# Patient Record
Sex: MALE | Race: ASIAN | NOT HISPANIC OR LATINO | URBAN - METROPOLITAN AREA
[De-identification: names, ages, dates, MRNs, and addresses within clinical notes are randomized per-mention and may not be internally consistent; named-entity substitution may affect disease eponyms.]

---
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
## 2020-01-02 ENCOUNTER — APPOINTMENT (OUTPATIENT)
Dept: HEMATOLOGY ONCOLOGY | Facility: CLINIC | Age: 20
End: 2020-01-02

## 2020-01-14 ENCOUNTER — OUTPATIENT (OUTPATIENT)
Dept: OUTPATIENT SERVICES | Facility: HOSPITAL | Age: 20
LOS: 1 days | Discharge: HOME | End: 2020-01-14

## 2020-01-14 DIAGNOSIS — C81.98 HODGKIN LYMPHOMA, UNSPECIFIED, LYMPH NODES OF MULTIPLE SITES: ICD-10-CM

## 2020-01-16 ENCOUNTER — RESULT REVIEW (OUTPATIENT)
Age: 20
End: 2020-01-16

## 2020-01-16 ENCOUNTER — APPOINTMENT (OUTPATIENT)
Dept: HEMATOLOGY ONCOLOGY | Facility: CLINIC | Age: 20
End: 2020-01-16

## 2020-01-16 LAB
BASOPHILS # BLD AUTO: 0 K/UL — SIGNIFICANT CHANGE UP (ref 0–0.2)
BASOPHILS NFR BLD AUTO: 0.3 % — SIGNIFICANT CHANGE UP (ref 0–2)
EOSINOPHIL # BLD AUTO: 0.3 K/UL — SIGNIFICANT CHANGE UP (ref 0–0.5)
EOSINOPHIL NFR BLD AUTO: 2.1 % — SIGNIFICANT CHANGE UP (ref 0–6)
HCT VFR BLD CALC: 41.1 % — SIGNIFICANT CHANGE UP (ref 39–50)
HGB BLD-MCNC: 13.3 G/DL — SIGNIFICANT CHANGE UP (ref 13–17)
LYMPHOCYTES # BLD AUTO: 13.1 % — SIGNIFICANT CHANGE UP (ref 13–44)
LYMPHOCYTES # BLD AUTO: 2 K/UL — SIGNIFICANT CHANGE UP (ref 1–3.3)
MCHC RBC-ENTMCNC: 25.9 PG — LOW (ref 27–34)
MCHC RBC-ENTMCNC: 32.5 G/DL — SIGNIFICANT CHANGE UP (ref 32–36)
MCV RBC AUTO: 79.9 FL — LOW (ref 80–100)
MONOCYTES # BLD AUTO: 0.6 K/UL — SIGNIFICANT CHANGE UP (ref 0–0.9)
MONOCYTES NFR BLD AUTO: 4.1 % — SIGNIFICANT CHANGE UP (ref 2–14)
NEUTROPHILS # BLD AUTO: 12 K/UL — HIGH (ref 1.8–7.4)
NEUTROPHILS NFR BLD AUTO: 80.4 % — HIGH (ref 43–77)
PLATELET # BLD AUTO: 308 K/UL — SIGNIFICANT CHANGE UP (ref 150–400)
RBC # BLD: 5.15 M/UL — SIGNIFICANT CHANGE UP (ref 4.2–5.8)
RBC # FLD: 13.3 % — SIGNIFICANT CHANGE UP (ref 10.3–14.5)
WBC # BLD: 15 K/UL — HIGH (ref 3.8–10.5)
WBC # FLD AUTO: 15 K/UL — HIGH (ref 3.8–10.5)

## 2020-01-16 NOTE — ASSESSMENT
[FreeTextEntry1] : The patient is a 18 y/o M with relapsed classical  Hodgkins lymphoma. He was being seen by Dr. Emery for treatment and was started on ICE- had day 1 of etoposide, experienced nausea/vomiting, he was admitted to have day 2 of treatment but then left AMA. He cancelled further appointments and did not want any further treatment despite numerous attempts to discuss with patient, mother, grandmother. He received ABVE at [facility]. Compliance was an issue then. Since he has had treatment with gemzar and brentuximab...with POD\par \par Biopsy on 9/23/19 showed evidence of dz. He was referred here for a consultation for a autoPSCT evaluation.\par Patient has a reputation of not being compliant with office visit as well as therapy. Patient is against receiving chemotherapy at this time. Therefore consideration for a evaluation of local radiation will be made followed by course of Brentuximab.\par \par Plan to schedule PET/CT in mid November 2019. \par Plan to refer for a radiation evaluation for localized therapy if cervical area is only site of disease..\par After XRT , the patient will receive Brentuximab every 3 weeks. Could consider Opdivo\par Upon receiving the medication, the patient will be monitored with f/u scans every 4 to 6  months.\par If his condition does not improve or worsens, chemotherapy will be reconsidered.\par To d/w Dr. Emery about further plan. \par Pt met with SW today.\par see me in 6 to 8 weeks\par Gravity of situation discussed

## 2020-01-16 NOTE — PHYSICAL EXAM
[Fully active, able to carry on all pre-disease performance without restriction] : Status 0 - Fully active, able to carry on all pre-disease performance without restriction [Normal] : affect appropriate [de-identified] : 1 cm cervical LN

## 2020-01-16 NOTE — ADDENDUM
[FreeTextEntry1] : Documented by Gissel Velázquez acting as a scribe for Dr. Keven Oneal on 01/16/2020.\par \par All medical record entries made by the Scribe were at my, Dr. Keven Oneal, direction and personally dictated by me on 01/16/2020. I have reviewed the chart and agree that  the record accurately reflects my personal performance of the history, physical exam, assessment and plan. I have also personally directed, reviewed, and agree with the discharge instructions.\par

## 2020-01-16 NOTE — HISTORY OF PRESENT ILLNESS
[de-identified] : Mr. Lai is a 19 year old male who is referred by Dr. Kalie Emery for a consultation. Recent PET/CT and bx of the left neck mass shows evidence of  relapsed classical Hodgkins lymphoma.\par Patient initially presented to the [facility] ED on 9/4/18 from [facility] with a mediastinal mass. Upon work up he was found to have Stage IVB disease. Ultrasound guided right supraclavicular lymph node Classical Hodgkins Lymphoma- large atypical cells to be CD30, CD15, PAX5+, MUM1+, LCA(-), CD20(-), CD79a(-), CD3(-), ALK(-).\par Bilateral bone marrow biopsy on 9/6/18- Cellular marrow with maturing trilineage hematopoiesis and no morphologic evidence of lymphoma\par PET/CT completed on 9/10/18- Hypermetabolic lymphadenopathy in neck and thorax, multiple hypermetabolic foci in the bilateral lower cervical and supraclavicular regions, measuring 0.9 x 0.7 cm demonstrates SUV 4.3 anterior mediastinal mass measuring 4.2 x 3 cm demonstrating peripheral FDG avidity and central photopenia, SUV: 6.9. Marked mediastinal and bilateral hilar lymphadenopathy demonstrating FDG avidity, right hilar lymph node measures 3.8 x 2.4 cm, SUV: 10.3. 5 mm nodule is again noted in the right middle lobe. Several hypermetabolic foci identified predominantly throughout the axial skeleton with foci noted scattered throughout the upper thoracic spine and a singular focus in the right anterior eighth rib FDG avid focus in the right side of the T7 vertebral body with corresponding lucency on CT measuring 0.9 x 0.9 cm, SUV 14.7. Two hypermetabolic foci in the appendicular skeleton with corresponding underlying lucency on CT. There is an FDG avid focus in the left inferior glenoid and a corresponding lucency on CT measuring 0.9 x 0.7 cm, SUV 12. There is also a 1.1 x 0.7 cm lucency in the right femoral head with corresponding FDG avidity, SUV 7.3. He was placed on/following a protocol- [facility] 1331, treatment with ABVE-PC (adriamycin, bleomycin, vincristine, etoposide, cytoxan, prednisone, dexrazoxane). He was withdrawn from the study due to his social situation, difficulty with compliance. 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 He was treated with chemotherapy from Sept 2018- Dec 2018. Interim PET/CT completed on 11/3/18- Partially hypermetabolic anterior mediastinal mass is decreased in \par size and metabolism as compared to prior study dated 9/10/2018, compatible with a partial response to interval therapy (Deauville 4). Resolution of additional previously seen hypermetabolic mediastinal, hilar, and cardiophrenic lymph nodes.Marked interval decrease in hypermetabolic foci in the axial and appendicular skeleton with residual hypermetabolic focus in left glenoid which may represent a small focus of residual disease (Deauville 4). He completed 5 cycles of chemotherapy, but missed about 10 appointments, and refused further chemotherapy after cycle 5 due to side effects. He underwent MRI Neck, Abdomen, and Pelvis from 2/2019 showed new lymph node enlargement to the L neck and redemonstrated osseous infiltration involving the bilateral pelvic bones and proximal femurs. CT Chest from 3/2019 showed mediastinal enlarged lymph nodes which were either unchanged or demonstrate interval progression since 11/2018, progressive R hilar lymphadenopathy, and a new cluster of small nodular opacities within the right lower lobe. PET Scan done 3/2019 revealed a new hypermetabolic lymphadenopathy in the left neck measuring approximately 2.4 x 2.1cm in the left level III/V cervical regions. There is also new hypermetabolic 1.2 x 0.8 cm left level VB cervical lymph node and chest new hypermetabolic approximately 1.1 x 0.9 cm right upper paratracheal node. Reference approximately 3.4 x 2.0 cm subcarinal node. Approximately 3.8 x 3.1cm right perihilar mass. as compared to PET/CT from 11/3/2018 suspicious for recurrent lymphoma. A nonspecific new diffuse splenic hypermetabolism. A new hypermetabolic opacity/consolidation in the right lower lung, approximately 2 x 1.3cm opacity/consolidation in the superior segment of the right lower lobe. Ultrasound guided core biopsy of left cervical lymph node done on 4/11/19- confirmed refractory disease. Bone marrow biopsy was negative. \par \par  [de-identified] : The patient is here today for a follow-up consultation for Auto-PSCT. Patient was being treated by Dr. Emery and was started on ICE- had day 1 of etoposide, experienced nausea/vomiting, he was admitted to have day 2 of treatment but then left AMA. He cancelled further appointments and did not want any further treatment despite numerous attempts to discuss with patient, mother, grandmother.........A recent PET/CT and bx of a left neck lymph node shows evidence of relapsed HL. He is doing well overall and offers no acute sxs. Denies mouth sores, eye dryness, blurred vision, nausea, vomiting, diarrhea. No CP, SOB or LE edema. He c/o a lump on the left side of his neck. He stresses that he is not fond of further treatment with chemotherapy.\par \par \par \par \par \par

## 2020-01-28 ENCOUNTER — APPOINTMENT (OUTPATIENT)
Dept: HEMATOLOGY ONCOLOGY | Facility: CLINIC | Age: 20
End: 2020-01-28
Payer: MEDICAID

## 2020-01-28 ENCOUNTER — RESULT REVIEW (OUTPATIENT)
Age: 20
End: 2020-01-28

## 2020-01-28 VITALS
DIASTOLIC BLOOD PRESSURE: 65 MMHG | OXYGEN SATURATION: 100 % | HEART RATE: 77 BPM | SYSTOLIC BLOOD PRESSURE: 99 MMHG | WEIGHT: 97.86 LBS | TEMPERATURE: 98 F | RESPIRATION RATE: 16 BRPM

## 2020-01-28 PROCEDURE — 99215 OFFICE O/P EST HI 40 MIN: CPT

## 2020-01-29 LAB
ALBUMIN SERPL ELPH-MCNC: 4.5 G/DL
ALP BLD-CCNC: 96 U/L
ALT SERPL-CCNC: 16 U/L
ANION GAP SERPL CALC-SCNC: 15 MMOL/L
AST SERPL-CCNC: 12 U/L
BILIRUB SERPL-MCNC: 0.5 MG/DL
BUN SERPL-MCNC: 15 MG/DL
CALCIUM SERPL-MCNC: 9.9 MG/DL
CHLORIDE SERPL-SCNC: 100 MMOL/L
CO2 SERPL-SCNC: 26 MMOL/L
CREAT SERPL-MCNC: 0.76 MG/DL
GLUCOSE SERPL-MCNC: 80 MG/DL
LDH SERPL-CCNC: 141 U/L
POTASSIUM SERPL-SCNC: 4.2 MMOL/L
PROT SERPL-MCNC: 8.2 G/DL
SODIUM SERPL-SCNC: 141 MMOL/L

## 2020-01-31 ENCOUNTER — TRANSCRIPTION ENCOUNTER (OUTPATIENT)
Age: 20
End: 2020-01-31

## 2020-01-31 ENCOUNTER — INPATIENT (INPATIENT)
Facility: HOSPITAL | Age: 20
LOS: 0 days | Discharge: LEFT BEFORE DISCHARGE | End: 2020-01-31
Attending: INTERNAL MEDICINE | Admitting: INTERNAL MEDICINE
Payer: MEDICAID

## 2020-01-31 VITALS
WEIGHT: 95.9 LBS | HEIGHT: 67.72 IN | TEMPERATURE: 98 F | SYSTOLIC BLOOD PRESSURE: 107 MMHG | OXYGEN SATURATION: 99 % | DIASTOLIC BLOOD PRESSURE: 63 MMHG | HEART RATE: 79 BPM | RESPIRATION RATE: 17 BRPM

## 2020-01-31 DIAGNOSIS — C81.70 OTHER HODGKIN LYMPHOMA, UNSPECIFIED SITE: ICD-10-CM

## 2020-01-31 DIAGNOSIS — C81.90 HODGKIN LYMPHOMA, UNSPECIFIED, UNSPECIFIED SITE: ICD-10-CM

## 2020-01-31 PROCEDURE — 99221 1ST HOSP IP/OBS SF/LOW 40: CPT

## 2020-01-31 RX ORDER — SODIUM CHLORIDE 9 MG/ML
1000 INJECTION INTRAMUSCULAR; INTRAVENOUS; SUBCUTANEOUS
Refills: 0 | Status: DISCONTINUED | OUTPATIENT
Start: 2020-01-31 | End: 2020-01-31

## 2020-01-31 RX ORDER — ACETAMINOPHEN 500 MG
650 TABLET ORAL EVERY 6 HOURS
Refills: 0 | Status: DISCONTINUED | OUTPATIENT
Start: 2020-01-31 | End: 2020-01-31

## 2020-01-31 RX ORDER — LIDOCAINE AND PRILOCAINE CREAM 25; 25 MG/G; MG/G
1 CREAM TOPICAL ONCE
Refills: 0 | Status: DISCONTINUED | OUTPATIENT
Start: 2020-01-31 | End: 2020-01-31

## 2020-01-31 NOTE — DISCHARGE NOTE PROVIDER - CARE PROVIDER_API CALL
Keven Oneal)  Medical Oncology  [address] [place] [address]  Phone: [phone]  Fax: [phone]  Follow Up Time:

## 2020-01-31 NOTE — DISCHARGE NOTE NURSING/CASE MANAGEMENT/SOCIAL WORK - PATIENT PORTAL LINK FT
You can access the FollowMyHealth Patient Portal offered by [facility] by registering at the following website: [email or web address]. By joining [facility] FollowMyHealth portal, you will also be able to view your health information using other applications (apps) compatible with our system.

## 2020-01-31 NOTE — H&P ADULT - HISTORY OF PRESENT ILLNESS
20 y/o M with relapsed classical Hodgkins lymphoma  admitted for cycle 1 ICE with plan for auto SCT     Patient was initially diagnosed with stage IV CHL in 9/2018 and treated with ABVE-PC completed 12/2018   after 5 cycles due to noncompliance and patient refusal. Relapsed in 2/2019 and started on salvage treatment in   4/2019 with gemzar/Brentuximab which was also stopped secondary to patients refusal and noncompliance.    It was recommended that the patient receive 2 cycles of ICE followed by an auto SCT. Patient started cycle 1 with ICE  , received day 1 etoposide, experienced nausea/vomiting. Admitted for remainder of chemotherapy treatment but left AMA    PET on  12/19/19 revealed POD. Patient is now admitted for cycle 1 ICE with plan for Auto SCT. 20 y/o M with relapsed classical Hodgkins lymphoma  admitted for cycle 1 ICE with plan for auto SCT.   Patient vomited on admission.     Patient was initially diagnosed with stage IV CHL in 9/2018 and treated with ABVE-PC completed 12/2018   after 5 cycles due to noncompliance and patient refusal. Relapsed in 2/2019 and started on salvage treatment in   4/2019 with gemzar/Brentuximab which was also stopped secondary to patients refusal and noncompliance.    It was recommended that the patient receive 2 cycles of ICE followed by an auto SCT. Patient started cycle 1 with ICE  , received day 1 etoposide, experienced nausea/vomiting. Admitted for remainder of chemotherapy treatment but left AMA    PET on  12/19/19 revealed POD. Patient is now admitted for cycle 1 ICE with plan for Auto SCT.

## 2020-01-31 NOTE — H&P ADULT - PROBLEM SELECTOR PLAN 1
Patient admitted to [address] [place] [address].  After a brief interview with the patient, he decided he did not want to stay in the hospital for his chemotherapy. Attempted to encourage patient to stay and discussed the consequences of not being treated.   Social work intervention provided.  Dr Oneal notified of patients intent to leave.  AMA papers provided to patient for signature.

## 2020-01-31 NOTE — DISCHARGE NOTE PROVIDER - HOSPITAL COURSE
20 y/o M with relapsed classical Hodgkins lymphoma    admitted for cycle 1 ICE with plan for auto SCT. PAtient vomited upon entry to his hospital room. Offered antiemetics, but refused. Brief interview with patient when he got to the floor. After a few minutes of being in the hospital, the patient decided he did not want to stay to receive his chemotherapy. Attempt made to encourage patient to stay. Social work intervention provided. Patient decided to sign out AMA. Paper work signed. Dr Oneal notificed of patients intent.

## 2020-01-31 NOTE — DISCHARGE NOTE PROVIDER - NSDCCPCAREPLAN_GEN_ALL_CORE_FT
PRINCIPAL DISCHARGE DIAGNOSIS  Diagnosis: Hodgkins lymphoma in relapse  Assessment and Plan of Treatment: Hodgkins lymphoma in relapse  Please make an appointment to follow up with your doctor.

## 2020-01-31 NOTE — H&P ADULT - NSHPLABSRESULTS_GEN_ALL_CORE
CBC for Oncology (01.28.20 @ 14:07)    WBC Count: 17.7 K/uL    RBC Count: 5.19 M/uL    Hemoglobin: 13.7 g/dL    Hematocrit: 41.4 %    Mean Cell Volume: 79.8 fl    Mean Cell Hemoglobin: 26.5 pg    Mean Cell Hemoglobin Conc: 33.2 g/dL    Red Cell Distrib Width: 14.0 %    Platelet Count - Automated: 287 K/uL

## 2020-02-03 NOTE — RESULTS/DATA
[FreeTextEntry1] : PET/CT Skull to Thigh (12/19/19): 1. Extensive physiologic brown adipose tissue in neck, thorax, and upper abdomen, limits evaluation. There is difficult to delineate hypermetabolic lymphadenopathy in neck and thorax, new as compared to prior study from 7/13/2019, compatible with progression of lymphoma. Contrast enhanced CT is recommended to further delineate lymphadenopathy. FDG PET/CT may be repeated following pretreatment with a benzodiazepine and warming, if clinically indicated. 2. Multiple new small nodular pulmonary opacities throughout right lung are nonspecific. The largest opacities are FDG-avid. Differential diagnosis includes infection, inflammation, and lymphoma. Please correlate clinically and follow-up with dedicated CT. 3. Nonspecific symmetric tonsillar hypermetabolism, decreased as compared to prior study.

## 2020-02-03 NOTE — ASSESSMENT
[FreeTextEntry1] : The patient is a 20 y/o M with relapsed classical  Hodgkins lymphoma. He was being seen by Dr. Emery for treatment and was started on ICE- had day 1 of etoposide, experienced nausea/vomiting, he was admitted to have day 2 of treatment but then left AMA. He cancelled further appointments and did not want any further treatment despite numerous attempts to discuss with patient, mother, grandmother. He received ABVE at [facility]. Compliance was an issue then. Since he has had treatment with gemzar and brentuximab...with POD\par Biopsy on 9/23/19 showed evidence of dz. He was referred here for a consultation for a autoPSCT evaluation.\par Patient has a reputation of not being compliant with office visit as well as therapy. He has had further POD on scan. Gravity of situation discussed. Pt left from appt last week w/o being seen. Compliance is a major issue. He is not following up as he needs to. \par \par I have had a long discussion with the patient regarding the risks, benefits, alternatives and logistics to autologous stem cell transplantation. I have reviewed with the patient the success rates with various treatments including chemotherapy only as well as chemotherapy and autologous stem cell transplant. \par \par I have also reviewed the pre-transplant testing for vital organ testing including, but not limited to echocardiogram, MUGA, PFTs, urine collection, bone marrow biopsy, sinus x-rays, dental evaluation and liver function.\par \par I have reviewed with the patient the role of Neupogen/Mozobil for 5 days prior to collecting stem cells. I have also discussed the process of apheresis as a way to collect the peripheral stem cells. Anticoagulation with Citrate during apheresis was discussed, along with side effects including but not limited to hypocalcemia mild dysesthesias (most common) to tetany, seizures and cardiac arrhythmias. Treatment with oral or intravenous calcium supplementation in the setting of hypocalcemia was also discussed. \par \par I have discussed with the patient the pre-administration of Kepivance IV x 3 days, as a GI prophylaxis with the aim to lessen swelling, irritation, sores, and ulcers in the GI tract caused by high dose chemotherapy. Side effects including but not limited to flushing, itching from Kepivance were also discussed. \par \par I reviewed with the patient the process of autologous stem cell transplant. I have reviewed the four week admission in the hospital including initial chemotherapy, lasting for 6 days, followed by a 1-2 day rest period, followed by the transplant via a triple lumen catheter. I reviewed post-transplant expectations and possible need for supportive care in the post transplant state as well as post-transplant chemotherapy. I instructed the patient, he may require various transfusions including, but not limited to, PRBCs, platelets, fluids, electrolytes, etc.\par \par I have also discussed the need for antifungal, antiviral, antibiotics, and other therapies to keep the patient comfortable in the 6-9 months post transplant and discharge. Possible risks were discussed including infection, bleeding, inflammation in bowel, inflammation in the kidneys and livers. \par \par Post-discharge instructions were reviewed including diet control, crowd controls for ideally 6 weeks post transplant. I also discussed common complaints in the post-discharge state including fatigue, weakness, and supportive care. The patient has a supportive environment at home. I have also reviewed treatment options if the transplant is not effective. \par \par All questions were answered, patient has verbalized understanding. Emotional support provided. Literature and consents were provided for the patient for their review. Patient to consider options.\par \par Plan for ICE treatment prior to Auto-PSCT which would consist of three days in the hospital every 2-3 weeks, aiming for three sessions of chemotherapy. After second session of ICE, would repeat scans to  response to treatment. After third session of ICE, would plan to collect stem cells. Plan for admission for first session on 1/30 or 1/31. Plan for pretesting.\par Patient will receive Neulasta shots and will have blood work monitored twice every week off from chemotherapy.\par Reviewed PET/CT Skull to Thigh (12/19/19) and discussed with patient - worsening disease.\par Gravity of situation discussed\par Will continue discussing plan of care with Dr. Emery.\par Patient requests that his mother be called at [phone] when the hospital is ready to admit him for ICE sessions.\par Ordered Emla cream for port-access on 1/28/2020. Ordered Oxycodone 5mg q 6 hours for 5 days for pain on 1/28/2020. Recommend taking Advil or Tylenol for pain as well.\par Labs sent for CMP today.\par RTC for f/u with Delores or ANIVAL Molina on 2/6. Neulasta shots to be given on 2/3 and 2/6.\par Compliance stressed...

## 2020-02-03 NOTE — REVIEW OF SYSTEMS
[Recent Change In Weight] : ~T recent weight change [Negative] : Integumentary [FreeTextEntry9] : pain of shoulder blades [FreeTextEntry2] : weight loss [de-identified] : enlarged cervical LNs

## 2020-02-03 NOTE — HISTORY OF PRESENT ILLNESS
[de-identified] : Mr. Lai is a 19 year old male who is referred by Dr. Kalie Emery for a consultation. Recent PET/CT and bx of the left neck mass shows evidence of  relapsed classical Hodgkins lymphoma.\par Patient initially presented to the [facility] ED on 9/4/18 from [facility] with a mediastinal mass. Upon work up he was found to have Stage IVB disease. Ultrasound guided right supraclavicular lymph node Classical Hodgkins Lymphoma- large atypical cells to be CD30, CD15, PAX5+, MUM1+, LCA(-), CD20(-), CD79a(-), CD3(-), ALK(-).\par Bilateral bone marrow biopsy on 9/6/18- Cellular marrow with maturing trilineage hematopoiesis and no morphologic evidence of lymphoma\par PET/CT completed on 9/10/18- Hypermetabolic lymphadenopathy in neck and thorax, multiple hypermetabolic foci in the bilateral lower cervical and supraclavicular regions, measuring 0.9 x 0.7 cm demonstrates SUV 4.3 anterior mediastinal mass measuring 4.2 x 3 cm demonstrating peripheral FDG avidity and central photopenia, SUV: 6.9. Marked mediastinal and bilateral hilar lymphadenopathy demonstrating FDG avidity, right hilar lymph node measures 3.8 x 2.4 cm, SUV: 10.3. 5 mm nodule is again noted in the right middle lobe. Several hypermetabolic foci identified predominantly throughout the axial skeleton with foci noted scattered throughout the upper thoracic spine and a singular focus in the right anterior eighth rib FDG avid focus in the right side of the T7 vertebral body with corresponding lucency on CT measuring 0.9 x 0.9 cm, SUV 14.7. Two hypermetabolic foci in the appendicular skeleton with corresponding underlying lucency on CT. There is an FDG avid focus in the left inferior glenoid and a corresponding lucency on CT measuring 0.9 x 0.7 cm, SUV 12. There is also a 1.1 x 0.7 cm lucency in the right femoral head with corresponding FDG avidity, SUV 7.3. He was placed on/following a protocol- [facility], treatment with ABVE-PC (adriamycin, bleomycin, vincristine, etoposide, cytoxan, prednisone, dexrazoxane). He was withdrawn from the study due to his social situation, difficulty with compliance. 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 He was treated with chemotherapy from Sept 2018- Dec 2018. Interim PET/CT completed on 11/3/18- Partially hypermetabolic anterior mediastinal mass is decreased in \par size and metabolism as compared to prior study dated 9/10/2018, compatible with a partial response to interval therapy (Deauville 4). Resolution of additional previously seen hypermetabolic mediastinal, hilar, and cardiophrenic lymph nodes.Marked interval decrease in hypermetabolic foci in the axial and appendicular skeleton with residual hypermetabolic focus in left glenoid which may represent a small focus of residual disease (Deauville 4). He completed 5 cycles of chemotherapy, but missed about 10 appointments, and refused further chemotherapy after cycle 5 due to side effects. He underwent MRI Neck, Abdomen, and Pelvis from 2/2019 showed new lymph node enlargement to the L neck and redemonstrated osseous infiltration involving the bilateral pelvic bones and proximal femurs. CT Chest from 3/2019 showed mediastinal enlarged lymph nodes which were either unchanged or demonstrate interval progression since 11/2018, progressive R hilar lymphadenopathy, and a new cluster of small nodular opacities within the right lower lobe. PET Scan done 3/2019 revealed a new hypermetabolic lymphadenopathy in the left neck measuring approximately 2.4 x 2.1cm in the left level III/V cervical regions. There is also new hypermetabolic 1.2 x 0.8 cm left level VB cervical lymph node and chest new hypermetabolic approximately 1.1 x 0.9 cm right upper paratracheal node. Reference approximately 3.4 x 2.0 cm subcarinal node. Approximately 3.8 x 3.1cm right perihilar mass. as compared to PET/CT from 11/3/2018 suspicious for recurrent lymphoma. A nonspecific new diffuse splenic hypermetabolism. A new hypermetabolic opacity/consolidation in the right lower lung, approximately 2 x 1.3cm opacity/consolidation in the superior segment of the right lower lobe. Ultrasound guided core biopsy of left cervical lymph node done on 4/11/19- confirmed refractory disease. Bone marrow biopsy was negative. \par \par  [de-identified] : The patient is here today for a follow-up consultation for Auto-PSCT. Patient is accompanied by his mother. He has lost a few lbs. He c/o enlarged cervical lymph nodes and pain of his shoulder blades. Denies fever/chills, night sweats, mouth sores, eye dryness, blurred vision, nausea, vomiting, diarrhea. No CP, SOB or LE edema. He requests the use of Emla numbing cream when his port is being accessed. He expresses interest in beginning chemotherapy ASAP. Progression of disease dicsussed on recent scan

## 2020-02-03 NOTE — ADDENDUM
[FreeTextEntry1] : Documented by Gissel Velázquez acting as a scribe for Dr. Keven Oneal on 01/28/2020.\par \par All medical record entries made by the Scribe were at my, Dr. Keven Oneal, direction and personally dictated by me on 01/28/2020. I have reviewed the chart and agree that  the record accurately reflects my personal performance of the history, physical exam, assessment and plan. I have also personally directed, reviewed, and agree with the discharge instructions.

## 2020-02-03 NOTE — PHYSICAL EXAM
[Fully active, able to carry on all pre-disease performance without restriction] : Status 0 - Fully active, able to carry on all pre-disease performance without restriction [Normal] : affect appropriate [de-identified] : 1 cm cervical LN

## 2020-02-04 ENCOUNTER — APPOINTMENT (OUTPATIENT)
Dept: INFUSION THERAPY | Facility: HOSPITAL | Age: 20
End: 2020-02-04

## 2020-02-06 ENCOUNTER — APPOINTMENT (OUTPATIENT)
Dept: HEMATOLOGY ONCOLOGY | Facility: CLINIC | Age: 20
End: 2020-02-06

## 2020-02-11 ENCOUNTER — APPOINTMENT (OUTPATIENT)
Dept: HEMATOLOGY ONCOLOGY | Facility: CLINIC | Age: 20
End: 2020-02-11

## 2020-02-19 ENCOUNTER — EMERGENCY (EMERGENCY)
Age: 20
LOS: 1 days | Discharge: LEFT BEFORE DISCHARGE | End: 2020-02-19
Attending: EMERGENCY MEDICINE | Admitting: EMERGENCY MEDICINE
Payer: MEDICAID

## 2020-02-19 VITALS
HEART RATE: 82 BPM | TEMPERATURE: 99 F | OXYGEN SATURATION: 100 % | SYSTOLIC BLOOD PRESSURE: 90 MMHG | WEIGHT: 97.78 LBS | DIASTOLIC BLOOD PRESSURE: 60 MMHG | RESPIRATION RATE: 18 BRPM

## 2020-02-19 VITALS
SYSTOLIC BLOOD PRESSURE: 106 MMHG | RESPIRATION RATE: 14 BRPM | OXYGEN SATURATION: 100 % | DIASTOLIC BLOOD PRESSURE: 55 MMHG | TEMPERATURE: 99 F | HEART RATE: 80 BPM

## 2020-02-19 LAB
ALBUMIN SERPL ELPH-MCNC: 3.8 G/DL — SIGNIFICANT CHANGE UP (ref 3.3–5)
ALP SERPL-CCNC: 90 U/L — SIGNIFICANT CHANGE UP (ref 60–270)
ALT FLD-CCNC: 14 U/L — SIGNIFICANT CHANGE UP (ref 4–41)
ANION GAP SERPL CALC-SCNC: 12 MMO/L — SIGNIFICANT CHANGE UP (ref 7–14)
APTT BLD: 38.8 SEC — HIGH (ref 27.5–36.3)
AST SERPL-CCNC: 14 U/L — SIGNIFICANT CHANGE UP (ref 4–40)
BASE EXCESS BLDV CALC-SCNC: 3.9 MMOL/L — SIGNIFICANT CHANGE UP
BASOPHILS # BLD AUTO: 0.05 K/UL — SIGNIFICANT CHANGE UP (ref 0–0.2)
BASOPHILS NFR BLD AUTO: 0.3 % — SIGNIFICANT CHANGE UP (ref 0–2)
BILIRUB SERPL-MCNC: 0.2 MG/DL — SIGNIFICANT CHANGE UP (ref 0.2–1.2)
BLOOD GAS VENOUS - CREATININE: SIGNIFICANT CHANGE UP MG/DL (ref 0.5–1.3)
BUN SERPL-MCNC: 14 MG/DL — SIGNIFICANT CHANGE UP (ref 7–23)
CALCIUM SERPL-MCNC: 9.3 MG/DL — SIGNIFICANT CHANGE UP (ref 8.4–10.5)
CHLORIDE BLDV-SCNC: 104 MMOL/L — SIGNIFICANT CHANGE UP (ref 96–108)
CHLORIDE SERPL-SCNC: 101 MMOL/L — SIGNIFICANT CHANGE UP (ref 98–107)
CO2 SERPL-SCNC: 26 MMOL/L — SIGNIFICANT CHANGE UP (ref 22–31)
CREAT SERPL-MCNC: 0.63 MG/DL — SIGNIFICANT CHANGE UP (ref 0.5–1.3)
EOSINOPHIL # BLD AUTO: 0.42 K/UL — SIGNIFICANT CHANGE UP (ref 0–0.5)
EOSINOPHIL NFR BLD AUTO: 2.2 % — SIGNIFICANT CHANGE UP (ref 0–6)
GAS PNL BLDV: 140 MMOL/L — SIGNIFICANT CHANGE UP (ref 136–146)
GLUCOSE BLDV-MCNC: 93 MG/DL — SIGNIFICANT CHANGE UP (ref 70–99)
GLUCOSE SERPL-MCNC: 104 MG/DL — HIGH (ref 70–99)
HCO3 BLDV-SCNC: 26 MMOL/L — SIGNIFICANT CHANGE UP (ref 20–27)
HCT VFR BLD CALC: 41.1 % — SIGNIFICANT CHANGE UP (ref 39–50)
HCT VFR BLDV CALC: 35.5 % — LOW (ref 39–51)
HGB BLD-MCNC: 12.3 G/DL — LOW (ref 13–17)
HGB BLDV-MCNC: 11.5 G/DL — LOW (ref 13–17)
IMM GRANULOCYTES NFR BLD AUTO: 0.5 % — SIGNIFICANT CHANGE UP (ref 0–1.5)
INR BLD: 1.39 — HIGH (ref 0.88–1.17)
LACTATE BLDV-MCNC: 0.8 MMOL/L — SIGNIFICANT CHANGE UP (ref 0.5–2)
LDH SERPL L TO P-CCNC: 1474 U/L — HIGH (ref 135–225)
LYMPHOCYTES # BLD AUTO: 11.1 % — LOW (ref 13–44)
LYMPHOCYTES # BLD AUTO: 2.14 K/UL — SIGNIFICANT CHANGE UP (ref 1–3.3)
MCHC RBC-ENTMCNC: 24.2 PG — LOW (ref 27–34)
MCHC RBC-ENTMCNC: 29.9 % — LOW (ref 32–36)
MCV RBC AUTO: 80.9 FL — SIGNIFICANT CHANGE UP (ref 80–100)
MONOCYTES # BLD AUTO: 0.72 K/UL — SIGNIFICANT CHANGE UP (ref 0–0.9)
MONOCYTES NFR BLD AUTO: 3.7 % — SIGNIFICANT CHANGE UP (ref 2–14)
NEUTROPHILS # BLD AUTO: 15.82 K/UL — HIGH (ref 1.8–7.4)
NEUTROPHILS NFR BLD AUTO: 82.2 % — HIGH (ref 43–77)
NRBC # FLD: 0 K/UL — SIGNIFICANT CHANGE UP (ref 0–0)
PCO2 BLDV: 58 MMHG — HIGH (ref 41–51)
PH BLDV: 7.33 PH — SIGNIFICANT CHANGE UP (ref 7.32–7.43)
PHOSPHATE SERPL-MCNC: 4.2 MG/DL — SIGNIFICANT CHANGE UP (ref 2.5–4.5)
PLATELET # BLD AUTO: 361 K/UL — SIGNIFICANT CHANGE UP (ref 150–400)
PMV BLD: 9.1 FL — SIGNIFICANT CHANGE UP (ref 7–13)
PO2 BLDV: < 24 MMHG — LOW (ref 35–40)
POTASSIUM BLDV-SCNC: 4 MMOL/L — SIGNIFICANT CHANGE UP (ref 3.4–4.5)
POTASSIUM SERPL-MCNC: 4.2 MMOL/L — SIGNIFICANT CHANGE UP (ref 3.5–5.3)
POTASSIUM SERPL-SCNC: 4.2 MMOL/L — SIGNIFICANT CHANGE UP (ref 3.5–5.3)
PROT SERPL-MCNC: 8.4 G/DL — HIGH (ref 6–8.3)
PROTHROM AB SERPL-ACNC: 15.6 SEC — HIGH (ref 9.8–13.1)
RBC # BLD: 5.08 M/UL — SIGNIFICANT CHANGE UP (ref 4.2–5.8)
RBC # FLD: 15.2 % — HIGH (ref 10.3–14.5)
SAO2 % BLDV: 36.6 % — LOW (ref 60–85)
SODIUM SERPL-SCNC: 139 MMOL/L — SIGNIFICANT CHANGE UP (ref 135–145)
URATE SERPL-MCNC: 3.9 MG/DL — SIGNIFICANT CHANGE UP (ref 3.4–8.8)
WBC # BLD: 19.24 K/UL — HIGH (ref 3.8–10.5)
WBC # FLD AUTO: 19.24 K/UL — HIGH (ref 3.8–10.5)

## 2020-02-19 PROCEDURE — 99285 EMERGENCY DEPT VISIT HI MDM: CPT

## 2020-02-19 RX ORDER — SODIUM CHLORIDE 9 MG/ML
1350 INJECTION, SOLUTION INTRAVENOUS ONCE
Refills: 0 | Status: COMPLETED | OUTPATIENT
Start: 2020-02-19 | End: 2020-02-19

## 2020-02-19 RX ADMIN — SODIUM CHLORIDE 1350 MILLILITER(S): 9 INJECTION, SOLUTION INTRAVENOUS at 20:57

## 2020-02-19 NOTE — ED PROVIDER NOTE - PROGRESS NOTE DETAILS
Elizabeth Goldberger PGY-3: discussed case w hem-onc, will see pt in morning. Recommend checking TLS labs and admit to medicine for chemo Elizabeth Goldberger PGY-3: pt verbally abusive to staff.  Attempted to deescalate situation but pt demanding to leave. Upset that his IV pump beeping and that his door was closed (as pt on droplet precautions since RVP sent). Had nursing fix pump and explained reason for door being closed. Discussed importance of admission as pt has acutely life-threatening condition (arrived hypotensive w/ some improvement after fluid started) and can be treated but pt has been refusing. Emphasized that leaving hospital could lead to death at home and he understands and refuses to stay in hospital in spite of numerous attempts and explanations as to why necessary. No signs intoxication. Will AMA Elizabeth Goldberger PGY-3: pt verbally abusive to staff.  Attempted to deescalate situation but pt demanding to leave. Upset that his IV pump beeping and that his door was closed (as pt on droplet precautions since RVP sent). Had nursing fix pump and explained reason for door being closed. Discussed importance of admission as pt has acutely life-threatening condition (arrived hypotensive w/ some improvement after fluid started) and can be treated but pt has been refusing. Emphasized that leaving hospital could lead to death at home and he understands and refuses to stay in hospital in spite of numerous attempts and explanations as to why necessary. States will call his doctors and try to arrange chemo himself (though was assured that would likely be told to return to hospital for it). No signs intoxication. Will AMA

## 2020-02-19 NOTE — ED ADULT NURSE NOTE - OBJECTIVE STATEMENT
Pt presenting to room 8 AxOx4 ambulatory at baseline with c.o left sided neck mass, N/V. PMH of lymphoma. On arrival to ED pt's breathing is even and unlabored. Palor/diaphoresis not noted. Pt appears comfortable, no active vomiting noted. Pt denies CP, SOB, difficulty breathing/swallowing, fevers, chills. IV established with 20g in RAC, labs drawn and sent, MD at bedside, will continue to monitor.

## 2020-02-19 NOTE — ED PROVIDER NOTE - OBJECTIVE STATEMENT
18 y/o M with relapsed classical Hodgkins lymphoma here today c/o nausea, low-grade temp (Tm 100), and dry cough. Also w/ left neck swelling though has been present for months. Pt has not had chemo for 8 mos because refused to receive. Denies vomiting in contrast to triage note "except when somewhere dirty" but not recently. No diarrhea. No travel or sick contacts. No urinary sx. Presented to [facility] 1/31 and was admitted w plan for cycle 1 ICE + auto SCT but pt left AMA immediately after admission.

## 2020-02-19 NOTE — ED PROVIDER NOTE - ATTENDING CONTRIBUTION TO CARE
Agree with above, pt very well appearing despite labs and BP, neck mass enlarging but no airway compromise. Plan was for admission for treatment however patient decided to AMA as he has done on prior admissions to [facility] and other hospitals. Pt states he finds hospitals dirty and was also complaining about machine beeping. Discussion had at length with patient regarding the fact that his lymphoma is potentially treatable and that if he continues to refuse care he will die. Pt is aware and states he will seek care at another hospital. He was able to repeat back risk of leaving and has decisional capacity at this time. Pt AMAed.

## 2020-02-19 NOTE — ED STATDOCS - OBJECTIVE STATEMENT
I performed a medical screening examination and determined this patient to be medically stable and will transfer to the [facility] adult ED for further care. heart and lung exam done and both did not reveal concerns for immediate intercvention.    stage iv hodgkins under adult care for past 8 months.  here with left sided neck pain, left sided back pain, malaise. I performed a medical screening examination and determined this patient to be medically stable and will transfer to the [facility] adult ED for further care. heart and lung exam done and both did not reveal concerns for immediate intervention.    stage iv hodgkins under adult oncology (non-compliant and refusing chemo) past 8 months.  here with left sided neck pain, left sided back pain, malaise.  non toxic, stable, well perfused.  signed out to adult EM Tammi.

## 2020-02-19 NOTE — ED PROVIDER NOTE - NSFOLLOWUPINSTRUCTIONS_ED_ALL_ED_FT
You were seen in the emergency department for low blood pressure. Please follow up with your hematologist-oncologist tomorrow. Return to the emergency department immediately if you experience fever, difficulty breathing, lightheadedness or any other concerning symptoms.

## 2020-02-19 NOTE — ED PEDIATRIC TRIAGE NOTE - CHIEF COMPLAINT QUOTE
Pt presents BIBA for general malaise, chills, night sweats and lymph mass on left side of the neck, pt reports lymph mass is tender, pt reports nausea and vomiting at home, hx of non hodgkin's lymphoma stage 4, vascular access port in left chest wall

## 2020-02-19 NOTE — ED PROVIDER NOTE - CLINICAL SUMMARY MEDICAL DECISION MAKING FREE TEXT BOX
18 y/o M with relapsed classical Hodgkins lymphoma here today c/o nausea, low-grade temp (Tm 100), and dry cough. Pt appears NAD, bp 80s/40s w otherwise normal VS. C/f occult sepsis c/b malignancy or URI w poor po intake. Plan for labs, IVF, CXR, d/w hemonc likely admit

## 2020-02-19 NOTE — ED PROVIDER NOTE - PATIENT PORTAL LINK FT
You can access the FollowMyHealth Patient Portal offered by [facility] by registering at the following website: [email or web address]. By joining [employer]’s FollowMyHealth portal, you will also be able to view your health information using other applications (apps) compatible with our system.

## 2020-02-20 ENCOUNTER — INPATIENT (INPATIENT)
Facility: HOSPITAL | Age: 20
LOS: 3 days | Discharge: HOME | DRG: 846 | End: 2020-02-24
Attending: STUDENT IN AN ORGANIZED HEALTH CARE EDUCATION/TRAINING PROGRAM | Admitting: STUDENT IN AN ORGANIZED HEALTH CARE EDUCATION/TRAINING PROGRAM
Payer: MEDICAID

## 2020-02-20 ENCOUNTER — TRANSCRIPTION ENCOUNTER (OUTPATIENT)
Age: 20
End: 2020-02-20

## 2020-02-20 VITALS
HEART RATE: 99 BPM | DIASTOLIC BLOOD PRESSURE: 72 MMHG | HEIGHT: 68 IN | TEMPERATURE: 99 F | OXYGEN SATURATION: 99 % | SYSTOLIC BLOOD PRESSURE: 105 MMHG | WEIGHT: 95.02 LBS | RESPIRATION RATE: 16 BRPM

## 2020-02-20 DIAGNOSIS — Z02.9 ENCOUNTER FOR ADMINISTRATIVE EXAMINATIONS, UNSPECIFIED: ICD-10-CM

## 2020-02-20 DIAGNOSIS — J11.1 INFLUENZA DUE TO UNIDENTIFIED INFLUENZA VIRUS WITH OTHER RESPIRATORY MANIFESTATIONS: ICD-10-CM

## 2020-02-20 DIAGNOSIS — C81.90 HODGKIN LYMPHOMA, UNSPECIFIED, UNSPECIFIED SITE: ICD-10-CM

## 2020-02-20 DIAGNOSIS — Z29.9 ENCOUNTER FOR PROPHYLACTIC MEASURES, UNSPECIFIED: ICD-10-CM

## 2020-02-20 LAB
ALBUMIN SERPL ELPH-MCNC: 3.6 G/DL — SIGNIFICANT CHANGE UP (ref 3.3–5)
ALP SERPL-CCNC: 90 U/L — SIGNIFICANT CHANGE UP (ref 40–120)
ALT FLD-CCNC: 15 U/L — SIGNIFICANT CHANGE UP (ref 10–45)
ANION GAP SERPL CALC-SCNC: 12 MMOL/L — SIGNIFICANT CHANGE UP (ref 5–17)
APTT BLD: 39.6 SEC — HIGH (ref 27.5–36.3)
AST SERPL-CCNC: 24 U/L — SIGNIFICANT CHANGE UP (ref 10–40)
B PERT DNA SPEC QL NAA+PROBE: NOT DETECTED — SIGNIFICANT CHANGE UP
BASOPHILS # BLD AUTO: 0.08 K/UL — SIGNIFICANT CHANGE UP (ref 0–0.2)
BASOPHILS NFR BLD AUTO: 0.4 % — SIGNIFICANT CHANGE UP (ref 0–2)
BILIRUB SERPL-MCNC: 0.3 MG/DL — SIGNIFICANT CHANGE UP (ref 0.2–1.2)
BUN SERPL-MCNC: 14 MG/DL — SIGNIFICANT CHANGE UP (ref 7–23)
C PNEUM DNA SPEC QL NAA+PROBE: NOT DETECTED — SIGNIFICANT CHANGE UP
CALCIUM SERPL-MCNC: 9.5 MG/DL — SIGNIFICANT CHANGE UP (ref 8.4–10.5)
CHLORIDE SERPL-SCNC: 98 MMOL/L — SIGNIFICANT CHANGE UP (ref 96–108)
CO2 SERPL-SCNC: 26 MMOL/L — SIGNIFICANT CHANGE UP (ref 22–31)
CREAT SERPL-MCNC: 0.72 MG/DL — SIGNIFICANT CHANGE UP (ref 0.5–1.3)
EOSINOPHIL # BLD AUTO: 0.35 K/UL — SIGNIFICANT CHANGE UP (ref 0–0.5)
EOSINOPHIL NFR BLD AUTO: 1.8 % — SIGNIFICANT CHANGE UP (ref 0–6)
FLUAV H1 2009 PAND RNA SPEC QL NAA+PROBE: DETECTED — HIGH
FLUAV H1 RNA SPEC QL NAA+PROBE: NOT DETECTED — SIGNIFICANT CHANGE UP
FLUAV H3 RNA SPEC QL NAA+PROBE: NOT DETECTED — SIGNIFICANT CHANGE UP
FLUBV RNA SPEC QL NAA+PROBE: NOT DETECTED — SIGNIFICANT CHANGE UP
GLUCOSE SERPL-MCNC: 89 MG/DL — SIGNIFICANT CHANGE UP (ref 70–99)
HADV DNA SPEC QL NAA+PROBE: NOT DETECTED — SIGNIFICANT CHANGE UP
HCOV PNL SPEC NAA+PROBE: SIGNIFICANT CHANGE UP
HCT VFR BLD CALC: 39.6 % — SIGNIFICANT CHANGE UP (ref 39–50)
HGB BLD-MCNC: 12.2 G/DL — LOW (ref 13–17)
HIV COMBO RESULT: SIGNIFICANT CHANGE UP
HIV1+2 AB SPEC QL: SIGNIFICANT CHANGE UP
HMPV RNA SPEC QL NAA+PROBE: NOT DETECTED — SIGNIFICANT CHANGE UP
HPIV1 RNA SPEC QL NAA+PROBE: NOT DETECTED — SIGNIFICANT CHANGE UP
HPIV2 RNA SPEC QL NAA+PROBE: NOT DETECTED — SIGNIFICANT CHANGE UP
HPIV3 RNA SPEC QL NAA+PROBE: NOT DETECTED — SIGNIFICANT CHANGE UP
HPIV4 RNA SPEC QL NAA+PROBE: NOT DETECTED — SIGNIFICANT CHANGE UP
IMM GRANULOCYTES NFR BLD AUTO: 0.6 % — SIGNIFICANT CHANGE UP (ref 0–1.5)
INR BLD: 1.4 RATIO — HIGH (ref 0.88–1.16)
LYMPHOCYTES # BLD AUTO: 10.4 % — LOW (ref 13–44)
LYMPHOCYTES # BLD AUTO: 2 K/UL — SIGNIFICANT CHANGE UP (ref 1–3.3)
MAGNESIUM SERPL-MCNC: 2.1 MG/DL — SIGNIFICANT CHANGE UP (ref 1.6–2.6)
MCHC RBC-ENTMCNC: 24.2 PG — LOW (ref 27–34)
MCHC RBC-ENTMCNC: 30.8 GM/DL — LOW (ref 32–36)
MCV RBC AUTO: 78.4 FL — LOW (ref 80–100)
MONOCYTES # BLD AUTO: 0.58 K/UL — SIGNIFICANT CHANGE UP (ref 0–0.9)
MONOCYTES NFR BLD AUTO: 3 % — SIGNIFICANT CHANGE UP (ref 2–14)
NEUTROPHILS # BLD AUTO: 16.13 K/UL — HIGH (ref 1.8–7.4)
NEUTROPHILS NFR BLD AUTO: 83.8 % — HIGH (ref 43–77)
NRBC # BLD: 0 /100 WBCS — SIGNIFICANT CHANGE UP (ref 0–0)
PLATELET # BLD AUTO: 371 K/UL — SIGNIFICANT CHANGE UP (ref 150–400)
POTASSIUM SERPL-MCNC: 4.7 MMOL/L — SIGNIFICANT CHANGE UP (ref 3.5–5.3)
POTASSIUM SERPL-SCNC: 4.7 MMOL/L — SIGNIFICANT CHANGE UP (ref 3.5–5.3)
PROT SERPL-MCNC: 8.6 G/DL — HIGH (ref 6–8.3)
PROTHROM AB SERPL-ACNC: 16.3 SEC — HIGH (ref 10–12.9)
RAPID RVP RESULT: SIGNIFICANT CHANGE UP
RBC # BLD: 5.05 M/UL — SIGNIFICANT CHANGE UP (ref 4.2–5.8)
RBC # FLD: 15.2 % — HIGH (ref 10.3–14.5)
RSV RNA SPEC QL NAA+PROBE: NOT DETECTED — SIGNIFICANT CHANGE UP
RV+EV RNA SPEC QL NAA+PROBE: NOT DETECTED — SIGNIFICANT CHANGE UP
SODIUM SERPL-SCNC: 136 MMOL/L — SIGNIFICANT CHANGE UP (ref 135–145)
SPECIMEN SOURCE: SIGNIFICANT CHANGE UP
SPECIMEN SOURCE: SIGNIFICANT CHANGE UP
WBC # BLD: 19.25 K/UL — HIGH (ref 3.8–10.5)
WBC # FLD AUTO: 19.25 K/UL — HIGH (ref 3.8–10.5)

## 2020-02-20 PROCEDURE — 70491 CT SOFT TISSUE NECK W/DYE: CPT | Mod: 26

## 2020-02-20 PROCEDURE — 99223 1ST HOSP IP/OBS HIGH 75: CPT | Mod: GC

## 2020-02-20 PROCEDURE — 74177 CT ABD & PELVIS W/CONTRAST: CPT | Mod: 26

## 2020-02-20 PROCEDURE — 99285 EMERGENCY DEPT VISIT HI MDM: CPT

## 2020-02-20 PROCEDURE — 71260 CT THORAX DX C+: CPT | Mod: 26

## 2020-02-20 PROCEDURE — 99223 1ST HOSP IP/OBS HIGH 75: CPT

## 2020-02-20 RX ORDER — SODIUM CHLORIDE 9 MG/ML
1000 INJECTION INTRAMUSCULAR; INTRAVENOUS; SUBCUTANEOUS
Refills: 0 | Status: DISCONTINUED | OUTPATIENT
Start: 2020-02-20 | End: 2020-02-24

## 2020-02-20 RX ORDER — ENOXAPARIN SODIUM 100 MG/ML
40 INJECTION SUBCUTANEOUS DAILY
Refills: 0 | Status: DISCONTINUED | OUTPATIENT
Start: 2020-02-20 | End: 2020-02-24

## 2020-02-20 RX ADMIN — ENOXAPARIN SODIUM 40 MILLIGRAM(S): 100 INJECTION SUBCUTANEOUS at 22:42

## 2020-02-20 NOTE — ED ADULT NURSE REASSESSMENT NOTE - NS ED NURSE REASSESS COMMENT FT1
MAR at bedside, plan of care and wait time explained to patient. MAR unable to convince patient to stay. Inpatient NP paged, awaiting re-evaluation.

## 2020-02-20 NOTE — DISCHARGE NOTE PROVIDER - NSDCACTIVITY_GEN_ALL_CORE
No heavy lifting/straining Walking - Indoors allowed/Walking - Outdoors allowed/No heavy lifting/straining

## 2020-02-20 NOTE — H&P ADULT - RS GEN PE MLT RESP DETAILS PC
breath sounds equal/good air movement/respirations non-labored/no wheezes/clear to auscultation bilaterally/normal

## 2020-02-20 NOTE — DISCHARGE NOTE PROVIDER - CARE PROVIDER_API CALL
Kalie Emery (DO)  Hematology; Medical Oncology  [address] [place] [address]  Phone: [phone]  Fax: [phone]  Follow Up Time: 1-3 days

## 2020-02-20 NOTE — ED ADULT NURSE REASSESSMENT NOTE - NS ED NURSE REASSESS COMMENT FT1
Inpatient NP Kandi explained plan of care and wait time for inpatient bed to patient, Pt refusing to stay. Pt leaving AMA, awaiting AMA paperwork from ANIVAL Chau.

## 2020-02-20 NOTE — ED ADULT NURSE REASSESSMENT NOTE - NS ED NURSE REASSESS COMMENT FT1
Pt states, "I want to leave, can you get me discharged. Ill leave AMA, I was in [facility] this morning and I've been here for 7 hours and 2 minutes and I want to go home now and go to sleep. Ill come back when Im sicker." Admitting physician paged, awaiting re-eval.

## 2020-02-20 NOTE — ED PROVIDER NOTE - PROGRESS NOTE DETAILS
Joseph Frankel PGY1: Dr. Rupert taveras. Nurse at her office says she will see patient after doing rounds. Joseph Frankel PGY1: Dr. Emery at patient's  bedside. Patient agreed to be admitted to hospital for chemo. Will get labs and imaging per Dr. Emery for chemo planning. Will admit.

## 2020-02-20 NOTE — DISCHARGE NOTE PROVIDER - HOSPITAL COURSE
20 yo M w/ pmhx stage IV CHL treated with ABVE-PC now with relapsed disease who presents with worsening left neck swelling. Needs salvage treatment ASAP.  Admit  Dx   Hodgkin's  lymphoma      Pt  requesting  to  leave  against  Medical advice.  MAR   discussed  with   Pt  in  detailed    the  risk  of  leaving  hospital.   I    I also  discussed  with  patient  for   20  minutes  the  risk  of  leaving  the  hospital including  death. 18 yo M w/ pmhx stage IV CHL treated with ABVE-PC now with relapsed disease who presents with worsening left neck swelling. Needs salvage treatment ASAP.  Admit  Dx   Hodgkin's  lymphoma      Pt  requesting  to  leave  against  Medical advice.  MAR   discussed  with   Pt  in  detailed    the  risk  of  leaving  hospital.   I    I also  discussed  with  patient  for   20  minutes  the  risk  of  leaving  the  hospital including  death.         18 yo M w/ pmhx stage IV CHL treated with ABVE-PC with relapsed disease non adherent with treatment initially presented to [facility] yesterday evening with worsening left neck swelling, diagnosed with Flu but left AMA, returns now at urging of hematologist to initiate chemo.          Problem/Plan - 1:    ·  Problem: Hodgkins lymphoma in relapse.  Plan: - Treatment with ICE    - Restaging with CT neck/chest/abdomen/pelvis completed-extensive metastatic   disease noted with necrotic lymph nodes    - TLS labs - No evidence on review , new labs ordered today    - Psych c/s for poor insight and non-compliance completed 2/21: Adjustment disorder with mixed disturbance of emotions and conduct, rec [facility] AOPD at discharge    - Transfer to [facility] when bed available    - Patient refused chemo in the middle of administration, he is refusing zofran for nausea, Heme/onc made aware.          Problem/Plan - 2:    ·  Problem: Flu.  Plan: - No role for Tamiflu    - Out of window and asymptomatic. 1    18 yo M w/ pmhx stage IV CHL treated with ABVE-PC with relapsed disease non adherent with treatment initially presented to [facility] yesterday evening with worsening left neck swelling, diagnosed with Flu but left AMA, returns now at urging of hematologist to initiate chemo. Treatment with ICE. Restaging with CT neck/chest/abdomen/pelvis completed-extensive metastatic   disease noted with necrotic lymph nodes    Psych c/s for poor insight and non-compliance completed 2/21: Adjustment disorder with mixed disturbance of emotions and conduct, rec [facility] AOPD at discharge    He received 1 and half days of CI chemotherapy but noted some shortness of breath overnight and refused to restart. He received Day 3 etoposide on 2/24    will need neulasta, was arranged to be given tomorrow 24 hours after chemo treatment, however pt did not want to stay in the hospital. Discussed with Heme and Attending Dr. Rodriges. pt to follow up at [facility] For Neulastar injection tomorrow.  Discussed the importance of compliance with this regimen to ensure that he attains a remission .         For Flu, no role for tamiflu as out of window and asymptomatic.    pt to follow up with Dr. Emery tomorrow and PCP in one week 1    20 yo M w/ pmhx stage IV CHL treated with ABVE-PC with relapsed disease non adherent with treatment initially presented to [facility] yesterday evening with worsening left neck swelling, diagnosed with Flu but left AMA, returns now at urging of hematologist to initiate chemo. Treatment with ICE. Restaging with CT neck/chest/abdomen/pelvis completed-extensive metastatic   disease noted with necrotic lymph nodes    Psych c/s for poor insight and non-compliance completed 2/21: Adjustment disorder with mixed disturbance of emotions and conduct, rec [facility] AOPD at discharge    He received 1 and half days of CI chemotherapy but noted some shortness of breath overnight and refused to restart. He received Day 3 etoposide on 2/24    will need neulasta, was arranged to be given tomorrow 24 hours after chemo treatment, however pt did not want to stay in the hospital. Discussed with Heme and Attending Dr. Rodriges. pt to follow up at [facility] For Neulastar injection tomorrow.  Discussed the importance of compliance with this regimen. For Flu, no role for tamiflu as out of window and asymptomatic. pt to follow up with Dr. Emery tomorrow and PCP in one week. He is medically stable for d/c home.

## 2020-02-20 NOTE — ED ADULT NURSE NOTE - ED STAT RN HANDOFF DETAILS
Bedside report given to on coming nurse Pedro Luis. Understands pmh, medications given and plan of care for patient. Patient in stable condition, vital signs updated, has no complaints at this time and has been updated on care plan. Explained to patient that it is change of shift and new nurse is taking over, pt verbalized understanding.

## 2020-02-20 NOTE — ED PROVIDER NOTE - ATTENDING CONTRIBUTION TO CARE
------------ATTENDING NOTE------------  pt c/o worsening labs and L anterior neck mass/lymph nodes from HL, complicated as noncompliant w/ care and declining appropriate treatments/follow up, calm/cooperative in ED, no recent fevers, no current additional complaints, evaluated by Onc and admitted for continued evaluation, pre testing for planned chemo / interventions.  - Hosea Khan MD   ------------------------------------------------

## 2020-02-20 NOTE — H&P ADULT - ASSESSMENT
18 yo M w/ pmhx stage IV CHL treated with ABVE-PC with relapsed disease non adherent with treatment initially presented to [facility] yesterday evening with worsening left neck swelling, diagnosed with Flu but left AMA, returns now at urging of hematologist to initiate chemo.

## 2020-02-20 NOTE — ED ADULT NURSE NOTE - OBJECTIVE STATEMENT
20 y/o male patient presents ambulatory to ED 20 y/o male patient presents ambulatory to ED at request of oncologist Dr. Kalie Emery for evaluation to restart chemo therapy. Patient has hx of lymphoma, has not received chemo in the last 8 months. Patient states he has had a lump on his left neck for the last few months but has not wanted to receive treatment. Patient seen at [facility] yesterday, signed out AMA, tested +flu A - patient without any symptoms right now. Patient has a port to the left chest wall.

## 2020-02-20 NOTE — CONSULT NOTE ADULT - ATTENDING COMMENTS
This is a 19 year old male with refractory Hodgkin's lymphoma due to poor compliance.  Attempted to administer ICE x 2 previously with the patient leaving the hospital AMA.  He now has worsening cervical lymphadenopathy, night sweats.   Extensive discussion had with the patient.  I have explained that without treatment his disease will progress and he will die which he appears to understand.  He states he want treatment, understands it will require commitment and compliance.    We have agreed upon having a psychiatry evaluation to understand why he has a short temper and is not able to have compliance- he is agreeable to this.  He denies depression but has a brief second of tearfulness.    CT neck/chest/abd/pelvis to assess his disease.   Plan to begin ICE tomorrow.    IV hydration.

## 2020-02-20 NOTE — ED PROVIDER NOTE - CLINICAL SUMMARY MEDICAL DECISION MAKING FREE TEXT BOX
Joseph Frankel PGY1: 18 yo with recurrent hodgkins lymphoma who presents on the request of Dr. Emery. VSS. Patient looks well and is non toxic appearing. PE as above. Patient has port in place on left side. No signs of infection. Patient with Joseph Frankel PGY1: 18 yo with recurrent hodgkins lymphoma who presents on the request of Dr. Emery. VSS. Patient looks well and is non toxic appearing. PE as above. Patient has port in place on left side. No signs of infection. No acute intervention for ED at this time. Will manage per Dr. Emery and oncolog team

## 2020-02-20 NOTE — H&P ADULT - PROBLEM SELECTOR PLAN 4
Transitions of Care Status:  1.  Name of PCP:Kalie Emery  2.  PCP Contacted on Admission: [x ] Y    [ ] N  seeing patient in hospital  3.  PCP contacted at Discharge: [ ] Y    [ ] N    [ ] N/A  4.  Post-Discharge Appointment Date and Location:  5.  Summary of Handoff given to PCP:

## 2020-02-20 NOTE — CONSULT NOTE ADULT - SUBJECTIVE AND OBJECTIVE BOX
HPI:  Pt is a 20 yo M w/ pmhx stage IV CHL treated with ABVE-PC with relapsed disease who presents with worsening left neck swelling. He was called to come in at the advise of primary hematologist. He presented yesterday evening to [facility] with complaints of weakness and n/v. He left AMA. He now presents to resume care. He reports weight loss, night sweats, and decreased appetite. In terms of Hodgkin's Lymphoma, pt was was initially diagnosed with stage IV CHL in 9/2018 and treated with ABVE-PC completed 12/2018 after 5 cycles. Relapsed in 2/2019 and started on salvage treatment in  4/2019 with gemzar/Brentuximab which was also stopped secondary to patients refusal and noncompliance. He was recommended treatment with ICE however has been noncompliant with treatments and appointments. He was most recently admitted to [facility] end of January but left AMA.     14 point ROS otherwise negative    PAST MEDICAL & SURGICAL HISTORY:  Hodgkins lymphoma in relapse  No significant past surgical history      Allergies    No Known Allergies    Intolerances        MEDICATIONS  (STANDING):    MEDICATIONS  (PRN):      FAMILY HISTORY:  No pertinent family history in first degree relatives      SOCIAL HISTORY: No EtOH, no tobacco    Height (cm): 172.72 (02-20 @ 13:05)  Weight (kg): 43.1 (02-20 @ 13:05), 44.35 (02-19 @ 17:26)  BMI (kg/m2): 14.4 (02-20 @ 13:05)  BSA (m2): 1.49 (02-20 @ 13:05)    VITALS:   T(F): 98.9 (02-20-20 @ 13:05), Max: 99.1 (02-19-20 @ 20:53)  HR: 91 (02-20-20 @ 14:20)  BP: 101/75 (02-20-20 @ 14:20)  RR: 16 (02-20-20 @ 14:20)  SpO2: 100% (02-20-20 @ 14:20)  Wt(kg): --    PHYSICAL EXAM    GENERAL: NAD, well-developed  HEAD:  Atraumatic, Normocephalic  EYES: EOMI, PERRLA, conjunctiva and sclera clear  NECK: prominent left neck swelling with tenderness to palpation   CHEST/LUNG: Clear to auscultation bilaterally; No wheeze  HEART: Regular rate and rhythm; No murmurs, rubs, or gallops  ABDOMEN: Soft, Nontender, Nondistended; Bowel sounds present  EXTREMITIES: No clubbing, cyanosis, or edema  NEUROLOGY: non-focal  SKIN: No rashes or lesions    LABS:                         12.2   19.25 )-----------( 371      ( 20 Feb 2020 14:36 )             39.6     02-20    136  |  98  |  14  ----------------------------<  89  4.7   |  26  |  0.72    Ca    9.5      20 Feb 2020 14:36  Phos  4.0     02-20  Mg     2.1     02-20    TPro  8.6<H>  /  Alb  3.6  /  TBili  0.3  /  DBili  x   /  AST  24  /  ALT  15  /  AlkPhos  90  02-20    Magnesium, Serum: 2.1 mg/dL (02-20 @ 14:36)  Phosphorus Level, Serum: 4.0 mg/dL (02-20 @ 14:36)  Uric Acid, Serum: 4.3 mg/dL (02-20 @ 14:36)  Lactate Dehydrogenase, Serum: 351 U/L (02-20 @ 14:36)  Phosphorus Level, Serum: 4.2 mg/dL (02-19 @ 19:42)  Uric Acid, Serum: 3.9 mg/dL (02-19 @ 19:42)  Lactate Dehydrogenase, Serum: 1474 U/L (02-19 @ 19:42)    PT/INR - ( 20 Feb 2020 14:36 )   PT: 16.3 sec;   INR: 1.40 ratio         PTT - ( 20 Feb 2020 14:36 )  PTT:39.6 sec      IMAGING:

## 2020-02-20 NOTE — DISCHARGE NOTE PROVIDER - NSDCCPCAREPLAN_GEN_ALL_CORE_FT
PRINCIPAL DISCHARGE DIAGNOSIS  Diagnosis: Hodgkins lymphoma in relapse  Assessment and Plan of Treatment: Re-emphiaze    the  importance  of  initiating  treatment.    Pt  requires  inpatient  treatment  for  ICE  tommorrow.   Eval  for  auto  transplant   Please  follow  up  with  your  hematology .   Please  return  to  ED    if  fever  or  decline in  your  condition. PRINCIPAL DISCHARGE DIAGNOSIS  Diagnosis: Hodgkins lymphoma in relapse  Assessment and Plan of Treatment: You were evaluated by hematology  Received chemotherapy   You will need neulasta tomorrow.   Please follow up wih Dr. Emery at [facility] tomorrow  It is very important to comply with treatment regimen and to keep Hematology appointment   Please follow up with your primray care physician         SECONDARY DISCHARGE DIAGNOSES  Diagnosis: Flu  Assessment and Plan of Treatment: No symptoms  No role for Tamiflu, Out of window and asymptomatic.

## 2020-02-20 NOTE — CHART NOTE - NSCHARTNOTEFT_GEN_A_CORE
I met the patient in the emergency department at approximately 8pm on 2/20/2020. We spoke extensively about his current situation. I asked Candido if he knew why we were admitting him to the hospital. He replied that we were admitting him in order to "start chemo for my lymphoma." He expressed frustration that he had been waiting for 7 hours in the ED while waiting for a bed to become available upstairs. He requested to leave the hospital immediately. He mentioned that he would be willing to the hospital as a direct admission in order to start chemo. He said he would come back whenever they have things all set up so that I can show up directly to a bed upstairs and start chemo right away after arriving. I explained to him that leaving the hospital with his advanced disease was dangerous. I explained to him in plain language that he could die tonight if he leaves the hospital. I urged him to stay in order to be monitored overnight and that we would start chemo as soon as possible. He was able to repeat back to me that he understood the risks of leaving the hospital against medical advice. He said directly to me: "if I leave the hospital now I could die tonight." I tried once more to convince him to stay however I explained to him that he has capacity and is an adult and can leave as long as he understands the risk. After leaving the room I discussed the situation with the NP covering the patient, the ED attending, the hospitalist and the hem-onc fellow on call. All are in agreement that the patient should stay, and I expressed this to Candido, however he still wishes to leave. Discussed with the NP to come to the room and assist Candido with filling out the AMA paperwork. I explained to Candido that he could come back to the hospital at any time to receive treatment or if he developed any symptoms. I discussed with the hem-onc fellow that Dr. Mallory thomas should contact the patient tomorrow morning in order to coordinate further care and he agreed this would happen.

## 2020-02-20 NOTE — ED PROVIDER NOTE - PHYSICAL EXAMINATION
Gen: Alert and oriented. Lying comfortably in bed. Answering questions appropriately  HEENT: port on left side. no erythema or pus from site. extra occular movements intact, no nasal discharge, mucous membranes moist  CV: Regular rate and rhythm, +S1/S2, no murmurs/rubs/gallops,   Resp: Clear to ausculation bilaterally, no wheezes/rhonchi/rales  GI: Abdomen soft non-distended, non tender to palpation, no masses  MSK: No open wounds, no bruising, no LE edema  Neuro: A&Ox4, following commands, moving all four extremities spontaneously  Psych: appropriate mood

## 2020-02-20 NOTE — ED PROVIDER NOTE - NS ED ROS FT
Gen: Denies fever, weight loss  CV: Denies chest pain, palpitations  HEENT: Denies neck pain, sore throat  Skin: Denies rash, erythema, color changes  Resp: Denies SOB, cough  Endo: Denies sensitivity to heat, cold, increased urination  GI: Denies constipation, nausea, vomiting  Msk: Denies LE swelling, extremity pain  : Denies dysuria, increased frequency  Neuro: Denies LOC, weakness, seizures  Psych: Denies hx of psych, hallucinations, SI

## 2020-02-20 NOTE — H&P ADULT - NSHPLABSRESULTS_GEN_ALL_CORE
personally reviewed by me:    < from: CT Abdomen and Pelvis w/ IV Cont (02.20.20 @ 15:55) >    IMPRESSION:    Mediastinal and hilar lymphadenopathy in keeping with history of Hodgkin's lymphoma, essentially unchanged.    Interval increase in number and size of right lung pulmonary nodules since PET/CT 12/19/2019, a couple of which are now centrally cavitating. Differential includes infection, inflammation and lymphoma.    < end of copied text >    < from: CT Neck Soft Tissue w/ IV Cont (02.20.20 @ 15:55) >    IMPRESSION:    Significantly increased, prominent left-sided deep chain andspinal accessory chain lymphadenopathy. Many of the lymph nodes are necrotic.    Asymmetric enlargement of the left palatine tonsil is similar. Increased size of the lingual tonsils which now fill the valleculae.      < end of copied text >

## 2020-02-20 NOTE — ED ADULT TRIAGE NOTE - CHIEF COMPLAINT QUOTE
L sided neck swelling, recalled by Dr. Reyes. L sided neck swelling, recalled by Dr. Reyes. Pt seen in [facility] yesterday and AMA'ed.

## 2020-02-20 NOTE — H&P ADULT - COMMENTS
Gen: weight loss, night sweats, fatigue  HEENT: no dysphagia/odynophagia  Neuro: +lightheadedness, no syncope  CV: no cp/palpitations  Resp: no wheezing/sob/cough  Abd: reports nausea after iv contrast today, no diarrhea  : no dysuria  Heme: no epistaxis/melena/brbpr  Psych: denies anxiety/depression  MSK: no muscle aches

## 2020-02-20 NOTE — H&P ADULT - NSHPSOCIALHISTORY_GEN_ALL_CORE
daily marijuana use  no tobacco/etoh/ivdu/cocaine  lives with mom, sister, brother  [facility]  dropped out of school after 11th grade  does not work

## 2020-02-20 NOTE — CONSULT NOTE ADULT - ASSESSMENT
18 yo M w/ pmhx stage IV CHL treated with ABVE-PC now with relapsed disease who presents with worsening left neck swelling. Needs salvage treatment ASAP.    #Hodgkin's Lymphoma  - initially treated with ABVE-PC as pediatric, then treated with brentuximab/gemzaar  - most recently was going to be treated with ICE  - pt has long standing history of noncompliance and missed treatments/appointments  - most recently left AMA for C1 ICE on 7 Monti 1/2020  - would restage with CT neck/chest/abdomen/pelvis   - monitor tumor lysis labs daily   - again reemphasized importance of starting treatment and goal of cure  - He needs salvage treatment and evaluation for auto transplant   - will await above workup and plan to treat as inpatient with ICE as pt seems agreeable   - would recommend psychiatric evaluation given repeated missed appointments and difficult to control behavior at times  - transfer to [facility] when bed available     Bridger Tellez, PGY-5  Hematology-Oncology Fellow  [phone] ([place]) [phone] ([place]ANISLAW)

## 2020-02-20 NOTE — H&P ADULT - HISTORY OF PRESENT ILLNESS
20 yo M w/ pmhx stage IV CHL treated with ABVE-PC with relapsed disease initially presented to [facility] yesterday evening with worsening left neck swelling, diagnosed with Flu but left AMA. His hematologist called him and advised him to return to the hospital to initiate treatment for his recurrent lymphoma asap.  He reports a few days of runny nose and cough, denies fevers but has night sweats and weight loss.  In terms of Hodgkin's Lymphoma, pt was initially diagnosed with stage IV CHL in 9/2018 and treated with ABVE-PC completed 12/2018 after 5 cycles. Relapsed in 2/2019 and started on salvage treatment in  4/2019 with gemzar/Brentuximab which was also stopped secondary to patients refusal and noncompliance. He was recommended treatment with ICE however has been noncompliant with treatments and appointments. He was most recently admitted to [facility] end of January but left AMA. Per the patient, he was frustrated that doctors 'lied to me' because they had said he wouldnt need chemo after the first treatment.  When it recurred and he was told he needed to resume chemo, he was very mad and wanted to grow his hair back for his birthday.  With his neck lymph nodes, they have been growing and now he feels they are getting close to his brain so he's ready to start treatment.

## 2020-02-20 NOTE — H&P ADULT - PROBLEM SELECTOR PLAN 1
appreciate heme/onc eval:  initially treated with ABVE-PC as pediatric, then treated with brentuximab/gemzaar  - most recently was going to be treated with ICE  - pt has long standing history of noncompliance and missed treatments/appointments  - most recently left AMA for C1 ICE on 7 Monti 1/2020  - restaging with CT neck/chest/abdomen/pelvis completed-extensive metastatic disease noted with necrotic lymph nodes  - monitor tumor lysis labs daily   - again reemphasized importance of starting treatment and goal of cure  - He needs salvage treatment and evaluation for auto transplant   - plan to treat as inpatient with ICE tomorrow per heme  - psych c/s in AM regarding short temper and poor adherence in past  - transfer to [facility] when bed available   -IVF

## 2020-02-20 NOTE — DISCHARGE NOTE PROVIDER - NSDCCAREPROVSEEN_GEN_ALL_CORE_FT
Rhea Munoz  [facility] Hematology, Team [facility] Hematology, Team  Luly Rodriges

## 2020-02-20 NOTE — ED PROVIDER NOTE - OBJECTIVE STATEMENT
18 yo M with history of recurrent active lymphoma who has not had chemo in 8 months who presents on the request of Dr. Kalie Emery for evaluation for restarting chemo. Patient states that he had a lump on his neck for sometime now but did want to restart chemo. Was seen in [facility] yesterday and tested positive for influenza A however patient is asymptomatic. Patient has no acute complaints.

## 2020-02-20 NOTE — H&P ADULT - PROBLEM SELECTOR PLAN 2
RVP+ on 2/19 at [facility], symptomatic for several days, now improving and RVP today (2/20) negative  ivf hydration  no role for tamiflu as >48 hours from symptom onset

## 2020-02-21 LAB
ALBUMIN SERPL ELPH-MCNC: 3.3 G/DL — SIGNIFICANT CHANGE UP (ref 3.3–5)
ALP SERPL-CCNC: 86 U/L — SIGNIFICANT CHANGE UP (ref 40–120)
ALT FLD-CCNC: 14 U/L — SIGNIFICANT CHANGE UP (ref 10–45)
ANION GAP SERPL CALC-SCNC: 13 MMOL/L — SIGNIFICANT CHANGE UP (ref 5–17)
AST SERPL-CCNC: 7 U/L — LOW (ref 10–40)
BASOPHILS # BLD AUTO: 0.06 K/UL — SIGNIFICANT CHANGE UP (ref 0–0.2)
BASOPHILS NFR BLD AUTO: 0.3 % — SIGNIFICANT CHANGE UP (ref 0–2)
BILIRUB SERPL-MCNC: 0.2 MG/DL — SIGNIFICANT CHANGE UP (ref 0.2–1.2)
BUN SERPL-MCNC: 14 MG/DL — SIGNIFICANT CHANGE UP (ref 7–23)
CALCIUM SERPL-MCNC: 9.2 MG/DL — SIGNIFICANT CHANGE UP (ref 8.4–10.5)
CHLORIDE SERPL-SCNC: 102 MMOL/L — SIGNIFICANT CHANGE UP (ref 96–108)
CO2 SERPL-SCNC: 25 MMOL/L — SIGNIFICANT CHANGE UP (ref 22–31)
CREAT SERPL-MCNC: 0.67 MG/DL — SIGNIFICANT CHANGE UP (ref 0.5–1.3)
EOSINOPHIL # BLD AUTO: 0.33 K/UL — SIGNIFICANT CHANGE UP (ref 0–0.5)
EOSINOPHIL NFR BLD AUTO: 1.6 % — SIGNIFICANT CHANGE UP (ref 0–6)
GLUCOSE SERPL-MCNC: 96 MG/DL — SIGNIFICANT CHANGE UP (ref 70–99)
HCT VFR BLD CALC: 38.2 % — LOW (ref 39–50)
HGB BLD-MCNC: 12.1 G/DL — LOW (ref 13–17)
IMM GRANULOCYTES NFR BLD AUTO: 0.5 % — SIGNIFICANT CHANGE UP (ref 0–1.5)
LDH SERPL L TO P-CCNC: 126 U/L — SIGNIFICANT CHANGE UP (ref 50–242)
LYMPHOCYTES # BLD AUTO: 1.84 K/UL — SIGNIFICANT CHANGE UP (ref 1–3.3)
LYMPHOCYTES # BLD AUTO: 9.1 % — LOW (ref 13–44)
MAGNESIUM SERPL-MCNC: 2.1 MG/DL — SIGNIFICANT CHANGE UP (ref 1.6–2.6)
MCHC RBC-ENTMCNC: 24.6 PG — LOW (ref 27–34)
MCHC RBC-ENTMCNC: 31.7 GM/DL — LOW (ref 32–36)
MCV RBC AUTO: 77.6 FL — LOW (ref 80–100)
MONOCYTES # BLD AUTO: 1.01 K/UL — HIGH (ref 0–0.9)
MONOCYTES NFR BLD AUTO: 5 % — SIGNIFICANT CHANGE UP (ref 2–14)
NEUTROPHILS # BLD AUTO: 16.79 K/UL — HIGH (ref 1.8–7.4)
NEUTROPHILS NFR BLD AUTO: 83.5 % — HIGH (ref 43–77)
NRBC # BLD: 0 /100 WBCS — SIGNIFICANT CHANGE UP (ref 0–0)
PHOSPHATE SERPL-MCNC: 4.1 MG/DL — SIGNIFICANT CHANGE UP (ref 2.5–4.5)
PLATELET # BLD AUTO: 362 K/UL — SIGNIFICANT CHANGE UP (ref 150–400)
POTASSIUM SERPL-MCNC: 4.4 MMOL/L — SIGNIFICANT CHANGE UP (ref 3.5–5.3)
POTASSIUM SERPL-SCNC: 4.4 MMOL/L — SIGNIFICANT CHANGE UP (ref 3.5–5.3)
PROT SERPL-MCNC: 7.3 G/DL — SIGNIFICANT CHANGE UP (ref 6–8.3)
RBC # BLD: 4.92 M/UL — SIGNIFICANT CHANGE UP (ref 4.2–5.8)
RBC # FLD: 15.1 % — HIGH (ref 10.3–14.5)
SODIUM SERPL-SCNC: 140 MMOL/L — SIGNIFICANT CHANGE UP (ref 135–145)
TSH SERPL-MCNC: 1.59 UIU/ML — SIGNIFICANT CHANGE UP (ref 0.27–4.2)
URATE SERPL-MCNC: 4.3 MG/DL — SIGNIFICANT CHANGE UP (ref 3.4–8.8)
WBC # BLD: 20.13 K/UL — HIGH (ref 3.8–10.5)
WBC # FLD AUTO: 20.13 K/UL — HIGH (ref 3.8–10.5)

## 2020-02-21 PROCEDURE — 99222 1ST HOSP IP/OBS MODERATE 55: CPT

## 2020-02-21 PROCEDURE — 99232 SBSQ HOSP IP/OBS MODERATE 35: CPT

## 2020-02-21 PROCEDURE — 99233 SBSQ HOSP IP/OBS HIGH 50: CPT | Mod: GC

## 2020-02-21 RX ORDER — SODIUM CHLORIDE 9 MG/ML
10 INJECTION INTRAMUSCULAR; INTRAVENOUS; SUBCUTANEOUS
Refills: 0 | Status: DISCONTINUED | OUTPATIENT
Start: 2020-02-21 | End: 2020-02-24

## 2020-02-21 RX ORDER — CHLORHEXIDINE GLUCONATE 213 G/1000ML
1 SOLUTION TOPICAL
Refills: 0 | Status: DISCONTINUED | OUTPATIENT
Start: 2020-02-21 | End: 2020-02-24

## 2020-02-21 RX ORDER — LIDOCAINE 4 G/100G
1 CREAM TOPICAL ONCE
Refills: 0 | Status: COMPLETED | OUTPATIENT
Start: 2020-02-21 | End: 2020-02-21

## 2020-02-21 RX ADMIN — SODIUM CHLORIDE 75 MILLILITER(S): 9 INJECTION INTRAMUSCULAR; INTRAVENOUS; SUBCUTANEOUS at 19:54

## 2020-02-21 RX ADMIN — LIDOCAINE 1 APPLICATION(S): 4 CREAM TOPICAL at 18:06

## 2020-02-21 RX ADMIN — ENOXAPARIN SODIUM 40 MILLIGRAM(S): 100 INJECTION SUBCUTANEOUS at 14:43

## 2020-02-21 NOTE — DIETITIAN INITIAL EVALUATION ADULT. - ADD RECOMMEND
1) Continue current diet order as tolerated and encourage po intake and obtain/honor food preferences as able. 2) Monitor PO intake, diet tolerance, weight trends, labs, and skin integrity. 3) Malnutrition sticker placed - provider notified.

## 2020-02-21 NOTE — DIETITIAN INITIAL EVALUATION ADULT. - OTHER INFO
Visited pt at bedside. Pt reports having a good appetite both PTA and in-house. Pt states that he does note like food in house and that his mom brings him food from home at each meal. Pt reports normally having traditional home foods such as chicken perez, rice, potatoes, and vegetables.      Pt denies any known food allergies or intolerances, but pt does report not consuming beef or pork due to [employer] preferences. Pt denies any chewing/swallowing difficulty, self-feeding difficulty, nausea/vomiting, constipation, or diarrhea. Last BM was this morning (2/21) per pt report. Pt denies any micronutrient supplementation at home.     Pt reports a UBW of 115lbs ~8 months ago. Pt endorses gradual weight loss to dosing weight of 95lbs. Pt reports that clothes have been fitting more loosely and that his face seems "skinnier". Nutrition focused physical exam conducted with verbal consent from patient. Pt observed with severe muscle wasting (clavicles and shoulders) and moderate fat loss (orbitals, triceps, quadriceps).     Education: encouraged pt to have adequate protein and energy intake; sources discussed. Pt declined nutrition supplements at this time. Pt does report that milk based supplements such as Ensure cause GI distress such as nausea/vomiting.

## 2020-02-21 NOTE — BEHAVIORAL HEALTH ASSESSMENT NOTE - NSBHREFERDETAILS_PSY_A_CORE_FT
Psychiatry is consulted due to patient's h/o bizarre behaviour, manifested by showing lack of interest in treatment, h/o signing out AMA, refusing chemotherapy in past .

## 2020-02-21 NOTE — SBIRT NOTE ADULT - NSSBIRTDRGBRIEFINTDET_GEN_A_CORE
Patient reports that he will smoke marijuana with his friends sometimes. Patient reports no concerns, brief intervention provided. Resources declined.

## 2020-02-21 NOTE — DIETITIAN INITIAL EVALUATION ADULT. - PERTINENT MEDS FT
MEDICATIONS  (STANDING):  chlorhexidine 4% Liquid 1 Application(s) Topical <User Schedule>  enoxaparin Injectable 40 milliGRAM(s) SubCutaneous daily  sodium chloride 0.9%. 1000 milliLiter(s) (75 mL/Hr) IV Continuous <Continuous>    MEDICATIONS  (PRN):  sodium chloride 0.9% lock flush 10 milliLiter(s) IV Push every 1 hour PRN Pre/post blood products, medications, blood draw, and to maintain line patency

## 2020-02-21 NOTE — PROGRESS NOTE ADULT - PROBLEM SELECTOR PLAN 1
Treatment with ICE  Restaging with CT neck/chest/abdomen/pelvis completed-extensive metastatic disease noted with necrotic lymph nodes  TLS labs daily   Psych c/s for poor insight and non-compliance  Transfer to 7M when bed available

## 2020-02-21 NOTE — DIETITIAN INITIAL EVALUATION ADULT. - REASON INDICATOR FOR ASSESSMENT
Pt seen for nutrition consult.   Source: EMR and pt  Pertinent chart information: 20 yo M w/ pmhx stage IV CHL treated with ABVE-PC with relapsed disease initially presented to [facility] yesterday evening with worsening left neck swelling, diagnosed with Flu but left AMA. Pt now on droplet precautions for influenza A.

## 2020-02-21 NOTE — CHART NOTE - NSCHARTNOTEFT_GEN_A_CORE
Upon Nutritional Assessment by the Registered Dietitian your patient was determined to meet criteria / has evidence of the following diagnosis/diagnoses:          [ ]  Mild Protein Calorie Malnutrition        [ ]  Moderate Protein Calorie Malnutrition        [x] Severe Protein Calorie Malnutrition        [ ] Unspecified Protein Calorie Malnutrition        [x] Underweight / BMI <19        [ ] Morbid Obesity / BMI > 40      Findings as based on:  [x] Comprehensive nutrition assessment   [x] Nutrition Focused Physical Exam  [x] Other: 17% weight loss over 8 months; moderate/severe fat/muscle loss, BMI 14.5kg/m2.       Nutrition Plan/Recommendations:    1) Continue current diet order as tolerated and encourage po intake and obtain/honor food preferences as able.   2) Monitor PO intake, diet tolerance, weight trends, labs, and skin integrity.      PROVIDER Section:     By signing this assessment you are acknowledging and agree with the diagnosis/diagnoses assigned by the Registered Dietitian    Comments:

## 2020-02-21 NOTE — BEHAVIORAL HEALTH ASSESSMENT NOTE - NSBHCHARTREVIEWLAB_PSY_A_CORE FT
12.1   20.13 )-----------( 362      ( 21 Feb 2020 06:28 )             38.2     02-21    140  |  102  |  14  ----------------------------<  96  4.4   |  25  |  0.67    Ca    9.2      21 Feb 2020 06:26  Phos  4.1     02-21  Mg     2.1     02-21    TPro  7.3  /  Alb  3.3  /  TBili  0.2  /  DBili  x   /  AST  7<L>  /  ALT  14  /  AlkPhos  86  02-21

## 2020-02-21 NOTE — PROGRESS NOTE ADULT - SUBJECTIVE AND OBJECTIVE BOX
INTERVAL HPI/OVERNIGHT EVENTS:  Overnight almost left AMA but decided to stay. Has no complaints today, wants to start chemo.     MEDICATIONS  (STANDING):  chlorhexidine 4% Liquid 1 Application(s) Topical <User Schedule>  enoxaparin Injectable 40 milliGRAM(s) SubCutaneous daily  lidocaine 2% Gel 1 Application(s) Topical once  sodium chloride 0.9%. 1000 milliLiter(s) (75 mL/Hr) IV Continuous <Continuous>    MEDICATIONS  (PRN):  sodium chloride 0.9% lock flush 10 milliLiter(s) IV Push every 1 hour PRN Pre/post blood products, medications, blood draw, and to maintain line patency    Allergies    No Known Allergies    Intolerances          VITAL SIGNS:  T(F): 98.2 (02-21-20 @ 13:19)  HR: 80 (02-21-20 @ 13:19)  BP: 113/69 (02-21-20 @ 13:19)  RR: 17 (02-21-20 @ 13:19)  SpO2: 98% (02-21-20 @ 13:19)  Wt(kg): --    PHYSICAL EXAM:    Constitutional: NAD, lying comfortably in bed  Eyes: EOMI, PERRLA  Neck: large left sided palpable LAD  Respiratory: CTAB; no r/r/w  Cardiovascular: RRR, no M/R/G  Gastrointestinal: +BS, soft, NTND, no hepatosplenomegaly  Extremities: no c/c/e  Neurological: AAOx3, nonfocal    LABS:                        12.1   20.13 )-----------( 362      ( 21 Feb 2020 06:28 )             38.2     02-21    140  |  102  |  14  ----------------------------<  96  4.4   |  25  |  0.67    Ca    9.2      21 Feb 2020 06:26  Phos  4.1     02-21  Mg     2.1     02-21    TPro  7.3  /  Alb  3.3  /  TBili  0.2  /  DBili  x   /  AST  7<L>  /  ALT  14  /  AlkPhos  86  02-21    PT/INR - ( 20 Feb 2020 14:36 )   PT: 16.3 sec;   INR: 1.40 ratio         PTT - ( 20 Feb 2020 14:36 )  PTT:39.6 sec      RADIOLOGY & ADDITIONAL TESTS:  Studies reviewed.

## 2020-02-21 NOTE — PROGRESS NOTE ADULT - ASSESSMENT
18 yo M w/ pmhx stage IV CHL treated with ABVE-PC now with relapsed disease who presents with worsening left neck swelling. Needs salvage treatment ASAP.    #Hodgkin's Lymphoma  - initially treated with ABVE-PC as pediatric, then treated with brentuximab/gemzaar  - pt has long standing history of noncompliance and missed treatments/appointments  - most recently left AMA for C1 ICE on 7 Elijah 1/2020  - CT neck/chest/abdomen/pelvis imaging reviewed with increase in neck adenopathy and necrotic nodes; hilar/mediastinal LAD mostly unchanged  - pt threatened to leave AMA once again but has decided to stay  - again reemphasized importance of starting treatment and goal of cure  - will plan to begin ICE regimen (ifosfamide, carboplatin, and etoposide) tomorrow 2/22   - chemo orders signed and consent obtained   - monitor TLS labs daily   - He needs salvage treatment and evaluation for auto transplant   - psychiatric evaluation pending given repeated missed appointments and difficult to control behavior at times    Bridger Tellez, PGY-5  Hematology-Oncology Fellow  [phone] ([place]) [phone] ([facility])

## 2020-02-21 NOTE — DIETITIAN INITIAL EVALUATION ADULT. - PHYSICAL APPEARANCE
other (specify)/emaciated Ht: 68in, Wt: 95lbs, BMI: 14.5kg/m2, IBW: 154lbs +/- 10%, %IBW: 62%  Edema: none noted   Skin per nursing flowsheets: no pressure injury documented

## 2020-02-21 NOTE — PROGRESS NOTE ADULT - SUBJECTIVE AND OBJECTIVE BOX
Michael Ribeiro MD  Pager [phone]  Spectra [phone]  Cell Phone [phone]    Patient is a 19y old  Male who presents with a chief complaint of neck swelling (20 Feb 2020 21:02)        SUBJECTIVE / OVERNIGHT EVENTS: Patient c/o burning at IV site.       MEDICATIONS  (STANDING):  enoxaparin Injectable 40 milliGRAM(s) SubCutaneous daily  sodium chloride 0.9%. 1000 milliLiter(s) (75 mL/Hr) IV Continuous <Continuous>    MEDICATIONS  (PRN):      Vital Signs Last 24 Hrs  T(C): 36.8 (21 Feb 2020 13:19), Max: 37.2 (20 Feb 2020 18:29)  T(F): 98.2 (21 Feb 2020 13:19), Max: 99 (20 Feb 2020 18:29)  HR: 80 (21 Feb 2020 13:19) (80 - 91)  BP: 113/69 (21 Feb 2020 13:19) (101/51 - 113/69)  BP(mean): 83 (20 Feb 2020 18:29) (83 - 83)  RR: 17 (21 Feb 2020 13:19) (15 - 18)  SpO2: 98% (21 Feb 2020 13:19) (96% - 100%)  CAPILLARY BLOOD GLUCOSE        I&O's Summary        PHYSICAL EXAM  GENERAL: NAD, well-developed  HEAD:  Atraumatic, Normocephalic  EYES: EOMI, PERRLA, conjunctiva and sclera clear  NECK: + L sided LAURA  CHEST/LUNG: Clear to auscultation bilaterally; No wheeze  HEART: Regular rate and rhythm; No murmurs, rubs, or gallops  ABDOMEN: Soft, Nontender, Nondistended; Bowel sounds present  EXTREMITIES:  2+ Peripheral Pulses, No clubbing, cyanosis, or edema; b/l inguinal LAURA  PSYCH: AAOx3      LABS:                        12.1   20.13 )-----------( 362      ( 21 Feb 2020 06:28 )             38.2     02-21    140  |  102  |  14  ----------------------------<  96  4.4   |  25  |  0.67    Ca    9.2      21 Feb 2020 06:26  Phos  4.1     02-21  Mg     2.1     02-21    TPro  7.3  /  Alb  3.3  /  TBili  0.2  /  DBili  x   /  AST  7<L>  /  ALT  14  /  AlkPhos  86  02-21    PT/INR - ( 20 Feb 2020 14:36 )   PT: 16.3 sec;   INR: 1.40 ratio         PTT - ( 20 Feb 2020 14:36 )  PTT:39.6 sec          Culture - Blood (collected 19 Feb 2020 22:07)  Source: Peripheral Site 2  Preliminary Report (20 Feb 2020 22:07):    NO ORGANISMS ISOLATED    NO ORGANISMS ISOLATED AT 24 HOURS    Culture - Blood (collected 19 Feb 2020 22:07)  Source: Peripheral Site 1  Preliminary Report (20 Feb 2020 22:07):    NO ORGANISMS ISOLATED    NO ORGANISMS ISOLATED AT 24 HOURS        RADIOLOGY & ADDITIONAL TESTS:    Imaging Personally Reviewed:  Consultant(s) Notes Reviewed:    Care Discussed with Consultants/Other Providers:

## 2020-02-21 NOTE — BEHAVIORAL HEALTH ASSESSMENT NOTE - RISK ASSESSMENT
Low Acute Suicide Risk Chronic risk : H/o Hodgkin lymphoma, h/o refusing treatment  Protective factors: Denies current/past SI, self preserving interest in care, domiciled

## 2020-02-21 NOTE — BEHAVIORAL HEALTH ASSESSMENT NOTE - OTHER PAST PSYCHIATRIC HISTORY (INCLUDE DETAILS REGARDING ONSET, COURSE OF ILLNESS, INPATIENT/OUTPATIENT TREATMENT)
anxiety 1 year ago, was treated with Xanax at [facility], stopped treatment 2 months after prescribed. See HPI

## 2020-02-21 NOTE — SBIRT NOTE ADULT - NSSBIRTBRIEFINTDET_GEN_A_CORE
Patient reports social drinking with family and friends at special occasions, no concerns for drinking habits. Brief intervention provided as patient is 19 yrs old. Resources declined.

## 2020-02-21 NOTE — BEHAVIORAL HEALTH ASSESSMENT NOTE - NSBHCHARTREVIEWVS_PSY_A_CORE FT
Vital Signs Last 24 Hrs  T(C): 36.8 (21 Feb 2020 13:19), Max: 37.2 (20 Feb 2020 18:29)  T(F): 98.2 (21 Feb 2020 13:19), Max: 99 (20 Feb 2020 18:29)  HR: 80 (21 Feb 2020 13:19) (80 - 91)  BP: 113/69 (21 Feb 2020 13:19) (101/51 - 113/69)  BP(mean): 83 (20 Feb 2020 18:29) (83 - 83)  RR: 17 (21 Feb 2020 13:19) (15 - 18)  SpO2: 98% (21 Feb 2020 13:19) (96% - 100%)

## 2020-02-21 NOTE — BEHAVIORAL HEALTH ASSESSMENT NOTE - NSBHCHARTREVIEWINVESTIGATE_PSY_A_CORE FT
Ventricular Rate 97 BPM    Atrial Rate 97 BPM    P-R Interval 112 ms    QRS Duration 110 ms    Q-T Interval 340 ms    QTC Calculation(Bezet) 431 ms    P Axis 64 degrees    R Axis 71 degrees    T Axis 54 degrees    Diagnosis Line Normal sinus rhythm  Possible Left atrial enlargement  Incomplete right bundle branch block  Nonspecific T wave abnormality  Abnormal ECG    Confirmed by KYE HINDS ([id number]) on 4/15/2019 3:22:16 PM

## 2020-02-21 NOTE — PROGRESS NOTE ADULT - ASSESSMENT
18 yo M w/ pmhx stage IV CHL treated with ABVE-PC with relapsed disease non adherent with treatment initially presented to [facility] yesterday evening with worsening left neck swelling, diagnosed with Flu but left AMA, returns now at urging of hematologist to initiate chemo.

## 2020-02-21 NOTE — BEHAVIORAL HEALTH ASSESSMENT NOTE - HPI (INCLUDE ILLNESS QUALITY, SEVERITY, DURATION, TIMING, CONTEXT, MODIFYING FACTORS, ASSOCIATED SIGNS AND SYMPTOMS)
Patient is a 18 y/o [place] male patient, single, with no kids, domiciled with mother and grandma and sister, with PPHx of adjustment disorder, h/o social drinking and social use of cannabis, no psychiatric admissions,  h/o arrests for robbery, racing, violating parole, h/o dropping out from school due to fights, PMH of relapsing Hodgkin Lymphoma, admitted to the hospital for neck swelling and increase in size of lymph nodes. Hematology plans to start chemotherapy but is concerned due to patient's h/o bizarre behaviour, manifested by showing lack of interest in treatment, h/o signing out AMA, refusing chemotherapy tx in past. Per team, patient's parents are  and they don't want to be involved in decisions regarding his treatment.   Upon evaluation, patient is fairly cooperative, alert and oriented. He reports having intentions of signing out from hospital yesterday because, "I was locked up yesterday and had to deal with court" and due to being here for hours. Per patient, he was locked up for driving with suspended license. He reports feeling so tired yesterday and wanted to just go home and have his coffee. Patient states that he wants to get treated for Hodgkin lymphoma but he does not like it when people handle him roughly. He complains about having redness and swelling at IV canula site for which he requested to see his nurse but he does not like waiting because, "what if it gets infected". Patient states that he used to be a patient person but now he does he gets frustrated when people don't listen to him.   Patient reports "night sweats" but denies any depressive symptoms including changes in appetite or sleep. He denies SI/HI, as well as denies any manic or psychotic complains. Patient denies taking Remeron which was previously recommended by CL team in July 2019 for depressive symptoms in context of  adjustment disorder. He  states that he does not need any psychiatric treatment at this time.

## 2020-02-21 NOTE — BEHAVIORAL HEALTH ASSESSMENT NOTE - SUMMARY
Patient is a 20 y/o [place] male patient, single, with no kids, domiciled with mother and grandma and sister, with PPHx of adjustment disorder, h/o social drinking and social use of cannabis, no psychiatric admissions,  h/o arrests for robbery, racing, violating parole, h/o dropping out from school due to fights, PMH of relapsing Hodgkin Lymphoma, admitted to the hospital for neck swelling and increase in size of lymph nodes. Hematology plans to start chemotherapy but is concerned due to patient's h/o bizarre behaviour, manifested by showing lack of interest in treatment, h/o signing out AMA, refusing chemotherapy tx in past. Per team, patient's parents are  and they don't want to be involved in decisions regarding his treatment.   Upon evaluation, patient denies any acute psychiatric complains including depressive or psychotic symptoms. He appears to have poor frustration tolerance in context of adjustment disorder or underlying personality traits. Presently, he is not interested in treatment with psychotropic medication. No indication for psychiatric admission at this time as patient is low acute risk of self harm or harm to others. Patient appears to have capacity for general medical decisions (primary team is in agreement) , but he was not assessed for any specific decisions.

## 2020-02-21 NOTE — BEHAVIORAL HEALTH ASSESSMENT NOTE - CASE SUMMARY
This is a 19-y.o. CAM pt, single, with no kids, domiciled with mother and grandma and sister, with PPHx of adjustment disorder, h/o social drinking and social use of cannabis, no psychiatric admissions,  h/o arrests for robbery, racing, violating parole, h/o dropping out from school due to fights, PMH of relapsing Hodgkin Lymphoma, admitted to the hospital for neck swelling and increase in size of lymph nodes. Hematology plans to start chemotherapy but is concerned due to patient's h/o bizarre behaviour, manifested by showing lack of interest in treatment, h/o signing out AMA, refusing chemotherapy tx in past. Per team, patient's parents are  and they don't want to be involved in decisions regarding his treatment.    I have seen and evaluated this patient myself. Chart, labs, meds reviewed. I agree with fellow's assessment and plan.

## 2020-02-22 LAB
ALBUMIN SERPL ELPH-MCNC: 3.5 G/DL — SIGNIFICANT CHANGE UP (ref 3.3–5)
ALP SERPL-CCNC: 82 U/L — SIGNIFICANT CHANGE UP (ref 40–120)
ALT FLD-CCNC: 17 U/L — SIGNIFICANT CHANGE UP (ref 10–45)
ANION GAP SERPL CALC-SCNC: 11 MMOL/L — SIGNIFICANT CHANGE UP (ref 5–17)
AST SERPL-CCNC: 9 U/L — LOW (ref 10–40)
BILIRUB SERPL-MCNC: 0.4 MG/DL — SIGNIFICANT CHANGE UP (ref 0.2–1.2)
BUN SERPL-MCNC: 10 MG/DL — SIGNIFICANT CHANGE UP (ref 7–23)
CALCIUM SERPL-MCNC: 9.1 MG/DL — SIGNIFICANT CHANGE UP (ref 8.4–10.5)
CHLORIDE SERPL-SCNC: 101 MMOL/L — SIGNIFICANT CHANGE UP (ref 96–108)
CO2 SERPL-SCNC: 24 MMOL/L — SIGNIFICANT CHANGE UP (ref 22–31)
CREAT SERPL-MCNC: 0.62 MG/DL — SIGNIFICANT CHANGE UP (ref 0.5–1.3)
GLUCOSE SERPL-MCNC: 87 MG/DL — SIGNIFICANT CHANGE UP (ref 70–99)
HCT VFR BLD CALC: 36.4 % — LOW (ref 39–50)
HGB BLD-MCNC: 11.7 G/DL — LOW (ref 13–17)
LDH SERPL L TO P-CCNC: 124 U/L — SIGNIFICANT CHANGE UP (ref 50–242)
MAGNESIUM SERPL-MCNC: 2 MG/DL — SIGNIFICANT CHANGE UP (ref 1.6–2.6)
MCHC RBC-ENTMCNC: 25 PG — LOW (ref 27–34)
MCHC RBC-ENTMCNC: 32.1 GM/DL — SIGNIFICANT CHANGE UP (ref 32–36)
MCV RBC AUTO: 77.8 FL — LOW (ref 80–100)
NRBC # BLD: 0 /100 WBCS — SIGNIFICANT CHANGE UP (ref 0–0)
PHOSPHATE SERPL-MCNC: 3.6 MG/DL — SIGNIFICANT CHANGE UP (ref 2.5–4.5)
PLATELET # BLD AUTO: 388 K/UL — SIGNIFICANT CHANGE UP (ref 150–400)
POTASSIUM SERPL-MCNC: 4.1 MMOL/L — SIGNIFICANT CHANGE UP (ref 3.5–5.3)
POTASSIUM SERPL-SCNC: 4.1 MMOL/L — SIGNIFICANT CHANGE UP (ref 3.5–5.3)
PROT SERPL-MCNC: 7.4 G/DL — SIGNIFICANT CHANGE UP (ref 6–8.3)
RBC # BLD: 4.68 M/UL — SIGNIFICANT CHANGE UP (ref 4.2–5.8)
RBC # FLD: 15.1 % — HIGH (ref 10.3–14.5)
SODIUM SERPL-SCNC: 136 MMOL/L — SIGNIFICANT CHANGE UP (ref 135–145)
URATE SERPL-MCNC: 4.2 MG/DL — SIGNIFICANT CHANGE UP (ref 3.4–8.8)
WBC # BLD: 18.99 K/UL — HIGH (ref 3.8–10.5)
WBC # FLD AUTO: 18.99 K/UL — HIGH (ref 3.8–10.5)

## 2020-02-22 PROCEDURE — 99233 SBSQ HOSP IP/OBS HIGH 50: CPT

## 2020-02-22 RX ORDER — DEXAMETHASONE 0.5 MG/5ML
4 ELIXIR ORAL EVERY 24 HOURS
Refills: 0 | Status: COMPLETED | OUTPATIENT
Start: 2020-02-22 | End: 2020-02-24

## 2020-02-22 RX ORDER — FOSAPREPITANT DIMEGLUMINE 150 MG/5ML
150 INJECTION, POWDER, LYOPHILIZED, FOR SOLUTION INTRAVENOUS ONCE
Refills: 0 | Status: COMPLETED | OUTPATIENT
Start: 2020-02-22 | End: 2020-02-22

## 2020-02-22 RX ORDER — IFOSFAMIDE 1 G/1
7450 INJECTION, POWDER, LYOPHILIZED, FOR SOLUTION INTRAVENOUS ONCE
Refills: 0 | Status: COMPLETED | OUTPATIENT
Start: 2020-02-23 | End: 2020-02-23

## 2020-02-22 RX ORDER — METOCLOPRAMIDE HCL 10 MG
10 TABLET ORAL EVERY 8 HOURS
Refills: 0 | Status: DISCONTINUED | OUTPATIENT
Start: 2020-02-22 | End: 2020-02-24

## 2020-02-22 RX ORDER — ETOPOSIDE 20 MG/ML
150 VIAL (ML) INTRAVENOUS EVERY 24 HOURS
Refills: 0 | Status: COMPLETED | OUTPATIENT
Start: 2020-02-22 | End: 2020-02-24

## 2020-02-22 RX ORDER — ONDANSETRON 8 MG/1
16 TABLET, FILM COATED ORAL EVERY 24 HOURS
Refills: 0 | Status: COMPLETED | OUTPATIENT
Start: 2020-02-22 | End: 2020-02-24

## 2020-02-22 RX ORDER — CARBOPLATIN 50 MG
640 VIAL (EA) INTRAVENOUS ONCE
Refills: 0 | Status: COMPLETED | OUTPATIENT
Start: 2020-02-23 | End: 2020-02-23

## 2020-02-22 RX ORDER — CARBOPLATIN 50 MG
640 VIAL (EA) INTRAVENOUS ONCE
Refills: 0 | Status: DISCONTINUED | OUTPATIENT
Start: 2020-02-23 | End: 2020-02-22

## 2020-02-22 RX ADMIN — ONDANSETRON 116 MILLIGRAM(S): 8 TABLET, FILM COATED ORAL at 11:55

## 2020-02-22 RX ADMIN — Medication 4 MILLIGRAM(S): at 12:17

## 2020-02-22 RX ADMIN — ENOXAPARIN SODIUM 40 MILLIGRAM(S): 100 INJECTION SUBCUTANEOUS at 12:47

## 2020-02-22 RX ADMIN — CHLORHEXIDINE GLUCONATE 1 APPLICATION(S): 213 SOLUTION TOPICAL at 12:47

## 2020-02-22 RX ADMIN — Medication 507.5 MILLIGRAM(S): at 12:24

## 2020-02-22 RX ADMIN — FOSAPREPITANT DIMEGLUMINE 300 MILLIGRAM(S): 150 INJECTION, POWDER, LYOPHILIZED, FOR SOLUTION INTRAVENOUS at 12:15

## 2020-02-22 NOTE — PROGRESS NOTE ADULT - SUBJECTIVE AND OBJECTIVE BOX
Patient is a 19y old  Male who presents with a chief complaint of neck swelling (21 Feb 2020 17:24)      SUBJECTIVE / OVERNIGHT EVENTS:  Feels well,  No complaints    MEDICATIONS  (STANDING):  chlorhexidine 4% Liquid 1 Application(s) Topical <User Schedule>  dexAMETHasone  Injectable 4 milliGRAM(s) IV Push every 24 hours  enoxaparin Injectable 40 milliGRAM(s) SubCutaneous daily  etoposide IVPB (eMAR) 150 milliGRAM(s) IV Intermittent every 24 hours  ifosfamide IVPB w/additives (eMAR) 7450 milliGRAM(s) IV Intermittent once  ondansetron  IVPB 16 milliGRAM(s) IV Intermittent every 24 hours  sodium chloride 0.9%. 1000 milliLiter(s) (75 mL/Hr) IV Continuous <Continuous>    MEDICATIONS  (PRN):  metoclopramide Injectable 10 milliGRAM(s) IV Push every 8 hours PRN nausea/vomitting  sodium chloride 0.9% lock flush 10 milliLiter(s) IV Push every 1 hour PRN Pre/post blood products, medications, blood draw, and to maintain line patency      CAPILLARY BLOOD GLUCOSE        I&O's Summary    21 Feb 2020 07:01  -  22 Feb 2020 07:00  --------------------------------------------------------  IN: 655 mL / OUT: 0 mL / NET: 655 mL    22 Feb 2020 07:01  -  22 Feb 2020 13:40  --------------------------------------------------------  IN: 707 mL / OUT: 0 mL / NET: 707 mL        PHYSICAL EXAM:  Vital Signs Last 24 Hrs  T(C): 36.7 (22 Feb 2020 09:35), Max: 36.7 (22 Feb 2020 09:35)  T(F): 98 (22 Feb 2020 09:35), Max: 98 (22 Feb 2020 09:35)  HR: 60 (22 Feb 2020 09:35) (60 - 112)  BP: 102/58 (22 Feb 2020 10:51) (100/69 - 102/58)  BP(mean): --  RR: 16 (22 Feb 2020 09:35) (16 - 16)  SpO2: 99% (22 Feb 2020 09:35) (99% - 99%)  GENERAL: NAD, well-developed  HEAD:  Atraumatic, Normocephalic  EYES: EOMI, PERRLA, conjunctiva and sclera clear  NECK: Supple, No JVD  CHEST/LUNG: Clear to auscultation bilaterally; No wheeze  HEART: Regular rate and rhythm; No murmurs, rubs, or gallops  ABDOMEN: Soft, Nontender, Nondistended; Bowel sounds present  EXTREMITIES:  2+ Peripheral Pulses, No clubbing, cyanosis, or edema  PSYCH: AAOx3  NEUROLOGY: non-focal  SKIN: No rashes or lesions    LABS:                        11.7   18.99 )-----------( 388      ( 22 Feb 2020 06:47 )             36.4     02-22    136  |  101  |  10  ----------------------------<  87  4.1   |  24  |  0.62    Ca    9.1      22 Feb 2020 06:47  Phos  3.6     02-22  Mg     2.0     02-22    TPro  7.4  /  Alb  3.5  /  TBili  0.4  /  DBili  x   /  AST  9<L>  /  ALT  17  /  AlkPhos  82  02-22    PT/INR - ( 20 Feb 2020 14:36 )   PT: 16.3 sec;   INR: 1.40 ratio         PTT - ( 20 Feb 2020 14:36 )  PTT:39.6 sec          RADIOLOGY & ADDITIONAL TESTS:    Imaging Personally Reviewed:    Consultant(s) Notes Reviewed:      Care Discussed with Consultants/Other Providers:

## 2020-02-22 NOTE — PROGRESS NOTE ADULT - ASSESSMENT
18 yo M w/ pmhx stage IV CHL treated with ABVE-PC now with relapsed disease who presents with worsening left neck swelling. Needs salvage treatment ASAP.    #Hodgkin's Lymphoma  - initially treated with ABVE-PC as pediatric, then treated with brentuximab/gemzar  - pt has long standing history of noncompliance and missed treatments/appointments  - most recently left AMA for C1 ICE on 7 Elijah 1/2020  - CT neck/chest/abdomen/pelvis imaging reviewed with increase in neck adenopathy and necrotic nodes; hilar/mediastinal LAD mostly unchanged  - pt threatened to leave AMA once again but has decided to stay  - again reemphasized importance of starting treatment and goal of cure  - Starting  ICE regimen (ifosfamide, carboplatin, and etoposide) today  2/22   - monitor TLS labs daily   - He needs salvage treatment and evaluation for auto transplant   - psychiatric evaluation pending given repeated missed appointments and difficult to control behavior at times

## 2020-02-22 NOTE — PROGRESS NOTE ADULT - ASSESSMENT
20 yo M w/ pmhx stage IV CHL treated with ABVE-PC with relapsed disease non adherent with treatment initially presented to [facility] yesterday evening with worsening left neck swelling, diagnosed with Flu but left AMA, returns now at urging of hematologist to initiate chemo.

## 2020-02-22 NOTE — PROGRESS NOTE ADULT - SUBJECTIVE AND OBJECTIVE BOX
Patient seen and examined , starting first cycle of ICE today.     MEDICATIONS  (STANDING):  chlorhexidine 4% Liquid 1 Application(s) Topical <User Schedule>  dexAMETHasone  Injectable 4 milliGRAM(s) IV Push every 24 hours  enoxaparin Injectable 40 milliGRAM(s) SubCutaneous daily  etoposide IVPB (eMAR) 150 milliGRAM(s) IV Intermittent every 24 hours  ifosfamide IVPB w/additives (eMAR) 7450 milliGRAM(s) IV Intermittent once  ondansetron  IVPB 16 milliGRAM(s) IV Intermittent every 24 hours  sodium chloride 0.9%. 1000 milliLiter(s) (75 mL/Hr) IV Continuous <Continuous>    MEDICATIONS  (PRN):  metoclopramide Injectable 10 milliGRAM(s) IV Push every 8 hours PRN nausea/vomitting  sodium chloride 0.9% lock flush 10 milliLiter(s) IV Push every 1 hour PRN Pre/post blood products, medications, blood draw, and to maintain line patency      ICU Vital Signs Last 24 Hrs  T(C): 36.7 (22 Feb 2020 09:35), Max: 36.7 (22 Feb 2020 09:35)  T(F): 98 (22 Feb 2020 09:35), Max: 98 (22 Feb 2020 09:35)  HR: 60 (22 Feb 2020 09:35) (60 - 112)  BP: 102/58 (22 Feb 2020 10:51) (100/69 - 102/58)  BP(mean): --  ABP: --  ABP(mean): --  RR: 16 (22 Feb 2020 09:35) (16 - 16)  SpO2: 99% (22 Feb 2020 09:35) (99% - 99%)            PHYSICAL EXAM:    Constitutional: NAD, lying comfortably in bed  Eyes: EOMI, PERRLA  Neck: large left sided palpable LAD  Respiratory: CTAB; no r/r/w  Cardiovascular: RRR, no M/R/G  Gastrointestinal: +BS, soft, NTND, no hepatosplenomegaly  Extremities: no c/c/e  Neurological: AAOx3, nonfocal    Uric Acid, Serum (02.22.20 @ 06:47)    Uric Acid, Serum: 4.2 mg/dL    Phosphorus Level, Serum in AM (02.22.20 @ 06:47)    Phosphorus Level, Serum: 3.6 mg/dL    Magnesium, Serum in AM (02.22.20 @ 06:47)    Magnesium, Serum: 2.0 mg/dL    Comprehensive Metabolic Panel in AM (02.22.20 @ 06:47)    Sodium, Serum: 136 mmol/L    Potassium, Serum: 4.1 mmol/L    Chloride, Serum: 101 mmol/L    Carbon Dioxide, Serum: 24 mmol/L    Anion Gap, Serum: 11 mmol/L    Blood Urea Nitrogen, Serum: 10 mg/dL    Creatinine, Serum: 0.62 mg/dL    Glucose, Serum: 87 mg/dL    Calcium, Total Serum: 9.1 mg/dL    Protein Total, Serum: 7.4 g/dL    Albumin, Serum: 3.5 g/dL    Bilirubin Total, Serum: 0.4 mg/dL    Alkaline Phosphatase, Serum: 82 U/L    Aspartate Aminotransferase (AST/SGOT): 9 U/L    Alanine Aminotransferase (ALT/SGPT): 17 U/L    eGFR if Non : 144: Interpretative comment  The units for eGFR are mL/min/1.73M2 (normalized body surface area). The  eGFR is calculated from a serum creatinine using the CKD-EPI equation.  Other variables required for calculation are race, age and sex. Among  patients with chronic kidney disease (CKD), the eGFR is useful in  determining the stage of disease according to KDOQI CKD classification.  All eGFR results are reported numerically with the following  interpretation.          GFR                    With                 Without     (ml/min/1.73 m2)    Kidney Damage       Kidney Damage        >= 90                    Stage 1                     Normal        60-89                    Stage 2                     Decreased GFR        30-59     Stage 3                     Stage 3        15-29                    Stage 4                     Stage 4        < 15                      Stage 5                     Stage 5  Each stage of CKD assumes that the associated GFR level has been in  effect for at least 3 months. Determination of stages one and two (with  eGFR > 59 ml/min/m2) requires estimation of kidney damage for at least 3  months as defined by structural or functional abnormalities.  Limitations: All estimates of GFR will be less accurate for patients at  extremes of muscle mass (including but not limited to frail elderly,  critically ill, or cancer patients), those with unusual diets, and those  with conditions associated with reduced secretion or extrarenal  elimination of creatinine. The eGFR equation is not recommended for use  in patients with unstable creatinine levels. mL/min/1.73M2    eGFR if African American: 167 mL/min/1.73M2          Lactate Dehydrogenase, Serum in AM (02.22.20 @ 06:47)    Lactate Dehydrogenase, Serum: 124 U/L    Complete Blood Count + Automated Diff in AM (02.22.20 @ 06:47)    WBC Count: 18.99 K/uL    RBC Count: 4.68 M/uL    Hemoglobin: 11.7 g/dL    Hematocrit: 36.4 %    Mean Cell Volume: 77.8 fl    Mean Cell Hemoglobin: 25.0 pg    Mean Cell Hemoglobin Conc: 32.1 gm/dL    Red Cell Distrib Width: 15.1 %    Platelet Count - Automated: 388 K/uL    Nucleated RBC: 0 /100 WBCs      RADIOLOGY & ADDITIONAL TESTS:  Studies reviewed.

## 2020-02-22 NOTE — ADVANCED PRACTICE NURSE CONSULT - ASSESSMENT
Patient received in bed, alert and oriented x 3, capable of expressing all needs to staff. Cycle 1, day 1/3, Height and weight verified.  Consent in chart. Lab results as per Md carmen aware of same. Vital signs stable prior to chemotherapy and  within acceptable parameters, see sunrise. Pt educated on the importance of saving urine, verbalizes good understanding. Pt education done re chemo regimen, drug effects and potential side effects, written materials provided, pt states understanding. Reports that he tolerated chemotherapy well without side effects in the past. Pt with  line, site free from signs and symptoms of infection, good blood return obtained. Pre- medicated with Patient received in bed, alert and oriented x 3, capable of expressing all needs to staff. Cycle 1, day 1/3, Height and weight verified.  Consent in chart. Lab results as per Md carmen aware of same. Vital signs stable prior to chemotherapy and  within acceptable parameters, see sunrise. Pt educated on the importance of saving urine, verbalizes good understanding. Pt education done re chemo regimen, drug effects and potential side effects, written materials provided, pt states understanding. Reports that he tolerated chemotherapy well without side effects in the past. Pt with  left chest wall mediport , site free from signs and symptoms of infection, good blood return obtained. Pre- medicated with Zofran 16mg IVSS , decadron 4mg IVP and Emend 150mg IVSS. Tjis was followed by Etoposide 100mg/i9=526qb IVSS over one hour, same given via locked pump. Writer remained at bedside with patient for 20 minutes, vital signs remain stable. Patient received in bed, alert and oriented x 3, capable of expressing all needs to staff. Cycle 1, day 1/3, Height and weight verified.  Consent in chart. Lab results as per Md carmen aware of same. Vital signs stable prior to chemotherapy and  within acceptable parameters, see sunrise. Pt educated on the importance of saving urine, verbalizes good understanding. Pt education done re chemo regimen, drug effects and potential side effects, written materials provided, pt states understanding. Reports that he tolerated chemotherapy well without side effects in the past. Pt with  left chest wall mediport , site free from signs and symptoms of infection, good blood return obtained. Pre- medicated with Zofran 16mg IVSS , decadron 4mg IVP and Emend 150mg IVSS. Tjis was followed by Etoposide 100mg/g3=892ml IVSS over one hour, same given via locked pump. Writer remained at bedside with patient for 20 minutes, vital signs remain stable. Patient completed without any signs and symptoms of adverse reactions noted.

## 2020-02-23 LAB
ALBUMIN SERPL ELPH-MCNC: 3.6 G/DL — SIGNIFICANT CHANGE UP (ref 3.3–5)
ALP SERPL-CCNC: 85 U/L — SIGNIFICANT CHANGE UP (ref 40–120)
ALT FLD-CCNC: 15 U/L — SIGNIFICANT CHANGE UP (ref 10–45)
ANION GAP SERPL CALC-SCNC: 14 MMOL/L — SIGNIFICANT CHANGE UP (ref 5–17)
AST SERPL-CCNC: 6 U/L — LOW (ref 10–40)
BILIRUB SERPL-MCNC: 0.3 MG/DL — SIGNIFICANT CHANGE UP (ref 0.2–1.2)
BUN SERPL-MCNC: 12 MG/DL — SIGNIFICANT CHANGE UP (ref 7–23)
CALCIUM SERPL-MCNC: 9.5 MG/DL — SIGNIFICANT CHANGE UP (ref 8.4–10.5)
CHLORIDE SERPL-SCNC: 99 MMOL/L — SIGNIFICANT CHANGE UP (ref 96–108)
CO2 SERPL-SCNC: 26 MMOL/L — SIGNIFICANT CHANGE UP (ref 22–31)
CREAT SERPL-MCNC: 0.59 MG/DL — SIGNIFICANT CHANGE UP (ref 0.5–1.3)
GLUCOSE SERPL-MCNC: 137 MG/DL — HIGH (ref 70–99)
HCT VFR BLD CALC: 36.4 % — LOW (ref 39–50)
HGB BLD-MCNC: 11.3 G/DL — LOW (ref 13–17)
LDH SERPL L TO P-CCNC: 119 U/L — SIGNIFICANT CHANGE UP (ref 50–242)
MAGNESIUM SERPL-MCNC: 2.2 MG/DL — SIGNIFICANT CHANGE UP (ref 1.6–2.6)
MCHC RBC-ENTMCNC: 24.1 PG — LOW (ref 27–34)
MCHC RBC-ENTMCNC: 31 GM/DL — LOW (ref 32–36)
MCV RBC AUTO: 77.8 FL — LOW (ref 80–100)
NRBC # BLD: 0 /100 WBCS — SIGNIFICANT CHANGE UP (ref 0–0)
PHOSPHATE SERPL-MCNC: 4.3 MG/DL — SIGNIFICANT CHANGE UP (ref 2.5–4.5)
PLATELET # BLD AUTO: 414 K/UL — HIGH (ref 150–400)
POTASSIUM SERPL-MCNC: 4.4 MMOL/L — SIGNIFICANT CHANGE UP (ref 3.5–5.3)
POTASSIUM SERPL-SCNC: 4.4 MMOL/L — SIGNIFICANT CHANGE UP (ref 3.5–5.3)
PROT SERPL-MCNC: 7.8 G/DL — SIGNIFICANT CHANGE UP (ref 6–8.3)
RBC # BLD: 4.68 M/UL — SIGNIFICANT CHANGE UP (ref 4.2–5.8)
RBC # FLD: 14.9 % — HIGH (ref 10.3–14.5)
SODIUM SERPL-SCNC: 139 MMOL/L — SIGNIFICANT CHANGE UP (ref 135–145)
URATE SERPL-MCNC: 4.8 MG/DL — SIGNIFICANT CHANGE UP (ref 3.4–8.8)
WBC # BLD: 13.9 K/UL — HIGH (ref 3.8–10.5)
WBC # FLD AUTO: 13.9 K/UL — HIGH (ref 3.8–10.5)

## 2020-02-23 PROCEDURE — 99233 SBSQ HOSP IP/OBS HIGH 50: CPT

## 2020-02-23 RX ADMIN — ENOXAPARIN SODIUM 40 MILLIGRAM(S): 100 INJECTION SUBCUTANEOUS at 12:04

## 2020-02-23 RX ADMIN — IFOSFAMIDE 50.98 MILLIGRAM(S): 1 INJECTION, POWDER, LYOPHILIZED, FOR SOLUTION INTRAVENOUS at 12:15

## 2020-02-23 RX ADMIN — CHLORHEXIDINE GLUCONATE 1 APPLICATION(S): 213 SOLUTION TOPICAL at 12:09

## 2020-02-23 RX ADMIN — Medication 507.5 MILLIGRAM(S): at 14:16

## 2020-02-23 RX ADMIN — ONDANSETRON 116 MILLIGRAM(S): 8 TABLET, FILM COATED ORAL at 11:45

## 2020-02-23 RX ADMIN — Medication 564 MILLIGRAM(S): at 13:15

## 2020-02-23 RX ADMIN — Medication 4 MILLIGRAM(S): at 12:08

## 2020-02-23 NOTE — ADVANCED PRACTICE NURSE CONSULT - ASSESSMENT
Patient received in bed, alert and oriented x 3, capable of expressing all needs to staff. Cycle 1, day 2/3, Height and weight verified.  Consent in chart. Lab results as per Md carmen aware of same. Vital signs stable prior to chemotherapy and  within acceptable parameters, see sunrise. Pt re-ducated on the importance of saving urine, verbalizes good understanding. Pt re-education done re chemo regimen, drug effects and potential side effects,  pt states understanding. Reports that he has been tolerating  chemotherapy well without side effectst. Pt with  left chest wall Mediport , site free from signs and symptoms of infection, good blood return obtained. Pre- medicated with Zofran 16mg IVSS , decadron 4mg IVP. This was followed by Ifosfamide 5000mg/f5=4451wi with mesna 5000mg/h7=9331ps IVSS, same infusing at 51ml/hr to run over 24hrs. Carboplatin AUC=5= 640mg IVSS on day #2, same administered over 0ne hour, tolerated same well without signs and symptoms of infusion reation noted.  Etoposide 100mg/z8=910qx IVSS over one hour, same given via locked pump. Writer remained at bedside with patient for the first 20 minutes, vital signs remain stable. Patient completed same  without any signs and symptoms of adverse reactions noted.

## 2020-02-23 NOTE — PROGRESS NOTE ADULT - ASSESSMENT
18 yo M w/ pmhx stage IV CHL treated with ABVE-PC now with relapsed disease who presents with worsening left neck swelling. Cycle #1 day #2   of ICE today.     #Hodgkin's Lymphoma  - initially treated with ABVE-PC as pediatric, then treated with brentuximab/gemzar  - pt has long standing history of noncompliance and missed treatments/appointments  - most recently left AMA for C1 ICE on 7 Elijahi 1/2020  - CT neck/chest/abdomen/pelvis imaging reviewed with increase in neck adenopathy and necrotic nodes; hilar/mediastinal LAD mostly unchanged  - pt threatened to leave AMA once again but has decided to stay  - again reemphasized importance of starting treatment and goal of cure  -Cycle #1 day #2   of ICE today.   - monitor TLS labs daily   - He needs salvage treatment and evaluation for auto transplant   - psychiatric evaluation pending given repeated missed appointments and difficult to control behavior at times

## 2020-02-23 NOTE — PROGRESS NOTE ADULT - SUBJECTIVE AND OBJECTIVE BOX
Patient seen and examined , Cycle #1 day #2   of ICE today.         MEDICATIONS  (STANDING):  CARBOplatin IVPB (eMAR) 640 milliGRAM(s) IV Intermittent once  chlorhexidine 4% Liquid 1 Application(s) Topical <User Schedule>  dexAMETHasone  Injectable 4 milliGRAM(s) IV Push every 24 hours  enoxaparin Injectable 40 milliGRAM(s) SubCutaneous daily  etoposide IVPB (eMAR) 150 milliGRAM(s) IV Intermittent every 24 hours  ifosfamide IVPB w/additives (eMAR) 7450 milliGRAM(s) IV Intermittent once  ondansetron  IVPB 16 milliGRAM(s) IV Intermittent every 24 hours  sodium chloride 0.9%. 1000 milliLiter(s) (75 mL/Hr) IV Continuous <Continuous>    MEDICATIONS  (PRN):  metoclopramide Injectable 10 milliGRAM(s) IV Push every 8 hours PRN nausea/vomitting  sodium chloride 0.9% lock flush 10 milliLiter(s) IV Push every 1 hour PRN Pre/post blood products, medications, blood draw, and to maintain line patency    IICU Vital Signs Last 24 Hrs  T(C): 36.7 (23 Feb 2020 05:16), Max: 37 (22 Feb 2020 21:02)  T(F): 98.1 (23 Feb 2020 05:16), Max: 98.6 (22 Feb 2020 21:02)  HR: 61 (23 Feb 2020 05:16) (61 - 68)  BP: 102/58 (23 Feb 2020 05:16) (102/58 - 105/76)  BP(mean): --  ABP: --  ABP(mean): --  RR: 18 (23 Feb 2020 05:16) (17 - 18)  SpO2: 97% (23 Feb 2020 05:16) (96% - 97%)              PHYSICAL EXAM:    Constitutional: NAD, lying comfortably in bed  Eyes: EOMI, PERRLA  Neck: large left sided palpable LAD  Respiratory: CTAB; no r/r/w  Cardiovascular: RRR, no M/R/G  Gastrointestinal: +BS, soft, NTND, no hepatosplenomegaly  Extremities: no c/c/e  Neurological: AAOx3, nonfocal          Complete Blood Count + Automated Diff in AM (02.23.20 @ 07:28)    WBC Count: 13.90 K/uL    RBC Count: 4.68 M/uL    Hemoglobin: 11.3 g/dL    Hematocrit: 36.4 %    Mean Cell Volume: 77.8 fl    Mean Cell Hemoglobin: 24.1 pg    Mean Cell Hemoglobin Conc: 31.0 gm/dL    Red Cell Distrib Width: 14.9 %    Platelet Count - Automated: 414 K/uL    Nucleated RBC: 0 /100 WBCs      Lactate Dehydrogenase, Serum in AM (02.23.20 @ 07:18)    Lactate Dehydrogenase, Serum: 119 U/L    Uric Acid, Serum (02.23.20 @ 07:18)    Uric Acid, Serum: 4.8 mg/dL    Phosphorus Level, Serum in AM (02.23.20 @ 07:18)    Phosphorus Level, Serum: 4.3 mg/dL        Comprehensive Metabolic Panel in AM (02.23.20 @ 07:18)    Sodium, Serum: 139 mmol/L    Potassium, Serum: 4.4 mmol/L    Chloride, Serum: 99 mmol/L    Carbon Dioxide, Serum: 26 mmol/L    Anion Gap, Serum: 14 mmol/L    Blood Urea Nitrogen, Serum: 12 mg/dL    Creatinine, Serum: 0.59 mg/dL    Glucose, Serum: 137 mg/dL    Calcium, Total Serum: 9.5 mg/dL    Protein Total, Serum: 7.8 g/dL    Albumin, Serum: 3.6 g/dL    Bilirubin Total, Serum: 0.3 mg/dL    Alkaline Phosphatase, Serum: 85 U/L    Aspartate Aminotransferase (AST/SGOT): 6 U/L    Alanine Aminotransferase (ALT/SGPT): 15 U/L    eGFR if Non : 147: Interpretative comment  The units for eGFR are mL/min/1.73M2 (normalized body surface area). The  eGFR is calculated from a serum creatinine using the CKD-EPI equation.  Other variables required for calculation are race, age and sex. Among  patients with chronic kidney disease (CKD), the eGFR is useful in  determining the stage of disease according to KDOQI CKD classification.  All eGFR results are reported numerically with the following  interpretation.          GFR                    With                 Without     (ml/min/1.73 m2)    Kidney Damage       Kidney Damage        >= 90                    Stage 1                     Normal        60-89                    Stage 2                     Decreased GFR        30-59     Stage 3                     Stage 3        15-29                    Stage 4                     Stage 4        < 15                      Stage 5                     Stage 5  Each stage of CKD assumes that the associated GFR level has been in  effect for at least 3 months. Determination of stages one and two (with  eGFR > 59 ml/min/m2) requires estimation of kidney damage for at least 3  months as defined by structural or functional abnormalities.  Limitations: All estimates of GFR will be less accurate for patients at  extremes of muscle mass (including but not limited to frail elderly,  critically ill, or cancer patients), those with unusual diets, and those  with conditions associated with reduced secretion or extrarenal  elimination of creatinine. The eGFR equation is not recommended for use  in patients with unstable creatinine levels. mL/min/1.73M2    eGFR if African American: 170 mL/min/1.73M2                      Lactate Dehydrogenase, Serum in AM (02.23.20 @ 07:18)    Lactate Dehydrogenase, Serum: 119 U/L    RADIOLOGY & ADDITIONAL TESTS:  Studies reviewed.

## 2020-02-23 NOTE — PROGRESS NOTE ADULT - PROBLEM SELECTOR PLAN 1
- Treatment with ICE  - Restaging with CT neck/chest/abdomen/pelvis completed-extensive metastatic   disease noted with necrotic lymph nodes  - TLS labs daily - No evidence on review   - Psych c/s for poor insight and non-compliance (Pending)  - Transfer to 7M when bed available

## 2020-02-23 NOTE — PROGRESS NOTE ADULT - ASSESSMENT
18 yo M w/ pmhx stage IV CHL treated with ABVE-PC with relapsed disease non adherent with treatment initially presented to [facility] yesterday evening with worsening left neck swelling, diagnosed with Flu but left AMA, returns now at urging of hematologist to initiate chemo.

## 2020-02-24 ENCOUNTER — TRANSCRIPTION ENCOUNTER (OUTPATIENT)
Age: 20
End: 2020-02-24

## 2020-02-24 VITALS
HEART RATE: 68 BPM | SYSTOLIC BLOOD PRESSURE: 91 MMHG | OXYGEN SATURATION: 100 % | DIASTOLIC BLOOD PRESSURE: 46 MMHG | RESPIRATION RATE: 18 BRPM | TEMPERATURE: 98 F

## 2020-02-24 LAB
BACTERIA BLD CULT: SIGNIFICANT CHANGE UP
BACTERIA BLD CULT: SIGNIFICANT CHANGE UP
G6PD RBC-CCNC: 20.3 U/G HGB — SIGNIFICANT CHANGE UP (ref 7–20.5)

## 2020-02-24 PROCEDURE — 74177 CT ABD & PELVIS W/CONTRAST: CPT

## 2020-02-24 PROCEDURE — 84550 ASSAY OF BLOOD/URIC ACID: CPT

## 2020-02-24 PROCEDURE — 87486 CHLMYD PNEUM DNA AMP PROBE: CPT

## 2020-02-24 PROCEDURE — 87798 DETECT AGENT NOS DNA AMP: CPT

## 2020-02-24 PROCEDURE — 85610 PROTHROMBIN TIME: CPT

## 2020-02-24 PROCEDURE — 84100 ASSAY OF PHOSPHORUS: CPT

## 2020-02-24 PROCEDURE — 99239 HOSP IP/OBS DSCHRG MGMT >30: CPT

## 2020-02-24 PROCEDURE — 80053 COMPREHEN METABOLIC PANEL: CPT

## 2020-02-24 PROCEDURE — 99285 EMERGENCY DEPT VISIT HI MDM: CPT

## 2020-02-24 PROCEDURE — 70491 CT SOFT TISSUE NECK W/DYE: CPT

## 2020-02-24 PROCEDURE — 83735 ASSAY OF MAGNESIUM: CPT

## 2020-02-24 PROCEDURE — 82955 ASSAY OF G6PD ENZYME: CPT

## 2020-02-24 PROCEDURE — 71260 CT THORAX DX C+: CPT

## 2020-02-24 PROCEDURE — 87581 M.PNEUMON DNA AMP PROBE: CPT

## 2020-02-24 PROCEDURE — 84443 ASSAY THYROID STIM HORMONE: CPT

## 2020-02-24 PROCEDURE — 99232 SBSQ HOSP IP/OBS MODERATE 35: CPT | Mod: GC

## 2020-02-24 PROCEDURE — 83615 LACTATE (LD) (LDH) ENZYME: CPT

## 2020-02-24 PROCEDURE — 93005 ELECTROCARDIOGRAM TRACING: CPT

## 2020-02-24 PROCEDURE — 85027 COMPLETE CBC AUTOMATED: CPT

## 2020-02-24 PROCEDURE — 87633 RESP VIRUS 12-25 TARGETS: CPT

## 2020-02-24 PROCEDURE — 85730 THROMBOPLASTIN TIME PARTIAL: CPT

## 2020-02-24 RX ORDER — PEGFILGRASTIM-CBQV 6 MG/.6ML
6 INJECTION, SOLUTION SUBCUTANEOUS ONCE
Refills: 0 | Status: DISCONTINUED | OUTPATIENT
Start: 2020-02-25 | End: 2020-02-24

## 2020-02-24 RX ORDER — LANOLIN ALCOHOL/MO/W.PET/CERES
3 CREAM (GRAM) TOPICAL AT BEDTIME
Refills: 0 | Status: DISCONTINUED | OUTPATIENT
Start: 2020-02-24 | End: 2020-02-24

## 2020-02-24 RX ADMIN — CHLORHEXIDINE GLUCONATE 1 APPLICATION(S): 213 SOLUTION TOPICAL at 15:18

## 2020-02-24 RX ADMIN — Medication 507.5 MILLIGRAM(S): at 15:25

## 2020-02-24 RX ADMIN — ONDANSETRON 116 MILLIGRAM(S): 8 TABLET, FILM COATED ORAL at 15:17

## 2020-02-24 RX ADMIN — Medication 4 MILLIGRAM(S): at 15:17

## 2020-02-24 RX ADMIN — Medication 10 MILLIGRAM(S): at 09:51

## 2020-02-24 RX ADMIN — ENOXAPARIN SODIUM 40 MILLIGRAM(S): 100 INJECTION SUBCUTANEOUS at 15:25

## 2020-02-24 NOTE — PROGRESS NOTE ADULT - PROBLEM SELECTOR PLAN 3
Transitions of Care Status:  1.  Name of PCP:Kalie Emery  2.  PCP Contacted on Admission: [x ] Y    [ ] N  seeing patient in hospital  3.  PCP contacted at Discharge: [ ] Y    [ ] N    [ ] N/A  4.  Post-Discharge Appointment Date and Location:  5.  Summary of Handoff given to PCP: Transitions of Care Status:  1.  Name of PCP: Kalie Emery  2.  PCP Contacted on Admission: [x ] Y    [ ] N  seeing patient in hospital  3.  PCP contacted at Discharge: [ x] Y    [ ] N    [ ] N/A  4.  Post-Discharge Appointment Date and Location: 2/25   5.  Summary of Handoff given to PCP: email sent to Dr. Emery and the Heme/onc team to arrange injection tomorrow at [facility].

## 2020-02-24 NOTE — PROGRESS NOTE ADULT - REASON FOR ADMISSION
neck swelling

## 2020-02-24 NOTE — PROGRESS NOTE ADULT - ASSESSMENT
18 yo M w/ pmhx stage IV CHL treated with ABVE-PC now with relapsed disease who presents with worsening left neck swelling. Cycle #1 day #3 of ICE today.     #Hodgkin's Lymphoma  - initially treated with ABVE-PC as pediatric, then treated with brentuximab/gemzar  - pt has long standing history of noncompliance and missed treatments/appointments  - most recently left AMA for C1 ICE on 7 Monti 1/2020  - CT neck/chest/abdomen/pelvis imaging reviewed with increase in neck adenopathy and necrotic nodes; hilar/mediastinal LAD mostly unchanged  - again reemphasized importance of starting treatment and goal of cure  - Cycle 1 day #3 of ICE today.   - Pt refused further ifosfamide and only got half of 24 hr infusion yesterday   - He declines resuming ifosfamide even with prophylactic measures and more anti-emetics  - will continue D3 etoposide today   - have arranged for pt to receive neulasta tomorrow as inpatient; however, pt is adamant about going home tonight. This can be arranged as outpatient at [facility]  - stressed importance of adhering to treatment schedule; he understands the risks of missing treatment and not following with outpatient appointments   - He needs salvage treatment and evaluation for auto transplant eventually   - appreciate psychiatry input  - will f/u as outpatient with primary hematologist Dr. Rupert Tellez, PGY-5  Hematology-Oncology Fellow  [phone] ([place]) [phone] ([facility])

## 2020-02-24 NOTE — DISCHARGE NOTE NURSING/CASE MANAGEMENT/SOCIAL WORK - PATIENT PORTAL LINK FT
You can access the FollowMyHealth Patient Portal offered by [facility] by registering at the following website: [email or web address]. By joining [employer] [employer]’s FollowMyHealth portal, you will also be able to view your health information using other applications (apps) compatible with our system.

## 2020-02-24 NOTE — PROGRESS NOTE ADULT - SUBJECTIVE AND OBJECTIVE BOX
INTERVAL HPI/OVERNIGHT EVENTS:  Overnight pt felt like he was having allergic reaction and stopped chemo infusion. He refuses further treatment with ifosfamide. He reports feelings of shortness of breath and itching have resolved this AM. Wants to go home.     MEDICATIONS  (STANDING):  chlorhexidine 4% Liquid 1 Application(s) Topical <User Schedule>  enoxaparin Injectable 40 milliGRAM(s) SubCutaneous daily  sodium chloride 0.9%. 1000 milliLiter(s) (75 mL/Hr) IV Continuous <Continuous>    MEDICATIONS  (PRN):  melatonin 3 milliGRAM(s) Oral at bedtime PRN Insomnia  metoclopramide Injectable 10 milliGRAM(s) IV Push every 8 hours PRN nausea/vomitting  sodium chloride 0.9% lock flush 10 milliLiter(s) IV Push every 1 hour PRN Pre/post blood products, medications, blood draw, and to maintain line patency    Allergies    No Known Allergies    Intolerances          VITAL SIGNS:  T(F): 97.5 (02-24-20 @ 13:21)  HR: 68 (02-24-20 @ 13:21)  BP: 91/46 (02-24-20 @ 13:21)  RR: 18 (02-24-20 @ 13:21)  SpO2: 100% (02-24-20 @ 13:21)  Wt(kg): --    PHYSICAL EXAM:    Constitutional: NAD, lying comfortably in bed  Eyes: EOMI, PERRLA  Neck: left sided adenopathy unchanged   Respiratory: CTAB; no r/r/w  Cardiovascular: RRR, no M/R/G  Gastrointestinal: +BS, soft, NTND, no hepatosplenomegaly  Extremities: no c/c/e  Neurological: AAOx3, nonfocal    LABS:                        11.3   13.90 )-----------( 414      ( 23 Feb 2020 07:28 )             36.4     02-23    139  |  99  |  12  ----------------------------<  137<H>  4.4   |  26  |  0.59    Ca    9.5      23 Feb 2020 07:18  Phos  4.3     02-23  Mg     2.2     02-23    TPro  7.8  /  Alb  3.6  /  TBili  0.3  /  DBili  x   /  AST  6<L>  /  ALT  15  /  AlkPhos  85  02-23          RADIOLOGY & ADDITIONAL TESTS:  Studies reviewed.

## 2020-02-24 NOTE — ADVANCED PRACTICE NURSE CONSULT - RECOMMEDATIONS
Full report given to area nurse, aware to monitor.
Pt tolerated well report given to area nurse strict i/o monitor for side effects
Full report given to area nurse, aware to monitor.

## 2020-02-24 NOTE — PROGRESS NOTE ADULT - PROVIDER SPECIALTY LIST ADULT
Hospitalist MIXED COPD ROS: No significant ongoing wheezing or shortness of breath,   New concerns: Progressive dyspnea   Exam: appears well, vitals normal, no respiratory distress, acyanotic, normal RR.   Assessment:  MIXED COPD stable.   Plan:  CONTINUE  ANORO.

## 2020-02-24 NOTE — PROGRESS NOTE ADULT - ASSESSMENT
20 yo M w/ pmhx stage IV CHL treated with ABVE-PC with relapsed disease non adherent with treatment initially presented to [facility] yesterday evening with worsening left neck swelling, diagnosed with Flu but left AMA, returns now at urging of hematologist to initiate chemo.

## 2020-02-24 NOTE — PROGRESS NOTE ADULT - ATTENDING COMMENTS
19 year old male with refractory Hodgkin's lymphoma due to poor compliance.  Attempted to administer ICE x 2 previously with the patient leaving the hospital AMA.  He now has worsening cervical lymphadenopathy, night sweats. He received 1 and half days of CI chemotherapy but noted some shortness of breath overnight and refused to restart. Today he received Day 3 etoposide.   -monitor for chemotherapy toxicity   -will need neulasta, patient wants to go home so will help arrange this as an outpatient   -discussed the importance of compliance with this regimen to ensure that he attains a remission
This is a 19 year old male with refractory Hodgkin's lymphoma due to poor compliance.  Attempted to administer ICE x 2 previously with the patient leaving the hospital AMA.  He now has worsening cervical lymphadenopathy, night sweats.   Extensive discussion had with the patient.  I have explained that without treatment his disease will progress and he will die which he appears to understand.  He states he want treatment, understands it will require commitment and compliance.    Apprec psychiatry assessment.    Will also consider palliative evaluation if patient has any adjustment d/o over the weekend.    Begin ICE tomorrow.   Maximize antiemetics, IVF.    FluB positive, minimally symptomatic.
Pt is medically stable for d/c home, with close follow up with Oncologist [facility]. Time spent 35 min

## 2020-02-24 NOTE — ADVANCED PRACTICE NURSE CONSULT - ASSESSMENT
Cycle 1, day 1/1,Height and weig BSAt verified. Lab results as per Md carmen Geoff aware of same. Vital signs stable prior to chemotherapy, and  within accepectable parameters, see sunrise. Pt educated on the importance of saving urine, verbalizes good understanding. Pt education done re chemo regimen, drug effects and potential side effects, pt states understanding. Reports that he didn't feel good last night and turned off his ifosfamide approx half way through dose. also c/o nausea w/ emesis  Pt withRCW mediport  line, site free from signs and symptoms of infection, good blood return obtained. Pre- medicated with zofran 16 mg ivss etoposide 100 mg/u8=560 mg ivss infused over 1 hr w/o incident

## 2020-02-24 NOTE — PROVIDER CONTACT NOTE (OTHER) - ASSESSMENT
Pt A&Ox4. VSS. Sitting up in bed c/o nausea and vomiting. Chemotherapy infusing. Pt refusing IV push reglan. Pt A&Ox4. VSS. Sitting up in bed c/o nausea and vomiting. Chemotherapy infusing. RN offered and educated pt on anti-nausea medication. Pt refusing IV push reglan.

## 2020-02-24 NOTE — PROGRESS NOTE ADULT - PROBLEM SELECTOR PLAN 1
- Treatment with ICE  - Restaging with CT neck/chest/abdomen/pelvis completed-extensive metastatic   disease noted with necrotic lymph nodes  - TLS labs - No evidence on review , new labs ordered today  - Psych c/s for poor insight and non-compliance completed 2/21: Adjustment disorder with mixed disturbance of emotions and conduct, rec [facility] AOCARLENE at discharge  - Transfer to 7M when bed available  - Patient refused chemo in the middle of administration, he is refusing zofran for nausea, Heme/onc made aware.

## 2020-02-24 NOTE — CHART NOTE - NSCHARTNOTEFT_GEN_A_CORE
Nutrition Follow Up Note  Patient seen for: malnutrition follow-up     Hospital course as per chart: Patient is a 18 yo male with PMH of stage IV CHL with relapsed disease who initially presented to [facility] 2/19 with worsening left neck swelling, diagnosed with flu but left AMA. Pt presented 2/20 as per hematologist advisement. Pt was admitted 2/20 for chemotherapy. Chart reviewed, events noted.       Source: comprehensive chart review    Diet: Regular    Patient reports poor intake today - only consumed some fruit from lunch tray. Endorses nausea/vomiting earlier today, now improved.     Pt with limited interest in RD interview. Encouraged PO intake and obtained pt food preferences (fruit). Pt made aware RD remains available.     PO intake: < 50%     Source for PO intake: patient    Enteral /Parenteral Nutrition: N/A    Most recent weight: 95.6 pounds (2/23 bed scale weight)  % Weight Change: no new weights to assess, will continue to monitor    Pertinent Medications: MEDICATIONS  (STANDING):  chlorhexidine 4% Liquid 1 Application(s) Topical <User Schedule>  dexAMETHasone  Injectable 4 milliGRAM(s) IV Push every 24 hours  enoxaparin Injectable 40 milliGRAM(s) SubCutaneous daily  etoposide IVPB (eMAR) 150 milliGRAM(s) IV Intermittent every 24 hours  ondansetron  IVPB 16 milliGRAM(s) IV Intermittent every 24 hours  sodium chloride 0.9%. 1000 milliLiter(s) (75 mL/Hr) IV Continuous <Continuous>    MEDICATIONS  (PRN):  melatonin 3 milliGRAM(s) Oral at bedtime PRN Insomnia  metoclopramide Injectable 10 milliGRAM(s) IV Push every 8 hours PRN nausea/vomitting  sodium chloride 0.9% lock flush 10 milliLiter(s) IV Push every 1 hour PRN Pre/post blood products, medications, blood draw, and to maintain line patency    Pertinent Labs:  02-23 Na139 mmol/L Glu 137 mg/dL<H> K+ 4.4 mmol/L Cr  0.59 mg/dL BUN 12 mg/dL 02-23 Phos 4.3 mg/dL 02-23 Alb 3.6 g/dL    Skin: no pressure injuries per flowsheets   Edema: no edema per flowsheets     Estimated Needs: no change since previous assessment, based on lower  pounds  Estimated Energy Needs (30-35 calories/kg): 6150-4087 calories/day  Estimated Protein Needs (1.2-1.4 gm/kg): 76-88 gm/day  Estimated Fluid Needs (30-35 ml/kg): 2428-0003 calories/day    Previous Nutrition Diagnosis: Severe Malnutrition, Underweight  Nutrition Diagnosis is ongoing, nutrition care plan in progress - being addressed with encouragement of PO intake, honoring of food preferences as able     New Nutrition Diagnosis: not applicable    Interventions:   Recommend  1) Continue current diet: Regular with no therapeutic restrictions - monitor/adjust as needed   2) Monitor interest in oral nutrition supplement   3) Encouraged PO intake as tolerated; pt made aware RD remains available; RD to continue to obtain/honor food preferences as able      Monitoring and Evaluation: Monitor PO intake, weight, labs, skin, GI status, diet     RD remains available upon request and will follow up per protocol.   Leila Valle, MS, RD, CDN Pager #[phone]

## 2020-02-24 NOTE — PROGRESS NOTE ADULT - SUBJECTIVE AND OBJECTIVE BOX
Patient is a 19y old  Male who presents with a chief complaint of neck swelling (23 Feb 2020 10:48)      SUBJECTIVE / OVERNIGHT EVENTS: Patient seen and examined at bedside. States he wasn't able to sleep last night due to nausea. Chemo was stopped in the middle of administration pre pt request 2/2 to nausea. Currently he denies any CP, SOB, abd pain, n/v, dizziness and rashes     ROS:  All other review of systems negative    Allergies    No Known Allergies    Intolerances        MEDICATIONS  (STANDING):  chlorhexidine 4% Liquid 1 Application(s) Topical <User Schedule>  dexAMETHasone  Injectable 4 milliGRAM(s) IV Push every 24 hours  enoxaparin Injectable 40 milliGRAM(s) SubCutaneous daily  etoposide IVPB (eMAR) 150 milliGRAM(s) IV Intermittent every 24 hours  ondansetron  IVPB 16 milliGRAM(s) IV Intermittent every 24 hours  sodium chloride 0.9%. 1000 milliLiter(s) (75 mL/Hr) IV Continuous <Continuous>    MEDICATIONS  (PRN):  melatonin 3 milliGRAM(s) Oral at bedtime PRN Insomnia  metoclopramide Injectable 10 milliGRAM(s) IV Push every 8 hours PRN nausea/vomitting  sodium chloride 0.9% lock flush 10 milliLiter(s) IV Push every 1 hour PRN Pre/post blood products, medications, blood draw, and to maintain line patency      Vital Signs Last 24 Hrs  T(C): 36.8 (24 Feb 2020 05:21), Max: 36.8 (23 Feb 2020 21:46)  T(F): 98.2 (24 Feb 2020 05:21), Max: 98.3 (23 Feb 2020 21:46)  HR: 69 (24 Feb 2020 05:21) (65 - 80)  BP: 97/55 (24 Feb 2020 05:21) (96/63 - 108/80)  BP(mean): --  RR: 18 (24 Feb 2020 05:21) (16 - 20)  SpO2: 100% (24 Feb 2020 05:21) (100% - 100%)  CAPILLARY BLOOD GLUCOSE        I&O's Summary    23 Feb 2020 07:01  -  24 Feb 2020 07:00  --------------------------------------------------------  IN: 1481 mL / OUT: 0 mL / NET: 1481 mL        PHYSICAL EXAM:  GENERAL: NAD, well-developed  HEAD:  Atraumatic, Normocephalic  EYES: EOMI, PERRLA, conjunctiva and sclera clear  NECK: large left lateral neck LAD  CHEST/LUNG: Clear to auscultation bilaterally; No wheeze  HEART: Regular rate and rhythm; No murmurs, rubs, or gallops  ABDOMEN: Soft, Nontender, Nondistended; Bowel sounds present  EXTREMITIES:  2+ Peripheral Pulses, No clubbing, cyanosis, or edema  NEUROLOGY: AAOx3, non-focal  PSYCH: calm  SKIN: No rashes or lesions    LABS:                        11.3   13.90 )-----------( 414      ( 23 Feb 2020 07:28 )             36.4     02-23    139  |  99  |  12  ----------------------------<  137<H>  4.4   |  26  |  0.59    Ca    9.5      23 Feb 2020 07:18  Phos  4.3     02-23  Mg     2.2     02-23    TPro  7.8  /  Alb  3.6  /  TBili  0.3  /  DBili  x   /  AST  6<L>  /  ALT  15  /  AlkPhos  85  02-23              RADIOLOGY & ADDITIONAL TESTS:    Consultant(s) Notes Reviewed:  Heme/onc rec noted     Care Discussed with Consultants/Other Providers: Medicine NP

## 2020-02-25 PROBLEM — C81.90 HODGKIN LYMPHOMA, UNSPECIFIED, UNSPECIFIED SITE: Chronic | Status: ACTIVE | Noted: 2020-02-20

## 2020-02-26 ENCOUNTER — APPOINTMENT (OUTPATIENT)
Dept: INFUSION THERAPY | Facility: HOSPITAL | Age: 20
End: 2020-02-26

## 2020-02-26 ENCOUNTER — OUTPATIENT (OUTPATIENT)
Dept: OUTPATIENT SERVICES | Facility: HOSPITAL | Age: 20
LOS: 1 days | Discharge: HOME | End: 2020-02-26

## 2020-02-26 DIAGNOSIS — C81.98 HODGKIN LYMPHOMA, UNSPECIFIED, LYMPH NODES OF MULTIPLE SITES: ICD-10-CM

## 2020-02-27 DIAGNOSIS — Z51.89 ENCOUNTER FOR OTHER SPECIFIED AFTERCARE: ICD-10-CM

## 2020-03-02 ENCOUNTER — APPOINTMENT (OUTPATIENT)
Dept: HEMATOLOGY ONCOLOGY | Facility: CLINIC | Age: 20
End: 2020-03-02

## 2020-03-05 ENCOUNTER — APPOINTMENT (OUTPATIENT)
Dept: HEMATOLOGY ONCOLOGY | Facility: CLINIC | Age: 20
End: 2020-03-05

## 2020-03-05 NOTE — ASSESSMENT
[FreeTextEntry1] : 19 year old male who was diagnosed with a stage IV CHL in 9/2018 who appears to have a relapsed or refractory Hodgkin's lymphoma.  He was initially treated following ABVE-PC completed 12/2018 after 5 cycles due to noncompliance and patient refusal, relapsed diagnosed in Feb 2019, started on salvage treatment in April 2019 with gemzar/brentuximab which was also stopped due to the patient's refusal and noncompliance. \par \par He was recommended to have 2 cycles of ICE with intent to pursue an autologous bone marrow transplant.  The patient began the cycle and then left AMA unfortunately.  I have had an extensive discussion with the patient today.  Explained that due to his immaturity, inability to be compliant with therapy, have great hesitation with pursuing intensive chemotherapy or transplant.  Discussed that his disease is still treatable but will require compliance and acceptance.  He continues to not want further chemotherapy despite the discussion of death.  \par I have asked him to bring his mother and grandmother to further discuss this point.  He is agreeable to having a lymph node biopsy (claims the LAD may be infectious) and if positive for disease will be more agreeable to further treatment with chemotherapy.  Can also consider local RT if there is no other site of disease as he is very non compliant with appointments/treatments.  \par Check his PT/PTT for the lymph node biopsy.  \par ENT evaluation/dental evaluation.  \par He is agreeable to meet with Dr. Oneal to further discuss bone marrow transplant.  \par All questions, concerns answered.\par He will follow up in 1 month.

## 2020-03-05 NOTE — HISTORY OF PRESENT ILLNESS
[de-identified] : This is an 17 y/o male presenting to \[facility]\"" care for treatment of Hodgkins Lymphoma. Patient initially presented to the [facility] on 9/4/18 from [facility] with a mediastinal mass.  Upon work up he was found to have Stage IVB disease.  Ultrasound guided right supraclavicular lymph node Classical Hodgkins Lymphoma- large atypical cells to be CD30, CD15, PAX5+, MUM1+, LCA(-), CD20(-), CD79a(-), CD3(-), ALK(-).\par Bilateral bone marrow biopsy on 9/6/18- Cellular marrow with maturing trilineage hematopoiesis and no morphologic evidence of lymphoma\par PET/CT completed on 9/10/18-  Hypermetabolic lymphadenopathy in neck and thorax,  multiple hypermetabolic foci in the bilateral lower cervical and supraclavicular regions, measuring 0.9 x 0.7 cm demonstrates SUV 4.3 anterior mediastinal mass measuring 4.2 x 3 cm demonstrating peripheral FDG avidity and central photopenia, SUV: 6.9. Marked mediastinal and bilateral hilar lymphadenopathy demonstrating FDG avidity, right hilar lymph node measures 3.8 x 2.4 cm, SUV: 10.3.  5 mm nodule is again noted in the right middle lobe.  Several hypermetabolic foci identified predominantly throughout the axial skeleton with foci noted scattered throughout the upper thoracic spine and a singular focus in the right anterior eighth rib FDG avid focus in the right side of the T7 vertebral body with corresponding lucency on CT measuring 0.9 x 0.9 cm, SUV 14.7.  Two hypermetabolic foci in the appendicular skeleton with corresponding underlying lucency on CT. There is an FDG avid focus in the left inferior glenoid and a corresponding lucency on CT measuring 0.9 x 0.7 cm, SUV 12. There is also a 1.1 x 0.7 cm lucency in the right femoral head with corresponding FDG avidity, SUV 7.3.  He was placed on/following a protocol- OC 1331, treatment with ABVE-PC (adriamycin, bleomycin, vincristine, etoposide, cytoxan, prednisone, dexrazoxane).  He was withdrawn from the study due to his social situation, difficulty with compliance.  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  He was treated with chemotherapy from Sept 2018- Dec 2018.  Interim PET/CT completed on 11/3/18- Partially hypermetabolic anterior mediastinal mass is decreased in \par size and metabolism as compared to prior study dated 9/10/2018, compatible with a partial response to interval therapy (Deauville 4).  Resolution of additional previously seen hypermetabolic mediastinal, hilar, and cardiophrenic lymph nodes.Marked interval decrease in hypermetabolic foci in the axial and appendicular skeleton with residual hypermetabolic focus in left glenoid which may represent a small focus of residual disease (Deauville 4).  He completed 5 cycles of chemotherapy, but missed about 10 appointments, and refused further chemotherapy after cycle 5 due to side effects.  He underwent MRI Neck, Abdomen, and Pelvis from 2/2019 showed new lymph node enlargement to the L neck and redemonstrated osseous infiltration involving the bilateral pelvic bones and proximal femurs. CT Chest from 3/2019 showed mediastinal enlarged lymph nodes which were either unchanged or demonstrate interval progression since 11/2018, progressive R hilar lymphadenopathy, and a new cluster of small nodular opacities within the right lower lobe. PET Scan done 3/2019 revealed a new hypermetabolic lymphadenopathy in the left neck measuring approximately 2.4 x 2.1cm  in the left level III/V cervical regions. There is also new hypermetabolic 1.2 x 0.8 cm left level VB cervical lymph node and chest new hypermetabolic approximately 1.1 x 0.9 cm right upper paratracheal node. Reference approximately 3.4 x 2.0 cm subcarinal node. Approximately 3.8 x 3.1cm right perihilar mass. as compared to PET/CT from 11/3/2018 suspicious for recurrent lymphoma. A nonspecific new diffuse splenic hypermetabolism.  A new hypermetabolic opacity/consolidation in the right lower lung, approximately 2 x 1.3cm opacity/consolidation in the superior segment of the right lower lobe.  Ultrasound guided core biopsy of left cervical lymph node done on 4/11/19-  confirmed refractory disease.  Bone marrow biopsy was negative.  He was started on treatment 4/13 with salvage treatment with Gemzar/brentuximab which he received 2 cycles but again had compliance issues along with behavioral issues with the staff.  He was then recommended to have \par \par During chemotherapy, he was experiencing severe symptoms of fatigue, headache, vomiting and nausea. His states his compliance issue may be due to believing the treatment was not working. His last chemotherapy session was 5/16 - he feels that his lymphadenopathy is mostly improved but he still palpates some cervical lymph nodes. \par \par  [de-identified] : Patient was started on ICE- had day 1 of etoposide, experienced nausea/vomiting, he was admitted to have day 2 of treatment but then left AMA.  He cancelled further appointments and did not want any further treatment despite numerous attempts to discuss with patient, mother, grandmother.  He has lost 5 pounds, denies any change in his appetite but only eats 2 meals a day.  He has no fever/chills, no cough, no abdominal pain, no n/v/d, no night sweats.  \par \par PET/CT 7/17-  Interval resolution of FDG activity associated with lymphadenopathy in the left neck which is either resolved or typically decreased in size. Nonspecific new symmetrically enlarged bilateral tonsils with increased FDG activity.  Diffuse FDG activity in brown adipose tissue in bilateral shoulders, mediastinum, and bilateral paraspinal regions limits evaluation of previously seen lymphadenopathy although no definite FDG avid lymph node is noted and may have either resolved or significantly decreased in size.\par Interval resolution of diffuse splenic and bone marrow hypermetabolism. Nonspecific new minimally FDG avid subcentimeter nodular opacities in the right middle lobe, probably inflammatory.

## 2020-03-05 NOTE — PHYSICAL EXAM
[Fully active, able to carry on all pre-disease performance without restriction] : Status 0 - Fully active, able to carry on all pre-disease performance without restriction [Thin] : thin [Normal] : affect appropriate [de-identified] : multiple left cervical lymph nodes 1-2 cm.  [de-identified] : no palpable axillary or inguinal LAD

## 2020-03-06 ENCOUNTER — APPOINTMENT (OUTPATIENT)
Dept: HEMATOLOGY ONCOLOGY | Facility: CLINIC | Age: 20
End: 2020-03-06

## 2020-03-16 ENCOUNTER — APPOINTMENT (OUTPATIENT)
Dept: HEMATOLOGY ONCOLOGY | Facility: CLINIC | Age: 20
End: 2020-03-16

## 2020-03-16 ENCOUNTER — RESULT REVIEW (OUTPATIENT)
Age: 20
End: 2020-03-16

## 2020-03-16 LAB
BASOPHILS # BLD AUTO: 0.08 K/UL — SIGNIFICANT CHANGE UP (ref 0–0.2)
BASOPHILS NFR BLD AUTO: 0.4 % — SIGNIFICANT CHANGE UP (ref 0–2)
EOSINOPHIL # BLD AUTO: 0.09 K/UL — SIGNIFICANT CHANGE UP (ref 0–0.5)
EOSINOPHIL NFR BLD AUTO: 0.4 % — SIGNIFICANT CHANGE UP (ref 0–6)
HCT VFR BLD CALC: 33.3 % — LOW (ref 39–50)
HGB BLD-MCNC: 10.9 G/DL — LOW (ref 13–17)
IMM GRANULOCYTES NFR BLD AUTO: 0.7 % — SIGNIFICANT CHANGE UP (ref 0–1.5)
LYMPHOCYTES # BLD AUTO: 1.09 K/UL — SIGNIFICANT CHANGE UP (ref 1–3.3)
LYMPHOCYTES # BLD AUTO: 5.2 % — LOW (ref 13–44)
MCHC RBC-ENTMCNC: 26 PG — LOW (ref 27–34)
MCHC RBC-ENTMCNC: 32.7 GM/DL — SIGNIFICANT CHANGE UP (ref 32–36)
MCV RBC AUTO: 79.3 FL — LOW (ref 80–100)
MONOCYTES # BLD AUTO: 1.36 K/UL — HIGH (ref 0–0.9)
MONOCYTES NFR BLD AUTO: 6.5 % — SIGNIFICANT CHANGE UP (ref 2–14)
NEUTROPHILS # BLD AUTO: 18.17 K/UL — HIGH (ref 1.8–7.4)
NEUTROPHILS NFR BLD AUTO: 86.8 % — HIGH (ref 43–77)
NRBC # BLD: 0 /100 WBCS — SIGNIFICANT CHANGE UP (ref 0–0)
PLATELET # BLD AUTO: 410 K/UL — HIGH (ref 150–400)
RBC # BLD: 4.2 M/UL — SIGNIFICANT CHANGE UP (ref 4.2–5.8)
RBC # FLD: 19 % — HIGH (ref 10.3–14.5)
WBC # BLD: 20.93 K/UL — HIGH (ref 3.8–10.5)
WBC # FLD AUTO: 20.93 K/UL — HIGH (ref 3.8–10.5)

## 2020-03-16 NOTE — ED PEDIATRIC NURSE NOTE - NSFALLRSKPASTHIST_ED_ALL_ED
Interval History: The patient underwent a small bowel follow through today which was negative. Will resume diet. General surgery following. Possible discharge home tomorrow if he continues to improve.       Review of Systems   Constitutional: Positive for appetite change (decreased). Negative for activity change, chills, diaphoresis, fatigue, fever and unexpected weight change.   HENT: Negative for congestion, ear discharge, facial swelling, rhinorrhea, sinus pressure, sneezing, sore throat and trouble swallowing.    Eyes: Negative for discharge, redness and visual disturbance.   Respiratory: Negative for apnea, cough, choking, chest tightness, shortness of breath, wheezing and stridor.    Cardiovascular: Negative for chest pain, palpitations and leg swelling.   Gastrointestinal: Positive for abdominal pain and nausea. Negative for abdominal distention, anal bleeding, blood in stool, constipation, diarrhea and vomiting.   Endocrine: Negative for cold intolerance and heat intolerance.   Genitourinary: Negative for difficulty urinating, discharge, dysuria, frequency and hematuria.   Musculoskeletal: Negative for arthralgias, back pain, gait problem, joint swelling, myalgias, neck pain and neck stiffness.   Skin: Negative for color change, pallor and rash.   Neurological: Negative for dizziness, tremors, seizures, syncope, facial asymmetry, speech difficulty, weakness, light-headedness, numbness and headaches.   Psychiatric/Behavioral: Negative for agitation, behavioral problems, confusion, hallucinations, sleep disturbance and suicidal ideas. The patient is not nervous/anxious.    All other systems reviewed and are negative.    Objective:     Vital Signs (Most Recent):  Temp: 97.3 °F (36.3 °C) (03/16/20 1307)  Pulse: 69 (03/16/20 1307)  Resp: 18 (03/16/20 1307)  BP: 127/62 (03/16/20 1307)  SpO2: 100 % (03/16/20 1307) Vital Signs (24h Range):  Temp:  [97.3 °F (36.3 °C)-98.2 °F (36.8 °C)] 97.3 °F (36.3 °C)  Pulse:   [63-69] 69  Resp:  [15-18] 18  SpO2:  [97 %-100 %] 100 %  BP: (127-182)/(62-87) 127/62     Weight: 67.6 kg (149 lb 0.5 oz)  Body mass index is 19.66 kg/m².    Intake/Output Summary (Last 24 hours) at 3/16/2020 1535  Last data filed at 3/16/2020 0800  Gross per 24 hour   Intake 3418.33 ml   Output 725 ml   Net 2693.33 ml      Physical Exam   Constitutional: He is oriented to person, place, and time. He appears well-developed and well-nourished. No distress.   HENT:   Head: Normocephalic and atraumatic.   Mouth/Throat: No oropharyngeal exudate.   Eyes: Pupils are equal, round, and reactive to light. Conjunctivae are normal. Right eye exhibits no discharge. Left eye exhibits no discharge.   Neck: Normal range of motion. Neck supple. No JVD present.   Cardiovascular: Normal rate, regular rhythm, normal heart sounds and intact distal pulses. Exam reveals no gallop and no friction rub.   No murmur heard.  Pulmonary/Chest: Effort normal and breath sounds normal. No stridor. No respiratory distress. He has no wheezes. He has no rales. He exhibits no tenderness.   Abdominal: Soft. Bowel sounds are normal. He exhibits no distension and no mass. There is no tenderness. There is no rebound and no guarding. No hernia.   Musculoskeletal: He exhibits no edema, tenderness or deformity.   Neurological: He is alert and oriented to person, place, and time.   Skin: Skin is warm and dry. Capillary refill takes less than 2 seconds. No rash noted. He is not diaphoretic. No erythema.   Psychiatric: He has a normal mood and affect. His behavior is normal.   Nursing note and vitals reviewed.      Significant Labs: All pertinent labs within the past 24 hours have been reviewed.    Significant Imaging:   Imaging Results          X-Ray Chest AP Portable (Final result)  Result time 03/12/20 09:22:40    Final result by Saji Issa MD (03/12/20 09:22:40)                 Impression:      As above.      Electronically signed by: Saji  Luis Manuel  Date:    03/12/2020  Time:    09:22             Narrative:    EXAMINATION:  XR CHEST AP PORTABLE    CLINICAL HISTORY:  . Presence of other specified devices    TECHNIQUE:  Single frontal portable view of the chest was performed.    COMPARISON:  02/02/2020    FINDINGS:  Support devices:    Esophagogastric tube in satisfactory position overlying the body of the stomach.    Chronic interstitial coarsening.  Linear reticular interstitial opacification reduced in severity compared to 02/02/2020.  No pneumothorax.  Suggestion of tiny right pleural effusion unchanged.  Surgical clips right axilla and left cervical region.    The cardiac silhouette is normal in size. The hilar and mediastinal contours are unremarkable.    Bones are intact.                               CT Abdomen Pelvis  Without Contrast (Final result)  Result time 03/12/20 08:01:28    Final result by Aurelio Curtis MD (03/12/20 08:01:28)                 Impression:      Prominent small bowel loops are seen throughout the abdomen with transition suspected within the upper pelvis likely at the site of a bowel anastomosis with decompressed ileum distally.  Small bowel loops are thickened at the level near the obstruction without evidence of pneumatosis.  Findings concerning for bowel obstruction.    Small hiatal hernia containing contrast and mild thickening at the GE junction likely reflects reflux disease.  Thickening of the rugal folds throughout the stomach likely reflects gastritis. Recommend endoscopy follow-up.    All CT scans at this facility use dose modulation, iterative reconstruction and/or weight based dosing when appropriate to reduce radiation dose to as low as reasonably achievable.      Electronically signed by: Aurelio Curtis MD  Date:    03/12/2020  Time:    08:01             Narrative:    EXAMINATION:  CT ABDOMEN PELVIS WITHOUT CONTRAST    CLINICAL HISTORY:  Abdominal pain, unspecified;    TECHNIQUE:  Routine 5 mm non-contrast  images of the abdomen and pelvis were done.  Sagittal and coronal reformats were also submitted for interpretation.    COMPARISON:  None    FINDINGS:  No consolidation.  Chronic interstitial thickening suspected at the right lung base.  Acute interstitial pneumonia not excluded.  No pleural effusion or pneumothorax identified heart size is normal.  Severe coronary disease.    The liver is normal in size and attenuation with no focal hepatic abnormality.  Moderate gallbladder distension.  No stones are identified.  No significant intrahepatic ductal dilatation.  Common bile duct is dilated measuring 11 mm without focal obstruction or stone.    The spleen, pancreas, and adrenal glands are unremarkable.  Benign splenic granuloma are noted.    Kidneys are small in size.  Nonobstructing stone lower pole of the right kidney.  Horseshoe configuration is noted.  Nonobstructing stone lower pole right kidney.  Mild left-sided hydronephrosis double-J ureteral stent is noted in satisfactory position.  Bladder demonstrates mild nonspecific bladder wall thickening which can be seen with cystitis..    Small hiatal hernia containing contrast and mild thickening at the GE junction likely reflects reflux disease.  Thickening of the rugal folds throughout the stomach likely reflects gastritis.  Recommend endoscopy follow-up.  Duodenal diverticulum is seen along the 3rd portion the duodenum.  Prominent small bowel loops are seen throughout the abdomen with transition suspected within the upper pelvis likely at the site of a bowel anastomosis with decompressed ileum distally.  Findings concerning for bowel obstruction.  Large bowel demonstrates fatty infiltration of the wall of the colon likely reflecting chronic inflammatory process.  Mild constipation.  Appendix is not seen..  Moderate diverticulosis seen throughout the descending and sigmoid colon.  No definite evidence for acute diverticulitis.  Trace ascites.  No free air.    There  is no evidence of lymph node enlargement in the abdomen or pelvis.  Shotty nodes are seen within the mesentery and retroperitoneum.    The abdominal aorta is small caliber with severe atherosclerotic calcifications.  Subclavian femoral bypass and fem-fem bypass grafts are noted.  Extensive clips are seen in the bilateral groin.    Moderate degenerative changes throughout the lumbar spine.  Chronic right rib fracture deformity involving the 7th rib laterally.  Chronic compression deformities at L3 and L1.                               X-Ray Abdomen Flat And Erect (Final result)  Result time 03/11/20 22:41:16    Final result by Aurelio Briceno MD (03/11/20 22:41:16)                 Impression:      Dilated loops of small bowel with multiple air-fluid levels consistent with a small-bowel obstruction.  No definite evidence of free air.      Electronically signed by: Aurelio Briceno MD  Date:    03/11/2020  Time:    22:41             Narrative:    EXAMINATION:  XR ABDOMEN FLAT AND ERECT    CLINICAL HISTORY:  Generalized abdominal pain    TECHNIQUE:  Flat and erect AP views of the abdomen were performed.    COMPARISON:  03/27/2019    FINDINGS:  Left ureteral stent.  Multiple air-fluid levels with distended small bowel loops consistent with a small bowel obstruction.                               RADIOLOGY REPORT (Final result)  Result time 03/13/20 17:14:06    Final result by Unknown User (03/13/20 17:14:06)                                   no

## 2020-03-26 ENCOUNTER — APPOINTMENT (OUTPATIENT)
Dept: HEMATOLOGY ONCOLOGY | Facility: CLINIC | Age: 20
End: 2020-03-26

## 2020-04-12 LAB
ALBUMIN SERPL ELPH-MCNC: 3.9 G/DL
ALBUMIN SERPL ELPH-MCNC: 4.4 G/DL
ALBUMIN SERPL ELPH-MCNC: 4.5 G/DL
ALP BLD-CCNC: 116 U/L
ALP BLD-CCNC: 84 U/L
ALP BLD-CCNC: 86 U/L
ALT SERPL-CCNC: 13 U/L
ALT SERPL-CCNC: 13 U/L
ALT SERPL-CCNC: 26 U/L
ANION GAP SERPL CALC-SCNC: 13 MMOL/L
ANION GAP SERPL CALC-SCNC: 14 MMOL/L
ANION GAP SERPL CALC-SCNC: 15 MMOL/L
APTT BLD: 41.5 SEC
AST SERPL-CCNC: 11 U/L
AST SERPL-CCNC: 14 U/L
AST SERPL-CCNC: 15 U/L
BILIRUB SERPL-MCNC: 0.3 MG/DL
BILIRUB SERPL-MCNC: 0.4 MG/DL
BILIRUB SERPL-MCNC: 0.4 MG/DL
BUN SERPL-MCNC: 12 MG/DL
BUN SERPL-MCNC: 15 MG/DL
BUN SERPL-MCNC: 17 MG/DL
CALCIUM SERPL-MCNC: 10 MG/DL
CALCIUM SERPL-MCNC: 9.2 MG/DL
CALCIUM SERPL-MCNC: 9.9 MG/DL
CHLORIDE SERPL-SCNC: 100 MMOL/L
CHLORIDE SERPL-SCNC: 101 MMOL/L
CHLORIDE SERPL-SCNC: 98 MMOL/L
CO2 SERPL-SCNC: 26 MMOL/L
CO2 SERPL-SCNC: 27 MMOL/L
CO2 SERPL-SCNC: 27 MMOL/L
CREAT SERPL-MCNC: 0.68 MG/DL
CREAT SERPL-MCNC: 0.69 MG/DL
CREAT SERPL-MCNC: 0.83 MG/DL
GLUCOSE SERPL-MCNC: 108 MG/DL
GLUCOSE SERPL-MCNC: 90 MG/DL
GLUCOSE SERPL-MCNC: 98 MG/DL
INR PPP: 1.21 RATIO
LDH SERPL-CCNC: 139 U/L
LDH SERPL-CCNC: 147 U/L
LDH SERPL-CCNC: 167 U/L
POTASSIUM SERPL-SCNC: 4.2 MMOL/L
POTASSIUM SERPL-SCNC: 4.3 MMOL/L
POTASSIUM SERPL-SCNC: 4.4 MMOL/L
PROT SERPL-MCNC: 7.5 G/DL
PROT SERPL-MCNC: 7.8 G/DL
PROT SERPL-MCNC: 7.9 G/DL
PT BLD: 13.8 SEC
SODIUM SERPL-SCNC: 138 MMOL/L
SODIUM SERPL-SCNC: 141 MMOL/L
SODIUM SERPL-SCNC: 142 MMOL/L
URATE SERPL-MCNC: 4.6 MG/DL
URATE SERPL-MCNC: 6.5 MG/DL

## 2020-05-29 NOTE — PATIENT PROFILE ADULT - ABILITY TO HEAR (WITH HEARING AID OR HEARING APPLIANCE IF NORMALLY USED):
Physical Therapy Treatment       ASSESSMENT:   Patient seen on 3 nursing unit.  Today's treatment focused on increased functional mobility .  The patient is demonstrating good progress as evidenced by increasing mobility .  At this time the patient continues to demonstrate impairments in activity tolerance, balance, safety awareness and strength which is limiting the performance of bed mobility, sit to/from stand transfers, stand pivot transfers, ambulation , stair climbing, car transfer and all functional mobility.  Further skilled physical therapy services are reasonable and necessary to address the above impairments and performance deficits.       Reviewed with log roll, proper sequencing with sit to stand transfer .      DIAGNOSIS & PAST MEDICAL HISTORY:   Diagnosis:  Spinal stenosis of lumbar region with neurogenic claudication [M48.062]      Past Medical History:   Diagnosis Date   • Anxiety    • Arthritis     RA   • Clostridium difficile diarrhea     with vomiting 5 years ago   • DDD (degenerative disc disease), lumbosacral    • Degenerative joint disease (DJD) of lumbar spine    • Depression    • Endometriosis    • Fibromyalgia    • Fibromyalgia    • Gastroesophageal reflux disease    • Osteoarthritis    • Osteoporosis    • PONV (postoperative nausea and vomiting)    • Rheumatoid arthritis (CMS/HCC)    • Spinal stenosis    • Spondylarthrosis      Past Surgical History:   Procedure Laterality Date   • Ankle surgery      right and left metal in left   • Back surgery      lumbar fusion   • Back surgery      SI joint   • Colon surgery      colon resection   • Femur surgery     • Hysterectomy      total   • Joint replacement Right     hip with 4 revisions   • Knee surgery Bilateral     one replacement, several scopes   • Ovarian cyst removal      12 laps for endometriosis   • Rectal surgery     • Revision total hip arthroplasty Right    • Shoulder surgery Bilateral         HOSPITAL COURSE:   There are no active  hospital problems to display for this patient.         PRIOR LIVING SITUATION & PRIOR LEVEL OF FUNCTION:   Prior Living Situation:  Prior Living Situation  Information Provided By:: patient  Type of Home: House  Home Layout: Multi-level  # Steps to Enter: 7  # Steps in the Home: 10  Lives With: Family  Transportation: Drives  Bathroom Shower/Tub: Walk-in shower  Bathroom Toilet: Standard  Bathroom Equipment: Shower chair, Hand-held shower, Commode  Home Equipment: Two-wheeled walker, Cane, Wheelchair-manual    Prior Communication and Cognition  Prior Communication/Cognition  Prior Cognition: Intact    Prior Level of Function:  Prior Function  Prior ADL: Modified Independent  Eating: Modified Independent  Locomotion Equipment: Cane  Car Transfers: Modified Independent  History of Falls in past year: No     BASIC LINES & SAFETY MEASURES:   Basic Lines: Continuous pulse oximetry (05/28/20 1430)  Safety Measures: Alarms;Bed rails (05/28/20 1430)      PRECAUTIONS:   Precautions  Lumbar Precautions: Yes (05/28/20 1101)      PAIN:   None Reported  None Observed      SUBJECTIVE:   Subjective: pt agreed with PT  (05/28/20 1100)      Orientation Level: Oriented X4 (05/28/20 1430)  Affect: Cooperative;Pleasant (05/28/20 1430)     REHAB SAFETY:   At the time of this encounter, the patient's isolation status is:   • None    Therapist donned the following PPE for this patient encounter:  • Gloves, Surgical Mask, Goggles and Hair Cap    Therapy aide or other healthcare professional present during this patient encounter:  •  No  • If another healthcare professional was present, they wore the following PPE: Not Applicable    The patient was wearing a mask during this session:  • No     Therapist with patient for approx 23 minutes.     COMMUNICATION & COGNITION:   Overall Cognitive Status: Within Functional Limits  Orientation Level: Oriented X4      OBJECTIVE:   Balance:       Bed Mobility:    Bed Mobility  Rolling to the Right:  Adequate: hears normal conversation without difficulty Moderate Assist (Mod) (05/28/20 1100)  Supine to Sit: Minimal Assist (Min) (05/28/20 1430)  Sit to Supine: Minimal Assist (Min) (05/28/20 1430)  Bed Mobility Comments: educated log roll , pt refuse to flatten HOB  (05/28/20 1430)     Transfers:    Transfers  Sit to Stand: Supervision (Supv) (05/28/20 1430)  Stand to Sit: Supervision (Supv) (05/28/20 1430)  Transfer Comments 1: needed increase time  (05/28/20 1430)      Gait:    Gait  Gait Assistance: Supervision (Supv) (05/28/20 1430)  Assistive Device/: 2-wheeled walker (05/28/20 1430)  Ambulation Distance (Feet): 200 Feet (05/28/20 1430)       Stairs:               EXERCISES:   Not addressed this session      AM-PAC 6 CLICKS BASIC MOBILITY:   AM-PAC 6 Clicks Basic Mobility  Help to turn from back to side (bed flat, no rails): A little (05/28/20 1430)  Help to go from supine to sit (bed flat, no rails): A little (05/28/20 1430)  Help to move to/from bed to chair: A little (05/28/20 1430)  Help to stand up/sit down from chair using UEs: A little (05/28/20 1430)  Help to walk in hospital room: A little (05/28/20 1430)  Help to climb 3-5 steps with rail: A lot (05/28/20 1430)  Basic Mobility Raw Score: 17 (05/28/20 1430)  Basic Mobility Converted Score: 39.67 (05/28/20 1430)     GOALS:   Short Term Goals to Be Reviewed On: 06/10/20 (05/28/20 1430)  Short Term Goals = Discharge Goals: Yes (05/28/20 1430)  Goal Agreement: Patient agrees with goals and treatment plan (05/28/20 1430)  Bed Mobility Short Term Goal: mod i  (05/28/20 1430)  Transfer Short Term Goal: mod i  (05/28/20 1430)  Ambulation Short Term Goal: mod i  (05/28/20 1430)  Stairs Short Term Goal: mod I LRAD 5 steps  (05/28/20 1430)     EQUIPMENT FOR DISCHARGE:         PLAN AND RECOMMENDATIONS:   Patient needs nondaily skilled therapy after discharge;Patient requires intermittent nonskilled assistance to perform mobility and/or ADLs safely (05/28/20 1100)    This may be adjusted as the patient's  condition and medical status change throughout their hospital stay.  This decision is also based on other factors such as living situation, home environment, caregiver assist and MD recommendation.       Plan:   Treatment/Interventions: Functional transfer training;Strengthening;Bed mobility;Gait training;Safety Education (05/28/20 1430)       PT Frequency: Twice a day (05/28/20 1430)     Treatment Plan for Next Session: up on chair                 END OF SESSION:   Patient location: Bed  Bed/Chair Alarm On: Yes  Hand Off To: RN

## 2020-06-12 NOTE — PROGRESS NOTE PEDS - PROBLEM SELECTOR PLAN 1
- Patient receiving chemotherapy per [id number] ABVE-PC Cycle 1.   - Received MRI Chest w/wo contrast on 9/21--supraclavicular, mediastinal and right hilar lymphadenopathy consistent with lymphoma. Bony lesions consistent with lymphomatous involvement. 06-12 Na136 mmol/L Glu 92 mg/dL K+ 4.0 mmol/L Cr  0.74 mg/dL BUN 14.0 mg/dL Phos n/a   Alb 3.1 g/dL<L> PAB n/a

## 2020-07-08 ENCOUNTER — APPOINTMENT (OUTPATIENT)
Dept: HEMATOLOGY ONCOLOGY | Facility: CLINIC | Age: 20
End: 2020-07-08

## 2020-08-15 ENCOUNTER — EMERGENCY (EMERGENCY)
Facility: HOSPITAL | Age: 20
LOS: 1 days | Discharge: HOME | End: 2020-08-15
Attending: EMERGENCY MEDICINE
Payer: MEDICAID

## 2020-08-15 VITALS
RESPIRATION RATE: 18 BRPM | TEMPERATURE: 100 F | HEART RATE: 108 BPM | HEIGHT: 67 IN | DIASTOLIC BLOOD PRESSURE: 60 MMHG | SYSTOLIC BLOOD PRESSURE: 103 MMHG | OXYGEN SATURATION: 97 % | WEIGHT: 89.95 LBS

## 2020-08-15 LAB
ALBUMIN SERPL ELPH-MCNC: 3.7 G/DL — SIGNIFICANT CHANGE UP (ref 3.3–5)
ALP SERPL-CCNC: 129 U/L — HIGH (ref 40–120)
ALT FLD-CCNC: 28 U/L — SIGNIFICANT CHANGE UP (ref 10–45)
ANION GAP SERPL CALC-SCNC: 13 MMOL/L — SIGNIFICANT CHANGE UP (ref 5–17)
APTT BLD: 46.1 SEC — HIGH (ref 27.5–35.5)
AST SERPL-CCNC: 23 U/L — SIGNIFICANT CHANGE UP (ref 10–40)
BASE EXCESS BLDV CALC-SCNC: 2.6 MMOL/L — HIGH (ref -2–2)
BILIRUB SERPL-MCNC: 0.3 MG/DL — SIGNIFICANT CHANGE UP (ref 0.2–1.2)
BLD GP AB SCN SERPL QL: NEGATIVE — SIGNIFICANT CHANGE UP
BUN SERPL-MCNC: 11 MG/DL — SIGNIFICANT CHANGE UP (ref 7–23)
CA-I SERPL-SCNC: 1.19 MMOL/L — SIGNIFICANT CHANGE UP (ref 1.12–1.3)
CALCIUM SERPL-MCNC: 9.7 MG/DL — SIGNIFICANT CHANGE UP (ref 8.4–10.5)
CHLORIDE BLDV-SCNC: 98 MMOL/L — SIGNIFICANT CHANGE UP (ref 96–108)
CHLORIDE SERPL-SCNC: 96 MMOL/L — SIGNIFICANT CHANGE UP (ref 96–108)
CO2 BLDV-SCNC: 30 MMOL/L — SIGNIFICANT CHANGE UP (ref 22–30)
CO2 SERPL-SCNC: 27 MMOL/L — SIGNIFICANT CHANGE UP (ref 22–31)
CREAT SERPL-MCNC: 0.54 MG/DL — SIGNIFICANT CHANGE UP (ref 0.5–1.3)
GAS PNL BLDV: 135 MMOL/L — SIGNIFICANT CHANGE UP (ref 135–145)
GAS PNL BLDV: SIGNIFICANT CHANGE UP
GAS PNL BLDV: SIGNIFICANT CHANGE UP
GLUCOSE BLDV-MCNC: 96 MG/DL — SIGNIFICANT CHANGE UP (ref 70–99)
GLUCOSE SERPL-MCNC: 100 MG/DL — HIGH (ref 70–99)
HCO3 BLDV-SCNC: 28 MMOL/L — SIGNIFICANT CHANGE UP (ref 21–29)
HCT VFR BLD CALC: 33.4 % — LOW (ref 39–50)
HCT VFR BLDA CALC: 32 % — LOW (ref 39–50)
HGB BLD CALC-MCNC: 10.2 G/DL — LOW (ref 13–17)
HGB BLD-MCNC: 10 G/DL — LOW (ref 13–17)
INR BLD: 1.63 RATIO — HIGH (ref 0.88–1.16)
LACTATE BLDV-MCNC: 2.2 MMOL/L — HIGH (ref 0.7–2)
MCHC RBC-ENTMCNC: 21.5 PG — LOW (ref 27–34)
MCHC RBC-ENTMCNC: 29.9 GM/DL — LOW (ref 32–36)
MCV RBC AUTO: 71.8 FL — LOW (ref 80–100)
NT-PROBNP SERPL-SCNC: 54 PG/ML — SIGNIFICANT CHANGE UP (ref 0–300)
OTHER CELLS CSF MANUAL: 6 ML/DL — LOW (ref 18–22)
PCO2 BLDV: 50 MMHG — SIGNIFICANT CHANGE UP (ref 35–50)
PH BLDV: 7.37 — SIGNIFICANT CHANGE UP (ref 7.35–7.45)
PLATELET # BLD AUTO: 528 K/UL — HIGH (ref 150–400)
PO2 BLDV: 28 MMHG — SIGNIFICANT CHANGE UP (ref 25–45)
POTASSIUM BLDV-SCNC: 3.6 MMOL/L — SIGNIFICANT CHANGE UP (ref 3.5–5.3)
POTASSIUM SERPL-MCNC: 3.8 MMOL/L — SIGNIFICANT CHANGE UP (ref 3.5–5.3)
POTASSIUM SERPL-SCNC: 3.8 MMOL/L — SIGNIFICANT CHANGE UP (ref 3.5–5.3)
PROT SERPL-MCNC: 8.6 G/DL — HIGH (ref 6–8.3)
PROTHROM AB SERPL-ACNC: 19 SEC — HIGH (ref 10.6–13.6)
RBC # BLD: 4.65 M/UL — SIGNIFICANT CHANGE UP (ref 4.2–5.8)
RBC # FLD: 16.7 % — HIGH (ref 10.3–14.5)
RH IG SCN BLD-IMP: POSITIVE — SIGNIFICANT CHANGE UP
RH IG SCN BLD-IMP: POSITIVE — SIGNIFICANT CHANGE UP
SAO2 % BLDV: 44 % — LOW (ref 67–88)
SODIUM SERPL-SCNC: 136 MMOL/L — SIGNIFICANT CHANGE UP (ref 135–145)
WBC # BLD: 33.08 K/UL — HIGH (ref 3.8–10.5)
WBC # FLD AUTO: 33.08 K/UL — HIGH (ref 3.8–10.5)

## 2020-08-15 PROCEDURE — 93010 ELECTROCARDIOGRAM REPORT: CPT

## 2020-08-15 PROCEDURE — 71045 X-RAY EXAM CHEST 1 VIEW: CPT | Mod: 26

## 2020-08-15 PROCEDURE — 99285 EMERGENCY DEPT VISIT HI MDM: CPT

## 2020-08-15 RX ORDER — SODIUM CHLORIDE 9 MG/ML
1000 INJECTION INTRAMUSCULAR; INTRAVENOUS; SUBCUTANEOUS ONCE
Refills: 0 | Status: COMPLETED | OUTPATIENT
Start: 2020-08-15 | End: 2020-08-15

## 2020-08-15 RX ORDER — VANCOMYCIN HCL 1 G
1000 VIAL (EA) INTRAVENOUS ONCE
Refills: 0 | Status: COMPLETED | OUTPATIENT
Start: 2020-08-15 | End: 2020-08-15

## 2020-08-15 RX ORDER — PIPERACILLIN AND TAZOBACTAM 4; .5 G/20ML; G/20ML
3.38 INJECTION, POWDER, LYOPHILIZED, FOR SOLUTION INTRAVENOUS ONCE
Refills: 0 | Status: COMPLETED | OUTPATIENT
Start: 2020-08-15 | End: 2020-08-15

## 2020-08-15 RX ORDER — ONDANSETRON 8 MG/1
4 TABLET, FILM COATED ORAL ONCE
Refills: 0 | Status: COMPLETED | OUTPATIENT
Start: 2020-08-15 | End: 2020-08-15

## 2020-08-15 RX ADMIN — SODIUM CHLORIDE 1000 MILLILITER(S): 9 INJECTION INTRAMUSCULAR; INTRAVENOUS; SUBCUTANEOUS at 23:00

## 2020-08-15 RX ADMIN — ONDANSETRON 4 MILLIGRAM(S): 8 TABLET, FILM COATED ORAL at 23:00

## 2020-08-15 RX ADMIN — PIPERACILLIN AND TAZOBACTAM 200 GRAM(S): 4; .5 INJECTION, POWDER, LYOPHILIZED, FOR SOLUTION INTRAVENOUS at 23:04

## 2020-08-15 NOTE — ED PROVIDER NOTE - NS ED ROS FT
GENERAL: no fever, chills  HEENT: no cough, congestion, odynophagia, dysphagia, (+) hemoptysis   CARDIAC: no chest pain, palpitations, lightheadedness    PULM: (+) dyspnea, no wheezing     GI: no abdominal pain, nausea, vomiting, diarrhea, constipation, melena, hematochezia  : no urinary dysuria, frequency, incontinence, hematuria  NEURO: no headache, motor weakness, sensory changes  MSK: no joint or muscle pain  SKIN: no rashes, hives  HEME: no active bleeding, bruising

## 2020-08-15 NOTE — ED PROVIDER NOTE - PATIENT PORTAL LINK FT
You can access the FollowMyHealth Patient Portal offered by [facility] by registering at the following website: [email or web address]. By joining [facility]’s FollowMyHealth portal, you will also be able to view your health information using other applications (apps) compatible with our system.

## 2020-08-15 NOTE — ED PROVIDER NOTE - ATTENDING CONTRIBUTION TO CARE
Afebrile. Awake and Alert. Lungs CTA. Heart RRR. Abdomen soft NTND. CN II-XII grossly intact. Moves all extremities without lateralization.    r/o PA  r/o PNA  r/o viral bronchitis  r/o progression of lymphoma

## 2020-08-15 NOTE — ED PROVIDER NOTE - OBJECTIVE STATEMENT
20 yo M with a pmhx of stage 4 Hodgkin's lymphoma presents to the ED with acute onset of cough and hemoptysis that started this morning. He admits to pain along his hip with his tussive episodes that does not radiate anywhere else. He admits to associated SOB. He denies any CP, abdominal pain, urinary symptoms, back pain, vision changes, fevers, N/V/D, FND, numbness or tingling.   He is currently not receiving chemotherapy treatment.

## 2020-08-15 NOTE — ED ADULT TRIAGE NOTE - MEANS OF ARRIVAL
Add 45402 Cpt? (Important Note: In 2017 The Use Of 14890 Is Being Tracked By Cms To Determine Future Global Period Reimbursement For Global Periods): no Wound Evaluated By: Dane Alvarez MD Detail Level: Detailed Body Location Override (Optional - Billing Will Still Be Based On Selected Body Map Location If Applicable): right superior posterior helix stretcher

## 2020-08-15 NOTE — ED ADULT NURSE NOTE - NSIMPLEMENTINTERV_GEN_ALL_ED
Implemented All Universal Safety Interventions:  [place] to call system. Call bell, personal items and telephone within reach. Instruct patient to call for assistance. Room bathroom lighting operational. Non-slip footwear when patient is off stretcher. Physically safe environment: no spills, clutter or unnecessary equipment. Stretcher in lowest position, wheels locked, appropriate side rails in place.

## 2020-08-15 NOTE — ED PROVIDER NOTE - NSFOLLOWUPINSTRUCTIONS_ED_ALL_ED_FT
-You were seen in the Emergency Department (ED) for Cough. Lab and imaging results, if performed, were discussed with you. Please follow up with your oncologist. You have left AMA today and should follow up as soon as possible. Numbers are listed above for who to call to follow up.    MEDICATIONS:  -Continue all other prescribed medicine, IF ANY, as per your primary care doctor's (PMD) recommendations.    PAIN CONTROL:  -Please take over the counter Tylenol (also known as acetaminophen) 650mg every 6 hours or Ibuprofen (also known as motrin, advil) 600mg every 8 hour for your pain, IF ANY, unless you are not supposed to for any reason.  -Rest, stay hydrated with plenty of fluids (drink at least 2 Liters or 64 Ounces of water each day UNLESS you are supposed to restrict fluids or ANY reason.      RETURN PRECAUTIONS:  -Please return to the Emergency Department if you experience ANY new or concerning symptoms, such as, but not limited to: worsening pain, large amount of bleeding, passing out, fever >100.F, shaking chills, inability to see or new double vision, chest pain, difficulty breathing, diffuse abdominal pain, unable to eat or drink, continuous vomiting or diarrhea, unable to move or feel part of your body

## 2020-08-15 NOTE — ED ADULT NURSE NOTE - OBJECTIVE STATEMENT
Pt is a 19y Male c/o hemoptysis starting today. Pt has a dry cough x "past few days" but states he has noticed blood in sputum today Pt PMHx Non-hodgkin's lymphoma stage 4. Pt last dose of chemo was several months ago and pt states he was supposed to get bone marrow transplant and radiation but both were delayed due to COVID pandemic. Pt has new rash on his feet and has visible tumor on L neck and R chest wall. Pt c/o chronic L back pain due to tumor. Pt denies feeling SOB, but appears to be slightly dyspneic. Pt denies CP, NVD, dizziness, light-headedness, sick contacts. Pt prefers to go by Candido and uses He/Him/His pronouns. Pt resting comfortably in bed with MD Loaiza at bedside. Pt educated on call bell use and call bell placed at bedside. Pt safety maintained.

## 2020-08-15 NOTE — ED PROVIDER NOTE - PHYSICAL EXAMINATION
GENERAL: no acute distress, non-toxic appearing    HEAD: normocephalic, atraumatic, hard nodular masses palpated over the left neck, tussive episodes, chest port buried under the skin,     HEENT: normal conjunctiva, oral mucosa moist, neck supple  CARDIAC: regular rate and rhythm, normal S1 and S2,  no appreciable murmurs  PULM: clear to ascultation bilaterally, no crackles, rales, rhonchi, or wheezing  GI: abdomen nondistended, soft, nontender, no guarding or rebound tenderness  : no CVA tenderness, no suprapubic tenderness  NEURO: alert and oriented x 3, normal speech, PERRLA, EOMI, no focal motor or sensory deficits    MSK: no visible deformities, no peripheral edema, calf tenderness/redness/swelling, punctate lesions bilateral feet , chronic in nature  SKIN: no visible rashes, dry, well-perfused  PSYCH: appropriate mood and affect

## 2020-08-15 NOTE — ED PROVIDER NOTE - NSFOLLOWUPCLINICS_GEN_ALL_ED_FT
[facility]  Hematology/Oncology  [address] [id number]  Phone: [phone]  Fax:   Follow Up Time: 1-3 Days

## 2020-08-15 NOTE — ED PROVIDER NOTE - PROGRESS NOTE DETAILS
Pt reports vomiting after IV contrast when contrast is pushed rapidly, but not SOB or rash. d/w CT Tech, no steroid premedication necessary. Will give Zofran IV prior to CTA. SHERRI. Patient wants to leave. Does not want to be admitted.  The patient is clinically not intoxicated, free from distracting pain, appears to have intact insight, judgment and reason and in my medical opinion has the capacity to make decisions. The patient is also not under any duress to leave the hospital. In this scenario, it would be battery to subject a patient to treatment against his/her will.   I have voiced my concerns for the patient's health given that a full evaluation and treatment had not occurred. I have discussed the need for continued evaluation to determine if their symptoms are caused by a condition that present risk of death or morbidity. Risks including but not limited to death, permanent disability, prolonged hospitalization, prolonged illness, were discussed. I tried offering alternative options in hopes that the patient might be amenable to partial evaluation and treatment which would be medically beneficial to the patient, though the patient declined my options and insisted on leaving.   Because I have been unable to convince the patient to stay, I answered all of their questions about their condition and asked them to return to the ED as soon as possible to complete their evaluation, especially if their symptoms worsen or do not improve. I emphasized that leaving against medical advice does not preclude returning here for further evaluation. I asked the patient to return if they change their mind about the further evaluation and treatment. I strongly encouraged the patient to return to this Emergency Department or any Emergency Department at any time, particularly with worsening symptoms.

## 2020-08-15 NOTE — ED PROVIDER NOTE - CLINICAL SUMMARY MEDICAL DECISION MAKING FREE TEXT BOX
18 yo M with a pmhx of stage 4 Hodgkin's lymphoma presents to the ED with acute onset of cough and hemoptysis that started this morning. He has associated SOB.     r/o PE   r/o infection  reassess

## 2020-08-15 NOTE — ED ADULT NURSE REASSESSMENT NOTE - NS ED NURSE REASSESS COMMENT FT1
Pt refusing meds and IV fluids. Pt educated and MD Roddy Loaiza spoke with pt. Pt consenting to scan and medications at this time will continue to reevaluate. Pt wishing to leave as soon as possible. Pt will continue to be updated as results come back. MD Romeo made aware pt asking for medications for his cough and feet itching. will await orders.

## 2020-08-15 NOTE — ED PROVIDER NOTE - CARE PROVIDER_API CALL
Kalie Emery  HEMATOLOGY  [address] [place] [address]  Phone: [phone]  Fax: [phone]  Established Patient  Follow Up Time: 1-3 Days

## 2020-08-16 VITALS
SYSTOLIC BLOOD PRESSURE: 100 MMHG | RESPIRATION RATE: 18 BRPM | DIASTOLIC BLOOD PRESSURE: 78 MMHG | TEMPERATURE: 100 F | OXYGEN SATURATION: 99 % | HEART RATE: 109 BPM

## 2020-08-16 LAB
BASE EXCESS BLDV CALC-SCNC: 0.6 MMOL/L — SIGNIFICANT CHANGE UP (ref -2–2)
BASOPHILS # BLD AUTO: 0.3 K/UL — HIGH (ref 0–0.2)
BASOPHILS NFR BLD AUTO: 0.9 % — SIGNIFICANT CHANGE UP (ref 0–2)
CA-I SERPL-SCNC: 1.17 MMOL/L — SIGNIFICANT CHANGE UP (ref 1.12–1.3)
CHLORIDE BLDV-SCNC: 101 MMOL/L — SIGNIFICANT CHANGE UP (ref 96–108)
CO2 BLDV-SCNC: 28 MMOL/L — SIGNIFICANT CHANGE UP (ref 22–30)
EOSINOPHIL # BLD AUTO: 0.3 K/UL — SIGNIFICANT CHANGE UP (ref 0–0.5)
EOSINOPHIL NFR BLD AUTO: 0.9 % — SIGNIFICANT CHANGE UP (ref 0–6)
GAS PNL BLDV: 135 MMOL/L — SIGNIFICANT CHANGE UP (ref 135–145)
GAS PNL BLDV: SIGNIFICANT CHANGE UP
GAS PNL BLDV: SIGNIFICANT CHANGE UP
GLUCOSE BLDV-MCNC: 98 MG/DL — SIGNIFICANT CHANGE UP (ref 70–99)
HCO3 BLDV-SCNC: 26 MMOL/L — SIGNIFICANT CHANGE UP (ref 21–29)
HCT VFR BLDA CALC: 31 % — LOW (ref 39–50)
HGB BLD CALC-MCNC: 10 G/DL — LOW (ref 13–17)
LACTATE BLDV-MCNC: 1.7 MMOL/L — SIGNIFICANT CHANGE UP (ref 0.7–2)
LYMPHOCYTES # BLD AUTO: 1.46 K/UL — SIGNIFICANT CHANGE UP (ref 1–3.3)
LYMPHOCYTES # BLD AUTO: 4.4 % — LOW (ref 13–44)
MANUAL SMEAR VERIFICATION: SIGNIFICANT CHANGE UP
MONOCYTES # BLD AUTO: 1.75 K/UL — HIGH (ref 0–0.9)
MONOCYTES NFR BLD AUTO: 5.3 % — SIGNIFICANT CHANGE UP (ref 2–14)
NEUTROPHILS # BLD AUTO: 29.28 K/UL — HIGH (ref 1.8–7.4)
NEUTROPHILS NFR BLD AUTO: 88.5 % — HIGH (ref 43–77)
OTHER CELLS CSF MANUAL: 6 ML/DL — LOW (ref 18–22)
PCO2 BLDV: 52 MMHG — HIGH (ref 35–50)
PH BLDV: 7.33 — LOW (ref 7.35–7.45)
PLAT MORPH BLD: NORMAL — SIGNIFICANT CHANGE UP
PO2 BLDV: 28 MMHG — SIGNIFICANT CHANGE UP (ref 25–45)
POTASSIUM BLDV-SCNC: 3.9 MMOL/L — SIGNIFICANT CHANGE UP (ref 3.5–5.3)
RBC BLD AUTO: SIGNIFICANT CHANGE UP
SAO2 % BLDV: 43 % — LOW (ref 67–88)

## 2020-08-16 PROCEDURE — 85610 PROTHROMBIN TIME: CPT

## 2020-08-16 PROCEDURE — 84295 ASSAY OF SERUM SODIUM: CPT

## 2020-08-16 PROCEDURE — 86900 BLOOD TYPING SEROLOGIC ABO: CPT

## 2020-08-16 PROCEDURE — 86901 BLOOD TYPING SEROLOGIC RH(D): CPT

## 2020-08-16 PROCEDURE — 71045 X-RAY EXAM CHEST 1 VIEW: CPT

## 2020-08-16 PROCEDURE — 71275 CT ANGIOGRAPHY CHEST: CPT | Mod: 26

## 2020-08-16 PROCEDURE — 83880 ASSAY OF NATRIURETIC PEPTIDE: CPT

## 2020-08-16 PROCEDURE — 96365 THER/PROPH/DIAG IV INF INIT: CPT | Mod: XU

## 2020-08-16 PROCEDURE — 82435 ASSAY OF BLOOD CHLORIDE: CPT

## 2020-08-16 PROCEDURE — 84132 ASSAY OF SERUM POTASSIUM: CPT

## 2020-08-16 PROCEDURE — 99284 EMERGENCY DEPT VISIT MOD MDM: CPT | Mod: 25

## 2020-08-16 PROCEDURE — 83605 ASSAY OF LACTIC ACID: CPT

## 2020-08-16 PROCEDURE — 71275 CT ANGIOGRAPHY CHEST: CPT

## 2020-08-16 PROCEDURE — 82330 ASSAY OF CALCIUM: CPT

## 2020-08-16 PROCEDURE — 85014 HEMATOCRIT: CPT

## 2020-08-16 PROCEDURE — 87040 BLOOD CULTURE FOR BACTERIA: CPT

## 2020-08-16 PROCEDURE — 85027 COMPLETE CBC AUTOMATED: CPT

## 2020-08-16 PROCEDURE — 85730 THROMBOPLASTIN TIME PARTIAL: CPT

## 2020-08-16 PROCEDURE — 82947 ASSAY GLUCOSE BLOOD QUANT: CPT

## 2020-08-16 PROCEDURE — 96375 TX/PRO/DX INJ NEW DRUG ADDON: CPT

## 2020-08-16 PROCEDURE — 82803 BLOOD GASES ANY COMBINATION: CPT

## 2020-08-16 PROCEDURE — 93005 ELECTROCARDIOGRAM TRACING: CPT

## 2020-08-16 PROCEDURE — 80053 COMPREHEN METABOLIC PANEL: CPT

## 2020-08-16 PROCEDURE — 86850 RBC ANTIBODY SCREEN: CPT

## 2020-08-16 RX ADMIN — Medication 250 MILLIGRAM(S): at 00:04

## 2020-08-16 RX ADMIN — SODIUM CHLORIDE 1000 MILLILITER(S): 9 INJECTION INTRAMUSCULAR; INTRAVENOUS; SUBCUTANEOUS at 00:05

## 2020-08-16 RX ADMIN — PIPERACILLIN AND TAZOBACTAM 3.38 GRAM(S): 4; .5 INJECTION, POWDER, LYOPHILIZED, FOR SOLUTION INTRAVENOUS at 00:05

## 2020-08-20 ENCOUNTER — OUTPATIENT (OUTPATIENT)
Dept: OUTPATIENT SERVICES | Facility: HOSPITAL | Age: 20
LOS: 1 days | Discharge: HOME | End: 2020-08-20

## 2020-08-20 DIAGNOSIS — C81.98 HODGKIN LYMPHOMA, UNSPECIFIED, LYMPH NODES OF MULTIPLE SITES: ICD-10-CM

## 2020-08-21 LAB
CULTURE RESULTS: SIGNIFICANT CHANGE UP
CULTURE RESULTS: SIGNIFICANT CHANGE UP
SPECIMEN SOURCE: SIGNIFICANT CHANGE UP
SPECIMEN SOURCE: SIGNIFICANT CHANGE UP

## 2020-08-25 ENCOUNTER — APPOINTMENT (OUTPATIENT)
Dept: HEMATOLOGY ONCOLOGY | Facility: CLINIC | Age: 20
End: 2020-08-25
Payer: MEDICAID

## 2020-08-25 ENCOUNTER — RESULT REVIEW (OUTPATIENT)
Age: 20
End: 2020-08-25

## 2020-08-25 VITALS
DIASTOLIC BLOOD PRESSURE: 68 MMHG | OXYGEN SATURATION: 100 % | BODY MASS INDEX: 14.88 KG/M2 | SYSTOLIC BLOOD PRESSURE: 99 MMHG | TEMPERATURE: 98.5 F | HEART RATE: 79 BPM | WEIGHT: 90.39 LBS | HEIGHT: 65.39 IN | RESPIRATION RATE: 16 BRPM

## 2020-08-25 LAB
BASOPHILS # BLD AUTO: 0 K/UL — SIGNIFICANT CHANGE UP (ref 0–0.2)
BASOPHILS NFR BLD AUTO: 0 % — SIGNIFICANT CHANGE UP (ref 0–2)
EOSINOPHIL # BLD AUTO: 0.83 K/UL — HIGH (ref 0–0.5)
EOSINOPHIL NFR BLD AUTO: 3 % — SIGNIFICANT CHANGE UP (ref 0–6)
HCT VFR BLD CALC: 34 % — LOW (ref 39–50)
HGB BLD-MCNC: 10.2 G/DL — LOW (ref 13–17)
LYMPHOCYTES # BLD AUTO: 1.1 K/UL — SIGNIFICANT CHANGE UP (ref 1–3.3)
LYMPHOCYTES # BLD AUTO: 4 % — LOW (ref 13–44)
MCHC RBC-ENTMCNC: 22 PG — LOW (ref 27–34)
MCHC RBC-ENTMCNC: 30 GM/DL — LOW (ref 32–36)
MCV RBC AUTO: 73.3 FL — LOW (ref 80–100)
MONOCYTES # BLD AUTO: 1.93 K/UL — HIGH (ref 0–0.9)
MONOCYTES NFR BLD AUTO: 7 % — SIGNIFICANT CHANGE UP (ref 2–14)
NEUTROPHILS # BLD AUTO: 23.74 K/UL — HIGH (ref 1.8–7.4)
NEUTROPHILS NFR BLD AUTO: 86 % — HIGH (ref 43–77)
NRBC # BLD: 0 /100 — SIGNIFICANT CHANGE UP (ref 0–0)
NRBC # BLD: SIGNIFICANT CHANGE UP /100 WBCS (ref 0–0)
PLAT MORPH BLD: NORMAL — SIGNIFICANT CHANGE UP
PLATELET # BLD AUTO: 632 K/UL — HIGH (ref 150–400)
RBC # BLD: 4.64 M/UL — SIGNIFICANT CHANGE UP (ref 4.2–5.8)
RBC # FLD: 17.2 % — HIGH (ref 10.3–14.5)
RBC BLD AUTO: SIGNIFICANT CHANGE UP
WBC # BLD: 27.61 K/UL — HIGH (ref 3.8–10.5)
WBC # FLD AUTO: 27.61 K/UL — HIGH (ref 3.8–10.5)

## 2020-08-25 PROCEDURE — 99214 OFFICE O/P EST MOD 30 MIN: CPT

## 2020-08-25 NOTE — PHYSICAL EXAM
[Fully active, able to carry on all pre-disease performance without restriction] : Status 0 - Fully active, able to carry on all pre-disease performance without restriction [Thin] : thin [Normal] : affect appropriate [de-identified] : multiple left cervical lymph nodes >1-2 cm.  [de-identified] : cachetic appearing [de-identified] : no palpable axillary or inguinal LAD

## 2020-08-25 NOTE — REVIEW OF SYSTEMS
[Fatigue] : fatigue [Negative] : Allergic/Immunologic [Joint Pain] : joint pain [Suicidal] : not suicidal [FreeTextEntry6] : hemoptysis

## 2020-08-25 NOTE — ASSESSMENT
[FreeTextEntry1] : 19 year old male who was diagnosed with a stage IV CHL in 9/2018 who appears to have a relapsed or refractory Hodgkin's lymphoma.  He was initially treated following ABVE-PC completed 12/2018 after 5 cycles due to noncompliance and patient refusal, relapsed diagnosed in Feb 2019, started on salvage treatment in April 2019 with gemzar/brentuximab which was also stopped due to the patient's refusal and noncompliance. \par \par He was recommended to have 2 cycles of ICE with intent to pursue an autologous bone marrow transplant.  The patient began the cycle and then left AMA unfortunately.  I have had an extensive discussion with the patient today.  Explained that due to his immaturity, inability to be compliant with therapy, have great hesitation with pursuing intensive chemotherapy or transplant.  Discussed that his disease is still treatable but will require compliance and acceptance.  He continues to not want further chemotherapy despite the discussion of death. \par The patient is clinically not intoxicated, free from distracting pain, appears to have intact insight, judgment and reason and in my medical opinion has the capacity to make decisions.\par Risks including but not limited to death, permanent disability, prolonged hospitalization, prolonged illness, were discussed.\par Able to convince the patient to got to [facility] ED later today to get admitted for treatment. Advised to go to ED as soon as possible. Offered to have patient sent via EMS to ED now but patient declined.\par He is agreeable to immune therapy, will arrange for patient to begin treatment today. \par Can no longer consider local RT as his disease is now diffuse. \par All questions, concerns answered.\par \par He will follow up after hospital discharge.\par Case and management discussed with Dr. Emery

## 2020-08-25 NOTE — HISTORY OF PRESENT ILLNESS
[de-identified] : This is an 19 y/o male presenting to [facility] care for treatment of Hodgkins Lymphoma. Patient initially presented to the [facility] ED on 9/4/18 from [facility] with a mediastinal mass.  Upon work up he was found to have Stage IVB disease.  Ultrasound guided right supraclavicular lymph node Classical Hodgkins Lymphoma- large atypical cells to be CD30, CD15, PAX5+, MUM1+, LCA(-), CD20(-), CD79a(-), CD3(-), ALK(-).\par Bilateral bone marrow biopsy on 9/6/18- Cellular marrow with maturing trilineage hematopoiesis and no morphologic evidence of lymphoma\par PET/CT completed on 9/10/18-  Hypermetabolic lymphadenopathy in neck and thorax,  multiple hypermetabolic foci in the bilateral lower cervical and supraclavicular regions, measuring 0.9 x 0.7 cm demonstrates SUV 4.3 anterior mediastinal mass measuring 4.2 x 3 cm demonstrating peripheral FDG avidity and central photopenia, SUV: 6.9. Marked mediastinal and bilateral hilar lymphadenopathy demonstrating FDG avidity, right hilar lymph node measures 3.8 x 2.4 cm, SUV: 10.3.  5 mm nodule is again noted in the right middle lobe.  Several hypermetabolic foci identified predominantly throughout the axial skeleton with foci noted scattered throughout the upper thoracic spine and a singular focus in the right anterior eighth rib FDG avid focus in the right side of the T7 vertebral body with corresponding lucency on CT measuring 0.9 x 0.9 cm, SUV 14.7.  Two hypermetabolic foci in the appendicular skeleton with corresponding underlying lucency on CT. There is an FDG avid focus in the left inferior glenoid and a corresponding lucency on CT measuring 0.9 x 0.7 cm, SUV 12. There is also a 1.1 x 0.7 cm lucency in the right femoral head with corresponding FDG avidity, SUV 7.3.  He was placed on/following a protocol- OC 1331, treatment with ABVE-PC (adriamycin, bleomycin, vincristine, etoposide, cytoxan, prednisone, dexrazoxane).  He was withdrawn from the study due to his social situation, difficulty with compliance.  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  He was treated with chemotherapy from Sept 2018- Dec 2018.  Interim PET/CT completed on 11/3/18- Partially hypermetabolic anterior mediastinal mass is decreased in \par size and metabolism as compared to prior study dated 9/10/2018, compatible with a partial response to interval therapy (Deauville 4).  Resolution of additional previously seen hypermetabolic mediastinal, hilar, and cardiophrenic lymph nodes.Marked interval decrease in hypermetabolic foci in the axial and appendicular skeleton with residual hypermetabolic focus in left glenoid which may represent a small focus of residual disease (Deauville 4).  He completed 5 cycles of chemotherapy, but missed about 10 appointments, and refused further chemotherapy after cycle 5 due to side effects.  He underwent MRI Neck, Abdomen, and Pelvis from 2/2019 showed new lymph node enlargement to the L neck and redemonstrated osseous infiltration involving the bilateral pelvic bones and proximal femurs. CT Chest from 3/2019 showed mediastinal enlarged lymph nodes which were either unchanged or demonstrate interval progression since 11/2018, progressive R hilar lymphadenopathy, and a new cluster of small nodular opacities within the right lower lobe. PET Scan done 3/2019 revealed a new hypermetabolic lymphadenopathy in the left neck measuring approximately 2.4 x 2.1cm  in the left level III/V cervical regions. There is also new hypermetabolic 1.2 x 0.8 cm left level VB cervical lymph node and chest new hypermetabolic approximately 1.1 x 0.9 cm right upper paratracheal node. Reference approximately 3.4 x 2.0 cm subcarinal node. Approximately 3.8 x 3.1cm right perihilar mass. as compared to PET/CT from 11/3/2018 suspicious for recurrent lymphoma. A nonspecific new diffuse splenic hypermetabolism.  A new hypermetabolic opacity/consolidation in the right lower lung, approximately 2 x 1.3cm opacity/consolidation in the superior segment of the right lower lobe.  Ultrasound guided core biopsy of left cervical lymph node done on 4/11/19-  confirmed refractory disease.  Bone marrow biopsy was negative.  He was started on treatment 4/13 with salvage treatment with Gemzar/brentuximab which he received 2 cycles but again had compliance issues along with behavioral issues with the staff.  He was then recommended to have \par \par During chemotherapy, he was experiencing severe symptoms of fatigue, headache, vomiting and nausea. His states his compliance issue may be due to believing the treatment was not working. His last chemotherapy session was 5/16 - he feels that his lymphadenopathy is mostly improved but he still palpates some cervical lymph nodes. \par \par  [de-identified] : Patient left AMA 09/2019 while undergoing ICE therapy, has been lost to follow up now presents after being seen in ED 2 weeks ago. 8/15/20 he presented to [facility] ED for hemoptysis and on CT was found to have masses and nodules as well as metastasis to his vertebrae. The ED attempted to admit the patient, however he left AMA. He states he does not want to die but also does not currently want chemotherapy due to the side effects he experienced with the previous regimen. He understands the risks of not getting treatment and further disease progression that will result in death. Patient agrees to immune therapy but would not like to go directly to the hospital via EMS from our office as we recommend. +Fatigue, +back pain, +bone pain. Patient denies fever, chills, night sweats, headache, abdominal pain, or chest pain. Poor appetite, further 7 pound unintentional weight loss.\par  \par CT angio chest w/ cont. 8/16/20:\par NGS AND AIRWAYS: Patent central airways.  Interval increase in size of multiple cavitary right lung nodules. For reference a 2.5 x 2.5 cm cavitary nodule in the superior segment of the right lower lobe previously measured under 1 cm. Right lower lobe mass measures 4.2 x 2.9 cm previously 1.9 x 1.4 cm. Groundglass nodules in the lower lobe of the left lung are new. The largest measures 0.8 x 0.7 cm..\par PLEURA: No pleural effusion.\par MEDIASTINUM AND DAT: Increasing mediastinal and hilar lymphadenopathy. For reference, subcarinal lymph node measures 4.7 x 2.1 cm. Right hilar lymph node measures 3.7 x 2.2 cm. A left hilar lymph node measures 2.1 x 2.0 cm. AP lymph node measures 2.2 x 1.9 cm..\par VESSELS: No pulmonary embolus.\par HEART: Heart size is normal. No pericardial effusion.\par CHEST WALL AND LOWER NECK: Bulky left supraclavicular and deep cervical lymphadenopathy has increased in size. A reference left deep cervical node measures 2.1 x 1.9 cm.. Increased left axillary lymphadenopathy. Reference node measures 2.5 x 1.9 cm\par VISUALIZED UPPER ABDOMEN: Within normal limits.\par BONES: New lytic metastases involving the T2, T9 and L1 vertebral bodies with paravertebral extension of soft tissue..\par IMPRESSION:\par No pulmonary embolus.\par Progression of pulmonary metastases and thoracic/lower cervical lymphadenopathy.\par New lytic metastases as above.\par

## 2020-10-01 ENCOUNTER — OUTPATIENT (OUTPATIENT)
Dept: OUTPATIENT SERVICES | Facility: HOSPITAL | Age: 20
LOS: 1 days | End: 2020-10-01
Payer: MEDICAID

## 2020-10-16 ENCOUNTER — INPATIENT (INPATIENT)
Facility: HOSPITAL | Age: 20
LOS: 2 days | Discharge: ANOTHER HOSPITAL | DRG: 55 | End: 2020-10-19
Attending: HOSPITALIST | Admitting: HOSPITALIST
Payer: MEDICAID

## 2020-10-16 ENCOUNTER — EMERGENCY (EMERGENCY)
Facility: HOSPITAL | Age: 20
LOS: 1 days | Discharge: ANOTHER HOSPITAL | End: 2020-10-16
Admitting: EMERGENCY MEDICINE
Payer: MEDICAID

## 2020-10-16 VITALS
HEART RATE: 100 BPM | WEIGHT: 85.1 LBS | TEMPERATURE: 99 F | DIASTOLIC BLOOD PRESSURE: 69 MMHG | OXYGEN SATURATION: 100 % | SYSTOLIC BLOOD PRESSURE: 112 MMHG | HEIGHT: 67 IN | RESPIRATION RATE: 20 BRPM

## 2020-10-16 PROCEDURE — 85025 COMPLETE CBC W/AUTO DIFF WBC: CPT

## 2020-10-16 PROCEDURE — 85610 PROTHROMBIN TIME: CPT

## 2020-10-16 PROCEDURE — 93005 ELECTROCARDIOGRAM TRACING: CPT

## 2020-10-16 PROCEDURE — L9992: CPT

## 2020-10-16 PROCEDURE — 83605 ASSAY OF LACTIC ACID: CPT

## 2020-10-16 PROCEDURE — 74176 CT ABD & PELVIS W/O CONTRAST: CPT

## 2020-10-16 PROCEDURE — 71045 X-RAY EXAM CHEST 1 VIEW: CPT

## 2020-10-16 PROCEDURE — 85730 THROMBOPLASTIN TIME PARTIAL: CPT

## 2020-10-16 PROCEDURE — 99285 EMERGENCY DEPT VISIT HI MDM: CPT

## 2020-10-16 PROCEDURE — 87040 BLOOD CULTURE FOR BACTERIA: CPT

## 2020-10-16 PROCEDURE — 36415 COLL VENOUS BLD VENIPUNCTURE: CPT

## 2020-10-16 PROCEDURE — 80053 COMPREHEN METABOLIC PANEL: CPT

## 2020-10-16 NOTE — ED ADULT NURSE NOTE - CHPI ED NUR SYMPTOMS NEG
no tingling/no pain/no fever/no decreased eating/drinking/no nausea/generalized body weakness due to disease condition/no dizziness/no chills/no vomiting

## 2020-10-16 NOTE — ED ADULT NURSE NOTE - OBJECTIVE STATEMENT
patient transferred from [facility] via ambulance for spine consult as stated by patient.

## 2020-10-16 NOTE — ED ADULT NURSE NOTE - NSIMPLEMENTINTERV_GEN_ALL_ED
Implemented All Fall with Harm Risk Interventions:  [place] to call system. Call bell, personal items and telephone within reach. Instruct patient to call for assistance. Room bathroom lighting operational. Non-slip footwear when patient is off stretcher. Physically safe environment: no spills, clutter or unnecessary equipment. Stretcher in lowest position, wheels locked, appropriate side rails in place. Provide visual cue, wrist band, yellow gown, etc. Monitor gait and stability. Monitor for mental status changes and reorient to person, place, and time. Review medications for side effects contributing to fall risk. Reinforce activity limits and safety measures with patient and family. Provide visual clues: red socks.

## 2020-10-16 NOTE — ED PROVIDER NOTE - CARE PLAN
Principal Discharge DX:	Inability to walk  Secondary Diagnosis:	Hodgkins lymphoma in relapse   Principal Discharge DX:	Inability to walk  Secondary Diagnosis:	Hodgkins lymphoma in relapse  Secondary Diagnosis:	Leg weakness, bilateral

## 2020-10-16 NOTE — ED PROVIDER NOTE - OBJECTIVE STATEMENT
19y M PMHx Hodgkins lymphoma p/w hip and back pain x 3 weeks. Pt is a transfer from [facility] due to concern for caudal equina syndrome. He went to [place] today due to worsening hip and back pain. Hip pain is worsened when bearing weight (standing or walking). Back pain only occurs when lying down on a hard surface. Per EMS, pt has stage 4B non-Hodgkin's lymphoma, with first chemo May 2020, but stopped due to side effects. No cough, no recent travel, no URI sx, no paresthesias. Pt was started on Rocephin but had reaction (itching) and D/C. At [place], pt was given PO Motrin and Benadryl. Per pt, he had XR pelvis and CT scan as well. Pt is not able to ambulate 2/2 back and hip pain, and leg weakness. Pt endorses PATRICK. Denies incontinence, numbness. Further denies any fall. Allergic to IV contrast.

## 2020-10-16 NOTE — ED PROVIDER NOTE - CLINICAL SUMMARY MEDICAL DECISION MAKING FREE TEXT BOX
Olivarez DO: 20 y/o hx hodgkin's lymphoma, noncompliant w/ treatment, last treated 7 months ago, reports adverse reaction to chemo and developed fear of treatment so did not follow up, does not have a hematologist/oncologist, does not follow with pmd. States he has had progressive worsening b/l LE weakness and right hip pain limiting ability to ambulate, for last 3 weeks has become progressively more bedbound with dependence with ADLs on family. Denies fevers, chills, headache, cough, nausea, vomiting, diarrhea, chest pain, dysuria. Pt sent from [facility] as transfer to eval for cauda equina given CT scan findings of L1 pathologic fx w/ severe canal compression/ cord compression and L4 pathologic fx, and metastatic disease. Denies sensory deficits, incontinence, urinary retention, fecal incontinence. Refused rectal exam despite discussion risk/benefits. Notes low back pain. Able to passively and actively range both hips, presentation appears subacute, but significant concern for cord compression. plan for ortho spine eval and stat mri to eval for cord compression, exam not consistent w/ septic arthritis. Pt initiated on rocephin at OSH that was stopped midway due to pruritic rash and given benadryl en route but pt denies any infectious symptoms and no clinical signs of sepsis. Suspect reported intermittent fevers/chills ongoing x weeks may be cancer related.     [facility] MRN [id number]

## 2020-10-16 NOTE — ED PROVIDER NOTE - PROGRESS NOTE DETAILS
Olivarez DO: spoke to mri, tech left, plan to call in someone for emergent mri, spoke to ortho, recommending mri w/w/out contrast- after discussion with radiology, mri w/wout contrast can take up to 3 hours, recommending only iv contrast if + finding on cord compression study. Olivarez DO: pt initially verbally consented to mri w/wout contrast- explained risks/benefits and pt agreeable, now refusing mri with contrast, now states he is very claustrophobic, initially stated he would obtain mri with mild anxiolytic, now stating he may not go through with study. radiology had already been spoken to to call in mri tech earlier when pt verbally consented, pt informed of limited mri study if without contrast due to known malignancy. Spoke to anesthesiologist on call Dr Dodson, who is willing to proceed with sedation for patient but states that it would require intubation for the duration of the mri procedure due to the length of the study and high risk of airway compromise in the set up of the mri suite, states sedation cannot be performed without intubation, pt initially ok with anxiolytic medication and now refusing, aware of consequences of not having study performed, asked pt to please attempt to have study done, states that he will not even try to get into the machine, spoke to ortho who also discussed risk/benefits w/ pt, pt also concerned that mri contrast would cause allergic reaction though no hx of reaction with prior mri w/ contrast- despite myself and ortho resident encouraging pt to proceed with exam, pt refused. Per ortho, plan for medicine admission for pain control/ spinal precautions/ further heme/onc eval. Pt states he is amenable to possibly receiving tx but is not sure yet. Agreeable to inpatient stay as bedbound now and difficulty walking. Olivarez DO: spoke to mri, tech left, plan to call in someone for emergent mri, spoke to ortho, recommending emergent mri w/w/out contrast- after discussion with radiology, mri w/wout contrast can take up to 3 hours, recommending only iv contrast if + finding on cord compression study. but on discussion with ortho, iv contrast ideal in evaluating mass from malignancy, radiology informed. Olivarez DO: pt initially verbally consented to mri w/wout contrast- explained risks/benefits and pt agreeable, arrangement made for mri, filled out mri checklist, now refusing mri with contrast, now states he is very claustrophobic, initially stated he would obtain mri with mild anxiolytic, now stating he may not go through with study., state he will not even try to get on the table. radiology had already been spoken to to call in mri tech earlier when pt verbally consented, will reattempt speaking w/ pt. pt also refusing contrast mri- pt informed of limited mri study if without contrast due to known malignancy and possibility of ongoing paralysis, still feels he does not want contrast. Spoke to anesthesiologist on call Dr Dodson, who is willing to proceed with sedation for patient but states that it would require intubation for the duration of the mri procedure due to the length of the study and high risk of airway compromise in the set up of the mri suite, states sedation cannot be performed without intubation, pt initially ok with anxiolytic medication and now refusing, aware that only option is for elective intubation and anesthesia- does not want general anesthesia/intubation. pt aware of consequences of not having study performed, made aware that this is a time sensitive study requiring multiple people to come in. Again, asked pt to please attempt to have study done, states that he will not even try to get into the machine again, spoke to ortho who also discussed risk/benefits w/ pt, pt also concerned that mri contrast would cause allergic reaction though no hx of reaction with prior mri w/ contrast- despite myself and ortho resident encouraging pt to proceed with exam, pt refused. Per ortho, plan for medicine admission for pain control/ spinal precautions/ further heme/onc eval. Pt states he is amenable to possibly receiving tx but is not sure yet. Agreeable to inpatient stay as states he cannot leave bed now without holding onto things to support himself and increased difficulty walking.

## 2020-10-16 NOTE — ED PROVIDER NOTE - ATTENDING CONTRIBUTION TO CARE
Olivarez DO: I have personally performed a face to face medical and diagnostic evaluation of the patient. I have discussed with and reviewed the ACP's note and agree with the History, ROS, Physical Exam and MDM unless otherwise indicated. A brief summary of my personal evaluation and impression can be found below.    18 y/o hx hodgkin's lymphoma, noncompliant w/ treatment, last treated 7 months ago, reports adverse reaction to chemo and developed fear of treatment so did not follow up, does not have a hematologist/oncologist, does not follow with pmd. States he has had progressive worsening b/l LE weakness and right hip pain limiting ability to ambulate, for last 3 weeks has become progressively more bedbound with dependence with ADLs on family. Denies fevers, chills, headache, cough, nausea, vomiting, diarrhea, chest pain, dysuria. Pt sent from [facility] as transfer to eval for cauda equina given CT scan findings of L1 pathologic fx w/ severe canal compression/ cord compression and L 4 pathologic fx, and metastatic disease. Denies sensory deficits, incontinence, urinary retention, fecal incontinence. Refused rectal exam despite discussion risk/benefits. Notes low back pain. Able to passively and actively range both hips, presentation appears subacute, plan for ortho spine eval and consideration for stat mri to eval for cord compression, exam not consistent w/ septic arthritis. Pt initiated on rocephin at OSH that was stopped midway due to pruritic rash and given benadryl en route but pt denies any infectious symptoms. Olivarez DO: I have personally performed a face to face medical and diagnostic evaluation of the patient. I have discussed with and reviewed the ACP's note and agree with the History, ROS, Physical Exam and MDM unless otherwise indicated. A brief summary of my personal evaluation and impression can be found below.    20 y/o hx hodgkin's lymphoma, noncompliant w/ treatment, last treated 7 months ago, reports adverse reaction to chemo and developed fear of treatment so did not follow up, does not have a hematologist/oncologist, does not follow with pmd. States he has had progressive worsening b/l LE weakness and right hip pain limiting ability to ambulate, for last 3 weeks has become progressively more bedbound with dependence with ADLs on family. Denies fevers, chills, headache, cough, nausea, vomiting, diarrhea, chest pain, dysuria. Pt sent from [facility] as transfer to eval for cauda equina given CT scan findings of L1 pathologic fx w/ severe canal compression/ cord compression and L 4 pathologic fx, and metastatic disease. Denies sensory deficits, incontinence, urinary retention, fecal incontinence. Refused rectal exam despite discussion risk/benefits. Notes low back pain. Able to passively and actively range both hips, presentation appears subacute, plan for ortho spine eval and consideration for stat mri to eval for cord compression, exam not consistent w/ septic arthritis. Pt initiated on rocephin at OSH that was stopped midway due to pruritic rash and given benadryl en route but pt denies any infectious symptoms    [facility] MRN [id number] Olivarez DO: I have personally performed a face to face medical and diagnostic evaluation of the patient. I have discussed with and reviewed the ACP's note and agree with the History, ROS, Physical Exam and MDM unless otherwise indicated. A brief summary of my personal evaluation and impression can be found below.    20 y/o hx hodgkin's lymphoma, noncompliant w/ treatment, last treated 7 months ago, reports adverse reaction to chemo and developed fear of treatment so did not follow up, does not have a hematologist/oncologist, does not follow with pmd. States he has had progressive worsening b/l LE weakness and right hip pain limiting ability to ambulate, for last 3 weeks has become progressively more bedbound with dependence with ADLs on family. Denies fevers, chills, headache, cough, nausea, vomiting, diarrhea, chest pain, dysuria. Pt sent from [facility] as transfer to eval for cauda equina given CT scan findings of L1 pathologic fx w/ severe canal compression/ cord compression and L 4 pathologic fx, and metastatic disease. Denies sensory deficits, incontinence, urinary retention, fecal incontinence. Refused rectal exam despite discussion risk/benefits. Notes low back pain. Able to passively and actively range both hips, presentation appears subacute, but significant concern for cord compression. plan for ortho spine eval and stat mri to eval for cord compression, exam not consistent w/ septic arthritis. Pt initiated on rocephin at OSH that was stopped midway due to pruritic rash and given benadryl en route but pt denies any infectious symptoms and no clinical signs of sepsis.     [facility] MRN [id number]

## 2020-10-16 NOTE — ED PROVIDER NOTE - PHYSICAL EXAMINATION
Gen: AAOx3, NAD  Head: NCAT  ENT: Airway patent, moist mucous membranes  Cardiac: Normal rate, normal rhythm, no murmurs  Respiratory: Lungs CTA B/L  Gastrointestinal: abdomen soft, nontender, nondistended, no rebound, no guarding  MSK: No gross abnormalities, FROM of all four extremities, no edema, no focal midline c-t-l spine ttp.   HEME: Extremities warm, pulses intact and symmetrical in all four extremities  Skin: No rashes, no lesions  Neuro: No gross neurologic deficits, strength 5/5 equal in all four extremities, no sensory deficits, unable to ambulate 2/2 to hip pain

## 2020-10-17 DIAGNOSIS — C81.90 HODGKIN LYMPHOMA, UNSPECIFIED, UNSPECIFIED SITE: ICD-10-CM

## 2020-10-17 DIAGNOSIS — R65.10 SYSTEMIC INFLAMMATORY RESPONSE SYNDROME (SIRS) OF NON-INFECTIOUS ORIGIN WITHOUT ACUTE ORGAN DYSFUNCTION: ICD-10-CM

## 2020-10-17 DIAGNOSIS — R26.2 DIFFICULTY IN WALKING, NOT ELSEWHERE CLASSIFIED: ICD-10-CM

## 2020-10-17 DIAGNOSIS — Z29.9 ENCOUNTER FOR PROPHYLACTIC MEASURES, UNSPECIFIED: ICD-10-CM

## 2020-10-17 DIAGNOSIS — R21 RASH AND OTHER NONSPECIFIC SKIN ERUPTION: ICD-10-CM

## 2020-10-17 DIAGNOSIS — A41.9 SEPSIS, UNSPECIFIED ORGANISM: ICD-10-CM

## 2020-10-17 DIAGNOSIS — Z02.9 ENCOUNTER FOR ADMINISTRATIVE EXAMINATIONS, UNSPECIFIED: ICD-10-CM

## 2020-10-17 DIAGNOSIS — S32.010A WEDGE COMPRESSION FRACTURE OF FIRST LUMBAR VERTEBRA, INITIAL ENCOUNTER FOR CLOSED FRACTURE: ICD-10-CM

## 2020-10-17 LAB
ALBUMIN SERPL ELPH-MCNC: 3 G/DL — LOW (ref 3.3–5)
ALP SERPL-CCNC: 387 U/L — HIGH (ref 40–120)
ALT FLD-CCNC: 43 U/L — SIGNIFICANT CHANGE UP (ref 10–45)
ANION GAP SERPL CALC-SCNC: 15 MMOL/L — SIGNIFICANT CHANGE UP (ref 5–17)
APPEARANCE UR: CLEAR — SIGNIFICANT CHANGE UP
APTT BLD: 40 SEC — HIGH (ref 27.5–35.5)
AST SERPL-CCNC: 18 U/L — SIGNIFICANT CHANGE UP (ref 10–40)
BACTERIA # UR AUTO: NEGATIVE — SIGNIFICANT CHANGE UP
BILIRUB SERPL-MCNC: 0.2 MG/DL — SIGNIFICANT CHANGE UP (ref 0.2–1.2)
BILIRUB UR-MCNC: NEGATIVE — SIGNIFICANT CHANGE UP
BUN SERPL-MCNC: 12 MG/DL — SIGNIFICANT CHANGE UP (ref 7–23)
CALCIUM SERPL-MCNC: 9.5 MG/DL — SIGNIFICANT CHANGE UP (ref 8.4–10.5)
CHLORIDE SERPL-SCNC: 100 MMOL/L — SIGNIFICANT CHANGE UP (ref 96–108)
CO2 SERPL-SCNC: 23 MMOL/L — SIGNIFICANT CHANGE UP (ref 22–31)
COLOR SPEC: SIGNIFICANT CHANGE UP
CREAT SERPL-MCNC: 0.42 MG/DL — LOW (ref 0.5–1.3)
DIFF PNL FLD: NEGATIVE — SIGNIFICANT CHANGE UP
EPI CELLS # UR: 0 /HPF — SIGNIFICANT CHANGE UP (ref 0–5)
GLUCOSE SERPL-MCNC: 175 MG/DL — HIGH (ref 70–99)
GLUCOSE UR QL: NEGATIVE — SIGNIFICANT CHANGE UP
HCT VFR BLD CALC: 31.2 % — LOW (ref 39–50)
HGB BLD-MCNC: 9 G/DL — LOW (ref 13–17)
HYALINE CASTS # UR AUTO: 0 /LPF — SIGNIFICANT CHANGE UP (ref 0–7)
INR BLD: 1.59 RATIO — HIGH (ref 0.88–1.16)
KETONES UR-MCNC: NEGATIVE — SIGNIFICANT CHANGE UP
LEUKOCYTE ESTERASE UR-ACNC: NEGATIVE — SIGNIFICANT CHANGE UP
MAGNESIUM SERPL-MCNC: 2.1 MG/DL — SIGNIFICANT CHANGE UP (ref 1.6–2.6)
MCHC RBC-ENTMCNC: 21.1 PG — LOW (ref 27–34)
MCHC RBC-ENTMCNC: 28.8 GM/DL — LOW (ref 32–36)
MCV RBC AUTO: 73.2 FL — LOW (ref 80–100)
NITRITE UR-MCNC: NEGATIVE — SIGNIFICANT CHANGE UP
NRBC # BLD: 0 /100 WBCS — SIGNIFICANT CHANGE UP (ref 0–0)
PH UR: 6.5 — SIGNIFICANT CHANGE UP (ref 5–8)
PHOSPHATE SERPL-MCNC: 4.2 MG/DL — SIGNIFICANT CHANGE UP (ref 2.5–4.5)
PLATELET # BLD AUTO: 683 K/UL — HIGH (ref 150–400)
POTASSIUM SERPL-MCNC: 3.7 MMOL/L — SIGNIFICANT CHANGE UP (ref 3.5–5.3)
POTASSIUM SERPL-SCNC: 3.7 MMOL/L — SIGNIFICANT CHANGE UP (ref 3.5–5.3)
PROT SERPL-MCNC: 7.9 G/DL — SIGNIFICANT CHANGE UP (ref 6–8.3)
PROT UR-MCNC: SIGNIFICANT CHANGE UP
PROTHROM AB SERPL-ACNC: 18.7 SEC — HIGH (ref 10.6–13.6)
RAPID RVP RESULT: SIGNIFICANT CHANGE UP
RBC # BLD: 4.26 M/UL — SIGNIFICANT CHANGE UP (ref 4.2–5.8)
RBC # FLD: 17.4 % — HIGH (ref 10.3–14.5)
RBC CASTS # UR COMP ASSIST: 1 /HPF — SIGNIFICANT CHANGE UP (ref 0–4)
SARS-COV-2 IGG SERPL QL IA: NEGATIVE — SIGNIFICANT CHANGE UP
SARS-COV-2 IGM SERPL IA-ACNC: <3.8 AU/ML — SIGNIFICANT CHANGE UP
SARS-COV-2 RNA SPEC QL NAA+PROBE: SIGNIFICANT CHANGE UP
SARS-COV-2 RNA SPEC QL NAA+PROBE: SIGNIFICANT CHANGE UP
SODIUM SERPL-SCNC: 138 MMOL/L — SIGNIFICANT CHANGE UP (ref 135–145)
SP GR SPEC: 1.02 — SIGNIFICANT CHANGE UP (ref 1.01–1.02)
UROBILINOGEN FLD QL: SIGNIFICANT CHANGE UP
WBC # BLD: 43.08 K/UL — CRITICAL HIGH (ref 3.8–10.5)
WBC # FLD AUTO: 43.08 K/UL — CRITICAL HIGH (ref 3.8–10.5)
WBC UR QL: 1 /HPF — SIGNIFICANT CHANGE UP (ref 0–5)

## 2020-10-17 PROCEDURE — 99223 1ST HOSP IP/OBS HIGH 75: CPT | Mod: GC

## 2020-10-17 PROCEDURE — 72146 MRI CHEST SPINE W/O DYE: CPT | Mod: 26

## 2020-10-17 PROCEDURE — 72141 MRI NECK SPINE W/O DYE: CPT | Mod: 26

## 2020-10-17 PROCEDURE — 12345: CPT | Mod: NC,GC

## 2020-10-17 PROCEDURE — 73522 X-RAY EXAM HIPS BI 3-4 VIEWS: CPT | Mod: 26

## 2020-10-17 PROCEDURE — 72148 MRI LUMBAR SPINE W/O DYE: CPT | Mod: 26

## 2020-10-17 PROCEDURE — 99221 1ST HOSP IP/OBS SF/LOW 40: CPT

## 2020-10-17 RX ORDER — HYDROCORTISONE 1 %
1 OINTMENT (GRAM) TOPICAL DAILY
Refills: 0 | Status: DISCONTINUED | OUTPATIENT
Start: 2020-10-17 | End: 2020-10-19

## 2020-10-17 RX ORDER — CYCLOBENZAPRINE HYDROCHLORIDE 10 MG/1
0 TABLET, FILM COATED ORAL
Qty: 0 | Refills: 0 | DISCHARGE

## 2020-10-17 RX ORDER — PETROLATUM,WHITE
1 JELLY (GRAM) TOPICAL
Refills: 0 | Status: DISCONTINUED | OUTPATIENT
Start: 2020-10-17 | End: 2020-10-19

## 2020-10-17 RX ORDER — DEXAMETHASONE 0.5 MG/5ML
4 ELIXIR ORAL EVERY 6 HOURS
Refills: 0 | Status: DISCONTINUED | OUTPATIENT
Start: 2020-10-17 | End: 2020-10-19

## 2020-10-17 RX ORDER — HEPARIN SODIUM 5000 [USP'U]/ML
5000 INJECTION INTRAVENOUS; SUBCUTANEOUS EVERY 8 HOURS
Refills: 0 | Status: DISCONTINUED | OUTPATIENT
Start: 2020-10-17 | End: 2020-10-17

## 2020-10-17 RX ORDER — ACETAMINOPHEN 500 MG
650 TABLET ORAL EVERY 6 HOURS
Refills: 0 | Status: DISCONTINUED | OUTPATIENT
Start: 2020-10-17 | End: 2020-10-19

## 2020-10-17 RX ORDER — ENOXAPARIN SODIUM 100 MG/ML
40 INJECTION SUBCUTANEOUS DAILY
Refills: 0 | Status: DISCONTINUED | OUTPATIENT
Start: 2020-10-17 | End: 2020-10-19

## 2020-10-17 RX ORDER — CLOTRIMAZOLE AND BETAMETHASONE DIPROPIONATE 10; .5 MG/G; MG/G
0 CREAM TOPICAL
Qty: 0 | Refills: 0 | DISCHARGE

## 2020-10-17 RX ORDER — ONDANSETRON 8 MG/1
4 TABLET, FILM COATED ORAL ONCE
Refills: 0 | Status: DISCONTINUED | OUTPATIENT
Start: 2020-10-17 | End: 2020-10-19

## 2020-10-17 RX ORDER — PANTOPRAZOLE SODIUM 20 MG/1
40 TABLET, DELAYED RELEASE ORAL
Refills: 0 | Status: DISCONTINUED | OUTPATIENT
Start: 2020-10-17 | End: 2020-10-19

## 2020-10-17 RX ORDER — INFLUENZA VIRUS VACCINE 15; 15; 15; 15 UG/.5ML; UG/.5ML; UG/.5ML; UG/.5ML
0.5 SUSPENSION INTRAMUSCULAR ONCE
Refills: 0 | Status: DISCONTINUED | OUTPATIENT
Start: 2020-10-17 | End: 2020-10-19

## 2020-10-17 RX ORDER — DIPHENHYDRAMINE HCL 50 MG
25 CAPSULE ORAL EVERY 6 HOURS
Refills: 0 | Status: DISCONTINUED | OUTPATIENT
Start: 2020-10-17 | End: 2020-10-19

## 2020-10-17 RX ADMIN — HEPARIN SODIUM 5000 UNIT(S): 5000 INJECTION INTRAVENOUS; SUBCUTANEOUS at 08:13

## 2020-10-17 RX ADMIN — HEPARIN SODIUM 5000 UNIT(S): 5000 INJECTION INTRAVENOUS; SUBCUTANEOUS at 17:01

## 2020-10-17 RX ADMIN — Medication 1 APPLICATION(S): at 17:01

## 2020-10-17 RX ADMIN — Medication 1 APPLICATION(S): at 08:40

## 2020-10-17 RX ADMIN — Medication 1 APPLICATION(S): at 12:04

## 2020-10-17 RX ADMIN — Medication 2 MILLIGRAM(S): at 20:42

## 2020-10-17 NOTE — PROGRESS NOTE ADULT - ASSESSMENT
19M hx Hodgkins lymphoma p/w hip and back pain x 3 weeks 2/2 to L1, L4 pathologic compression fracture causing severe canal stenosis w/ cord compression course, found to meet SIRS criteria.

## 2020-10-17 NOTE — H&P ADULT - NSHPPHYSICALEXAM_GEN_ALL_CORE
Vital Signs Last 24 Hrs  T(C): 36.7 (16 Oct 2020 23:29), Max: 37 (16 Oct 2020 22:54)  T(F): 98 (16 Oct 2020 23:29), Max: 98.6 (16 Oct 2020 22:54)  HR: 97 (17 Oct 2020 03:41) (95 - 100)  BP: 110/75 (17 Oct 2020 03:41) (99/57 - 112/69)  BP(mean): --  RR: 18 (17 Oct 2020 03:41) (18 - 20)  SpO2: 100% (17 Oct 2020 03:41) (99% - 100%)    PHYSICAL EXAM:  GENERAL: NAD, not obese/cachectic  HEAD:  Atraumatic, Normocephalic  EYES: conjunctiva and sclera clear, no icterus, EOMI  HENT: No tonsillar erythema, exudates, or enlargement; Moist mucous membranes  NECK: Supple, No JVD, Normal thyroid  HEART: Regular rate and rhythm; No murmurs, rubs, or gallops  RESPIRATORY: CTA B/L, No W/R/R  ABDOMEN: Soft, Nontender, Nondistended; Bowel sounds present  NEUROLOGY: A&Ox3, nonfocal exam, strength/sensation normal  VASCULAR: 2+ pedal pulses  EXTREMITIES:  Moving all extremities, No clubbing, cyanosis, or edema, Symmetric lower extremities  SKIN: warm, dry, normal color, no rash or abnormal lesions  PSYCH: normal behavior, normal affect, normal speech  LYMPHATICS: supraclavicular nodes normal/not enlarged, non-tender Vital Signs Last 24 Hrs  T(C): 36.7 (16 Oct 2020 23:29), Max: 37 (16 Oct 2020 22:54)  T(F): 98 (16 Oct 2020 23:29), Max: 98.6 (16 Oct 2020 22:54)  HR: 97 (17 Oct 2020 03:41) (95 - 100)  BP: 110/75 (17 Oct 2020 03:41) (99/57 - 112/69)  BP(mean): --  RR: 18 (17 Oct 2020 03:41) (18 - 20)  SpO2: 100% (17 Oct 2020 03:41) (99% - 100%)    PHYSICAL EXAM:  GENERAL: NAD, cachectic  HEAD:  Atraumatic, Normocephalic  EYES: conjunctiva and sclera clear, no icterus, EOMI  HENT: No tonsillar erythema, exudates, or enlargement; Moist mucous membranes  NECK: Supple, No JVD, Normal thyroid  HEART: Regular rate and rhythm; No murmurs, rubs, or gallops  RESPIRATORY: CTA B/L, No W/R/R  ABDOMEN: Soft, Nontender, Nondistended; Bowel sounds present  BACK: tenderness in lower back, bony protrusions  NEUROLOGY: A&Ox3, nonfocal exam, 5/5 strength bilateral lower extremities, intact sensation  VASCULAR: 2+ pedal pulses  EXTREMITIES: bilateral hypopigmented papules over lower extremity, non-tender, itchy  SKIN: warm, dry, normal color, no rash or abnormal lesions  PSYCH: normal behavior, normal affect, normal speech  LYMPHATICS: supraclavicular nodes normal/not enlarged, non-tender

## 2020-10-17 NOTE — PROGRESS NOTE ADULT - PROBLEM SELECTOR PLAN 1
hx of hodgkin lymphoma w/ severe back pain, denies weakness, bladder/bowel dysfunction, saddle anesthesia  - strict bedrest for now  - neuro checks Q4H  - MRI C/T/L spine w/wo IVC, however, pt refusing 2/2 to claustrophobia, states he needs to be "put out"  - patient prefers tylenol for his pain, tylenol PRN  - ortho following  - MRI with sedation

## 2020-10-17 NOTE — PROGRESS NOTE ADULT - PROBLEM SELECTOR PLAN 2
fever, leukocytosis, unclear if pt may have possible source of infxn vs pt has tumor fevers from hodgkin lymphoma  - s/p ceftriaxone (not fully completed) due to pruritis, 1L IVF, MAP>65  - f/u Bcx,  RVP  - lactate WNL  - procalcitonin elevated   - continue levofloxacin for empiric coverage for now

## 2020-10-17 NOTE — PROGRESS NOTE ADULT - PROBLEM SELECTOR PLAN 4
rash over lower extremities, possibly eczema vs possible Sweet syndrome?   - consult dermatology team in AM  - hydrocortisone ointment and petroleum jelly prescribed

## 2020-10-17 NOTE — CONSULT NOTE ADULT - SUBJECTIVE AND OBJECTIVE BOX
Patient is a 19y Male Hx stage 4 Hodgkins lymphoma (s/p chemo) who presents c/o 1 month of low back pain and bilateral hip pain. Patient developed atraumatic low back pain 1 month ago, which has increased in severity over time. The pain spread to his bilateral hips. For the last 2 weeks he has not been able to ambulate due to bilateral hip pain and weakness, L>R. He points to his ASIS when describing his pain. Denies pain/injury elsewhere. Denies numbness/tingling/paresthesias/weakness in his extremities.. Denies bowel/bladder incontinence. Denies fevers/chills. No other complaints at this time. Patient was transferred to [facility] from [place] ED due to CT chest showing L1, L4 pathologic compression fracture with severe spinal canal compression.     PAST MEDICAL & SURGICAL HISTORY:  Hodgkins lymphoma in relapse    No significant past surgical history      MEDICATIONS  (STANDING):  heparin   Injectable 5000 Unit(s) SubCutaneous every 8 hours  hydrocortisone 1% Cream 1 Application(s) Topical daily  influenza   Vaccine 0.5 milliLiter(s) IntraMuscular once  levoFLOXacin IVPB      petrolatum white Ointment 1 Application(s) Topical two times a day    MEDICATIONS  (PRN):  acetaminophen   Tablet .. 650 milliGRAM(s) Oral every 6 hours PRN Temp greater or equal to 38C (100.4F), Mild Pain (1 - 3), Moderate Pain (4 - 6)    Allergies    IV Contrast (Vomiting)  Rocephin (Pruritus)    Intolerances                              9.0    43.08 )-----------( 683      ( 17 Oct 2020 06:51 )             31.2     10-17    138  |  100  |  12  ----------------------------<  175<H>  3.7   |  23  |  0.42<L>    Ca    9.5      17 Oct 2020 06:52  Phos  4.2     10-17  Mg     2.1     10-17    TPro  7.9  /  Alb  3.0<L>  /  TBili  0.2  /  DBili  x   /  AST  18  /  ALT  43  /  AlkPhos  387<H>  10-17    PT/INR - ( 17 Oct 2020 06:51 )   PT: 18.7 sec;   INR: 1.59 ratio         PTT - ( 17 Oct 2020 06:51 )  PTT:40.0 sec    T(C): 36.5 (10-17-20 @ 04:30), Max: 37 (10-16-20 @ 22:54)  HR: 72 (10-17-20 @ 04:30) (72 - 100)  BP: 103/62 (10-17-20 @ 04:30) (99/57 - 112/69)  RR: 18 (10-17-20 @ 04:30) (18 - 20)  SpO2: 99% (10-17-20 @ 04:30) (99% - 100%)  Wt(kg): --    PE   BLLE:  Skin intact; No ecchymosis/soft tissue swelling  ROM Hips 0-100 without pain  Endorses TTP and numbness over b/l AIIS   Able to SLR; Neg Log Roll/Heel Strike  Motor intact GS/TA/FHL/EHL  SILT L2-S1  DP/PT pulses 2+      Imaging:  CT Abdomen pelvis showing lytic lesion of L acetabulum and sclerotic lesion of R femur    19y Male with Stage 4 Hodgkins Lymphoma with L acetabulum lytic lesion and sclerotic lesion of R femur  - Pain control  - Will likely need MRI w/ and w/o con of Pelvis and R femur  - Should coordinate MRI with MRI L spine for pathologic compression fractures  - Will follow up with final plan with Dr. Francis Delgadillo   Patient is a 19y Male Hx stage 4 Hodgkins lymphoma (s/p chemo) who presents c/o 1 month of low back pain and bilateral hip pain. Patient developed atraumatic low back pain 1 month ago, which has increased in severity over time. The pain spread to his bilateral hips. For the last 2 weeks he has not been able to ambulate due to bilateral hip pain and weakness, L>R. He points to his ASIS when describing his pain. Denies pain/injury elsewhere. Denies numbness/tingling/paresthesias/weakness in his extremities.. Denies bowel/bladder incontinence. Denies fevers/chills. No other complaints at this time. Patient was transferred to [facility] from [place] ED due to CT chest showing L1, L4 pathologic compression fracture with severe spinal canal compression.     PAST MEDICAL & SURGICAL HISTORY:  Hodgkins lymphoma in relapse    No significant past surgical history      MEDICATIONS  (STANDING):  heparin   Injectable 5000 Unit(s) SubCutaneous every 8 hours  hydrocortisone 1% Cream 1 Application(s) Topical daily  influenza   Vaccine 0.5 milliLiter(s) IntraMuscular once  levoFLOXacin IVPB      petrolatum white Ointment 1 Application(s) Topical two times a day    MEDICATIONS  (PRN):  acetaminophen   Tablet .. 650 milliGRAM(s) Oral every 6 hours PRN Temp greater or equal to 38C (100.4F), Mild Pain (1 - 3), Moderate Pain (4 - 6)    Allergies    IV Contrast (Vomiting)  Rocephin (Pruritus)    Intolerances                              9.0    43.08 )-----------( 683      ( 17 Oct 2020 06:51 )             31.2     10-17    138  |  100  |  12  ----------------------------<  175<H>  3.7   |  23  |  0.42<L>    Ca    9.5      17 Oct 2020 06:52  Phos  4.2     10-17  Mg     2.1     10-17    TPro  7.9  /  Alb  3.0<L>  /  TBili  0.2  /  DBili  x   /  AST  18  /  ALT  43  /  AlkPhos  387<H>  10-17    PT/INR - ( 17 Oct 2020 06:51 )   PT: 18.7 sec;   INR: 1.59 ratio         PTT - ( 17 Oct 2020 06:51 )  PTT:40.0 sec    T(C): 36.5 (10-17-20 @ 04:30), Max: 37 (10-16-20 @ 22:54)  HR: 72 (10-17-20 @ 04:30) (72 - 100)  BP: 103/62 (10-17-20 @ 04:30) (99/57 - 112/69)  RR: 18 (10-17-20 @ 04:30) (18 - 20)  SpO2: 99% (10-17-20 @ 04:30) (99% - 100%)  Wt(kg): --    PE   BLLE:  Skin intact; No ecchymosis/soft tissue swelling  ROM Hips 0-100 without pain  Endorses TTP and numbness over b/l AIIS   Able to SLR; Neg Log Roll/Heel Strike  Motor intact GS/TA/FHL/EHL  SILT L2-S1  DP/PT pulses 2+      Imaging:  CT Abdomen pelvis showing lytic lesion of L acetabulum and sclerotic lesion of R femur    19y Male with Stage 4 Hodgkins Lymphoma with L acetabulum lytic lesion and sclerotic lesion of R femur, likely metastatic disease  - Pain control  - Recommend partial weight bearing RLE with crutch/walker  - Does not need MRI of Pelvis/Femur  - Recommend Bone scan  - Recommend Chemo/Radiation  - Recommend Heme/Onc consult  - No biopsy need from ortho onc  - Plan Discussed with Yennifer Delgadillo   Patient is a 19y Male Hx stage 4 Hodgkins lymphoma (s/p chemo) who presents c/o 1 month of low back pain and bilateral hip pain. Patient developed atraumatic low back pain 1 month ago, which has increased in severity over time. The pain spread to his bilateral hips. For the last 2 weeks he has not been able to ambulate due to bilateral hip pain and weakness, L>R. He points to his ASIS when describing his pain. Denies pain/injury elsewhere. Denies numbness/tingling/paresthesias/weakness in his extremities.. Denies bowel/bladder incontinence. Denies fevers/chills. No other complaints at this time. Patient was transferred to [facility] from [place] ED due to CT chest showing L1, L4 pathologic compression fracture with severe spinal canal compression.     PAST MEDICAL & SURGICAL HISTORY:  Hodgkins lymphoma in relapse    No significant past surgical history      MEDICATIONS  (STANDING):  heparin   Injectable 5000 Unit(s) SubCutaneous every 8 hours  hydrocortisone 1% Cream 1 Application(s) Topical daily  influenza   Vaccine 0.5 milliLiter(s) IntraMuscular once  levoFLOXacin IVPB      petrolatum white Ointment 1 Application(s) Topical two times a day    MEDICATIONS  (PRN):  acetaminophen   Tablet .. 650 milliGRAM(s) Oral every 6 hours PRN Temp greater or equal to 38C (100.4F), Mild Pain (1 - 3), Moderate Pain (4 - 6)    Allergies    IV Contrast (Vomiting)  Rocephin (Pruritus)    Intolerances                              9.0    43.08 )-----------( 683      ( 17 Oct 2020 06:51 )             31.2     10-17    138  |  100  |  12  ----------------------------<  175<H>  3.7   |  23  |  0.42<L>    Ca    9.5      17 Oct 2020 06:52  Phos  4.2     10-17  Mg     2.1     10-17    TPro  7.9  /  Alb  3.0<L>  /  TBili  0.2  /  DBili  x   /  AST  18  /  ALT  43  /  AlkPhos  387<H>  10-17    PT/INR - ( 17 Oct 2020 06:51 )   PT: 18.7 sec;   INR: 1.59 ratio         PTT - ( 17 Oct 2020 06:51 )  PTT:40.0 sec    T(C): 36.5 (10-17-20 @ 04:30), Max: 37 (10-16-20 @ 22:54)  HR: 72 (10-17-20 @ 04:30) (72 - 100)  BP: 103/62 (10-17-20 @ 04:30) (99/57 - 112/69)  RR: 18 (10-17-20 @ 04:30) (18 - 20)  SpO2: 99% (10-17-20 @ 04:30) (99% - 100%)  Wt(kg): --    PE   BLLE:  Skin intact; No ecchymosis/soft tissue swelling  ROM Hips 0-100 without pain  Endorses TTP and numbness over b/l AIIS   Able to SLR; Neg Log Roll/Heel Strike  Motor intact GS/TA/FHL/EHL  SILT L2-S1  DP/PT pulses 2+      Imaging:  CT Abdomen pelvis showing lytic lesion of L acetabulum and sclerotic lesion of R femur    19y Male with Stage 4 Hodgkins Lymphoma with L acetabulum lytic lesion and sclerotic lesion of R femur, likely metastatic disease  - Pain control  - Recommend partial weight bearing LLE with crutch/walker  - Does not need MRI of Pelvis/Femur  - Recommend Bone scan  - Recommend Chemo/Radiation  - Recommend Heme/Onc consult  - No biopsy need from ortho onc  - Plan Discussed with Yennifer Delgadillo

## 2020-10-17 NOTE — H&P ADULT - ASSESSMENT
19M hx Hodgkins lymphoma p/w hip and back pain x 3 weeks 2/2 to L1 pathologic compression fracture causing severe canal compression and cord compression w/ moderate L4 compression fracture 19M hx Hodgkins lymphoma p/w hip and back pain x 3 weeks 2/2 to L1, L4 pathologic compression fracture causing severe canal stenosis w/ cord compression course c/b fever 19M hx Hodgkins lymphoma p/w hip and back pain x 3 weeks 2/2 to L1, L4 pathologic compression fracture causing severe canal stenosis w/ cord compression course c/b sepsis 19M hx Hodgkins lymphoma p/w hip and back pain x 3 weeks 2/2 to L1, L4 pathologic compression fracture causing severe canal stenosis w/ cord compression course, found to meet SIRS criteria.

## 2020-10-17 NOTE — PROGRESS NOTE ADULT - PROBLEM SELECTOR PLAN 3
hx of Hodgkin lymphoma, stage 4, follows w/ Dr. Emery, s/p ABVE-PC and ICE, however pt has history of non-compliance  - oncology team emailed

## 2020-10-17 NOTE — H&P ADULT - PROBLEM SELECTOR PLAN 1
hx of hodgkin lymphoma w/ severe back pain, denies weakness, bladder/bowel dysfunction, saddle anesthesia  - consider dose of decadron 10mg IVPx1  - neuro checks Q4H  - MRI C/T/L spine w/wo IVC, can consider CT spine L/T w/ IVC  - PO pain medications PRN, not in severe pain  - ortho following hx of hodgkin lymphoma w/ severe back pain, denies weakness, bladder/bowel dysfunction, saddle anesthesia  - s/p decadron 6mg IVPx1, continue decadron 6mg QD  - neuro checks Q4H  - MRI C/T/L spine w/wo IVC, however, pt refusing 2/2 to claustrophobia, states he needs to be put out  - patient prefers tylenol for his pain, tylenol PRN  - ortho following hx of hodgkin lymphoma w/ severe back pain, denies weakness, bladder/bowel dysfunction, saddle anesthesia  - s/p decadron 6mg IVPx1, continue decadron 6mg QD  - strict bedrest for now  - neuro checks Q4H  - MRI C/T/L spine w/wo IVC, however, pt refusing 2/2 to claustrophobia, states he needs to be "put out"  - patient prefers tylenol for his pain, tylenol PRN  - ortho following hx of hodgkin lymphoma w/ severe back pain, denies weakness, bladder/bowel dysfunction, saddle anesthesia  - strict bedrest for now  - neuro checks Q4H  - MRI C/T/L spine w/wo IVC, however, pt refusing 2/2 to claustrophobia, states he needs to be "put out"  - patient prefers tylenol for his pain, tylenol PRN  - ortho following  - d/w anesthesia in AM possible MRI sedation options

## 2020-10-17 NOTE — H&P ADULT - PROBLEM SELECTOR PLAN 2
hx of Hodgkin lymphoma, stage 4, follows w/ Dr. Emery, s/p ABVE-PC and ICE, however non-compliant  - oncology consult in AM fever, leukocytosis, unclear if clear abdominal source of infxn vs hodgkin lymphoma  - s/p ceftriaxone (not fully completed), 1L IVF, MAP>65  - f/u Bcx, send UA, Ucx  - lactate WNL fever, leukocytosis, unclear if clear abdominal source of infxn vs hodgkin lymphoma  - s/p ceftriaxone (not fully completed), 1L IVF, MAP>65  - f/u Bcx, send UA, procalcitonin, RVP  - lactate WNL  - continue levofloxacin fever, leukocytosis, unclear if pt may have possible abdominal source of infxn vs pt has tumor fevers from hodgkin lymphoma  - s/p ceftriaxone (not fully completed) due to pruritis, 1L IVF, MAP>65  - f/u Bcx, send UA, procalcitonin, RVP  - lactate WNL  - continue levofloxacin for empiric coverage for now fever, leukocytosis, unclear if pt may have possible source of infxn vs pt has tumor fevers from hodgkin lymphoma  - s/p ceftriaxone (not fully completed) due to pruritis, 1L IVF, MAP>65  - f/u Bcx, send UA, procalcitonin, RVP  - lactate WNL  - continue levofloxacin for empiric coverage for now

## 2020-10-17 NOTE — H&P ADULT - HISTORY OF PRESENT ILLNESS
19M hx Hodgkins lymphoma p/w hip and back pain x 3 weeks. Pt is a transfer from [facility] due to concern for caudal equina syndrome. He went to [place] today due to worsening hip and back pain. Hip pain is worsened when bearing weight (standing or walking). Back pain only occurs when lying down on a hard surface. Per EMS, pt has stage 4B non-Hodgkin's lymphoma, with first chemo May 2020, but stopped due to side effects. No cough, no recent travel, no URI sx, no paresthesias. Pt was started on Rocephin but had reaction (itching) and D/C. At [facility], pt was given PO Motrin and Benadryl. Per pt, he had XR pelvis and CT scan as well. Pt is not able to ambulate 2/2 back and hip pain, and leg weakness. Pt endorses PATRICK. Denies incontinence, numbness. Further denies any fall. Allergic to IV contrast. 19M hx Hodgkins lymphoma p/w hip and back pain x 3 weeks. Pt is a transfer from [facility] due to concern for caudal equina syndrome. He went to [place] today due to worsening back pain. Back pain is entire back, worse in the lower part of back, worse when lying down on a hard surface. Hip pain worsened with bearing weight. Pt has history of metastatic stage 4B non-Hodgkin's lymphoma, diagnosed in 2017 s/p port placement, follows w/ Dr. Emery was on ICE chemotherapy, but stopped months ago due to side effect of body swelling and patient is not fully compliant w/ visits. Pt was febrile in ED, started on Rocephin but had reaction (itching) and D/C. Also given PO Motrin and Benadryl, 1L fluids. CT scan showing possible cord compression. Pt is not able to ambulate 2/2 back and hip pain, and leg weakness. Pt endorses PATRICK. Denies incontinence, numbness. Further denies any fall. Allergic to IV contrast.

## 2020-10-17 NOTE — PROGRESS NOTE ADULT - SUBJECTIVE AND OBJECTIVE BOX
PROGRESS NOTE:   Authored by Dr. Yulisa Diaz, [facility] pager [phone]    Patient is a 19y old  Male who presents with a chief complaint of hip and back pain, transfer from [place] (17 Oct 2020 09:15)      SUBJECTIVE / OVERNIGHT EVENTS: Pt appears worried and is refusing multiple treatment options. The patient reports lower back pain that radiates up his spine. He denies saddle anesthesia and urinary incontinence. he states that this has been going on for about 3 weeks but now he cant walk because of the pain. He presented to [facility] but was transferred here. Pt states that he has been having chills and fevers intermittently for the past few weeks.     REVIEW OF SYSTEMS:    CONSTITUTIONAL: intermittent fevers or chills  EYES/ENT: No visual changes;  No vertigo or throat pain   NECK: No pain or stiffness  BACK: lower back pain, 10/10 at worst   RESPIRATORY: No cough, wheezing, hemoptysis; No shortness of breath  CARDIOVASCULAR: No chest pain or palpitations  GASTROINTESTINAL: No abdominal or epigastric pain. No nausea, vomiting, or hematemesis; No diarrhea or constipation. No melena or hematochezia.  GENITOURINARY: No dysuria, frequency or hematuria  NEUROLOGICAL: No numbness or weakness  EXTREMITIES: no varicose veins, no pain in UE and LE  SKIN: + itching on UE       MEDICATIONS  (STANDING):  heparin   Injectable 5000 Unit(s) SubCutaneous every 8 hours  hydrocortisone 1% Cream 1 Application(s) Topical daily  influenza   Vaccine 0.5 milliLiter(s) IntraMuscular once  levoFLOXacin IVPB      petrolatum white Ointment 1 Application(s) Topical two times a day    MEDICATIONS  (PRN):  acetaminophen   Tablet .. 650 milliGRAM(s) Oral every 6 hours PRN Temp greater or equal to 38C (100.4F), Mild Pain (1 - 3), Moderate Pain (4 - 6)      CAPILLARY BLOOD GLUCOSE        I&O's Summary      PHYSICAL EXAM:  Vital Signs Last 24 Hrs  T(C): 36.5 (17 Oct 2020 04:30), Max: 37 (16 Oct 2020 22:54)  T(F): 97.7 (17 Oct 2020 04:30), Max: 98.6 (16 Oct 2020 22:54)  HR: 72 (17 Oct 2020 04:30) (72 - 100)  BP: 103/62 (17 Oct 2020 04:30) (99/57 - 112/69)  BP(mean): --  RR: 18 (17 Oct 2020 04:30) (18 - 20)  SpO2: 99% (17 Oct 2020 04:30) (99% - 100%)    GENERAL: No acute distress, cachexic   HEAD:  Atraumatic, Normocephalic  EYES: EOMI, PERRLA, conjunctiva and sclera clear  NECK: Supple, no lymphadenopathy, no JVD  CHEST/LUNG: CTAB; No wheezes, rales, or rhonchi  HEART: Regular rate and rhythm; No murmurs, rubs, or gallops  ABDOMEN: Soft, non-tender, non-distended; normal bowel sounds, no organomegaly  EXTREMITIES:  2+ peripheral pulses b/l, No clubbing, cyanosis, or edema, pain with adduction of both legs, tenderness with palpation of right and left sides of lower spine.   NEUROLOGY: A&O x 3, no focal deficits, 5/5 UE and LE strength  SKIN: rash on LE    LABS:                        9.0    43.08 )-----------( 683      ( 17 Oct 2020 06:51 )             31.2     10-17    138  |  100  |  12  ----------------------------<  175<H>  3.7   |  23  |  0.42<L>    Ca    9.5      17 Oct 2020 06:52  Phos  4.2     10-17  Mg     2.1     10-17    TPro  7.9  /  Alb  3.0<L>  /  TBili  0.2  /  DBili  x   /  AST  18  /  ALT  43  /  AlkPhos  387<H>  10-17    PT/INR - ( 17 Oct 2020 06:51 )   PT: 18.7 sec;   INR: 1.59 ratio         PTT - ( 17 Oct 2020 06:51 )  PTT:40.0 sec            RADIOLOGY & ADDITIONAL TESTS:  Results Reviewed:   Imaging Personally Reviewed:  Electrocardiogram Personally Reviewed:    COORDINATION OF CARE:  Care Discussed with Consultants/Other Providers [Y/N]:  Prior or Outpatient Records Reviewed [Y/N]:

## 2020-10-17 NOTE — CONSULT NOTE ADULT - SUBJECTIVE AND OBJECTIVE BOX
Patient is a 19y Male Hx stage 4 Hodgkins lymphoma (s/p chemo) who presents c/o 1 month of low back pain and bilateral hip pain. Patient developed atraumatic low back pain 1 month ago, which has increased in severity over time. The pain spread to his bilateral hips. For the last 2 weeks he has not been able to ambulate due to bilateral hip pain and weakness. He points to his ASIS when describing his pain. Denies pain/injury elsewhere. Denies numbness/tingling/paresthesias/weakness in his extremities.. Denies bowel/bladder incontinence. Denies fevers/chills. No other complaints at this time. Patient was transferred to [facility] from [place] ED due to CT chest showing L1, L4 pathologic compression fracture with severe spinal canal compression.       HEALTH ISSUES - PROBLEM Dx:          MEDICATIONS  (STANDING):      Allergies    IV Contrast (Vomiting)    Intolerances        PAST MEDICAL & SURGICAL HISTORY:  Hodgkins lymphoma in relapse    No pertinent past medical history    NHL (non-Hodgkin&#x27;s lymphoma)    No pertinent past medical history    No significant past surgical history          Vital Signs Last 24 Hrs  T(C): 36.7 (10-16-20 @ 23:29), Max: 37 (10-16-20 @ 22:54)  T(F): 98 (10-16-20 @ 23:29), Max: 98.6 (10-16-20 @ 22:54)  HR: 95 (10-16-20 @ 23:29) (95 - 100)  BP: 99/57 (10-16-20 @ 23:29) (99/57 - 112/69)  BP(mean): --  RR: 18 (10-16-20 @ 23:29) (18 - 20)  SpO2: 99% (10-16-20 @ 23:29) (99% - 100%)    Gen: NAD    Spine PE:  Skin intact  No gross deformity  TTP L spine  No bony step offs  No paraspinal muscle ttp/hypertonicity   Negative Straight leg raise  Negative clonus  Negative babinski  Negative howe  + rectal tone  No saddle anesthesia    Motor:                   C5                C6              C7               C8           T1   R            5/5                5/5            5/5             5/5          5/5  L             5/5               5/5             5/5             5/5          5/5                L2             L3             L4               L5            S1  R         5/5           5/5          5/5             5/5           5/5  L          5/5          5/5           5/5             5/5           5/5    Sensory:            C5         C6         C7      C8       T1        (0=absent, 1=impaired, 2=normal, NT=not testable)  R         2            2           2        2         2  L          2            2           2        2         2               L2          L3         L4      L5       S1         (0=absent, 1=impaired, 2=normal, NT=not testable)  R         2            2            2        2        2  L          2            2           2        2         2    Imaging:   From [place] PACS  CT Abdomen pelvis:       A/P: 19y Male Stage 4 Hodgkins lympoma w/ L1 and L4 severe compression fractures causing severe canal stenosis and cord compression at L1. Patient is neurologically intact, without signs or symptoms of cauda equina, except for inability to ambulate primarily due to hip pain.   FU MRI C/T/L spine w/w/o contrast  FU XR pelvis and bilateral hips  Pain control  Plan discussed with Dr. Lind Patient is a 19y Male Hx stage 4 Hodgkins lymphoma (s/p chemo) who presents c/o 1 month of low back pain and bilateral hip pain. Patient developed atraumatic low back pain 1 month ago, which has increased in severity over time. The pain spread to his bilateral hips. For the last 2 weeks he has not been able to ambulate due to bilateral hip pain and weakness. He points to his ASIS when describing his pain. Denies pain/injury elsewhere. Denies numbness/tingling/paresthesias/weakness in his extremities.. Denies bowel/bladder incontinence. Denies fevers/chills. No other complaints at this time. Patient was transferred to [facility] from [place] ED due to CT chest showing L1, L4 pathologic compression fracture with severe spinal canal compression.       HEALTH ISSUES - PROBLEM Dx:          MEDICATIONS  (STANDING):      Allergies    IV Contrast (Vomiting)    Intolerances        PAST MEDICAL & SURGICAL HISTORY:  Hodgkins lymphoma in relapse    No pertinent past medical history    NHL (non-Hodgkin&#x27;s lymphoma)    No pertinent past medical history    No significant past surgical history          Vital Signs Last 24 Hrs  T(C): 36.7 (10-16-20 @ 23:29), Max: 37 (10-16-20 @ 22:54)  T(F): 98 (10-16-20 @ 23:29), Max: 98.6 (10-16-20 @ 22:54)  HR: 95 (10-16-20 @ 23:29) (95 - 100)  BP: 99/57 (10-16-20 @ 23:29) (99/57 - 112/69)  BP(mean): --  RR: 18 (10-16-20 @ 23:29) (18 - 20)  SpO2: 99% (10-16-20 @ 23:29) (99% - 100%)    Gen: NAD    Spine PE:  Skin intact  No gross deformity  TTP L spine  No bony step offs  No paraspinal muscle ttp/hypertonicity   Negative Straight leg raise  Negative clonus  Negative babinski  Negative howe  + rectal tone  No saddle anesthesia    Motor:                   C5                C6              C7               C8           T1   R            5/5                5/5            5/5             5/5          5/5  L             5/5               5/5             5/5             5/5          5/5                L2             L3             L4               L5            S1  R         5/5           5/5          5/5             5/5           5/5  L          5/5          5/5           5/5             5/5           5/5    Sensory:            C5         C6         C7      C8       T1        (0=absent, 1=impaired, 2=normal, NT=not testable)  R         2            2           2        2         2  L          2            2           2        2         2               L2          L3         L4      L5       S1         (0=absent, 1=impaired, 2=normal, NT=not testable)  R         2            2            2        2        2  L          2            2           2        2         2    Imaging:   From [place] PACS  CT Abdomen pelvis: Severe L1 patholgic compression fracture causing severe canal compression and cord compression. Moderate L4 pathologic compression fracture. Bilateral metastatic disease.      A/P: 19y Male Stage 4 Hodgkins lympoma w/ L1 and L4 severe compression fractures causing severe canal stenosis and cord compression at L1. Patient is neurologically intact, without signs or symptoms of cauda equina, except for inability to ambulate primarily due to hip pain.   FU MRI C/T/L spine w/w/o contrast  FU XR pelvis and bilateral hips  Pain control  Plan discussed with Dr. Lind

## 2020-10-17 NOTE — H&P ADULT - PROBLEM SELECTOR PLAN 5
DVT ppx: HSQ  Diet: regular  Dispo: pending DVT ppx: HSQ  Diet: NPO pending MRI- will need sedation, d/w anesthesia  Dispo: pending

## 2020-10-17 NOTE — H&P ADULT - NSHPSOCIALHISTORY_GEN_ALL_CORE
denies smoking, alcohol, or drugs. Lives w/ , /not  Lives with parents.  Uses marijuana. Denies alcohol, tobacco use.

## 2020-10-17 NOTE — H&P ADULT - NSHPLABSRESULTS_GEN_ALL_CORE
From [facility] PACS  CT Abdomen pelvis: Severe L1 patholgic compression fracture causing severe canal compression and cord compression. Moderate L4 pathologic compression fracture. Bilateral metastatic disease. WBC-47.95 <- 27K  Hg-9.5 <- 10.2    PTT-41.8, PT-22.5, INR-1.95  Albumin-2  Alk [id number]  AST/ALT- 33/61    CT A/P at [place]  IMPRESSION:  1. Diffuse lung and bony metastases.  2. Severe pathologic compression fracture deformity of L1 with large ventral epidural soft tissue component results in severe central canal stenosis likely resulting in cord compression and or cauda equina syndrome. Recommend further evaluation with MRI.  3. Moderate pathologic fracture deformity of L4 with ventral epidural extension results in moderate central canal narrowing.  4. Multiple bibasilar lung metastases, right greater than left. Labs pending. Labs from [facility] reviewed by me.  Orthopedics consult note reviewed personally by me.     WBC-47.95 <- 27K  Hg-9.5 <- 10.2    PTT-41.8, PT-22.5, INR-1.95  Albumin-2  Alk [id number]  AST/ALT- 33/61    CT A/P at [place]  IMPRESSION:  1. Diffuse lung and bony metastases.  2. Severe pathologic compression fracture deformity of L1 with large ventral epidural soft tissue component results in severe central canal stenosis likely resulting in cord compression and or cauda equina syndrome. Recommend further evaluation with MRI.  3. Moderate pathologic fracture deformity of L4 with ventral epidural extension results in moderate central canal narrowing.  4. Multiple bibasilar lung metastases, right greater than left. Labs pending. Labs from [facility] reviewed by me.  Orthopedics consult note reviewed personally by me.     WBC-47.95 <- 27K  Hg-9.5 <- 10.2    PTT-41.8, PT-22.5, INR-1.95  Albumin-2  Alk [id number]  AST/ALT- 33/61      CT from [facility]  EXAM:  CT ABDOMEN AND PELVIS                        PROCEDURE DATE:  10/16/2020    CLINICAL INFORMATION:  Abdominal distension . Non-Hodgkin's with diffuse pain.  TECHNIQUE: Multiple contiguous axial CT images of the abdomen and pelvis obtained without intravenous contrast. Coronal and sagittal reformatted images provided.    COMPARISON: None.    FINDINGS: The visualized lung bases demonstrate bibasilar nodular masses, right greater than left, the largest of the right lung base measures 3.5 cm. Left lower lobe nodule measures 9 mm.    Limited, unenhanced images of the liver demonstrates right hepatic prominence. Limited, unenhanced images of the spleen demonstrates a normal size, underlying parenchyma is limited without intravenous contrast. Limited, unenhanced images of the adrenal glands and pancreas are grossly unremarkable. The stomach is underdistended for adequate evaluation. Evaluation of the abdominal aorta is limited without intravenous contrast but demonstrates a normal grossly normal caliber and course.    Suggestion of a tiny 2 mm nonobstructing mid right renal stone. No hydroureteronephrosis.    The bladder is collapsed. Prostate gland is not grossly enlarged. Seminal vesicles are symmetric.    Evaluation of bowel is limited without distention with oral contrast, however there is no bowel obstruction. Unremarkable appendix without appendicitis. Small bowel loops are not dilated. No free air or significant ascites seen. Enlarged 1.1 x 1.8 x 2.1 cm left periaortic retroperitoneal lymph node.    There is severe pathologic compression fracture deformity of L1 with a large ventral epidural soft tissue component which results in severe central canal stenosis, cord compression and/or cauda equina syndrome. Prominent soft tissue component about the L1 vertebral body.    In addition, there is moderate pathologic fracture deformity of L4 with a ventral epidural extension results in moderate central canal narrowing. There is also prominent adjacent soft tissue component about the L4 vertebral body.    There is also a bony lytic metastases of the left iliac spine and left anterior acetabulum. Sclerotic densities of the left proximal femur are also evident.    IMPRESSION:  1. Diffuse lung and bony metastases.  2. Severe pathologic compression fracture deformity of L1 with large ventral epidural soft tissue component results in severe central canal stenosis likely resulting in cord compression and or cauda equina syndrome. Recommend further evaluation with MRI.  3. Moderate pathologic fracture deformity of L4 with ventral epidural extension results in moderate central canal narrowing.  4. Multiple bibasilar lung metastases, right greater than left.    Dr. Ram notified 10/16/202 at 8:20PM EST with read back confirmation

## 2020-10-17 NOTE — H&P ADULT - ATTENDING COMMENTS
This patient is a 18yo M with PMH of stage IV Hodgkin lymphoma s/p 5 cycles ABVE-PC in 12/2018, with relapse in  Feb 2019, treatment with gemzar/bruntuximab, and then ICE therapy who presents to the ED as transfer from [facility] due to back pain. Patient complains of 1 month of lower back and hip pain, worsened with certain positions and movement, affecting R and left sides. He denies any direct trauma to back, numbness or weakness in lower extremities, bowel or bladder incontinence. He has not been taking medications recently. He denies any sick contacts.   He had a fever earlier today of 101F, with mild cough productive of trace blood and phlegm. He denies N/V, diarrhea or dysuria.   Pt has had 85 lbs unintentional weight loss over the last 3 months, night sweats and enlarging lymph nodes in the neck and axilla.   He expresses that he does not want to get chemotherapy but wants to know if there are other medications that can treat his lymphoma.   On exam, pt is cachectic appearing young man, A&O x 3, PERRL, EOMI, lungs CTAB, cardiac exam tachycardic, abd soft and nontender. Pt has enlarged submandibular, cervical, L axillar lymph nodes. He has tenderness to palpation along spine.  Pt has 5/5 BUE and BLE strength, full sensation to light touch.   Skin notable for pruritic hypopigmented papules mostly on lower extremities, fewer on upper extremities. Pt has some excoriations along those areas  Agree with plan discussed above  - I discussed with patient that his CT findings place him at high risk of nerve injury which may result in complications including but not limited to paralysis or loss of sensation in the lower extremities and patinet expressed understanding. Pt insists he requires anesthesia for MRI, but not willing to undergo intubation. Pt states he had anesthesia while at [facility] and wants the same treatment. Day team to discuss with Anesthesia team possible sedation methods for MRI.   - Continue empiric levofloxacin. Pt had itching with ceftriaxone thus he may be allergic to penicillin and related drugs. Follow up bcx and ucx. Check procalcitonin, RVP. CXR did not suggest pulmonary source of infection.  - Consult hematology/oncology team in AM to discuss possibly restarting chemotherapy for this patient. [employer] record indicates that the patient has a history of noncompliance and refusal. I discussed with the patient that declining chemotherapy can result in progression of disease and worse clinical outcome. This patient is a 18yo M with PMH of stage IV Hodgkin lymphoma s/p 5 cycles ABVE-PC in 12/2018, with relapse in  Feb 2019, treatment with gemzar/bruntuximab, and then ICE therapy who presents to the ED as transfer from [facility] due to back pain. Patient complains of 1 month of lower back and hip pain, worsened with certain positions and movement, affecting R and left sides. He denies any direct trauma to back, numbness or weakness in lower extremities, bowel or bladder incontinence. He has not been taking medications recently. He denies any sick contacts.   He had a fever earlier today of 101F, with mild cough productive of trace blood and phlegm. He denies N/V, diarrhea or dysuria.   Pt has had 85 lbs unintentional weight loss over the last 3 months, night sweats and enlarging lymph nodes in the neck and axilla.   He expresses that he does not want to get chemotherapy but wants to know if there are other medications that can treat his lymphoma.   On exam, pt is cachectic appearing young man, A&O x 3, PERRL, EOMI, lungs CTAB, cardiac exam tachycardic, abd soft and nontender. Pt has enlarged submandibular, cervical, L axillar lymph nodes. He has tenderness to palpation along spine.  Pt has 5/5 BUE and BLE strength, full sensation to light touch.   Skin notable for pruritic hypopigmented papules mostly on lower extremities, fewer on upper extremities. Pt has some excoriations along those areas  Agree with plan discussed above  - I discussed with patient that his CT findings place him at high risk of nerve injury which may result in complications including but not limited to paralysis or loss of sensation in the lower extremities and patinet expressed understanding. Pt insists he requires anesthesia for MRI, but not willing to undergo intubation. Pt states he had anesthesia while at [facility] and wants the same treatment. Day team to discuss with Anesthesia team possible sedation methods for MRI.   - Continue empiric levofloxacin. Pt had itching with ceftriaxone thus he may be allergic to penicillin and related drugs. Follow up bcx and ucx. Check procalcitonin, RVP. CXR did not suggest pulmonary source of infection.  - Consult hematology/oncology team in AM to discuss possibly restarting chemotherapy for this patient. [employer] record indicates that the patient has a history of noncompliance and refusal. I discussed with the patient that declining chemotherapy can result in progression of disease and worse clinical outcome  - Consult dermatology for hypopigmented papular rash on extremities. May be Sweet syndrome? Less likely folliculitis or contract dermatitis. This patient is a 18yo M with PMH of stage IV Hodgkin lymphoma s/p 5 cycles ABVE-PC in 12/2018, with relapse in  Feb 2019, treatment with gemzar/bruntuximab, and then ICE therapy who presents to the ED as transfer from [facility] due to back pain. Patient complains of 1 month of lower back and hip pain, worsened with certain positions and movement, affecting R and left sides. He denies any direct trauma to back, numbness or weakness in lower extremities, bowel or bladder incontinence. He has not been taking medications recently. He denies any sick contacts.   He had a fever earlier today of 101F, with mild cough productive of trace blood and phlegm. He denies N/V, diarrhea or dysuria.   Pt has had 85 lbs unintentional weight loss over the last 3 months, night sweats and enlarging lymph nodes in the neck and axilla.   He expresses that he does not want to get chemotherapy but wants to know if there are other medications that can treat his lymphoma.   On exam, pt is cachectic appearing young man, A&O x 3, PERRL, EOMI, lungs CTAB, cardiac exam tachycardic, abd soft and nontender. Pt has enlarged submandibular, cervical, L axillar lymph nodes. He has tenderness to palpation along spine.  Pt has 5/5 BUE and BLE strength, full sensation to light touch.   Skin notable for pruritic hypopigmented papules mostly on lower extremities, fewer on upper extremities. Pt has some excoriations along those areas  Agree with plan discussed above  - I discussed with patient that his CT findings place him at high risk of nerve injury which may result in complications including but not limited to paralysis or loss of sensation in the lower extremities and patient expressed understanding. Pt insists he requires anesthesia for MRI, but not willing to undergo intubation. Pt states he had anesthesia while at [facility] and wants the same treatment. Day team to discuss with Anesthesia team possible sedation methods for MRI.   - Continue empiric levofloxacin. Pt had itching with ceftriaxone thus he may be allergic to penicillin and related drugs. Follow up bcx and ucx. Check procalcitonin, RVP. CXR did not suggest pulmonary source of infection.  - Consult hematology/oncology team in AM to discuss possibly restarting chemotherapy for this patient. [employer] record indicates that the patient has a history of noncompliance and refusal. I discussed with the patient that declining chemotherapy can result in progression of disease and worse clinical outcome  - Consult dermatology for hypopigmented papular rash on extremities. May be Sweet syndrome? Less likely folliculitis or contract dermatitis.

## 2020-10-17 NOTE — H&P ADULT - NSHPREVIEWOFSYSTEMS_GEN_ALL_CORE
CONSTITUTIONAL:  No weakness, fevers, chills, or unintentional weight loss  HEENT: Eyes:  No trauma or visual changes. Head, Ears, Nose, Throat:  No trauma dizziness, changes in hearing, or throat pain  NECK: No pain or stiffness  CARDIOVASCULAR:  No light-headedness chest pain, chest pressure or chest discomfort. No palpitations.  RESPIRATORY:  No shortness of breath, cough or sputum or hemoptysis.  GASTROINTESTINAL:  No abdominal pain, N/V, or constipation/diarrhea  GENITOURINARY:  Denies hematuria, dysuria.   NEUROLOGICAL:  No numbness or weakness  PSYCH: no SI, HI, no anxiety, depression  MUSCULOSKELETAL:  No muscle, back pain, joint pain or stiffness, no swelling  HEMATOLOGIC: No bleeding or bruising  SKIN:  No rash or itching.  ALLERGY: no swelling, hives  All other review of systems is negative unless indicated above CONSTITUTIONAL:  No weakness, or chills,  + fever, + unintentional weight loss  HEENT: Eyes:  No trauma or visual changes. Head, Ears, Nose, Throat:  No trauma dizziness, changes in hearing, or throat pain  NECK: No pain or stiffness  CARDIOVASCULAR:  No light-headedness chest pain, chest pressure or chest discomfort. No palpitations.  RESPIRATORY:  No shortness of breath, + cough   GASTROINTESTINAL:  No abdominal pain, N/V, or constipation/diarrhea  GENITOURINARY:  Denies hematuria, dysuria.   NEUROLOGICAL:  No numbness or weakness  PSYCH: no SI, HI, no anxiety, depression  MUSCULOSKELETAL:  No muscle, + back pain,   HEMATOLOGIC: No bleeding or bruising  SKIN:  + itching at legs and arms  ALLERGY: no swelling, hives  All other review of systems is negative unless indicated above

## 2020-10-17 NOTE — ED ADULT NURSE REASSESSMENT NOTE - NS ED NURSE REASSESS COMMENT FT1
0015-MRI safety checklist faxed. Dr. Olivarez notified about patient stating "they gave me med to put me to sleep for the MRI.

## 2020-10-17 NOTE — ED ADULT NURSE REASSESSMENT NOTE - NS ED NURSE REASSESS COMMENT FT1
0006- patient at first declined to have covid swab "its been traumatic for me". Dr. Olivarez made aware & spoke to patient. Patient requested to do his own swabbing with supervision.

## 2020-10-17 NOTE — H&P ADULT - PROBLEM SELECTOR PLAN 3
DVT ppx: HSQ  Diet: regular  Dispo: pending hx of Hodgkin lymphoma, stage 4, follows w/ Dr. Emery, s/p ABVE-PC and ICE, however non-compliant  - oncology consult in AM hx of Hodgkin lymphoma, stage 4, follows w/ Dr. Emery, s/p ABVE-PC and ICE, however non-compliant  - oncology emailed hx of Hodgkin lymphoma, stage 4, follows w/ Dr. Emery, s/p ABVE-PC and ICE, however pt has history of non-compliance  - oncology team emailed

## 2020-10-17 NOTE — H&P ADULT - PROBLEM SELECTOR PLAN 4
Transitions of Care Status:  1.  Name of PCP:  2.  PCP Contacted on Admission: [ ] Y    [ ] N    3.  PCP contacted at Discharge: [ ] Y    [ ] N    [ ] N/A  4.  Post-Discharge Appointment Date and Location:  5.  Summary of Handoff given to PCP: rash over lower extremities, possibly eczema vs psoriasis  - hydrocortisone ointment and petroleum jelly prescribed rash over lower extremities, possibly eczema vs possible Sweet syndrome?   - consult dermatology team in AM  - hydrocortisone ointment and petroleum jelly prescribed

## 2020-10-17 NOTE — PROGRESS NOTE ADULT - ATTENDING COMMENTS
pt seen and examined.  above plan discussed on rounds today.  In addition,    r/o cord compression --> pt to get MRI with anesthesia due to severe claustrophobia

## 2020-10-17 NOTE — CHART NOTE - NSCHARTNOTEFT_GEN_A_CORE
HEMATOLOGY FELLOW NOTE    Called by primary team regarding patient with known Stage IV Hodgkin's Lymphoma who presented with hip and back pain. Documentation that a CT scan performed (at [place]?) demonstrated cord compression, however, there are no scans to review. Per primary team, patient is complaining of hip and back pain, but does not have any focal neurologic deficits on exam. MRI spine is pending for 8pm tonight to evaluate for cord compression. Orthopedic Surgery has been consulted.     Plan  #History of Hodgkin's Lymphoma with concern for cord compression  -As noted above, no CT imaging is available for review to confirm cord compression. MRI spine is pending. Will follow-up  -Regardless of if patient received Decadron at [place], we recommend starting Decadron 4mg q6 along with GI prophylaxis tonight  -Recommend Neurosurgical evaluation if needed following results of MRI   -This plan was discussed with attending on call, Dr. Messina, who is in agreement. Patient will be formally seen by the Hematology Team on 10/18.     Please call with any questions or concerns.    Sumaya Reis MD  Hematology/Oncology Fellow, PGY-4  Pager: [phone]  After 5pm and on weekends please page on-call fellow

## 2020-10-17 NOTE — PROGRESS NOTE ADULT - SUBJECTIVE AND OBJECTIVE BOX
No acute event overnight  Patient is refusing any sort of intubation for MRI. He says he will only do MRI without any tubes.    HEALTH ISSUES - PROBLEM Dx:  Rash  Rash    Sepsis  Sepsis    Discharge planning issues  Discharge planning issues    Prophylactic measure  Prophylactic measure    Hodgkin lymphoma  Hodgkin lymphoma    Compression fracture of L1 vertebra  Compression fracture of L1 vertebra            MEDICATIONS  (STANDING):  heparin   Injectable 5000 Unit(s) SubCutaneous every 8 hours  hydrocortisone 1% Cream 1 Application(s) Topical daily  influenza   Vaccine 0.5 milliLiter(s) IntraMuscular once  levoFLOXacin IVPB      petrolatum white Ointment 1 Application(s) Topical two times a day      Allergies    IV Contrast (Vomiting)  Rocephin (Pruritus)    Intolerances        PAST MEDICAL & SURGICAL HISTORY:  Hodgkins lymphoma in relapse    No pertinent past medical history    NHL (non-Hodgkin&#x27;s lymphoma)    No pertinent past medical history    No significant past surgical history                              9.0    43.08 )-----------( 683      ( 17 Oct 2020 06:51 )             31.2       17 Oct 2020 06:52    138    |  100    |  12     ----------------------------<  175    3.7     |  23     |  0.42     Ca    9.5        17 Oct 2020 06:52  Phos  4.2       17 Oct 2020 06:52  Mg     2.1       17 Oct 2020 06:52    TPro  7.9    /  Alb  3.0    /  TBili  0.2    /  DBili  x      /  AST  18     /  ALT  43     /  AlkPhos  387    17 Oct 2020 06:52      PT/INR - ( 17 Oct 2020 06:51 )   PT: 18.7 sec;   INR: 1.59 ratio         PTT - ( 17 Oct 2020 06:51 )  PTT:40.0 sec        Vital Signs Last 24 Hrs  T(C): 36.5 (10-17-20 @ 04:30), Max: 37 (10-16-20 @ 22:54)  T(F): 97.7 (10-17-20 @ 04:30), Max: 98.6 (10-16-20 @ 22:54)  HR: 72 (10-17-20 @ 04:30) (72 - 100)  BP: 103/62 (10-17-20 @ 04:30) (99/57 - 112/69)  BP(mean): --  RR: 18 (10-17-20 @ 04:30) (18 - 20)  SpO2: 99% (10-17-20 @ 04:30) (99% - 100%)    Gen: NAD     BLLE:   Hip ROM 0-100  5/5 GS/TA/EHL/FHL  +SILT  2+ DP    Spine PE:    Motor:                   C5                C6              C7               C8           T1   R            5/5                5/5            5/5             5/5          5/5  L             5/5               5/5             5/5             5/5          5/5                L2             L3             L4               L5            S1  R         5/5           5/5          5/5             5/5           5/5  L          5/5          5/5           5/5             5/5           5/5    Sensory:            C5         C6         C7      C8       T1        (0=absent, 1=impaired, 2=normal, NT=not testable)  R         2            2           2        2         2  L          2            2           2        2         2               L2          L3         L4      L5       S1         (0=absent, 1=impaired, 2=normal, NT=not testable)  R         2            2            2        2        2  L          2            2           2        2         2    Neg clonsus/babinski/Hoffmans    A/P: 19y Male Stage 4 Hodgkins lympohma with L1/L4 pathologic compression fractures of L lytic acetabular lesion/R femur sclerotic lesion  Will need MRI L spine w/w/o con. Can defer MRI T and L spine for now given patient unable to tolerate extended time  Will also need MRI Pelvis and R femur w/w/o con  Bedrest, spine precautions until MRI L spine  Will need coordination with anesthesia regarding ability to do these MRI's with sedation/anxiolytic but no intubation  NWB LLE  DVT ppx No acute event overnight  Patient is refusing any sort of intubation for MRI. He says he will only do MRI without any tubes.    HEALTH ISSUES - PROBLEM Dx:  Rash  Rash    Sepsis  Sepsis    Discharge planning issues  Discharge planning issues    Prophylactic measure  Prophylactic measure    Hodgkin lymphoma  Hodgkin lymphoma    Compression fracture of L1 vertebra  Compression fracture of L1 vertebra            MEDICATIONS  (STANDING):  heparin   Injectable 5000 Unit(s) SubCutaneous every 8 hours  hydrocortisone 1% Cream 1 Application(s) Topical daily  influenza   Vaccine 0.5 milliLiter(s) IntraMuscular once  levoFLOXacin IVPB      petrolatum white Ointment 1 Application(s) Topical two times a day      Allergies    IV Contrast (Vomiting)  Rocephin (Pruritus)    Intolerances        PAST MEDICAL & SURGICAL HISTORY:  Hodgkins lymphoma in relapse    No pertinent past medical history    NHL (non-Hodgkin&#x27;s lymphoma)    No pertinent past medical history    No significant past surgical history                              9.0    43.08 )-----------( 683      ( 17 Oct 2020 06:51 )             31.2       17 Oct 2020 06:52    138    |  100    |  12     ----------------------------<  175    3.7     |  23     |  0.42     Ca    9.5        17 Oct 2020 06:52  Phos  4.2       17 Oct 2020 06:52  Mg     2.1       17 Oct 2020 06:52    TPro  7.9    /  Alb  3.0    /  TBili  0.2    /  DBili  x      /  AST  18     /  ALT  43     /  AlkPhos  387    17 Oct 2020 06:52      PT/INR - ( 17 Oct 2020 06:51 )   PT: 18.7 sec;   INR: 1.59 ratio         PTT - ( 17 Oct 2020 06:51 )  PTT:40.0 sec        Vital Signs Last 24 Hrs  T(C): 36.5 (10-17-20 @ 04:30), Max: 37 (10-16-20 @ 22:54)  T(F): 97.7 (10-17-20 @ 04:30), Max: 98.6 (10-16-20 @ 22:54)  HR: 72 (10-17-20 @ 04:30) (72 - 100)  BP: 103/62 (10-17-20 @ 04:30) (99/57 - 112/69)  BP(mean): --  RR: 18 (10-17-20 @ 04:30) (18 - 20)  SpO2: 99% (10-17-20 @ 04:30) (99% - 100%)    Gen: NAD     BLLE:   Hip ROM 0-100  5/5 GS/TA/EHL/FHL  +SILT  2+ DP    Spine PE:    Motor:                   C5                C6              C7               C8           T1   R            5/5                5/5            5/5             5/5          5/5  L             5/5               5/5             5/5             5/5          5/5                L2             L3             L4               L5            S1  R         5/5           5/5          5/5             5/5           5/5  L          5/5          5/5           5/5             5/5           5/5    Sensory:            C5         C6         C7      C8       T1        (0=absent, 1=impaired, 2=normal, NT=not testable)  R         2            2           2        2         2  L          2            2           2        2         2               L2          L3         L4      L5       S1         (0=absent, 1=impaired, 2=normal, NT=not testable)  R         2            2            2        2        2  L          2            2           2        2         2    Neg clonsus/babinski/Hoffmans    A/P: 19y Male Stage 4 Hodgkins lympohma with L1/L4 pathologic compression fractures of L lytic acetabular lesion  Will need MRI L spine w/w/o con. Can defer MRI T and L spine for now given patient unable to tolerate extended time  Will need coordination with anesthesia regarding ability to do MRI with sedation/anxiolytic but no intubation  No long need MRI Pelvis/ R femur  Will need Bone scan  Bedrest, spine precautions until MRI L spine  PWB LLE with crutch walker  Recommend Radiation/Chemo for acetabular lesion  DVT ppx  FU Heme/Onc consult

## 2020-10-18 LAB
ALBUMIN SERPL ELPH-MCNC: 2.7 G/DL — LOW (ref 3.3–5)
ALP SERPL-CCNC: 288 U/L — HIGH (ref 40–120)
ALT FLD-CCNC: 52 U/L — HIGH (ref 10–45)
ANION GAP SERPL CALC-SCNC: 14 MMOL/L — SIGNIFICANT CHANGE UP (ref 5–17)
AST SERPL-CCNC: 31 U/L — SIGNIFICANT CHANGE UP (ref 10–40)
BILIRUB SERPL-MCNC: 0.3 MG/DL — SIGNIFICANT CHANGE UP (ref 0.2–1.2)
BUN SERPL-MCNC: 13 MG/DL — SIGNIFICANT CHANGE UP (ref 7–23)
CALCIUM SERPL-MCNC: 9.5 MG/DL — SIGNIFICANT CHANGE UP (ref 8.4–10.5)
CHLORIDE SERPL-SCNC: 98 MMOL/L — SIGNIFICANT CHANGE UP (ref 96–108)
CO2 SERPL-SCNC: 25 MMOL/L — SIGNIFICANT CHANGE UP (ref 22–31)
CREAT SERPL-MCNC: 0.58 MG/DL — SIGNIFICANT CHANGE UP (ref 0.5–1.3)
GLUCOSE SERPL-MCNC: 78 MG/DL — SIGNIFICANT CHANGE UP (ref 70–99)
HCT VFR BLD CALC: 28.9 % — LOW (ref 39–50)
HGB BLD-MCNC: 8.5 G/DL — LOW (ref 13–17)
MAGNESIUM SERPL-MCNC: 2.1 MG/DL — SIGNIFICANT CHANGE UP (ref 1.6–2.6)
MCHC RBC-ENTMCNC: 21.8 PG — LOW (ref 27–34)
MCHC RBC-ENTMCNC: 29.4 GM/DL — LOW (ref 32–36)
MCV RBC AUTO: 74.1 FL — LOW (ref 80–100)
NRBC # BLD: 0 /100 WBCS — SIGNIFICANT CHANGE UP (ref 0–0)
PHOSPHATE SERPL-MCNC: 4.8 MG/DL — HIGH (ref 2.5–4.5)
PLATELET # BLD AUTO: 652 K/UL — HIGH (ref 150–400)
POTASSIUM SERPL-MCNC: 3.4 MMOL/L — LOW (ref 3.5–5.3)
POTASSIUM SERPL-SCNC: 3.4 MMOL/L — LOW (ref 3.5–5.3)
PROT SERPL-MCNC: 7.1 G/DL — SIGNIFICANT CHANGE UP (ref 6–8.3)
RBC # BLD: 3.9 M/UL — LOW (ref 4.2–5.8)
RBC # FLD: 17.5 % — HIGH (ref 10.3–14.5)
SODIUM SERPL-SCNC: 137 MMOL/L — SIGNIFICANT CHANGE UP (ref 135–145)
WBC # BLD: 40.06 K/UL — CRITICAL HIGH (ref 3.8–10.5)
WBC # FLD AUTO: 40.06 K/UL — CRITICAL HIGH (ref 3.8–10.5)

## 2020-10-18 PROCEDURE — 99232 SBSQ HOSP IP/OBS MODERATE 35: CPT

## 2020-10-18 PROCEDURE — 99223 1ST HOSP IP/OBS HIGH 75: CPT | Mod: GC

## 2020-10-18 PROCEDURE — 99233 SBSQ HOSP IP/OBS HIGH 50: CPT | Mod: GC

## 2020-10-18 RX ORDER — POTASSIUM CHLORIDE 20 MEQ
40 PACKET (EA) ORAL EVERY 4 HOURS
Refills: 0 | Status: COMPLETED | OUTPATIENT
Start: 2020-10-18 | End: 2020-10-18

## 2020-10-18 RX ADMIN — Medication 1 APPLICATION(S): at 05:53

## 2020-10-18 RX ADMIN — Medication 650 MILLIGRAM(S): at 00:31

## 2020-10-18 RX ADMIN — ENOXAPARIN SODIUM 40 MILLIGRAM(S): 100 INJECTION SUBCUTANEOUS at 11:37

## 2020-10-18 RX ADMIN — PANTOPRAZOLE SODIUM 40 MILLIGRAM(S): 20 TABLET, DELAYED RELEASE ORAL at 05:49

## 2020-10-18 RX ADMIN — Medication 650 MILLIGRAM(S): at 01:51

## 2020-10-18 RX ADMIN — Medication 4 MILLIGRAM(S): at 11:37

## 2020-10-18 RX ADMIN — Medication 4 MILLIGRAM(S): at 05:46

## 2020-10-18 RX ADMIN — Medication 4 MILLIGRAM(S): at 23:44

## 2020-10-18 RX ADMIN — Medication 4 MILLIGRAM(S): at 17:18

## 2020-10-18 RX ADMIN — Medication 1 APPLICATION(S): at 17:18

## 2020-10-18 RX ADMIN — Medication 40 MILLIEQUIVALENT(S): at 13:33

## 2020-10-18 RX ADMIN — Medication 1 APPLICATION(S): at 11:38

## 2020-10-18 RX ADMIN — Medication 1 APPLICATION(S): at 17:19

## 2020-10-18 RX ADMIN — Medication 40 MILLIEQUIVALENT(S): at 17:18

## 2020-10-18 NOTE — CHART NOTE - NSCHARTNOTEFT_GEN_A_CORE
Patient had MRI of spine, notified by radiology resident that there is evidence of cord compression. I called orthopedic surgery, who were already following, and made them aware of results. Ortho already aware, stated they will address with attending this morning.

## 2020-10-18 NOTE — PROGRESS NOTE ADULT - SUBJECTIVE AND OBJECTIVE BOX
PROGRESS NOTE:   Authored by Dr. Yulisa Diaz, [facility] pager [phone]    Patient is a 19y old  Male who presents with a chief complaint of hip and back pain, transfer from [place] (18 Oct 2020 10:43)      SUBJECTIVE / OVERNIGHT EVENTS: Pt doing well this morning. He says that his back pain is the same and he is still unable to walk, he denies saddle anesthesia, bowel and bladder incontinence.     REVIEW OF SYSTEMS:    CONSTITUTIONAL: No weakness, fevers or chills  EYES/ENT: No visual changes;  No vertigo or throat pain   NECK: No pain or stiffness  BACK:+ pain mostly in lower back    RESPIRATORY: No cough, wheezing, hemoptysis; No shortness of breath  CARDIOVASCULAR: No chest pain or palpitations  GASTROINTESTINAL: No abdominal or epigastric pain. No nausea, vomiting, or hematemesis; No diarrhea or constipation. No melena or hematochezia.  GENITOURINARY: No dysuria, frequency or hematuria  NEUROLOGICAL: generalized weakness   EXTREMITIES: no varicose veins, no pain in UE and LE  SKIN: No itching, rashes      MEDICATIONS  (STANDING):  dexAMETHasone  Injectable 4 milliGRAM(s) IV Push every 6 hours  enoxaparin Injectable 40 milliGRAM(s) SubCutaneous daily  hydrocortisone 1% Cream 1 Application(s) Topical daily  influenza   Vaccine 0.5 milliLiter(s) IntraMuscular once  levoFLOXacin IVPB 500 milliGRAM(s) IV Intermittent every 24 hours  levoFLOXacin IVPB      pantoprazole    Tablet 40 milliGRAM(s) Oral before breakfast  petrolatum white Ointment 1 Application(s) Topical two times a day  triamcinolone 0.1% Ointment 1 Application(s) Topical every 12 hours    MEDICATIONS  (PRN):  acetaminophen   Tablet .. 650 milliGRAM(s) Oral every 6 hours PRN Temp greater or equal to 38C (100.4F), Mild Pain (1 - 3), Moderate Pain (4 - 6)  diphenhydrAMINE 25 milliGRAM(s) Oral every 6 hours PRN Rash and/or Itching  ondansetron Injectable 4 milliGRAM(s) IV Push once PRN Nausea      CAPILLARY BLOOD GLUCOSE        I&O's Summary    17 Oct 2020 07:01  -  18 Oct 2020 07:00  --------------------------------------------------------  IN: 600 mL / OUT: 2200 mL / NET: -1600 mL    18 Oct 2020 07:01  -  18 Oct 2020 12:16  --------------------------------------------------------  IN: 340 mL / OUT: 500 mL / NET: -160 mL        PHYSICAL EXAM:  Vital Signs Last 24 Hrs  T(C): 36.6 (18 Oct 2020 11:34), Max: 37.2 (17 Oct 2020 20:49)  T(F): 97.8 (18 Oct 2020 11:34), Max: 98.9 (17 Oct 2020 20:49)  HR: 98 (18 Oct 2020 11:34) (68 - 98)  BP: 100/62 (18 Oct 2020 11:34) (96/58 - 106/70)  BP(mean): --  RR: 18 (18 Oct 2020 11:34) (18 - 20)  SpO2: 100% (18 Oct 2020 11:34) (98% - 100%)    GENERAL: No acute distress, well-developed  HEAD:  Atraumatic, Normocephalic  Back: tenderness to palpation to lower back and to either side of spine, pain with adduction of legs   EYES: EOMI, PERRLA, conjunctiva and sclera clear  NECK: Supple, no lymphadenopathy, no JVD  CHEST/LUNG: CTAB; No wheezes, rales, or rhonchi  HEART: Regular rate and rhythm; No murmurs, rubs, or gallops  ABDOMEN: Soft, non-tender, non-distended; normal bowel sounds, no organomegaly  EXTREMITIES:  2+ peripheral pulses b/l, No clubbing, cyanosis, or edema  NEUROLOGY: 5/5 muscle strength in UE and LE   SKIN: No rashes or lesions    LABS:                        8.5    40.06 )-----------( 652      ( 18 Oct 2020 07:04 )             28.9     10-18    137  |  98  |  13  ----------------------------<  78  3.4<L>   |  25  |  0.58    Ca    9.5      18 Oct 2020 07:04  Phos  4.8     10  Mg     2.1     10-    TPro  7.1  /  Alb  2.7<L>  /  TBili  0.3  /  DBili  x   /  AST  31  /  ALT  52<H>  /  AlkPhos  288<H>  10-18    PT/INR - ( 17 Oct 2020 06:51 )   PT: 18.7 sec;   INR: 1.59 ratio         PTT - ( 17 Oct 2020 06:51 )  PTT:40.0 sec      Urinalysis Basic - ( 17 Oct 2020 13:02 )    Color: Light Yellow / Appearance: Clear / S.022 / pH: x  Gluc: x / Ketone: Negative  / Bili: Negative / Urobili: <2 mg/dL   Blood: x / Protein: Trace / Nitrite: Negative   Leuk Esterase: Negative / RBC: 1 /HPF / WBC 1 /HPF   Sq Epi: x / Non Sq Epi: 0 /HPF / Bacteria: Negative          RADIOLOGY & ADDITIONAL TESTS:  Results Reviewed:   Imaging Personally Reviewed:  Electrocardiogram Personally Reviewed:    COORDINATION OF CARE:  Care Discussed with Consultants/Other Providers [Y/N]:  Prior or Outpatient Records Reviewed [Y/N]:

## 2020-10-18 NOTE — PROGRESS NOTE ADULT - ATTENDING COMMENTS
pt seen and examined on 10/18/2020  above plan discussed on rounds on 10/18/2020  In addition,    T2 Cord compression --> c/w pain control, ortho eval appreciated --> no surgical intervention at time, Rad onco evaluation

## 2020-10-18 NOTE — PROGRESS NOTE ADULT - ATTENDING COMMENTS
Patient is neuro intact. Of note, the patient is cachectic and with little to no soft tissue coverage over the thoracic/lumbar spine. He has pain over his posterior buttock and hips primarily, right worse than left.    Given his pelvic/lower extremity lesions he is partial weight bearing    Has lesions over T2, T4, T6, T9, L1, L4. Soft tissue extension anteriorly    Will continue to monitor symptoms  Mobilize with PT as able based on TTWB restrictions

## 2020-10-18 NOTE — PROGRESS NOTE ADULT - PROBLEM SELECTOR PLAN 1
hx of hodgkin lymphoma w/ severe back pain, denies weakness, bladder/bowel dysfunction, saddle anesthesia  - strict bedrest for now  - neuro checks Q4H  - MRI C/T/L spine w/wo IVC, however, pt refusing 2/2 to claustrophobia, states he needs to be "put out"  - patient prefers tylenol for his pain, tylenol PRN  - ortho following  - MRI showing lumbar spinal fractures and epidural tumor involvement, retroperitoneal lymphadenopathy and multiple nodules in the lung  - ortho aware  - decadron 4mg q6

## 2020-10-18 NOTE — CHART NOTE - NSCHARTNOTEFT_GEN_A_CORE
Dermatology Chart Note    History: 18 yo M w/ h/o stage IV hodgkin's lymphoma non adherent to chemo regimen admitted for possible cord compression. Dermatology consulted for pruritic rash on LEs x 4 months, untreated.     Physical Exam:  Per photos,  no primary lesions appreciated on b/l LEs, scattered excoriated smooth dome shaped hypopigmented nodules with hyperpigmented rim on b/l extensor legs and dorsal feet    Differential favors Prurigo nodularis.  At this time recommend:  -start topical triamcinolone 0.1% ointment to affected areas on legs BID  -avoid scratching the area    Complete in person consult to follow on 10/19.    The patient's chart was reviewed in addition to photos of the rash taken by the primary team with the permission of the patient.  Patient was discussed remotely with the dermatology attending Dr. Lentz.  Recommendations were communicated with the primary team.  Please page [phone] for further related questions.    Erica Rodrigues MD  Resident Physician, PGY2  [facility]  Pager: [phone]  Office: [phone]

## 2020-10-18 NOTE — PROGRESS NOTE ADULT - SUBJECTIVE AND OBJECTIVE BOX
Patient seen and examined at bedside. Obtained MRIs, pending official reads    Vital Signs Last 24 Hrs  T(C): 36.9 (18 Oct 2020 04:06), Max: 37.2 (17 Oct 2020 20:49)  T(F): 98.4 (18 Oct 2020 04:06), Max: 98.9 (17 Oct 2020 20:49)  HR: 79 (18 Oct 2020 04:06) (68 - 92)  BP: 100/66 (18 Oct 2020 04:06) (96/58 - 106/70)  BP(mean): --  RR: 18 (18 Oct 2020 04:06) (18 - 20)  SpO2: 98% (18 Oct 2020 04:06) (98% - 99%)                      9.0    43.08 )-----------( 683      ( 17 Oct 2020 06:51 )             31.2   10-17    138  |  100  |  12  ----------------------------<  175<H>  3.7   |  23  |  0.42<L>    Ca    9.5      17 Oct 2020 06:52  Phos  4.2     10-17  Mg     2.1     10-17    TPro  7.9  /  Alb  3.0<L>  /  TBili  0.2  /  DBili  x   /  AST  18  /  ALT  43  /  AlkPhos  387<H>  10-17    PE   BLLE:  Skin intact; No ecchymosis/soft tissue swelling  ROM Hips 0-100 without pain  Endorses TTP and numbness over b/l AIIS   Able to SLR; Neg Log Roll/Heel Strike  Motor intact GS/TA/FHL/EHL  SILT L2-S1  DP/PT pulses 2+      Imaging:  CT Abdomen pelvis showing lytic lesion of L acetabulum and sclerotic lesion of R femur    19y Male with Stage 4 Hodgkins Lymphoma with L acetabulum lytic lesion and sclerotic lesion of R femur, likely metastatic disease  - Pain control  - Recommend partial weight bearing RLE with crutch/walker  - Recommend Bone scan  - Recommend Chemo/Radiation  - Recommend Heme/Onc consult  - No biopsy need from ortho onc  - Plan Discussed with Yennifer Delgadillo Patient seen and examined at bedside. Obtained MRIs, pending official reads    Vital Signs Last 24 Hrs  T(C): 36.9 (18 Oct 2020 04:06), Max: 37.2 (17 Oct 2020 20:49)  T(F): 98.4 (18 Oct 2020 04:06), Max: 98.9 (17 Oct 2020 20:49)  HR: 79 (18 Oct 2020 04:06) (68 - 92)  BP: 100/66 (18 Oct 2020 04:06) (96/58 - 106/70)  BP(mean): --  RR: 18 (18 Oct 2020 04:06) (18 - 20)  SpO2: 98% (18 Oct 2020 04:06) (98% - 99%)                      9.0    43.08 )-----------( 683      ( 17 Oct 2020 06:51 )             31.2   10-17    138  |  100  |  12  ----------------------------<  175<H>  3.7   |  23  |  0.42<L>    Ca    9.5      17 Oct 2020 06:52  Phos  4.2     10-17  Mg     2.1     10-17    TPro  7.9  /  Alb  3.0<L>  /  TBili  0.2  /  DBili  x   /  AST  18  /  ALT  43  /  AlkPhos  387<H>  10-17    PE   BLLE:  Skin intact; No ecchymosis/soft tissue swelling  ROM Hips 0-100 without pain  Endorses TTP and numbness over b/l AIIS   Able to SLR; Neg Log Roll/Heel Strike  Motor intact GS/TA/FHL/EHL  SILT L2-S1  DP/PT pulses 2+      Imaging:  CT Abdomen pelvis showing lytic lesion of L acetabulum and sclerotic lesion of R femur    19y Male with Stage 4 Hodgkins Lymphoma with L acetabulum lytic lesion and sclerotic lesion of R femur, likely metastatic disease  - Pain control  - Recommend partial weight bearing LLE with crutch/walker  - Recommend Bone scan  - Recommend Chemo/Radiation  - Recommend Heme/Onc consult  - No biopsy need from ortho onc  - Plan Discussed with Yennifer Delgadillo

## 2020-10-18 NOTE — CONSULT NOTE ADULT - ASSESSMENT
Patient is a 20 y/o M w/ a PMHx of Stage IV CHL (Dx 9/2018, s/p multiple lines of therapy which has been c/b nonadherence and patient refusal of treatment) who was transferred to [facility] due to concern for cauda equina syndrome, found to have pathologic compression fractures with associated ventral epidural tumor involvement on imaging, and was admitted to Medicine for further management. Hematology was consulted for further evaluation.       *** NOTE INCOMPLETE *** Patient is a 20 y/o M w/ a PMHx of Stage IV CHL (Dx 9/2018, s/p multiple lines of therapy which has been c/b nonadherence and patient refusal of treatment) who was transferred to [facility] due to concern for cauda equina syndrome, found to have pathologic compression fractures with associated ventral epidural tumor involvement on imaging, and was admitted to Medicine for further management. Hematology was consulted for further evaluation.     # Pathologic compression fractures  - Patient initially presented with worsening hip and back pain for 3 weeks and was ultimately found to have pathologic compression fractures  - MRI C/T/L spine was notable for extensive left cervical chain lymphadenopathy which appears to have increased since 2/20/2020, multiple marrow replacing lesions throughout the thoracic spine (including T2, T4, and a severe pathologic compression fracture with ventral epidural tumor involvement which contributes to mild central canal stenosis at T9), and multiple marrow replacing lesions throughout the lumbar spine and sacrum (including a moderate pathologic compression fracture and ventral epidural tumor involvement which contributes to moderate central canal stenosis at L1 as well as a severe pathologic compression fracture at L4)  - [facility] was notable for diffuse lung and bony metastases in addition to the compression fractures noted above  - Appreciate Orthopedic evaluation. It does not appear surgery is required at this time. Continue to follow-up recs  - Please consult Radiation Oncology for inpatient RT evaluation given MRI findings. If needed, patient will need to be transferred to [facility]  - Agree with high-dose Decadron for now  - Continue neuro checks  - Appreciate care as per Medicine team    # Stage IV CHL  - Diagnosed in 9/2018. He was initially treated following ABVE-PC which was completed in 12/2018 after 5 cycles due to nonadherence and patient refusal. He then relapsed in 2/2019. He was started on salvage treatment in April 2019 with gemzar/brentuximab which was also stopped due to the patient's refusal and nonadherence. Patient was then recommended to have 2 cycles of ICE with intent to pursue an autologous bone marrow transplant. The patient began the cycle and then left AMA unfortunately and so this was never completed.   - Further management as noted above  - Treatment plan to be determined pending Radiation Oncology evaluation  - Please monitor CBC WITH DIFF and TLS labs (CMP, Phos, LDH, Uric acid) daily  - Primary Hematologist: Dr. Kalie Emery. Continue outpatient follow-up    Plan d/w primary team    Wai Torres, PGY5  Hematology-Oncology Fellow  Pager: [phone] / [phone] Patient is a 20 y/o M w/ a PMHx of Stage IV CHL (Dx 9/2018, s/p multiple lines of therapy which has been c/b nonadherence and patient refusal of treatment) who was transferred to [facility] due to concern for cauda equina syndrome, found to have pathologic compression fractures with associated ventral epidural tumor involvement on imaging, and was admitted to Medicine for further management. Hematology was consulted for further evaluation.     # Pathologic compression fractures  - Patient initially presented with worsening hip and back pain for 3 weeks and was ultimately found to have pathologic compression fractures  - MRI C/T/L spine was notable for extensive left cervical chain lymphadenopathy which appears to have increased since 2/20/2020, multiple marrow replacing lesions throughout the thoracic spine (including T2, T4, and a severe pathologic compression fracture with ventral epidural tumor involvement which contributes to mild central canal stenosis at T9), and multiple marrow replacing lesions throughout the lumbar spine and sacrum (including a moderate pathologic compression fracture and ventral epidural tumor involvement which contributes to moderate central canal stenosis at L1 as well as a severe pathologic compression fracture at L4)  - CT A+P obtained at [facility] (on a different MRN) was notable for diffuse lung and bony metastases in addition to the compression fractures noted above  - Appreciate Orthopedic evaluation. It does not appear surgery is required at this time. Continue to follow-up recs  - Please consult Radiation Oncology for inpatient RT evaluation given MRI findings. If needed, patient will need to be transferred to [facility]  - Agree with high-dose Decadron for now  - Continue neuro checks  - Appreciate care as per Medicine team    # Stage IV CHL  - Diagnosed in 9/2018. He was initially treated following ABVE-PC which was completed in 12/2018 after 5 cycles due to nonadherence and patient refusal. He then relapsed in 2/2019. He was started on salvage treatment in April 2019 with gemzar/brentuximab which was also stopped due to the patient's refusal and nonadherence. Patient was then recommended to have 2 cycles of ICE with intent to pursue an autologous bone marrow transplant. The patient began the cycle and then left AMA unfortunately and so this was never completed.   - Further management as noted above  - Treatment plan to be determined pending Radiation Oncology evaluation  - Please monitor CBC WITH DIFF and TLS labs (CMP, Phos, LDH, Uric acid) daily  - Primary Hematologist: Dr. Kalie Emery. Continue outpatient follow-up    # Leukocytosis/Anemia  - Patient noted to have a marked leukocytosis which is neutrophilic predominant and a microcytic anemia. Per chart review, patient has had a chronic leukocytosis since 1/2020 which has been slowly uptrending. His WBC was 27.61 and Hgb was 10.2 on 8/25  - Peripheral smear reviewed: Slightly hypochromic RBCs, No schistocytes seen, Thrombocytosis, Neutrophilic-predominant leukocytosis, PMNs appears grossly unremarkable  - Leukocytosis likely reactive from underlying progressive lymphoma +/- recent steroids  - Check Ferritin, iron studies, Retic count, B12, and Folate  - Monitor CBC WITH diff daily    Plan d/w primary team    Wai Torres, PGY5  Hematology-Oncology Fellow  Pager: [phone] / [phone]

## 2020-10-18 NOTE — PROGRESS NOTE ADULT - PROBLEM SELECTOR PLAN 4
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

## 2020-10-18 NOTE — PROGRESS NOTE ADULT - PROBLEM SELECTOR PLAN 3
hx of Hodgkin lymphoma, stage 4, follows w/ Dr. Emery, s/p ABVE-PC and ICE, however pt has history of non-compliance  - oncology team aware and will see patient today, will f/u with recs

## 2020-10-19 ENCOUNTER — TRANSCRIPTION ENCOUNTER (OUTPATIENT)
Age: 20
End: 2020-10-19

## 2020-10-19 ENCOUNTER — INPATIENT (INPATIENT)
Facility: HOSPITAL | Age: 20
LOS: 2 days | Discharge: HOME | End: 2020-10-22
Attending: HOSPITALIST | Admitting: HOSPITALIST
Payer: MEDICAID

## 2020-10-19 VITALS
OXYGEN SATURATION: 100 % | RESPIRATION RATE: 17 BRPM | HEART RATE: 76 BPM | WEIGHT: 80.69 LBS | SYSTOLIC BLOOD PRESSURE: 98 MMHG | HEIGHT: 68 IN | TEMPERATURE: 98 F | DIASTOLIC BLOOD PRESSURE: 58 MMHG

## 2020-10-19 VITALS
TEMPERATURE: 98 F | RESPIRATION RATE: 18 BRPM | OXYGEN SATURATION: 100 % | DIASTOLIC BLOOD PRESSURE: 60 MMHG | SYSTOLIC BLOOD PRESSURE: 95 MMHG | HEART RATE: 80 BPM

## 2020-10-19 DIAGNOSIS — C81.90 HODGKIN LYMPHOMA, UNSPECIFIED, UNSPECIFIED SITE: ICD-10-CM

## 2020-10-19 LAB
ANION GAP SERPL CALC-SCNC: 11 MMOL/L — SIGNIFICANT CHANGE UP (ref 5–17)
BASOPHILS # BLD AUTO: 0.06 K/UL — SIGNIFICANT CHANGE UP (ref 0–0.2)
BASOPHILS NFR BLD AUTO: 0.3 % — SIGNIFICANT CHANGE UP (ref 0–2)
BUN SERPL-MCNC: 16 MG/DL — SIGNIFICANT CHANGE UP (ref 7–23)
CALCIUM SERPL-MCNC: 9.2 MG/DL — SIGNIFICANT CHANGE UP (ref 8.4–10.5)
CHLORIDE SERPL-SCNC: 104 MMOL/L — SIGNIFICANT CHANGE UP (ref 96–108)
CO2 SERPL-SCNC: 25 MMOL/L — SIGNIFICANT CHANGE UP (ref 22–31)
CREAT SERPL-MCNC: 0.57 MG/DL — SIGNIFICANT CHANGE UP (ref 0.5–1.3)
EOSINOPHIL # BLD AUTO: 0.01 K/UL — SIGNIFICANT CHANGE UP (ref 0–0.5)
EOSINOPHIL NFR BLD AUTO: 0.1 % — SIGNIFICANT CHANGE UP (ref 0–6)
FERRITIN SERPL-MCNC: 1070 NG/ML — HIGH (ref 30–400)
FOLATE SERPL-MCNC: 7.4 NG/ML — SIGNIFICANT CHANGE UP
GLUCOSE SERPL-MCNC: 191 MG/DL — HIGH (ref 70–99)
HCT VFR BLD CALC: 29.8 % — LOW (ref 39–50)
HGB BLD-MCNC: 8.4 G/DL — LOW (ref 13–17)
IMM GRANULOCYTES NFR BLD AUTO: 6.9 % — HIGH (ref 0–1.5)
IRON SATN MFR SERPL: 36 % — SIGNIFICANT CHANGE UP (ref 16–55)
IRON SATN MFR SERPL: 63 UG/DL — SIGNIFICANT CHANGE UP (ref 45–165)
LDH SERPL L TO P-CCNC: 205 U/L — SIGNIFICANT CHANGE UP (ref 50–242)
LYMPHOCYTES # BLD AUTO: 0.82 K/UL — LOW (ref 1–3.3)
LYMPHOCYTES # BLD AUTO: 4.6 % — LOW (ref 13–44)
MCHC RBC-ENTMCNC: 21.1 PG — LOW (ref 27–34)
MCHC RBC-ENTMCNC: 28.2 GM/DL — LOW (ref 32–36)
MCV RBC AUTO: 74.9 FL — LOW (ref 80–100)
MONOCYTES # BLD AUTO: 0.55 K/UL — SIGNIFICANT CHANGE UP (ref 0–0.9)
MONOCYTES NFR BLD AUTO: 3.1 % — SIGNIFICANT CHANGE UP (ref 2–14)
NEUTROPHILS # BLD AUTO: 15.17 K/UL — HIGH (ref 1.8–7.4)
NEUTROPHILS NFR BLD AUTO: 85 % — HIGH (ref 43–77)
NRBC # BLD: 0 /100 WBCS — SIGNIFICANT CHANGE UP (ref 0–0)
PLATELET # BLD AUTO: 670 K/UL — HIGH (ref 150–400)
POTASSIUM SERPL-MCNC: 4.1 MMOL/L — SIGNIFICANT CHANGE UP (ref 3.5–5.3)
POTASSIUM SERPL-SCNC: 4.1 MMOL/L — SIGNIFICANT CHANGE UP (ref 3.5–5.3)
RBC # BLD: 3.98 M/UL — LOW (ref 4.2–5.8)
RBC # BLD: 3.98 M/UL — LOW (ref 4.2–5.8)
RBC # FLD: 18 % — HIGH (ref 10.3–14.5)
RETICS #: 100.3 K/UL — SIGNIFICANT CHANGE UP (ref 25–125)
RETICS/RBC NFR: 2.5 % — SIGNIFICANT CHANGE UP (ref 0.5–2.5)
SODIUM SERPL-SCNC: 140 MMOL/L — SIGNIFICANT CHANGE UP (ref 135–145)
TIBC SERPL-MCNC: 175 UG/DL — LOW (ref 220–430)
UIBC SERPL-MCNC: 112 UG/DL — SIGNIFICANT CHANGE UP (ref 110–370)
URATE SERPL-MCNC: 4.2 MG/DL — SIGNIFICANT CHANGE UP (ref 3.4–8.8)
VIT B12 SERPL-MCNC: 1512 PG/ML — HIGH (ref 232–1245)
WBC # BLD: 17.85 K/UL — HIGH (ref 3.8–10.5)
WBC # FLD AUTO: 17.85 K/UL — HIGH (ref 3.8–10.5)

## 2020-10-19 PROCEDURE — 73522 X-RAY EXAM HIPS BI 3-4 VIEWS: CPT

## 2020-10-19 PROCEDURE — 78306 BONE IMAGING WHOLE BODY: CPT | Mod: 26

## 2020-10-19 PROCEDURE — 99285 EMERGENCY DEPT VISIT HI MDM: CPT

## 2020-10-19 PROCEDURE — 82746 ASSAY OF FOLIC ACID SERUM: CPT

## 2020-10-19 PROCEDURE — 72148 MRI LUMBAR SPINE W/O DYE: CPT

## 2020-10-19 PROCEDURE — 84550 ASSAY OF BLOOD/URIC ACID: CPT

## 2020-10-19 PROCEDURE — U0003: CPT

## 2020-10-19 PROCEDURE — 72146 MRI CHEST SPINE W/O DYE: CPT

## 2020-10-19 PROCEDURE — 78306 BONE IMAGING WHOLE BODY: CPT

## 2020-10-19 PROCEDURE — 85045 AUTOMATED RETICULOCYTE COUNT: CPT

## 2020-10-19 PROCEDURE — 99223 1ST HOSP IP/OBS HIGH 75: CPT

## 2020-10-19 PROCEDURE — 84145 PROCALCITONIN (PCT): CPT

## 2020-10-19 PROCEDURE — 78830 RP LOCLZJ TUM SPECT W/CT 1: CPT | Mod: 26

## 2020-10-19 PROCEDURE — 82728 ASSAY OF FERRITIN: CPT

## 2020-10-19 PROCEDURE — 83540 ASSAY OF IRON: CPT

## 2020-10-19 PROCEDURE — 73552 X-RAY EXAM OF FEMUR 2/>: CPT

## 2020-10-19 PROCEDURE — 85025 COMPLETE CBC W/AUTO DIFF WBC: CPT

## 2020-10-19 PROCEDURE — 83735 ASSAY OF MAGNESIUM: CPT

## 2020-10-19 PROCEDURE — 77262 THER RADIOLOGY TX PLNG INTRM: CPT

## 2020-10-19 PROCEDURE — 80053 COMPREHEN METABOLIC PANEL: CPT

## 2020-10-19 PROCEDURE — 83615 LACTATE (LD) (LDH) ENZYME: CPT

## 2020-10-19 PROCEDURE — 81001 URINALYSIS AUTO W/SCOPE: CPT

## 2020-10-19 PROCEDURE — 99222 1ST HOSP IP/OBS MODERATE 55: CPT | Mod: 25

## 2020-10-19 PROCEDURE — 73552 X-RAY EXAM OF FEMUR 2/>: CPT | Mod: 26,50

## 2020-10-19 PROCEDURE — 0225U NFCT DS DNA&RNA 21 SARSCOV2: CPT

## 2020-10-19 PROCEDURE — A9561: CPT

## 2020-10-19 PROCEDURE — 85730 THROMBOPLASTIN TIME PARTIAL: CPT

## 2020-10-19 PROCEDURE — 84100 ASSAY OF PHOSPHORUS: CPT

## 2020-10-19 PROCEDURE — 83550 IRON BINDING TEST: CPT

## 2020-10-19 PROCEDURE — 80048 BASIC METABOLIC PNL TOTAL CA: CPT

## 2020-10-19 PROCEDURE — 87040 BLOOD CULTURE FOR BACTERIA: CPT

## 2020-10-19 PROCEDURE — 78830 RP LOCLZJ TUM SPECT W/CT 1: CPT

## 2020-10-19 PROCEDURE — 86769 SARS-COV-2 COVID-19 ANTIBODY: CPT

## 2020-10-19 PROCEDURE — 72141 MRI NECK SPINE W/O DYE: CPT

## 2020-10-19 PROCEDURE — 99239 HOSP IP/OBS DSCHRG MGMT >30: CPT | Mod: GC

## 2020-10-19 PROCEDURE — 85610 PROTHROMBIN TIME: CPT

## 2020-10-19 PROCEDURE — 99232 SBSQ HOSP IP/OBS MODERATE 35: CPT | Mod: GC

## 2020-10-19 PROCEDURE — 82607 VITAMIN B-12: CPT

## 2020-10-19 RX ORDER — ONDANSETRON 8 MG/1
4 TABLET, FILM COATED ORAL
Qty: 0 | Refills: 0 | DISCHARGE
Start: 2020-10-19

## 2020-10-19 RX ORDER — ONDANSETRON 8 MG/1
4 TABLET, FILM COATED ORAL EVERY 6 HOURS
Refills: 0 | Status: DISCONTINUED | OUTPATIENT
Start: 2020-10-19 | End: 2020-10-22

## 2020-10-19 RX ORDER — ENOXAPARIN SODIUM 100 MG/ML
30 INJECTION SUBCUTANEOUS DAILY
Refills: 0 | Status: DISCONTINUED | OUTPATIENT
Start: 2020-10-19 | End: 2020-10-22

## 2020-10-19 RX ORDER — LACTOBACILLUS ACIDOPHILUS 100MM CELL
1 CAPSULE ORAL
Qty: 30 | Refills: 0
Start: 2020-10-19

## 2020-10-19 RX ORDER — DEXAMETHASONE 0.5 MG/5ML
4 ELIXIR ORAL
Qty: 0 | Refills: 0 | DISCHARGE
Start: 2020-10-19

## 2020-10-19 RX ORDER — PANTOPRAZOLE SODIUM 20 MG/1
1 TABLET, DELAYED RELEASE ORAL
Qty: 0 | Refills: 0 | DISCHARGE
Start: 2020-10-19

## 2020-10-19 RX ORDER — MEN-PHOR .5; .5 G/G; G/G
1 LOTION TOPICAL
Refills: 0 | Status: DISCONTINUED | OUTPATIENT
Start: 2020-10-19 | End: 2020-10-19

## 2020-10-19 RX ORDER — DEXAMETHASONE 0.5 MG/5ML
4 ELIXIR ORAL EVERY 6 HOURS
Refills: 0 | Status: DISCONTINUED | OUTPATIENT
Start: 2020-10-19 | End: 2020-10-22

## 2020-10-19 RX ORDER — CALAMINE AND ZINC OXIDE AND PHENOL 160; 10 MG/ML; MG/ML
1 LOTION TOPICAL
Refills: 0 | Status: DISCONTINUED | OUTPATIENT
Start: 2020-10-19 | End: 2020-10-22

## 2020-10-19 RX ORDER — ENOXAPARIN SODIUM 100 MG/ML
40 INJECTION SUBCUTANEOUS
Qty: 0 | Refills: 0 | DISCHARGE
Start: 2020-10-19

## 2020-10-19 RX ORDER — PANTOPRAZOLE SODIUM 20 MG/1
40 TABLET, DELAYED RELEASE ORAL
Refills: 0 | Status: DISCONTINUED | OUTPATIENT
Start: 2020-10-19 | End: 2020-10-22

## 2020-10-19 RX ORDER — ACETAMINOPHEN 500 MG
2 TABLET ORAL
Qty: 0 | Refills: 0 | DISCHARGE
Start: 2020-10-19

## 2020-10-19 RX ORDER — CHLORHEXIDINE GLUCONATE 213 G/1000ML
1 SOLUTION TOPICAL DAILY
Refills: 0 | Status: DISCONTINUED | OUTPATIENT
Start: 2020-10-19 | End: 2020-10-19

## 2020-10-19 RX ORDER — OXYCODONE HYDROCHLORIDE 5 MG/1
5 TABLET ORAL ONCE
Refills: 0 | Status: DISCONTINUED | OUTPATIENT
Start: 2020-10-19 | End: 2020-10-19

## 2020-10-19 RX ORDER — LACTOBACILLUS ACIDOPHILUS 100MM CELL
1 CAPSULE ORAL THREE TIMES A DAY
Refills: 0 | Status: DISCONTINUED | OUTPATIENT
Start: 2020-10-19 | End: 2020-10-19

## 2020-10-19 RX ORDER — CHLORHEXIDINE GLUCONATE 213 G/1000ML
1 SOLUTION TOPICAL
Qty: 0 | Refills: 0 | DISCHARGE
Start: 2020-10-19

## 2020-10-19 RX ORDER — INFLUENZA VIRUS VACCINE 15; 15; 15; 15 UG/.5ML; UG/.5ML; UG/.5ML; UG/.5ML
0.5 SUSPENSION INTRAMUSCULAR ONCE
Refills: 0 | Status: COMPLETED | OUTPATIENT
Start: 2020-10-19 | End: 2020-10-19

## 2020-10-19 RX ORDER — ACETAMINOPHEN 500 MG
650 TABLET ORAL EVERY 6 HOURS
Refills: 0 | Status: DISCONTINUED | OUTPATIENT
Start: 2020-10-19 | End: 2020-10-22

## 2020-10-19 RX ADMIN — PANTOPRAZOLE SODIUM 40 MILLIGRAM(S): 20 TABLET, DELAYED RELEASE ORAL at 05:05

## 2020-10-19 RX ADMIN — Medication 4 MILLIGRAM(S): at 23:01

## 2020-10-19 RX ADMIN — ENOXAPARIN SODIUM 40 MILLIGRAM(S): 100 INJECTION SUBCUTANEOUS at 11:27

## 2020-10-19 RX ADMIN — Medication 1 APPLICATION(S): at 18:44

## 2020-10-19 RX ADMIN — Medication 1 APPLICATION(S): at 05:05

## 2020-10-19 RX ADMIN — CHLORHEXIDINE GLUCONATE 1 APPLICATION(S): 213 SOLUTION TOPICAL at 13:54

## 2020-10-19 RX ADMIN — Medication 4 MILLIGRAM(S): at 05:05

## 2020-10-19 RX ADMIN — Medication 4 MILLIGRAM(S): at 11:27

## 2020-10-19 RX ADMIN — OXYCODONE HYDROCHLORIDE 5 MILLIGRAM(S): 5 TABLET ORAL at 18:44

## 2020-10-19 RX ADMIN — Medication 4 MILLIGRAM(S): at 18:44

## 2020-10-19 RX ADMIN — Medication 1 APPLICATION(S): at 13:55

## 2020-10-19 NOTE — DIETITIAN INITIAL EVALUATION ADULT. - REASON INDICATOR FOR ASSESSMENT
Pt seen for BMI <19 referral and consult received for assessment.   Information obtained from: medical record, previous RD note, and pt.

## 2020-10-19 NOTE — DISCHARGE NOTE NURSING/CASE MANAGEMENT/SOCIAL WORK - PATIENT PORTAL LINK FT
You can access the FollowMyHealth Patient Portal offered by [facility] by registering at the following website: [email or web address]. By joining [facility]’s FollowMyHealth portal, you will also be able to view your health information using other applications (apps) compatible with our system.

## 2020-10-19 NOTE — DIETITIAN INITIAL EVALUATION ADULT. - OTHER CALCULATIONS
Pertinent information as per chart: Pt 20 y/o M with PMH: Hodgkin lymphoma, admitted with hip and back pain x 3 weeks 2/2 to L1, L4 pathologic compression fracture causing severe canal stenosis w/ cord compression course, found to meet SIRS criteria.

## 2020-10-19 NOTE — H&P ADULT - PROBLEM SELECTOR PLAN 2
- Initial fevers with leukocytosis, improving with levofloxacin  - Will c/w levofloxacin for now, start on 10/17, likely would need 5-7 days of empiric abx, all cultures so far negative  - Possibly also has SIRS 2/2 lymphoma/steroids, f/u hematology in AM

## 2020-10-19 NOTE — H&P ADULT - PROBLEM SELECTOR PLAN 7
1.  Name of PCP: Dr. Kalie Emery  2.  PCP Contacted on Admission: [ ] Y    [ ] N    3.  PCP contacted at Discharge: [ ] Y    [ ] N    [ ] N/A  4.  Post-Discharge Appointment Date and Location:  5.  Summary of Handoff given to PCP:

## 2020-10-19 NOTE — H&P ADULT - NSHPLABSRESULTS_GEN_ALL_CORE
LABS and ADDITIONAL STUDIES:                        8.4    17.85 )-----------( 670      ( 19 Oct 2020 06:25 )             29.8       10-19    140  |  104  |  16  ----------------------------<  191<H>  4.1   |  25  |  0.57    Ca    9.2      19 Oct 2020 06:25  Phos  4.8     10-18  Mg     2.1     10-18    TPro  7.1  /  Alb  2.7<L>  /  TBili  0.3  /  DBili  x   /  AST  31  /  ALT  52<H>  /  AlkPhos  288<H>  10-18            LIVER FUNCTIONS - ( 18 Oct 2020 07:04 )  Alb: 2.7 g/dL / Pro: 7.1 g/dL / ALK PHOS: 288 U/L / ALT: 52 U/L / AST: 31 U/L / GGT: x LABS and ADDITIONAL STUDIES:                        8.4    17.85 )-----------( 670      ( 19 Oct 2020 06:25 )             29.8   Complete Blood Count + Automated Diff in AM (10.19.20 @ 06:25)   Auto Neutrophil #: 15.17 K/uL   Auto Neutrophil %: 85.0    Complete Blood Count + Automated Diff in AM (10.19.20 @ 06:25)   Mean Cell Volume: 74.9 fl   Mean Cell Hemoglobin: 21.1 pg   Mean Cell Hemoglobin Conc: 28.2 gm/dL     Iron with Total Binding Capacity in AM (10.19.20 @ 08:40)   Iron - Total Binding Capacity.: 175 ug/dL   % Saturation, Iron: 36 %   Iron Total, Serum: 63 ug/dL   Unsaturated Iron Binding Capacity: 112 ug/dL   Ferritin, Serum in AM (10.19.20 @ 08:13)   Ferritin, Serum: 1070 ng/mL   Folate, Serum in AM (10.19.20 @ 08:13)   Folate, Serum: 7.4 ng/mL   Vitamin B12, Serum in AM (10.19.20 @ 08:13)   Vitamin B12, Serum: 1512 pg/mL     10-19    140  |  104  |  16  ----------------------------<  191<H>  4.1   |  25  |  0.57    Ca    9.2      19 Oct 2020 06:25  Phos  4.8     10-18  Mg     2.1     10-18    TPro  7.1  /  Alb  2.7<L>  /  TBili  0.3  /  DBili  x   /  AST  31  /  ALT  52<H>  /  AlkPhos  288<H>  10-18    LIVER FUNCTIONS - ( 18 Oct 2020 07:04 )  Alb: 2.7 g/dL / Pro: 7.1 g/dL / ALK PHOS: 288 U/L / ALT: 52 U/L / AST: 31 U/L / GGT: x    < from: CT Angio Chest w/ IV Cont (08.16.20 @ 00:01) >  IMPRESSION:  No pulmonary embolus.    Progression of pulmonary metastases and thoracic/lower cervical lymphadenopathy.    New lytic metastases as above.  < end of copied text >    < from: MR Cervical Spine No Cont (10.17.20 @ 22:16) >  IMPRESSION:  MR Cervical spine: There are no marrow replacing lesions throughout the cervical spine. There is no high-grade central canal stenosis. There is extensive left cervical chain lymphadenopathy which appears to have increased since 2/20/2020.    MR thoracic spine: There are multiple marrow replacing lesions throughout the thoracic spine.  At T2 there is epidural tumor involvement which contributes to moderate central canal stenosis without underlying cord signal abnormality. At T4 there is a possible mild pathologic compression fracture. At T9 there is a severe pathologic compression fracture. At T9 there is ventral epidural tumor involvement which contributes to mild central canal stenosis. There is associated cord signal abnormality extending from T8-T11.    MR lumbar spine: There are multiple marrow replacing lesions throughout the lumbar spine and sacrum as described. At L1 there is associated moderate pathologic compression fracture. At L1 there is ventral epidural tumor involvement which contributes to moderate central canal stenosis. At L4 there is severe pathologic compression fracture with associated levocurvature of the lumbar spine.  < end of copied text >    < from: NM Bone Imaging Total (10.19.20 @ 12:52) >  IMPRESSION: Abnormal bone scan.    Increased radiotracer uptake corresponding to lucent lesions in left acetabulum and left lateral aspect of L4 vertebra. Destructive lesion in the remainder of L4 vertebra with associated soft tissue component is photopenic on the bone scan.    Increased uptake in the right proximal femur without CT correlate probably representing bone marrow abnormality. This may be further evaluated with MRI.  < end of copied text >

## 2020-10-19 NOTE — PROGRESS NOTE ADULT - PROBLEM SELECTOR PLAN 5
DVT ppx: HSQ  Diet: NPO pending MRI- will need sedation, d/w anesthesia  Dispo: pending DVT ppx: Lovenox  Diet: Regular diet  Dispo: Transfer to [facility] for radiation therapy.

## 2020-10-19 NOTE — DISCHARGE NOTE PROVIDER - HOSPITAL COURSE
19M hx Hodgkins lymphoma p/w hip and back pain x 3 weeks 2/2 to L1, L4 pathologic compression fracture causing severe canal stenosis w/ cord compression course presenting from outside hospital with back pain. On admission pt found to be febrile with elevated WBC and pt was started on levoquin with improvement in WBC and has been afebrile. Xray of hips and pelvis showed Loss of height at the right aspect of the L4 vertebral body which is new since 12/19/2019 and is consistent with a compression fracture which may be pathologic in etiology. Correlate with the ordering MRI of the lumbar spine. .1 cm sclerotic lesion in the left femoral intertrochanteric region which is stable since 12/19/2019. 0.7 cm sclerotic region in the right iliac bone which appears new. MRI of spine showed extensive left cervical chain lymphadenopathy which appears to have increased since 2/20/2020. There are multiple marrow replacing lesions throughout the thoracic spine.  At T2 there is epidural tumor involvement which contributes to moderate central canal stenosis without underlying cord signal abnormality. At T4 there is a possible mild pathologic compression fracture. At T9 there is a severe pathologic compression fracture. At T9 there is ventral epidural tumor involvement which contributes to mild central canal stenosis. There is associated cord signal abnormality extending from T8-T11. There are multiple marrow replacing lesions throughout the lumbar spine and sacrum as described. At L1 there is associated moderate pathologic compression fracture. At L1 there is ventral epidural tumor involvement which contributes to moderate central canal stenosis. At L4 there is severe pathologic compression fracture with associated levocurvature of the lumbar spine. Pt was started on 6mg decadron on admission and continues to take 4mg decadron q6h. Pt seen by othomike which wanted no surgical intervention. Heme/onc and rad onc would rec transfer to [facility] for radiation.     Pt medically optimized for transfer to [facility] radiation oncology service.

## 2020-10-19 NOTE — PROGRESS NOTE ADULT - ATTENDING COMMENTS
This is a 19 year old man with stage IVB Hodgkins Lymphoma with findings concerning for cord compresson  -radiation oncology on board, plan to take him to [facility] for radiation therapy  -subsequent to this, plan for further therapy for systemic disease  -discussed with patient at length at the bedside

## 2020-10-19 NOTE — H&P ADULT - PROBLEM SELECTOR PLAN 5
- Chronic, mildly microcytic anemia, iron studies with nl iron, elevated ferritin, suspect AOCD in setting of lymphoma, will monitor for now

## 2020-10-19 NOTE — DIETITIAN INITIAL EVALUATION ADULT. - ORAL INTAKE PTA/DIET HISTORY
Pt reports good appetite and PO intake at home, denies changes in PO intake. Reports allergy to squash, states it causes itchy throat. Pt reports not following any type of diet or restriction at home but does not consume beef or pork. Pt reports not taking any vitamins or nutritional supplements PTA.

## 2020-10-19 NOTE — DIETITIAN INITIAL EVALUATION ADULT. - PROBLEM SELECTOR PLAN 1
hx of hodgkin lymphoma w/ severe back pain, denies weakness, bladder/bowel dysfunction, saddle anesthesia  - strict bedrest for now  - neuro checks Q4H  - MRI C/T/L spine w/wo IVC, however, pt refusing 2/2 to claustrophobia, states he needs to be "put out"  - patient prefers tylenol for his pain, tylenol PRN  - ortho following  - d/w anesthesia in AM possible MRI sedation options

## 2020-10-19 NOTE — H&P ADULT - HISTORY OF PRESENT ILLNESS
This is a 19M with history of Stage 4 Hodgkin's Lymphoma (Dx 9/2018, s/p 3 lines of therapy but limited 2/2 patient nonadherence/reluctance to continue treatment) who initially presented to [facility] with complaints of severe hip and back pain with LE weakness (2/2 pain as per patient). Was transferred to [facility] where work up revealed him to have extensive bony disease with multiple vertebral compression fractures and tumor invasion into the spinal canal (evaled by orthopedics, determined to not need surgical intervention at this time). He had a bone scan that showed a few active lesions in his pelvis and spine and was evaluated by rad onc who recommended he be transferred to [facility] for RT therapy. Currently patient said that he still has some pain but it has improved significantly. Denies any LE weakness. Denies any urinary/fecal incontinence/retention, denies any saddle anesthesia. Was initially febrile with significant leukocytosis but denied any leukocytosis, was started on levofloxacin and now has improving leukocytosis. Still has the rash on his b/l LEs but states its better controlled now. Denies any other significant complaints.     On arrival to [facility], his vitals were T 98.0, P 76, BP 98/58, RR 17, O2 sat 100% RA. His lab work at [facility] prior to transfer showed improved leukocytosis, persistent anemia, and thrombocytosis. BCx, RVP, COVID-19, and UA were negative at [facility].

## 2020-10-19 NOTE — PHYSICAL THERAPY INITIAL EVALUATION ADULT - PERTINENT HX OF CURRENT PROBLEM, REHAB EVAL
19y M PMHx Hodgkins lymphoma p/w hip and back pain x 3 weeks. Pt is a transfer from [facility] due to concern for caudal equina syndrome. He went to [place] today due to worsening hip and back pain. Hip pain is worsened when bearing weight (standing or walking). Back pain only occurs when lying down on a hard surface. Per EMS, pt has stage 4B non-Hodgkin's lymphoma, with first chemo May 2020, but stopped due to side effects.

## 2020-10-19 NOTE — DISCHARGE NOTE PROVIDER - NSDCMRMEDTOKEN_GEN_ALL_CORE_FT
acetaminophen 325 mg oral tablet: 2 tab(s) orally every 6 hours, As needed, Temp greater or equal to 38C (100.4F), Mild Pain (1 - 3), Moderate Pain (4 - 6)  chlorhexidine 2% topical pad: 1 application topically once a day  dexamethasone: 4 milligram(s) orally every 6 hours  levoFLOXacin: 500 milligram(s) intravenous once a day  pantoprazole 40 mg oral delayed release tablet: 1 tab(s) orally once a day (before a meal)  triamcinolone 0.1% topical ointment: 1 application topically every 12 hours   acetaminophen 325 mg oral tablet: 2 tab(s) orally every 6 hours, As needed, Temp greater or equal to 38C (100.4F), Mild Pain (1 - 3), Moderate Pain (4 - 6)  chlorhexidine 2% topical pad: 1 application topically once a day  dexamethasone: 4 milligram(s) orally every 6 hours  enoxaparin: 40 milligram(s) injectable once a day  levoFLOXacin: 500 milligram(s) intravenous once a day  ondansetron 2 mg/mL injectable solution: 4 milligram(s) injectable every 6 hours  pantoprazole 40 mg oral delayed release tablet: 1 tab(s) orally once a day (before a meal)  triamcinolone 0.1% topical ointment: 1 application topically every 12 hours

## 2020-10-19 NOTE — PHYSICAL THERAPY INITIAL EVALUATION ADULT - PRECAUTIONS/LIMITATIONS, REHAB EVAL
Per pt, he had XR pelvis and CT scan as well. Pt is not able to ambulate 2/2 back and hip pain, and leg weakness. Pt endorses PATRICK. CT scan findings of L1 pathologic fx w/ severe canal compression/ cord compression and L4 pathologic fx, and metastatic disease. L acetabulum lytic lesion and sclerotic lesion of R femur, likely metastatic disease. Recommend partial weight bearing LLE with crutch/walker. Recommend Bone scan. Recommend Chemo/Radiation. Given his pelvic/lower extremity lesions he is partial weight bearing. Has lesions over T2, T4, T6, T9, L1, L4. Same ortho note: "Mobilize with PT as able based on TTWB restrictions." MRI C/T/L spine w/wo IVC, however, pt refusing 2/2 to claustrophobia, states he needs to be "put out"

## 2020-10-19 NOTE — H&P ADULT - PROBLEM SELECTOR PLAN 6
DVT ppx - Lovenox, dose adjusted 2/2 low weight/BMI  Activity - LLE Partial weight bearing with walker as per ortho recs at [facility], PT eval  Diet - regular diet, ensure shake supplementation DVT ppx - Lovenox, dose adjusted 2/2 low weight/BMI  Activity - Toe touch weight bearing with walker as per ortho recs at [facility], PT eval  Diet - regular diet, ensure shake supplementation

## 2020-10-19 NOTE — H&P ADULT - NSHPREVIEWOFSYSTEMS_GEN_ALL_CORE
REVIEW OF SYSTEMS:    CONSTITUTIONAL: +Weight loss (over 7 months), No changes in appetite, No weakness, fevers or chills  EYES: No visual changes or eye discharge  ENT: No rhinorrhea or sore throat  NECK: No pain or stiffness  RESPIRATORY: No cough, wheezing, hemoptysis; No shortness of breath  CARDIOVASCULAR: No chest pain or palpitations; No lower extremity edema  GASTROINTESTINAL: No abdominal or epigastric pain. No nausea, vomiting, or hematemesis; No diarrhea or constipation. No melena or hematochezia.  BACK: +lower back pain, +L shoulder blade pain, +b/l hip pain  GENITOURINARY: No dysuria, frequency or hematuria  NEUROLOGICAL: No numbness or weakness  SKIN: +pruritic rash on b/l LE and L neck to L back, no new rash/ulcers

## 2020-10-19 NOTE — DIETITIAN INITIAL EVALUATION ADULT. - PHYSCIAL ASSESSMENT
Performed nutrition focused physical exam with pt's consent and noted results as below/underweight/other (specify) Skin: no noted pressure injuries as per documentation.

## 2020-10-19 NOTE — H&P ADULT - PROBLEM SELECTOR PLAN 3
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

## 2020-10-19 NOTE — CONSULT NOTE ADULT - ATTENDING COMMENTS
Agree with above. Neuro intact. Pain mostly in hips. Given the extent of retropulsion at L1 associated with pathologic fracture I would like to have a MRI of the lumbar spine. Patient is amenable to this.
I have seen and evaluated the patient. I agree with findings and plan as discussed.
Agree with above. CT CAP ([place], 10/16/20) showing a permeative lesion involving the anterior column of the left acetabulum with focal cortical erosion/permeation, consistent with metastatic lymphoma.     Recommend:  PWB LLE with crutches/walker  Chemo per med/heme onc  XRT consult for left acetabular lesion for local control  No acute orthopaedic surgical intervention  FU Bone scan
Patient interviewed/examined.  Agree with history, ROS, PE, A/P as above.    Consult Rad-Onc first thing in AM.  If Rad-Onc agreeable, arrange transfer to [facility].  Continue steroids with GI prophylaxis.    Systemic therapy options to be determined but RT takes precedence.    D/W Dr. Emery.    Roddy Messina MD (Weekend Coverage)  [phone]

## 2020-10-19 NOTE — PHYSICAL THERAPY INITIAL EVALUATION ADULT - ASR WT BEARING STATUS EVAL
WB to be clarified- orders and notes are inconsistent between lower extremities and amount of weight allowed. Will wait for results of bone scan as well before clarifying with ortho.,

## 2020-10-19 NOTE — PROGRESS NOTE ADULT - REASON FOR ADMISSION
hip and back pain, transfer from [place]
hip and back pain, transfer from [place]
hip and back pain, transfer from [place]
hip and back pain, transfer from [place]
hip and back pain, transfer from [place]
hip and back pain, transfer from [place]

## 2020-10-19 NOTE — H&P ADULT - NSHPPHYSICALEXAM_GEN_ALL_CORE
Vital Signs Last 24 Hrs  T(C): 36.7 (19 Oct 2020 21:31), Max: 36.7 (19 Oct 2020 11:30)  T(F): 98 (19 Oct 2020 21:31), Max: 98 (19 Oct 2020 11:30)  HR: 76 (19 Oct 2020 21:31) (76 - 80)  BP: 98/58 (19 Oct 2020 21:31) (95/60 - 125/76)  BP(mean): --  RR: 17 (19 Oct 2020 21:31) (17 - 18)  SpO2: 100% (19 Oct 2020 21:31) (100% - 100%)    GENERAL: No acute distress, well-developed, cachectic appearing  ENT: EOMI, PERRL, conjunctiva and sclera clear, Neck supple, No JVD, moist mucosa  CHEST/LUNG: Clear to auscultation bilaterally; No wheeze, equal breath sounds bilaterally   BACK: No spinal tenderness on palpation, no CVA tenderness  HEART: Regular rate and rhythm; No murmurs, rubs, or gallops  ABDOMEN: Soft, Nontender, Nondistended; Bowel sounds present  EXTREMITIES:  No clubbing, cyanosis, or edema  PSYCH: Nl behavior, nl affect  NEUROLOGY: AAOx3, non-focal, cranial nerves intact  SKIN: Multiple hypopigmented lesions with surrounding hyperpigmented rims on b/l LE and L neck to L upper back, no other ulcerations

## 2020-10-19 NOTE — DIETITIAN INITIAL EVALUATION ADULT. - ADD RECOMMEND
1. Will continue to monitor PO intake, weight, labs, skin, GI status, diet. 2. Encourage PO intake and obtain food preferences (obtained at this time). 3. Provided education on increased kcal and protein needs to help prevent further weight loss - made aware RD remains available. 4. Malnutrition/BMI <19 notification placed in chart - spoke to Team 5.

## 2020-10-19 NOTE — DIETITIAN INITIAL EVALUATION ADULT. - NS FNS WEIGHT CHANGE REASON
Pt reports 40 pounds weight loss x 7 months PTA due to "cancer", from 125 to 85 pounds. Weight as per previous RD note (02/21/2020) 95 pounds. Weight as per flow sheets (10/17) 85 pounds - will continue to monitor.

## 2020-10-19 NOTE — DIETITIAN INITIAL EVALUATION ADULT. - CONTINUE CURRENT NUTRITION CARE PLAN
1. Recommend continue regular + no beef/pork diet to allow pt more food options and optimize PO intake (defer fluids to team). Will continue to monitor and adjust as needed (will monitor need for phosphorus restriction). 2. Recommend Mighty Shakes 3xday (600 kcal and 18 g protein) to optimize kcal and protein intake./yes

## 2020-10-19 NOTE — PROGRESS NOTE ADULT - ASSESSMENT
-----------incomplete    Patient is a 18 y/o M w/ a PMHx of Stage IV CHL (Dx 9/2018, s/p multiple lines of therapy which has been c/b nonadherence and patient refusal of treatment) who was transferred to [facility] due to concern for cauda equina syndrome, found to have pathologic compression fractures with associated ventral epidural tumor involvement on imaging, and was admitted to Medicine for further management. Hematology was consulted for further evaluation.     # Pathologic compression fractures  - Patient initially presented with worsening hip and back pain for 3 weeks and was ultimately found to have pathologic compression fractures  - MRI C/T/L spine was notable for extensive left cervical chain lymphadenopathy which appears to have increased since 2/20/2020, multiple marrow replacing lesions throughout the thoracic spine (including T2, T4, and a severe pathologic compression fracture with ventral epidural tumor involvement which contributes to mild central canal stenosis at T9), and multiple marrow replacing lesions throughout the lumbar spine and sacrum (including a moderate pathologic compression fracture and ventral epidural tumor involvement which contributes to moderate central canal stenosis at L1 as well as a severe pathologic compression fracture at L4)  - CT [facility]P obtained at [facility] (on a different MRN) was notable for diffuse lung and bony metastases in addition to the compression fractures noted above  - Appreciate Orthopedic evaluation. It does not appear surgery is required at this time. Continue to follow-up recs  - seen by rad-onc, planned for radiation [facility]  - c/w decadron  - Continue neuro checks  - Appreciate care as per Medicine team    # Stage IV CHL  - Diagnosed in 9/2018. He was initially treated following ABVE-PC which was completed in 12/2018 after 5 cycles due to nonadherence and patient refusal. He then relapsed in 2/2019. He was started on salvage treatment in April 2019 with gemzar/brentuximab which was also stopped due to the patient's refusal and nonadherence. Patient was then recommended to have 2 cycles of ICE with intent to pursue an autologous bone marrow transplant. The patient began the cycle and then left AMA unfortunately and so this was never completed.   - Further management as noted above  - Treatment plan to be determined pending Radiation Oncology evaluation  - c/w CBC WITH DIFF and TLS labs (CMP, Phos, LDH, Uric acid) daily  - Primary Hematologist: Dr. Kalie Emery. Continue outpatient follow-up    # Leukocytosis/Anemia  - Patient noted to have a marked leukocytosis which is neutrophilic predominant and a microcytic anemia. Per chart review, patient has had a chronic leukocytosis since 1/2020 which has been slowly uptrending. His WBC was 27.61 and Hgb was 10.2 on 8/25  - Peripheral smear reviewed: Slightly hypochromic RBCs, No schistocytes seen, Thrombocytosis, Neutrophilic-predominant leukocytosis, PMNs appears grossly unremarkable  - Leukocytosis likely reactive from underlying progressive lymphoma +/- recent steroids  - Check Ferritin, iron studies, Retic count, B12, and Folate  - Monitor CBC WITH diff daily

## 2020-10-19 NOTE — PROGRESS NOTE ADULT - SUBJECTIVE AND OBJECTIVE BOX
PROGRESS NOTE:   Authored by Dr. Yulisa Diaz, BART pager [phone]    Patient is a 19y old  Male who presents with a chief complaint of hip and back pain, transfer from [place] (19 Oct 2020 09:45)      SUBJECTIVE / OVERNIGHT EVENTS:    REVIEW OF SYSTEMS:    CONSTITUTIONAL: No weakness, fevers or chills  EYES/ENT: No visual changes;  No vertigo or throat pain   NECK: No pain or stiffness  BACK: no pain or lesions   RESPIRATORY: No cough, wheezing, hemoptysis; No shortness of breath  CARDIOVASCULAR: No chest pain or palpitations  GASTROINTESTINAL: No abdominal or epigastric pain. No nausea, vomiting, or hematemesis; No diarrhea or constipation. No melena or hematochezia.  GENITOURINARY: No dysuria, frequency or hematuria  NEUROLOGICAL: No numbness or weakness  EXTREMITIES: no varicose veins, no pain in UE and LE  SKIN: No itching, rashes      MEDICATIONS  (STANDING):  chlorhexidine 2% Cloths 1 Application(s) Topical daily  dexAMETHasone  Injectable 4 milliGRAM(s) IV Push every 6 hours  enoxaparin Injectable 40 milliGRAM(s) SubCutaneous daily  hydrocortisone 1% Cream 1 Application(s) Topical daily  influenza   Vaccine 0.5 milliLiter(s) IntraMuscular once  levoFLOXacin IVPB 500 milliGRAM(s) IV Intermittent every 24 hours  levoFLOXacin IVPB      pantoprazole    Tablet 40 milliGRAM(s) Oral before breakfast  petrolatum white Ointment 1 Application(s) Topical two times a day  triamcinolone 0.1% Ointment 1 Application(s) Topical every 12 hours    MEDICATIONS  (PRN):  acetaminophen   Tablet .. 650 milliGRAM(s) Oral every 6 hours PRN Temp greater or equal to 38C (100.4F), Mild Pain (1 - 3), Moderate Pain (4 - 6)  diphenhydrAMINE 25 milliGRAM(s) Oral every 6 hours PRN Rash and/or Itching  ondansetron Injectable 4 milliGRAM(s) IV Push once PRN Nausea      CAPILLARY BLOOD GLUCOSE        I&O's Summary    18 Oct 2020 07:01  -  19 Oct 2020 07:00  --------------------------------------------------------  IN: 1300 mL / OUT: 1601 mL / NET: -301 mL        PHYSICAL EXAM:  Vital Signs Last 24 Hrs  T(C): 36.4 (19 Oct 2020 04:03), Max: 36.8 (18 Oct 2020 20:48)  T(F): 97.5 (19 Oct 2020 04:03), Max: 98.2 (18 Oct 2020 20:48)  HR: 80 (19 Oct 2020 04:03) (80 - 98)  BP: 99/60 (19 Oct 2020 04:03) (95/60 - 104/68)  BP(mean): --  RR: 18 (19 Oct 2020 04:03) (18 - 18)  SpO2: 100% (19 Oct 2020 04:03) (98% - 100%)    GENERAL: No acute distress, well-developed  HEAD:  Atraumatic, Normocephalic  EYES: EOMI, PERRLA, conjunctiva and sclera clear  NECK: Supple, no lymphadenopathy, no JVD  CHEST/LUNG: CTAB; No wheezes, rales, or rhonchi  HEART: Regular rate and rhythm; No murmurs, rubs, or gallops  ABDOMEN: Soft, non-tender, non-distended; normal bowel sounds, no organomegaly  EXTREMITIES:  2+ peripheral pulses b/l, No clubbing, cyanosis, or edema  NEUROLOGY: A&O x 3, no focal deficits  SKIN: No rashes or lesions    LABS:                        8.4    17.85 )-----------( 670      ( 19 Oct 2020 06:25 )             29.8     10-19    140  |  104  |  16  ----------------------------<  191<H>  4.1   |  25  |  0.57    Ca    9.2      19 Oct 2020 06:25  Phos  4.8     10-18  Mg     2.1     10-18    TPro  7.1  /  Alb  2.7<L>  /  TBili  0.3  /  DBili  x   /  AST  31  /  ALT  52<H>  /  AlkPhos  288<H>  10-18          Urinalysis Basic - ( 17 Oct 2020 13:02 )    Color: Light Yellow / Appearance: Clear / S.022 / pH: x  Gluc: x / Ketone: Negative  / Bili: Negative / Urobili: <2 mg/dL   Blood: x / Protein: Trace / Nitrite: Negative   Leuk Esterase: Negative / RBC: 1 /HPF / WBC 1 /HPF   Sq Epi: x / Non Sq Epi: 0 /HPF / Bacteria: Negative        Culture - Blood (collected 17 Oct 2020 15:51)  Source: .Blood Blood-Venous  Preliminary Report (18 Oct 2020 16:01):    No growth to date.    Culture - Blood (collected 17 Oct 2020 15:51)  Source: .Blood Blood-Peripheral  Preliminary Report (18 Oct 2020 16:01):    No growth to date.        RADIOLOGY & ADDITIONAL TESTS:  Results Reviewed:   Imaging Personally Reviewed:  Electrocardiogram Personally Reviewed:    COORDINATION OF CARE:  Care Discussed with Consultants/Other Providers [Y/N]:  Prior or Outpatient Records Reviewed [Y/N]:   PROGRESS NOTE:   Authored by Dr. Yulisa Diaz, BART pager [phone]    Patient is a 19y old  Male who presents with a chief complaint of hip and back pain, transfer from [place] (19 Oct 2020 09:45)      SUBJECTIVE / OVERNIGHT EVENTS: Pt c/o pain in irizarry, unchanged from yesterday. Denies saddle anesthesia, urinary incontinence and loss of bowel function    REVIEW OF SYSTEMS:    CONSTITUTIONAL: No weakness, fevers or chills  EYES/ENT: No visual changes;  No vertigo or throat pain   NECK: No pain or stiffness  BACK: + back pain   RESPIRATORY: No cough, wheezing, hemoptysis; No shortness of breath  CARDIOVASCULAR: No chest pain or palpitations  GASTROINTESTINAL: No abdominal or epigastric pain. No nausea, vomiting, or hematemesis; No diarrhea or constipation. No melena or hematochezia.  GENITOURINARY: No dysuria, frequency or hematuria  NEUROLOGICAL: No numbness or weakness  EXTREMITIES: no varicose veins, no pain in UE and LE  SKIN: No itching, rashes      MEDICATIONS  (STANDING):  chlorhexidine 2% Cloths 1 Application(s) Topical daily  dexAMETHasone  Injectable 4 milliGRAM(s) IV Push every 6 hours  enoxaparin Injectable 40 milliGRAM(s) SubCutaneous daily  hydrocortisone 1% Cream 1 Application(s) Topical daily  influenza   Vaccine 0.5 milliLiter(s) IntraMuscular once  levoFLOXacin IVPB 500 milliGRAM(s) IV Intermittent every 24 hours  levoFLOXacin IVPB      pantoprazole    Tablet 40 milliGRAM(s) Oral before breakfast  petrolatum white Ointment 1 Application(s) Topical two times a day  triamcinolone 0.1% Ointment 1 Application(s) Topical every 12 hours    MEDICATIONS  (PRN):  acetaminophen   Tablet .. 650 milliGRAM(s) Oral every 6 hours PRN Temp greater or equal to 38C (100.4F), Mild Pain (1 - 3), Moderate Pain (4 - 6)  diphenhydrAMINE 25 milliGRAM(s) Oral every 6 hours PRN Rash and/or Itching  ondansetron Injectable 4 milliGRAM(s) IV Push once PRN Nausea      CAPILLARY BLOOD GLUCOSE        I&O's Summary    18 Oct 2020 07:01  -  19 Oct 2020 07:00  --------------------------------------------------------  IN: 1300 mL / OUT: 1601 mL / NET: -301 mL        PHYSICAL EXAM:  Vital Signs Last 24 Hrs  T(C): 36.4 (19 Oct 2020 04:03), Max: 36.8 (18 Oct 2020 20:48)  T(F): 97.5 (19 Oct 2020 04:03), Max: 98.2 (18 Oct 2020 20:48)  HR: 80 (19 Oct 2020 04:03) (80 - 98)  BP: 99/60 (19 Oct 2020 04:03) (95/60 - 104/68)  BP(mean): --  RR: 18 (19 Oct 2020 04:03) (18 - 18)  SpO2: 100% (19 Oct 2020 04:03) (98% - 100%)    GENERAL: No acute distress, cachexic   HEAD:  Atraumatic, Normocephalic  EYES: EOMI, PERRLA, conjunctiva and sclera clear  BACK: pain in lower back, tender to palpation   NECK: Supple, severe lymphadenopathy, no JVD  CHEST/LUNG: absent lung sounds in lower lungs b/l   HEART: Regular rate and rhythm; No murmurs, rubs, or gallops  ABDOMEN: Soft, non-tender, non-distended; normal bowel sounds, no organomegaly  EXTREMITIES:  2+ peripheral pulses b/l, No clubbing, cyanosis, or edema  NEUROLOGY: A&O x 3, no focal deficits  SKIN: scratch marks and round marks non raised on legs     LABS:                        8.4    17.85 )-----------( 670      ( 19 Oct 2020 06:25 )             29.8     10-19    140  |  104  |  16  ----------------------------<  191<H>  4.1   |  25  |  0.57    Ca    9.2      19 Oct 2020 06:25  Phos  4.8     10-18  Mg     2.1     10-18    TPro  7.1  /  Alb  2.7<L>  /  TBili  0.3  /  DBili  x   /  AST  31  /  ALT  52<H>  /  AlkPhos  288<H>  10-18          Urinalysis Basic - ( 17 Oct 2020 13:02 )    Color: Light Yellow / Appearance: Clear / S.022 / pH: x  Gluc: x / Ketone: Negative  / Bili: Negative / Urobili: <2 mg/dL   Blood: x / Protein: Trace / Nitrite: Negative   Leuk Esterase: Negative / RBC: 1 /HPF / WBC 1 /HPF   Sq Epi: x / Non Sq Epi: 0 /HPF / Bacteria: Negative        Culture - Blood (collected 17 Oct 2020 15:51)  Source: .Blood Blood-Venous  Preliminary Report (18 Oct 2020 16:01):    No growth to date.    Culture - Blood (collected 17 Oct 2020 15:51)  Source: .Blood Blood-Peripheral  Preliminary Report (18 Oct 2020 16:01):    No growth to date.        RADIOLOGY & ADDITIONAL TESTS:  Results Reviewed:   Imaging Personally Reviewed:  Electrocardiogram Personally Reviewed:    COORDINATION OF CARE:  Care Discussed with Consultants/Other Providers [Y/N]:  Prior or Outpatient Records Reviewed [Y/N]:

## 2020-10-19 NOTE — PROGRESS NOTE ADULT - PROBLEM SELECTOR PLAN 3
hx of Hodgkin lymphoma, stage 4, follows w/ Dr. Emery, s/p ABVE-PC and ICE, however pt has history of non-compliance  - oncology team aware and will see patient today, will f/u with recs hx of Hodgkin lymphoma, stage 4, follows w/ Dr. Emery, s/p ABVE-PC and ICE, however pt has history of non-compliance  - oncology team aware and will see patient today, will f/u with recs  - Bone scan 10/19 abnormal hx of Hodgkin lymphoma, stage 4, follows w/ Dr. Emery, s/p ABVE-PC and ICE, however pt has history of non-compliance  - oncology team aware, will f/u with recs  - Bone scan 10/19 abnormal

## 2020-10-19 NOTE — H&P ADULT - PROBLEM SELECTOR PLAN 1
- Plan for RT, would consult rad onc in AM  - Heme consult in AM to evaluate for further therapy  - c/w neuro checks q4h, c/w decadron  - Monitor CBC with diff and TLS labs (CMP, Phos, LDH, Uric Acid)

## 2020-10-19 NOTE — CONSULT NOTE ADULT - REASON FOR ADMISSION
hip and back pain, transfer from [place]
hip and back pain, transfer from [place]
hip and back pain, transfer from [place]
hip and back pain, transfer from [place]
hip and back pain, transfer from [place]

## 2020-10-19 NOTE — PROGRESS NOTE ADULT - SUBJECTIVE AND OBJECTIVE BOX
CHAU ARZOLA  MRN-[id number]    Interval hx:      Subjective:    Review of System (ROS)  Constitutional:  No Weight Change, No Fever, No Chills, No Night Sweats, No Fatigue, No Malaise  ENT/Mouth:  No Hearing Changes, No Ear Pain, No Nasal Congestion, No Sinus Pain, No Hoarseness, No sore throat, No Rhinorrhea, No Swallowing Difficulty  Eyes:  No Eye Pain, No Swelling, No Redness, No Foreign Body, No Discharge, No Vision Changes  Cardiovascular:  No Chest Pain, No SOB, No PND, No Dyspnea on Exertion, No Orthopnea, No Claudication, No Edema, No Palpitations  Respiratory:  No Cough, No Sputum, No Wheezing, No Smoke Exposure, No Dyspnea  Gastrointestinal:  No Nausea, No Vomiting, No Diarrhea, No Constipation, No Pain, No Heartburn, No Anorexia, No Dysphagia, No Hematochezia, No Melena, No Flatulence, No Jaundice  Genitourinary:  No Dysmenorrhea, No DUB, No Dyspareunia, No Dysuria, No Urinary Frequency, No Hematuria, No Urinary Incontinence, No Urgency, No Flank Pain, No Urinary Flow Changes, No Hesitancy  Musculoskeletal:  No Arthralgias, No Myalgias, No Joint Swelling, No Joint Stiffness, No Back Pain, No Neck Pain, No Injury History  Skin:  No Skin Lesions, No Pruritis, No Hair Changes, No Breast/Skin Changes, No Nipple Discharge  Neuro:  No Weakness, No Numbness, No Paresthesias, No Loss of Consciousness, No Syncope, No Dizziness, No Headache, No Coordination Changes, No Recent Falls  Psych:  No Anxiety/Panic, No Depression, No Insomnia, No Personality Changes, No Delusions, No Rumination, No SI/HI/AH/VH, No Social Issues, No Memory Changes, No Violence/Abuse Hx., No Eating Concerns  Heme/Lymph:  No Bruising, No Bleeding, No Transfusions History, No Lymphadenopathy  Endocrine:  No Polyuria, No Polydipsia, No Temperature Intolerance    MEDICATIONS  (STANDING):  chlorhexidine 2% Cloths 1 Application(s) Topical daily  dexAMETHasone  Injectable 4 milliGRAM(s) IV Push every 6 hours  enoxaparin Injectable 40 milliGRAM(s) SubCutaneous daily  hydrocortisone 1% Cream 1 Application(s) Topical daily  levoFLOXacin IVPB 500 milliGRAM(s) IV Intermittent every 24 hours  levoFLOXacin IVPB      pantoprazole    Tablet 40 milliGRAM(s) Oral before breakfast  triamcinolone 0.1% Ointment 1 Application(s) Topical every 12 hours    MEDICATIONS  (PRN):  acetaminophen   Tablet .. 650 milliGRAM(s) Oral every 6 hours PRN Temp greater or equal to 38C (100.4F), Mild Pain (1 - 3), Moderate Pain (4 - 6)  diphenhydrAMINE 25 milliGRAM(s) Oral every 6 hours PRN Rash and/or Itching  ondansetron Injectable 4 milliGRAM(s) IV Push once PRN Nausea      Allergies    IV Contrast (Vomiting)  Rocephin (Pruritus)  Squash (Other)    Intolerances        Objectives:  Vital Signs Last 24 Hrs  T(C): 36.7 (19 Oct 2020 11:30), Max: 36.8 (18 Oct 2020 20:48)  T(F): 98 (19 Oct 2020 11:30), Max: 98.2 (18 Oct 2020 20:48)  HR: 79 (19 Oct 2020 11:30) (79 - 91)  BP: 125/76 (19 Oct 2020 11:30) (95/60 - 125/76)  BP(mean): --  RR: 18 (19 Oct 2020 11:30) (18 - 18)  SpO2: 100% (19 Oct 2020 04:03) (98% - 100%)    Physical exam  General - NAD, alert and oriented  HEENT - PERRLA, EOM intact, sclera and conjunctiva clear, oropharynx, nares clear  Neck - Supple, no thyromegaly or thyroid nodules, no bruits  Cardiovascular - RRR no m/r/g, no JVD, no carotid bruits  Lungs - CTAB, no use of acessory muscles, no w/c/r  Abdomen - Normal bowel sounds, NT/ND  Genito Urinary –   Lymph Nodes - No LAD  Extremeties - No e/c/c  Skin - No rashes, skin warm and dry, no erythematous areas  Musculo Skeletal - 5/5 strength, normal range of motion, no swollen or erythematous joints.  Neurological – Alert and oriented x 3, CN 2-12 grossly intact.        10-18-20 @ 07:01  -  10-19-20 @ 07:00  --------------------------------------------------------  IN: 1300 mL / OUT: 1601 mL / NET: -301 mL    10-19-20 @ 07:01  -  10-19-20 @ 15:37  --------------------------------------------------------  IN: 240 mL / OUT: 500 mL / NET: -260 mL        Labs:    CBC Full  -  ( 19 Oct 2020 06:25 )  WBC Count : 17.85 K/uL  RBC Count : 3.98 M/uL  Hemoglobin : 8.4 g/dL  Hematocrit : 29.8 %  Platelet Count - Automated : 670 K/uL  Mean Cell Volume : 74.9 fl  Mean Cell Hemoglobin : 21.1 pg  Mean Cell Hemoglobin Concentration : 28.2 gm/dL  Auto Neutrophil # : 15.17 K/uL  Auto Lymphocyte # : 0.82 K/uL  Auto Monocyte # : 0.55 K/uL  Auto Eosinophil # : 0.01 K/uL  Auto Basophil # : 0.06 K/uL  Auto Neutrophil % : 85.0 %  Auto Lymphocyte % : 4.6 %  Auto Monocyte % : 3.1 %  Auto Eosinophil % : 0.1 %  Auto Basophil % : 0.3 %        10-19    140  |  104  |  16  ----------------------------<  191<H>  4.1   |  25  |  0.57    Ca    9.2      19 Oct 2020 06:25  Phos  4.8     10-18  Mg     2.1     10-18    TPro  7.1  /  Alb  2.7<L>  /  TBili  0.3  /  DBili  x   /  AST  31  /  ALT  52<H>  /  AlkPhos  288<H>  10-18    LIVER FUNCTIONS - ( 18 Oct 2020 07:04 )  Alb: 2.7 g/dL / Pro: 7.1 g/dL / ALK PHOS: 288 U/L / ALT: 52 U/L / AST: 31 U/L / GGT: x                   Culture - Blood (collected 17 Oct 2020 15:51)  Source: .Blood Blood-Venous  Preliminary Report (18 Oct 2020 16:01):    No growth to date.    Culture - Blood (collected 17 Oct 2020 15:51)  Source: .Blood Blood-Peripheral  Preliminary Report (18 Oct 2020 16:01):    No growth to date.        Imagings:

## 2020-10-19 NOTE — PROGRESS NOTE ADULT - PROBLEM SELECTOR PLAN 4
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

## 2020-10-19 NOTE — DIETITIAN INITIAL EVALUATION ADULT. - OTHER INFO
Pt reports good appetite and PO intake in house. Noted 100% PO intake as per flow sheets. Offered nutritional supplement - pt agreed only to Mighty Shakes. Denies difficulty chewing/swallowing. Pt denies nausea, vomiting, diarrhea, or constipation, last BM yesterday (10/18).     Provided education on increased kcal and protein needs to help prevent further weight loss; recommended consuming nutrient-dense snacks between meals and adequate protein intake; reviewed foods with protein, nutrient-dense snacks, and menu order procedures in hospital. Pt denies having further questions/concerns about diet and nutrition - made aware RD remains available.

## 2020-10-19 NOTE — PROGRESS NOTE ADULT - PROBLEM SELECTOR PLAN 1
hx of hodgkin lymphoma w/ severe back pain, denies weakness, bladder/bowel dysfunction, saddle anesthesia  - strict bedrest for now  - neuro checks Q4H  - MRI C/T/L spine w/wo IVC, however, pt refusing 2/2 to claustrophobia, states he needs to be "put out"  - patient prefers tylenol for his pain, tylenol PRN  - ortho following  - MRI showing lumbar spinal fractures and epidural tumor involvement, retroperitoneal lymphadenopathy and multiple nodules in the lung  - ortho aware  - decadron 4mg q6 hx of hodgkin lymphoma w/ severe back pain, denies weakness, bladder/bowel dysfunction, saddle anesthesia  - strict bedrest for now  - neuro checks Q4H  - MRI C/T/L spine w/wo IVC, however, pt refusing 2/2 to claustrophobia, states he needs to be "put out"  - patient prefers tylenol for his pain, tylenol PRN  - ortho following  - MRI showing lumbar spinal fractures and epidural tumor involvement, retroperitoneal lymphadenopathy and multiple nodules in the lung  - ortho aware  - decadron 4mg q6  - rad onc wants transfer for inpatient radiation therapy hx of hodgkin lymphoma w/ severe back pain, denies weakness, bladder/bowel dysfunction, saddle anesthesia  - strict bedrest for now  - neuro checks Q4H  - MRI C/T/L spine w/wo IVC, however, pt refusing 2/2 to claustrophobia, states he needs to be "put out"  - patient prefers tylenol for his pain, tylenol PRN  - ortho following  - MRI showing lumbar spinal fractures and epidural tumor involvement, retroperitoneal lymphadenopathy and multiple nodules in the lung  - ortho aware  - decadron 4mg q6  - rad onc wants transfer for inpatient radiation therapy. Will transfer to [facility] today.

## 2020-10-19 NOTE — CONSULT NOTE ADULT - SUBJECTIVE AND OBJECTIVE BOX
HPI:  19M hx Hodgkins lymphoma p/w hip and back pain x 3 weeks. Pt is a transfer from [facility] due to concern for caudal equina syndrome. He went to [facility] today due to worsening back pain. Back pain is entire back, worse in the lower part of back, worse when lying down on a hard surface. Hip pain worsened with bearing weight. Pt has history of metastatic stage 4B non-Hodgkin's lymphoma, diagnosed in 2017 s/p port placement, follows w/ Dr. Emery was on ICE chemotherapy, but stopped months ago due to side effect of body swelling and patient is not fully compliant w/ visits. Pt was febrile in ED, started on Rocephin but had reaction (itching) and D/C. Also given PO Motrin and Benadryl, 1L fluids. CT scan showing possible cord compression. Pt is not able to ambulate 2/2 back and hip pain, and leg weakness. Pt endorses PATRICK. Denies incontinence, numbness. Further denies any fall. Allergic to IV contrast. (17 Oct 2020 04:02)      EXAM:  MR SPINE CERVICAL/THORACIC/LUMBAR                        PROCEDURE DATE:  10/17/2020      FINDINGS:    Cervical Spine: Images are degraded by motion artifact. There is straightening of the usual cervical lordosis. The vertebral body heights and alignment are maintained. There is no focal STIR hyperintense marrow replacing lesion throughout the cervical spine. There is no significant disc bulge or herniation. There is no significant cervical spinal canal stenosis. There is no significant cervical spinal cord signal abnormality. There is extensive left cervical chain lymphadenopathy which appears to have moderately increased since 2/20/2020.    Thoracic Spine:  There are multiple STIR hyperintense marrow replacing lesions throughout the thoracic spine.    At T2 there is an extensive marrow replacing lesion involving both the anterior and posterior elements. There is left ventrolateral epidural tumor involvement which indents the left ventrolateral aspect of the cord and contributes to moderate central canal stenosis. There is no underlying cord signal abnormality. There is tumor involvement of the left T2-T3 neural foramen contributing to severe stenosis. There is partial extra osseous extension into the left ventrolateral paraspinal soft tissues.    There is an extensive marrow replacing lesion involving the vertebral body at T4. There is no definite epidural tumor involvement. There is mild loss of vertebral body height which may suggest underlying mild pathologic compression fracture.    There is an extensive marrow replacing lesion at T9 involving the anterior and posterior elements. There is associated severe loss of vertebral body height consistent with pathologic compression fracture.. There is ventral epidural tumor involvement which indents the ventral cord and contributes to mild central canal stenosis. There is associated underlying cord signal abnormality centrally within the cord extending from T8 to T11. There is neural foraminal tumor involvement bilaterally at T9-T10 which contributes to severe bilateral neural foraminal stenosis. There is prominent tumor involvement of the ventral paraspinal soft tissues.    Normal thoracic kyphosis is maintained. The vertebral body heights is maintained throughout the remaining thoracic spine.    Multiple bilateral lung masses are seen right greater than left.    Lumbar Spine:  There are multiple marrow replacing lesions throughout the lumbar spine and sacrum.    There is an extensive marrow replacing lesion involving the anterior and posterior elements at L1. There is associated moderate severe loss of vertebral body height consistent with a pathologic compression fracture. There is ventral epidural tumor involvement which contributes to moderate central canal stenosis and crowding of the nerve roots. There is neural foraminal tumor involvement at L1-L2 which contributes to moderate bilateral neural foraminal stenosis. There is partial tumor involvement of the ventral paraspinal soft tissues.    There is an extensive marrow replacing lesion involving the anterior and posterior elements at L4. There is associated right-sided severe loss of vertebral body height consistent with pathologic compression fracture. There is associated levocurvature of the lumbar spine. There is no definiteepidural tumor involvement however it cannot entirely be excluded. There is large right ventrolateral paraspinal soft tissue tumor involvement which ventrally displaces the inferior vena cava. There is partial tumor involvement of the right neural foramina at L3-L4 and L4-L5 which are moderately stenosed.    There is straightening of the usual lumbar lordosis. The vertebral body heights and alignment are maintained throughout the remaining lumbar spine. There is no significant disc bulge or herniation.  A normal-appearing conus medullaris terminates at T12-L1.    There is retroperitoneal lymphadenopathy.      IMPRESSION:  MR Cervical spine: There are no marrow replacing lesions throughout the cervical spine. There is no high-grade central canal stenosis. There is extensive left cervical chain lymphadenopathy which appears to have increased since 2/20/2020.    MR thoracic spine: There are multiple marrow replacing lesions throughout the thoracic spine.  At T2 there is epidural tumor involvement which contributes to moderate central canal stenosis without underlying cord signal abnormality. At T4 there is a possible mild pathologic compression fracture. At T9 there is a severe pathologic compression fracture. At T9 there is ventral epidural tumor involvement which contributes to mild central canal stenosis. There is associated cord signal abnormality extending from T8-T11.    MR lumbar spine: There are multiple marrow replacing lesions throughout the lumbar spine and sacrum as described. At L1 there is associated moderate pathologic compression fracture. At L1 there is ventral epidural tumor involvement which contributes to moderate central canal stenosis. At L4 there is severe pathologic compression fracture with associated levocurvature of the lumbar spine.    Allergies    IV Contrast (Vomiting)  Rocephin (Pruritus)  Squash (Other)    Intolerances        ROS: [  ] Fever  [  ] Chills  [  ]Chest Pain [  ] SOB  [  ]Cough [  ] N/V  [  ] Diarrhea [  ]Constipation [  ]Other ROS:  [  ] ROS otherwise negative    PAST MEDICAL & SURGICAL HISTORY:  Hodgkins lymphoma in relapse    No pertinent past medical history    NHL (non-Hodgkin&#x27;s lymphoma)    No pertinent past medical history    No significant past surgical history        FAMILY HISTORY:  No pertinent family history in first degree relatives        MEDICATIONS  (STANDING):  chlorhexidine 2% Cloths 1 Application(s) Topical daily  dexAMETHasone  Injectable 4 milliGRAM(s) IV Push every 6 hours  enoxaparin Injectable 40 milliGRAM(s) SubCutaneous daily  hydrocortisone 1% Cream 1 Application(s) Topical daily  levoFLOXacin IVPB 500 milliGRAM(s) IV Intermittent every 24 hours  levoFLOXacin IVPB      pantoprazole    Tablet 40 milliGRAM(s) Oral before breakfast  triamcinolone 0.1% Ointment 1 Application(s) Topical every 12 hours    MEDICATIONS  (PRN):  acetaminophen   Tablet .. 650 milliGRAM(s) Oral every 6 hours PRN Temp greater or equal to 38C (100.4F), Mild Pain (1 - 3), Moderate Pain (4 - 6)  diphenhydrAMINE 25 milliGRAM(s) Oral every 6 hours PRN Rash and/or Itching  ondansetron Injectable 4 milliGRAM(s) IV Push once PRN Nausea      PHYSICAL EXAM  Vital Signs Last 24 Hrs  T(C): 36.4 (19 Oct 2020 04:03), Max: 36.8 (18 Oct 2020 20:48)  T(F): 97.5 (19 Oct 2020 04:03), Max: 98.2 (18 Oct 2020 20:48)  HR: 80 (19 Oct 2020 04:03) (80 - 98)  BP: 99/60 (19 Oct 2020 04:03) (95/60 - 104/68)  BP(mean): --  RR: 18 (19 Oct 2020 04:03) (18 - 18)  SpO2: 100% (19 Oct 2020 04:03) (98% - 100%)    General: Well nourished, well developed, no acute distress  HEENT: NC/AT; EOMI, PERRL, sclera nonicteric; external ears normal; no rhinorrhea or epistaxis; mucous membranes moist; oropharynx clear and without erythema  CV: NR, RR; no appreciable r/m/g  Lungs: CTAB, no increased work of breathing  Abdomen: Bowel sounds present; soft, NTND  MSK: Vertebral spine non-tender to palpation  Neuro: AAOx3; cranial nerves II-XII intact; strength 5/5 in upper and lower extremities; sensation to light touch in tact bilaterally.  Psych: Full affect; mood congruent  Skin: no visible rashes on limited examination    IMAGING/LABS/PATHOLOGY: I have personally reviewed the relevant labs, pathology, and imaging as noted in the HPI.  In addition,                          8.4    17.85 )-----------( 670      ( 19 Oct 2020 06:25 )             29.8     10-19    140  |  104  |  16  ----------------------------<  191<H>  4.1   |  25  |  0.57    Ca    9.2      19 Oct 2020 06:25  Phos  4.8     10-18  Mg     2.1     10-18    TPro  7.1  /  Alb  2.7<L>  /  TBili  0.3  /  DBili  x   /  AST  31  /  ALT  52<H>  /  AlkPhos  288<H>  10-18          ASSESSMENT/PLAN    CHAU ARZOLA is a 19y man with     We discussed the use of palliative radiation in this setting, namely to improve quality of life through the reduction of symptoms.  We talked about the risks, benefits, acute and long term side effects, as well as expected treatment outcomes.  He/She was given the opportunity to ask questions, which were answered to his/her apparent satisfaction.  He/She provided written consent to proceed with radiation therapy. We will arrange for inpatient/outpatient treatment.

## 2020-10-19 NOTE — CONSULT NOTE ADULT - SUBJECTIVE AND OBJECTIVE BOX
HPI:  19M hx Hodgkins lymphoma p/w hip and back pain x 3 weeks. Patient was diagnosed with stage IV CHL in 9/2018. He was initially treated following ABVE-PC which was completed in 12/2018 after 5 cycles due to nonadherence and patient refusal. He then relapsed in 2/2019. He was started on salvage treatment in April 2019 with gemzar/brentuximab which was also stopped due to the patient's refusal and nonadherence. Patient was then recommended to have 2 cycles of ICE with intent to pursue an autologous bone marrow transplant. The patient began the cycle and then left AMA unfortunately and so this was never completed. Patient follows with Dr. Kalie Emery as an outpatient. He was last seen in clinic on 8/25/20. An extensive discussion regarding the need for treatment and the need for him to adhere to therapy took place; however, patient continued to not want to pursue further chemotherapy despite the discussion of death. Pt is a transfer from [facility] due to concern for caudal equina syndrome. He went to [place]  due to worsening back pain. Back pain is entire back, worse in the lower part of back, worse when lying down on a hard surface. Hip pain worsened with bearing weight. Pt has history of metastatic stage 4B non-Hodgkin's lymphoma, diagnosed in 2017 s/p port placement, follows w/ Dr. Emery was on ICE chemotherapy, but stopped months ago due to side effect of body swelling and patient is not fully compliant w/ visits. Pt was febrile in ED, started on Rocephin but had reaction (itching) and D/C. Also given PO Motrin and Benadryl, 1L fluids. CT scan showing possible cord compression. Pt is not able to ambulate 2/2 back and hip pain, and leg weakness. Pt endorses PATRICK. Denies incontinence, numbness. Further denies any fall. Allergic to IV contrast. (17 Oct 2020 04:02)      EXAM:  MR SPINE CERVICAL/THORACIC/LUMBAR                          FINDINGS:    Cervical Spine: Images are degraded by motion artifact. There is straightening of the usual cervical lordosis. The vertebral body heights and alignment are maintained. There is no focal STIR hyperintense marrow replacing lesion throughout the cervical spine. There is no significant disc bulge or herniation. There is no significant cervical spinal canal stenosis. There is no significant cervical spinal cord signal abnormality. There is extensive left cervical chain lymphadenopathy which appears to have moderately increased since 2/20/2020.    Thoracic Spine:  There are multiple STIR hyperintense marrow replacing lesions throughout the thoracic spine.    At T2 there is an extensive marrow replacing lesion involving both the anterior and posterior elements. There is left ventrolateral epidural tumor involvement which indents the left ventrolateral aspect of the cord and contributes to moderate central canal stenosis. There is no underlying cord signal abnormality. There is tumor involvement of the left T2-T3 neural foramen contributing to severe stenosis. There is partial extra osseous extension into the left ventrolateral paraspinal soft tissues.    There is an extensive marrow replacing lesion involving the vertebral body at T4. There is no definite epidural tumor involvement. There is mild loss of vertebral body height which may suggest underlying mild pathologic compression fracture.    There is an extensivemarrow replacing lesion at T9 involving the anterior and posterior elements. There is associated severe loss of vertebral body height consistent with pathologic compression fracture.. There is ventral epidural tumor involvement which indents the ventral cord and contributes to mild central canal stenosis. There is associated underlying cord signal abnormality centrally within the cord extending from T8 to T11. There is neural foraminal tumor involvement bilaterally at T9-T10 which contributes to severe bilateral neural foraminal stenosis. There is prominent tumor involvement of the ventral paraspinal soft tissues.    Normal thoracic kyphosis is maintained. The vertebral body heights is maintained throughout the remaining thoracic spine.    Multiple bilateral lung masses are seen right greater than left.    Lumbar Spine:  There are multiple marrow replacing lesions throughout the lumbar spine and sacrum.  There is an extensive marrow replacing lesion involving the anterior and posterior elements at L1. There is associated moderate severe loss of vertebral body height consistent with a pathologic compression fracture. There is ventral epidural tumor involvement which contributes to moderate central canal stenosis and crowding of the nerve roots. There is neural foraminal tumor involvement at L1-L2 which contributes to moderate bilateral neural foraminal stenosis. There is partial tumor involvement of the ventral paraspinal soft tissues.  There is an extensive marrow replacing lesion involving the anterior and posterior elements at L4. There is associated right-sided severe loss of vertebral body height consistent with pathologic compression fracture. There is associated levocurvature of the lumbar spine. There is no definiteepidural tumor involvement however it cannot entirely be excluded. There is large right ventrolateral paraspinal soft tissue tumor involvement which ventrally displaces the inferior vena cava. There is partial tumor involvement of the right neural foramina at L3-L4 and L4-L5 which are moderately stenosed.  There is straightening of the usual lumbar lordosis. The vertebral body heights and alignment are maintained throughout the remaining lumbar spine. There is no significant disc bulge or herniation.  A normal-appearing conus medullaris terminates at T12-L1.  There is retroperitoneal lymphadenopathy.      IMPRESSION:  MR Cervical spine: There are no marrow replacing lesions throughout the cervical spine. There is no high-grade central canal stenosis. There is extensive left cervical chain lymphadenopathy which appears to have increased since 2/20/2020.    MR thoracic spine: There are multiple marrow replacing lesions throughout the thoracic spine.  At T2 there is epidural tumor involvement which contributes to moderate central canal stenosis without underlying cord signal abnormality. At T4 there is a possible mild pathologic compression fracture. At T9 there is a severe pathologic compression fracture. At T9 there is ventral epidural tumor involvement which contributes to mild central canal stenosis. There is associated cord signal abnormality extending from T8-T11.    MR lumbar spine: There are multiple marrow replacing lesions throughout the lumbar spine and sacrum as described. At L1 there is associated moderate pathologic compression fracture. At L1 there is ventral epidural tumor involvement which contributes to moderate central canal stenosis. At L4 there is severe pathologic compression fracture with associated levocurvature of the lumbar spine.        < end of copied text >    Allergies    IV Contrast (Vomiting)  Rocephin (Pruritus)  Squash (Other)    Intolerances        ROS: [  ] Fever  [  ] Chills  [  ]Chest Pain [  ] SOB  [  ]Cough [  ] N/V  [  ] Diarrhea [  ]Constipation [  ]Other ROS:  [  ] ROS otherwise negative    PAST MEDICAL & SURGICAL HISTORY:  Hodgkins lymphoma in relapse    No pertinent past medical history    NHL (non-Hodgkin&#x27;s lymphoma)    No pertinent past medical history    No significant past surgical history        FAMILY HISTORY:  No pertinent family history in first degree relatives        MEDICATIONS  (STANDING):  chlorhexidine 2% Cloths 1 Application(s) Topical daily  dexAMETHasone  Injectable 4 milliGRAM(s) IV Push every 6 hours  enoxaparin Injectable 40 milliGRAM(s) SubCutaneous daily  hydrocortisone 1% Cream 1 Application(s) Topical daily  levoFLOXacin IVPB 500 milliGRAM(s) IV Intermittent every 24 hours  levoFLOXacin IVPB      pantoprazole    Tablet 40 milliGRAM(s) Oral before breakfast  triamcinolone 0.1% Ointment 1 Application(s) Topical every 12 hours    MEDICATIONS  (PRN):  acetaminophen   Tablet .. 650 milliGRAM(s) Oral every 6 hours PRN Temp greater or equal to 38C (100.4F), Mild Pain (1 - 3), Moderate Pain (4 - 6)  diphenhydrAMINE 25 milliGRAM(s) Oral every 6 hours PRN Rash and/or Itching  ondansetron Injectable 4 milliGRAM(s) IV Push once PRN Nausea      PHYSICAL EXAM  Vital Signs Last 24 Hrs  T(C): 36.4 (19 Oct 2020 04:03), Max: 36.8 (18 Oct 2020 20:48)  T(F): 97.5 (19 Oct 2020 04:03), Max: 98.2 (18 Oct 2020 20:48)  HR: 80 (19 Oct 2020 04:03) (80 - 98)  BP: 99/60 (19 Oct 2020 04:03) (95/60 - 104/68)  BP(mean): --  RR: 18 (19 Oct 2020 04:03) (18 - 18)  SpO2: 100% (19 Oct 2020 04:03) (98% - 100%)    General: Well nourished, well developed, no acute distress  HEENT: NC/AT; EOMI, PERRL, sclera nonicteric; external ears normal; no rhinorrhea or epistaxis; mucous membranes moist; oropharynx clear and without erythema  CV: NR, RR; no appreciable r/m/g  Lungs: CTAB, no increased work of breathing  Abdomen: Bowel sounds present; soft, NTND  MSK: Vertebral spine non-tender to palpation  Neuro: AAOx3; cranial nerves II-XII intact; strength 5/5 in upper and lower extremities; sensation to light touch in tact bilaterally.  Psych: Full affect; mood congruent  Skin: no visible rashes on limited examination    IMAGING/LABS/PATHOLOGY: I have personally reviewed the relevant labs, pathology, and imaging as noted in the HPI.  In addition,                          8.4    17.85 )-----------( 670      ( 19 Oct 2020 06:25 )             29.8     10-19    140  |  104  |  16  ----------------------------<  191<H>  4.1   |  25  |  0.57    Ca    9.2      19 Oct 2020 06:25  Phos  4.8     10-18  Mg     2.1     10-18    TPro  7.1  /  Alb  2.7<L>  /  TBili  0.3  /  DBili  x   /  AST  31  /  ALT  52<H>  /  AlkPhos  288<H>  10-18          ASSESSMENT/PLAN    CHAU ARZOLA is a 19y man with     We discussed the use of palliative radiation in this setting, namely to improve quality of life through the reduction of symptoms.  We talked about the risks, benefits, acute and long term side effects, as well as expected treatment outcomes.  He/She was given the opportunity to ask questions, which were answered to his/her apparent satisfaction.  He/She provided written consent to proceed with radiation therapy. We will arrange for inpatient/outpatient treatment. HPI:  19M hx Hodgkins lymphoma p/w hip and back pain x 3 weeks. Patient was diagnosed with stage IV CHL in 9/2018. He was initially treated following ABVE-PC which was completed in 12/2018 after 5 cycles due to nonadherence and patient refusal. He then relapsed in 2/2019. He was started on salvage treatment in April 2019 with gemzar/brentuximab which was also stopped due to the patient's refusal and nonadherence. Patient was then recommended to have 2 cycles of ICE with intent to pursue an autologous bone marrow transplant. The patient began the cycle and then left AMA unfortunately and so this was never completed. Patient follows with Dr. Kalie Emery as an outpatient. He was last seen in clinic on 8/25/20. An extensive discussion regarding the need for treatment and the need for him to adhere to therapy took place; however, patient continued to not want to pursue further chemotherapy despite the discussion of death. Pt is a transfer from [facility] due to concern for caudal equina syndrome. He went to [place]  due to worsening back pain. Back pain is entire back, worse in the lower part of back, worse when lying down on a hard surface. Hip pain worsened with bearing weight. Pt has history of metastatic stage 4B non-Hodgkin's lymphoma, diagnosed in 2017 s/p port placement, follows w/ Dr. Emery was on ICE chemotherapy, but stopped months ago due to side effect of body swelling and patient is not fully compliant w/ visits. Pt was febrile in ED, started on Rocephin but had reaction (itching) and D/C. Also given PO Motrin and Benadryl, 1L fluids. CT scan showing possible cord compression. Pt is not able to ambulate easily 2/2 because of back and hip pain, and some leg weakness. Pt endorses PATRICK. Denies incontinence, numbness. Further denies any fall. Allergic to IV contrast. (17 Oct 2020 04:02)      EXAM:  MR SPINE CERVICAL/THORACIC/LUMBAR                          FINDINGS:    Cervical Spine: Images are degraded by motion artifact. There is straightening of the usual cervical lordosis. The vertebral body heights and alignment are maintained. There is no focal STIR hyperintense marrow replacing lesion throughout the cervical spine. There is no significant disc bulge or herniation. There is no significant cervical spinal canal stenosis. There is no significant cervical spinal cord signal abnormality. There is extensive left cervical chain lymphadenopathy which appears to have moderately increased since 2/20/2020.    Thoracic Spine:  There are multiple STIR hyperintense marrow replacing lesions throughout the thoracic spine.    At T2 there is an extensive marrow replacing lesion involving both the anterior and posterior elements. There is left ventrolateral epidural tumor involvement which indents the left ventrolateral aspect of the cord and contributes to moderate central canal stenosis. There is no underlying cord signal abnormality. There is tumor involvement of the left T2-T3 neural foramen contributing to severe stenosis. There is partial extra osseous extension into the left ventrolateral paraspinal soft tissues.    There is an extensive marrow replacing lesion involving the vertebral body at T4. There is no definite epidural tumor involvement. There is mild loss of vertebral body height which may suggest underlying mild pathologic compression fracture.    There is an extensivemarrow replacing lesion at T9 involving the anterior and posterior elements. There is associated severe loss of vertebral body height consistent with pathologic compression fracture.. There is ventral epidural tumor involvement which indents the ventral cord and contributes to mild central canal stenosis. There is associated underlying cord signal abnormality centrally within the cord extending from T8 to T11. There is neural foraminal tumor involvement bilaterally at T9-T10 which contributes to severe bilateral neural foraminal stenosis. There is prominent tumor involvement of the ventral paraspinal soft tissues.    Normal thoracic kyphosis is maintained. The vertebral body heights is maintained throughout the remaining thoracic spine.    Multiple bilateral lung masses are seen right greater than left.    Lumbar Spine:  There are multiple marrow replacing lesions throughout the lumbar spine and sacrum.  There is an extensive marrow replacing lesion involving the anterior and posterior elements at L1. There is associated moderate severe loss of vertebral body height consistent with a pathologic compression fracture. There is ventral epidural tumor involvement which contributes to moderate central canal stenosis and crowding of the nerve roots. There is neural foraminal tumor involvement at L1-L2 which contributes to moderate bilateral neural foraminal stenosis. There is partial tumor involvement of the ventral paraspinal soft tissues.  There is an extensive marrow replacing lesion involving the anterior and posterior elements at L4. There is associated right-sided severe loss of vertebral body height consistent with pathologic compression fracture. There is associated levocurvature of the lumbar spine. There is no definiteepidural tumor involvement however it cannot entirely be excluded. There is large right ventrolateral paraspinal soft tissue tumor involvement which ventrally displaces the inferior vena cava. There is partial tumor involvement of the right neural foramina at L3-L4 and L4-L5 which are moderately stenosed.  There is straightening of the usual lumbar lordosis. The vertebral body heights and alignment are maintained throughout the remaining lumbar spine. There is no significant disc bulge or herniation.  A normal-appearing conus medullaris terminates at T12-L1.  There is retroperitoneal lymphadenopathy.      IMPRESSION:  MR Cervical spine: There are no marrow replacing lesions throughout the cervical spine. There is no high-grade central canal stenosis. There is extensive left cervical chain lymphadenopathy which appears to have increased since 2/20/2020.    MR thoracic spine: There are multiple marrow replacing lesions throughout the thoracic spine.  At T2 there is epidural tumor involvement which contributes to moderate central canal stenosis without underlying cord signal abnormality. At T4 there is a possible mild pathologic compression fracture. At T9 there is a severe pathologic compression fracture. At T9 there is ventral epidural tumor involvement which contributes to mild central canal stenosis. There is associated cord signal abnormality extending from T8-T11.    MR lumbar spine: There are multiple marrow replacing lesions throughout the lumbar spine and sacrum as described. At L1 there is associated moderate pathologic compression fracture. At L1 there is ventral epidural tumor involvement which contributes to moderate central canal stenosis. At L4 there is severe pathologic compression fracture with associated levocurvature of the lumbar spine.    separate entry on [employer] PACS, with ID#:[id number], dated 10/16/20, showing an erosive lesion with cortical breakthrough within the anterior left acetabulum    Allergies    IV Contrast (Vomiting)  Rocephin (Pruritus)  Squash (Other)    Intolerances    ROS: [  ] Fever  [  ] Chills  [  ]Chest Pain [  ] SOB  [  ]Cough [  ] N/V  [  ] Diarrhea [  ]Constipation [  ]Other ROS:  [  ] ROS otherwise negative    PAST MEDICAL & SURGICAL HISTORY:  Hodgkin's lymphoma in relapse    No pertinent past medical history    NHL (non-Hodgkin&#x27;s lymphoma)    No pertinent past medical history    No significant past surgical history        FAMILY HISTORY:  No pertinent family history in first degree relatives        MEDICATIONS  (STANDING):  chlorhexidine 2% Cloths 1 Application(s) Topical daily  dexAMETHasone  Injectable 4 milliGRAM(s) IV Push every 6 hours  enoxaparin Injectable 40 milliGRAM(s) SubCutaneous daily  hydrocortisone 1% Cream 1 Application(s) Topical daily  levoFLOXacin IVPB 500 milliGRAM(s) IV Intermittent every 24 hours  levoFLOXacin IVPB      pantoprazole    Tablet 40 milliGRAM(s) Oral before breakfast  triamcinolone 0.1% Ointment 1 Application(s) Topical every 12 hours    MEDICATIONS  (PRN):  acetaminophen   Tablet .. 650 milliGRAM(s) Oral every 6 hours PRN Temp greater or equal to 38C (100.4F), Mild Pain (1 - 3), Moderate Pain (4 - 6)  diphenhydrAMINE 25 milliGRAM(s) Oral every 6 hours PRN Rash and/or Itching  ondansetron Injectable 4 milliGRAM(s) IV Push once PRN Nausea      PHYSICAL EXAM- KPS 60  Vital Signs Last 24 Hrs  T(C): 36.4 (19 Oct 2020 04:03), Max: 36.8 (18 Oct 2020 20:48)  T(F): 97.5 (19 Oct 2020 04:03), Max: 98.2 (18 Oct 2020 20:48)  HR: 80 (19 Oct 2020 04:03) (80 - 98)  BP: 99/60 (19 Oct 2020 04:03) (95/60 - 104/68)  BP(mean): --  RR: 18 (19 Oct 2020 04:03) (18 - 18)  SpO2: 100% (19 Oct 2020 04:03) (98% - 100%)    General: cachectic, mildly uncomfortable, NAD  HEENT: NC/AT; EOMI, PERRL, sclera nonicteric; external ears normal; no rhinorrhea or epistaxis; mucous membranes moist; oropharynx clear and without erythema  CV: NR, RR; no appreciable r/m/g  Lungs: CTAB, no increased work of breathing  Abdomen: Bowel sounds present; soft, NTND  MSK: moderate spinal tenderness to palpation  Neuro: AAOx3; cranial nerves II-XII intact; strength 4/5 in upper and lower extremities; sensation to light touch intact bilaterally.  Psych: Full affect; mood congruent  Skin: no visible rashes on limited examination    IMAGING/LABS/PATHOLOGY: I have personally reviewed the relevant labs, pathology, and imaging as noted in the HPI.  In addition,                          8.4    17.85 )-----------( 670      ( 19 Oct 2020 06:25 )             29.8     10-19    140  |  104  |  16  ----------------------------<  191<H>  4.1   |  25  |  0.57    Ca    9.2      19 Oct 2020 06:25  Phos  4.8     10-18  Mg     2.1     10-18    TPro  7.1  /  Alb  2.7<L>  /  TBili  0.3  /  DBili  x   /  AST  31  /  ALT  52<H>  /  AlkPhos  288<H>  10-18          ASSESSMENT/PLAN    CHAU LAI is a 19y man with relapsed Hodgkin's Disease, multiple vertebral mets involving T8-T11 and L1-2, L4-5, as well as a lytic left acetabular lesion. No deb high grade cord compression. Moderate weakness in all extremities. A short palliative course of radiation is recommended to the above sites.    I discussed with Mr Lai the use of palliative radiation in this setting, namely to improve quality of life through the reduction of symptoms.  We talked about the risks, benefits, acute and long term side effects, as well as expected treatment outcomes.  He was given the opportunity to ask questions, which were answered to his apparent satisfaction.  He provided written consent to proceed with radiation therapy. We will arrange for inpatient treatment.    Jordan Villavicencio MD  cell   Radiation Medicine Dep't at [facility]

## 2020-10-19 NOTE — CONSULT NOTE ADULT - ASSESSMENT
Prurigo Nodularis Lichen Simplex Chronicus     Education patient that changes are self-induced from scratching and must restrain from scratching to prevent worsening the lesions and promote healing.     1. Restrain from scratching  2. OTC Sarna lotion PRN itching   3. Can follow-up as outpatient for continued care     Patient can follow-up at our outpatient office:  [address], [place] [address], phone number for appointment: [phone]    If any questions, please page [phone] (please leave 10 digit call back number because we cover several facilities)

## 2020-10-19 NOTE — H&P ADULT - PROBLEM SELECTOR PLAN 4
- Noted to have significant coagulopathy at [facility], no significant LFT abnormalities, likely nutritional given his significant weight loss  - Will trend for now

## 2020-10-19 NOTE — PROGRESS NOTE ADULT - PROBLEM SELECTOR PLAN 2
fever, leukocytosis, unclear if pt may have possible source of infxn vs pt has tumor fevers from hodgkin lymphoma  - s/p ceftriaxone (not fully completed) due to pruritis, 1L IVF, MAP>65  - f/u Bcx,  RVP  - lactate WNL  - procalcitonin elevated   - continue levofloxacin for empiric coverage for now fever, leukocytosis, unclear if pt may have possible source of infxn vs pt has tumor fevers from hodgkin lymphoma  - s/p ceftriaxone (not fully completed) due to pruritis, 1L IVF, MAP>65  - f/u Bcx,  RVP  - lactate WNL  - procalcitonin elevated   - continue levofloxacin for empiric coverage for now  - WBC downtrending fever, leukocytosis, unclear if pt may have possible source of infxn vs pt has tumor fevers from hodgkin lymphoma  - s/p ceftriaxone (not fully completed) due to pruritis, 1L IVF, MAP>65  - f/u Bcx,  RVP  - lactate WNL  - procalcitonin elevated   - continue levofloxacin for empiric coverage for now  - WBC downtrending  -Added lactobacillus for abx associated diarrhea.

## 2020-10-19 NOTE — DIETITIAN INITIAL EVALUATION ADULT. - PROBLEM SELECTOR PLAN 2
fever, leukocytosis, unclear if pt may have possible source of infxn vs pt has tumor fevers from hodgkin lymphoma  - s/p ceftriaxone (not fully completed) due to pruritis, 1L IVF, MAP>65  - f/u Bcx, send UA, procalcitonin, RVP  - lactate WNL  - continue levofloxacin for empiric coverage for now

## 2020-10-19 NOTE — PROGRESS NOTE ADULT - ATTENDING COMMENTS
-Patient seen/examined on 10/19/20. Case/plan discussed with the intern and resident as reviewed/edited by me above and in any comments below.  -Added lactobacillus for some likely abx associated diarrhea patient reports.   -Transfer to [facility] today for radiation therapy. -Patient seen/examined on 10/19/20. Case/plan discussed with the intern and resident as reviewed/edited by me above and in any comments below.  -Added lactobacillus for some likely abx associated diarrhea patient reports.   -Transfer to [facility] today for radiation therapy.   -35 minutes spent on the discharge/transfer process.

## 2020-10-19 NOTE — CONSULT NOTE ADULT - SUBJECTIVE AND OBJECTIVE BOX
HPI:    19M hx Hodgkins lymphoma p/w hip and back pain x 3 weeks 2/2 to L1, L4 pathologic compression fracture causing severe canal stenosis w/ cord compression course, found to meet SIRS criteria. Patient has history of not following up with oncology for medications. Dermatology consulted for itchy rash on the lower extremities which has been present for 3 months.       PAST MEDICAL & SURGICAL HISTORY:  Hodgkins lymphoma in relapse    No significant past surgical history        REVIEW OF SYSTEMS    General: no fevers/chills, no lethargy	    Skin/Breast: see HPI  	  Ophthalmologic: no eye pain or change in vision  	  ENMT: no dysphagia or change in hearing    Respiratory and Thorax: no SOB or cough  	  Cardiovascular: no palpitations or chest pain    Gastrointestinal: no abdominal pain or blood in stool     Genitourinary: no dysuria or frequency    Musculoskeletal: no joint pains    Neurological: no weakness, numbness , or tingling    MEDICATIONS  (STANDING):  chlorhexidine 2% Cloths 1 Application(s) Topical daily  dexAMETHasone  Injectable 4 milliGRAM(s) IV Push every 6 hours  enoxaparin Injectable 40 milliGRAM(s) SubCutaneous daily  hydrocortisone 1% Cream 1 Application(s) Topical daily  levoFLOXacin IVPB 500 milliGRAM(s) IV Intermittent every 24 hours  levoFLOXacin IVPB      pantoprazole    Tablet 40 milliGRAM(s) Oral before breakfast  triamcinolone 0.1% Ointment 1 Application(s) Topical every 12 hours    MEDICATIONS  (PRN):  acetaminophen   Tablet .. 650 milliGRAM(s) Oral every 6 hours PRN Temp greater or equal to 38C (100.4F), Mild Pain (1 - 3), Moderate Pain (4 - 6)  diphenhydrAMINE 25 milliGRAM(s) Oral every 6 hours PRN Rash and/or Itching  ondansetron Injectable 4 milliGRAM(s) IV Push once PRN Nausea      Allergies    IV Contrast (Vomiting)  Rocephin (Pruritus)  Squash (Other)    Intolerances        SOCIAL HISTORY:    FAMILY HISTORY:  No pertinent family history in first degree relatives        Vital Signs Last 24 Hrs  T(C): 36.7 (19 Oct 2020 11:30), Max: 36.8 (18 Oct 2020 20:48)  T(F): 98 (19 Oct 2020 11:30), Max: 98.2 (18 Oct 2020 20:48)  HR: 79 (19 Oct 2020 11:30) (79 - 91)  BP: 125/76 (19 Oct 2020 11:30) (95/60 - 125/76)  BP(mean): --  RR: 18 (19 Oct 2020 11:30) (18 - 18)  SpO2: 100% (19 Oct 2020 04:03) (99% - 100%)    PHYSICAL EXAM:     The patient was alert and oriented X 3, well nourished, and in no  apparent distress.  OP showed no ulcerations  There was no visible lymphadenopathy.  Conjunctiva were non injected  There was no clubbing or edema of extremities.  The scalp, hair, face, eyebrows, lips, OP, neck, chest, back,   extremities X 4, nails were examined.  There was no hyperhidrosis or bromhidrosis.    Of note on skin exam:     scattered excoriated smooth dome shaped hypopigmented nodules with hyperpigmented rim on b/l extensor legs and dorsal feet, some present on arms, chest and back as well    LABS:                        8.4    17.85 )-----------( 670      ( 19 Oct 2020 06:25 )             29.8     10-19    140  |  104  |  16  ----------------------------<  191<H>  4.1   |  25  |  0.57    Ca    9.2      19 Oct 2020 06:25  Phos  4.8     10-18  Mg     2.1     10-18    TPro  7.1  /  Alb  2.7<L>  /  TBili  0.3  /  DBili  x   /  AST  31  /  ALT  52<H>  /  AlkPhos  288<H>  10-18          RADIOLOGY & ADDITIONAL STUDIES:

## 2020-10-19 NOTE — H&P ADULT - ASSESSMENT
This is a 19M with metastatic lymphoma who presents to [facility] for evaluation for radiation therapy to his metastatic spine and pelvic lesions.

## 2020-10-19 NOTE — H&P ADULT - NSHPSOCIALHISTORY_GEN_ALL_CORE
Lives with family  No tobacco use  +Marijuana  Rare EtOH use (at parties, 1-2 shots at most, less than weekly occurrence)

## 2020-10-20 DIAGNOSIS — D68.9 COAGULATION DEFECT, UNSPECIFIED: ICD-10-CM

## 2020-10-20 DIAGNOSIS — R65.10 SYSTEMIC INFLAMMATORY RESPONSE SYNDROME (SIRS) OF NON-INFECTIOUS ORIGIN WITHOUT ACUTE ORGAN DYSFUNCTION: ICD-10-CM

## 2020-10-20 DIAGNOSIS — D63.0 ANEMIA IN NEOPLASTIC DISEASE: ICD-10-CM

## 2020-10-20 DIAGNOSIS — Z29.9 ENCOUNTER FOR PROPHYLACTIC MEASURES, UNSPECIFIED: ICD-10-CM

## 2020-10-20 DIAGNOSIS — L28.2 OTHER PRURIGO: ICD-10-CM

## 2020-10-20 DIAGNOSIS — Z02.9 ENCOUNTER FOR ADMINISTRATIVE EXAMINATIONS, UNSPECIFIED: ICD-10-CM

## 2020-10-20 DIAGNOSIS — C81.90 HODGKIN LYMPHOMA, UNSPECIFIED, UNSPECIFIED SITE: ICD-10-CM

## 2020-10-20 LAB
ALBUMIN SERPL ELPH-MCNC: 3 G/DL — LOW (ref 3.3–5)
ALP SERPL-CCNC: 223 U/L — SIGNIFICANT CHANGE UP (ref 60–270)
ALT FLD-CCNC: 71 U/L — HIGH (ref 4–41)
ANION GAP SERPL CALC-SCNC: 11 MMO/L — SIGNIFICANT CHANGE UP (ref 7–14)
ANISOCYTOSIS BLD QL: SLIGHT — SIGNIFICANT CHANGE UP
APTT BLD: 34.7 SEC — SIGNIFICANT CHANGE UP (ref 27–36.3)
AST SERPL-CCNC: 37 U/L — SIGNIFICANT CHANGE UP (ref 4–40)
BASOPHILS # BLD AUTO: 0.08 K/UL — SIGNIFICANT CHANGE UP (ref 0–0.2)
BASOPHILS NFR BLD AUTO: 0.3 % — SIGNIFICANT CHANGE UP (ref 0–2)
BASOPHILS NFR SPEC: 0 % — SIGNIFICANT CHANGE UP (ref 0–2)
BILIRUB SERPL-MCNC: < 0.2 MG/DL — LOW (ref 0.2–1.2)
BUN SERPL-MCNC: 20 MG/DL — SIGNIFICANT CHANGE UP (ref 7–23)
CALCIUM SERPL-MCNC: 9.1 MG/DL — SIGNIFICANT CHANGE UP (ref 8.4–10.5)
CHLORIDE SERPL-SCNC: 107 MMOL/L — SIGNIFICANT CHANGE UP (ref 98–107)
CO2 SERPL-SCNC: 24 MMOL/L — SIGNIFICANT CHANGE UP (ref 22–31)
CREAT SERPL-MCNC: 0.52 MG/DL — SIGNIFICANT CHANGE UP (ref 0.5–1.3)
DACRYOCYTES BLD QL SMEAR: SLIGHT — SIGNIFICANT CHANGE UP
EOSINOPHIL # BLD AUTO: 0 K/UL — SIGNIFICANT CHANGE UP (ref 0–0.5)
EOSINOPHIL NFR BLD AUTO: 0 % — SIGNIFICANT CHANGE UP (ref 0–6)
EOSINOPHIL NFR FLD: 0 % — SIGNIFICANT CHANGE UP (ref 0–6)
GLUCOSE SERPL-MCNC: 160 MG/DL — HIGH (ref 70–99)
HCT VFR BLD CALC: 33.3 % — LOW (ref 39–50)
HGB BLD-MCNC: 9.3 G/DL — LOW (ref 13–17)
HYPOCHROMIA BLD QL: SLIGHT — SIGNIFICANT CHANGE UP
IMM GRANULOCYTES NFR BLD AUTO: 6.8 % — HIGH (ref 0–1.5)
INR BLD: 1.19 — HIGH (ref 0.88–1.16)
LDH SERPL L TO P-CCNC: 206 U/L — SIGNIFICANT CHANGE UP (ref 135–225)
LYMPHOCYTES # BLD AUTO: 1.06 K/UL — SIGNIFICANT CHANGE UP (ref 1–3.3)
LYMPHOCYTES # BLD AUTO: 4.1 % — LOW (ref 13–44)
LYMPHOCYTES NFR SPEC AUTO: 3.7 % — LOW (ref 13–44)
MAGNESIUM SERPL-MCNC: 2.1 MG/DL — SIGNIFICANT CHANGE UP (ref 1.6–2.6)
MCHC RBC-ENTMCNC: 21 PG — LOW (ref 27–34)
MCHC RBC-ENTMCNC: 27.9 % — LOW (ref 32–36)
MCV RBC AUTO: 75.2 FL — LOW (ref 80–100)
MICROCYTES BLD QL: SLIGHT — SIGNIFICANT CHANGE UP
MONOCYTES # BLD AUTO: 1.11 K/UL — HIGH (ref 0–0.9)
MONOCYTES NFR BLD AUTO: 4.3 % — SIGNIFICANT CHANGE UP (ref 2–14)
MONOCYTES NFR BLD: 3.7 % — SIGNIFICANT CHANGE UP (ref 2–9)
MYELOCYTES NFR BLD: 4.6 % — HIGH (ref 0–0)
NEUTROPHIL AB SER-ACNC: 84.4 % — HIGH (ref 43–77)
NEUTROPHILS # BLD AUTO: 21.91 K/UL — HIGH (ref 1.8–7.4)
NEUTROPHILS NFR BLD AUTO: 84.5 % — HIGH (ref 43–77)
NEUTS BAND # BLD: 0.9 % — SIGNIFICANT CHANGE UP (ref 0–6)
NRBC # FLD: 0.02 K/UL — SIGNIFICANT CHANGE UP (ref 0–0)
PHOSPHATE SERPL-MCNC: 2.5 MG/DL — SIGNIFICANT CHANGE UP (ref 2.5–4.5)
PLATELET # BLD AUTO: 780 K/UL — HIGH (ref 150–400)
PLATELET COUNT - ESTIMATE: SIGNIFICANT CHANGE UP
PMV BLD: 9 FL — SIGNIFICANT CHANGE UP (ref 7–13)
POLYCHROMASIA BLD QL SMEAR: SIGNIFICANT CHANGE UP
POTASSIUM SERPL-MCNC: 4 MMOL/L — SIGNIFICANT CHANGE UP (ref 3.5–5.3)
POTASSIUM SERPL-SCNC: 4 MMOL/L — SIGNIFICANT CHANGE UP (ref 3.5–5.3)
PROT SERPL-MCNC: 7.1 G/DL — SIGNIFICANT CHANGE UP (ref 6–8.3)
PROTHROM AB SERPL-ACNC: 13.5 SEC — SIGNIFICANT CHANGE UP (ref 10.6–13.6)
RBC # BLD: 4.43 M/UL — SIGNIFICANT CHANGE UP (ref 4.2–5.8)
RBC # FLD: 18.8 % — HIGH (ref 10.3–14.5)
SCHISTOCYTES BLD QL AUTO: SLIGHT — SIGNIFICANT CHANGE UP
SODIUM SERPL-SCNC: 142 MMOL/L — SIGNIFICANT CHANGE UP (ref 135–145)
URATE SERPL-MCNC: 3.6 MG/DL — SIGNIFICANT CHANGE UP (ref 3.4–8.8)
VARIANT LYMPHS # BLD: 2.7 % — SIGNIFICANT CHANGE UP
WBC # BLD: 25.93 K/UL — HIGH (ref 3.8–10.5)
WBC # FLD AUTO: 25.93 K/UL — HIGH (ref 3.8–10.5)

## 2020-10-20 PROCEDURE — 77334 RADIATION TREATMENT AID(S): CPT | Mod: 26

## 2020-10-20 PROCEDURE — 77290 THER RAD SIMULAJ FIELD CPLX: CPT | Mod: 26

## 2020-10-20 PROCEDURE — 99232 SBSQ HOSP IP/OBS MODERATE 35: CPT | Mod: GC

## 2020-10-20 PROCEDURE — 99233 SBSQ HOSP IP/OBS HIGH 50: CPT

## 2020-10-20 RX ORDER — CHLORHEXIDINE GLUCONATE 213 G/1000ML
1 SOLUTION TOPICAL ONCE
Refills: 0 | Status: DISCONTINUED | OUTPATIENT
Start: 2020-10-20 | End: 2020-10-22

## 2020-10-20 RX ORDER — ACETAMINOPHEN 500 MG
975 TABLET ORAL ONCE
Refills: 0 | Status: COMPLETED | OUTPATIENT
Start: 2020-10-20 | End: 2020-10-20

## 2020-10-20 RX ADMIN — Medication 4 MILLIGRAM(S): at 05:31

## 2020-10-20 RX ADMIN — ENOXAPARIN SODIUM 30 MILLIGRAM(S): 100 INJECTION SUBCUTANEOUS at 11:33

## 2020-10-20 RX ADMIN — Medication 4 MILLIGRAM(S): at 19:07

## 2020-10-20 RX ADMIN — CALAMINE AND ZINC OXIDE AND PHENOL 1 APPLICATION(S): 160; 10 LOTION TOPICAL at 05:31

## 2020-10-20 RX ADMIN — Medication 1 APPLICATION(S): at 19:07

## 2020-10-20 RX ADMIN — PANTOPRAZOLE SODIUM 40 MILLIGRAM(S): 20 TABLET, DELAYED RELEASE ORAL at 05:31

## 2020-10-20 RX ADMIN — Medication 4 MILLIGRAM(S): at 23:19

## 2020-10-20 RX ADMIN — Medication 4 MILLIGRAM(S): at 11:33

## 2020-10-20 RX ADMIN — Medication 650 MILLIGRAM(S): at 21:34

## 2020-10-20 RX ADMIN — Medication 1 APPLICATION(S): at 05:31

## 2020-10-20 RX ADMIN — CALAMINE AND ZINC OXIDE AND PHENOL 1 APPLICATION(S): 160; 10 LOTION TOPICAL at 19:07

## 2020-10-20 RX ADMIN — Medication 650 MILLIGRAM(S): at 20:34

## 2020-10-20 NOTE — PROGRESS NOTE ADULT - PROBLEM SELECTOR PLAN 6
DVT ppx - Lovenox, dose adjusted 2/2 low weight/BMI  Activity - Toe touch weight bearing with walker as per ortho recs at [facility], PT eval  Diet - regular diet, ensure shake supplementation DVT ppx - Lovenox, dose adjusted 2/2 low weight/BMI   PT eval  Diet - regular diet, ensure shake supplementation

## 2020-10-20 NOTE — PROGRESS NOTE ADULT - SUBJECTIVE AND OBJECTIVE BOX
Patient is a 19y old  Male who presents with a chief complaint of Metastatic Lymphoma, RT eval (20 Oct 2020 11:49)      SUBJECTIVE / OVERNIGHT EVENTS: Pt seen and examined at 12:20pm, no overnight events, pt reports having diarrhea for the past 3 days, back pain and hip pain under control, denies any saddle anesthesia, bowel or bladder incontinence. Waiting for RT stim today, says that he will be compliant with treatment. Itching has improved. Says he has hip and back pain upon standing. No other new issues reported.    MEDICATIONS  (STANDING):  calamine/zinc oxide Lotion 1 Application(s) Topical two times a day  dexAMETHasone     Tablet 4 milliGRAM(s) Oral every 6 hours  enoxaparin Injectable 30 milliGRAM(s) SubCutaneous daily  influenza   Vaccine 0.5 milliLiter(s) IntraMuscular once  levoFLOXacin  Tablet 500 milliGRAM(s) Oral every 24 hours  pantoprazole    Tablet 40 milliGRAM(s) Oral before breakfast  triamcinolone 0.1% Ointment 1 Application(s) Topical every 12 hours    MEDICATIONS  (PRN):  acetaminophen   Tablet .. 650 milliGRAM(s) Oral every 6 hours PRN Mild Pain (1 - 3), Moderate Pain (4 - 6)  ondansetron Injectable 4 milliGRAM(s) IV Push every 6 hours PRN Nausea      Vital Signs Last 24 Hrs  T(C): 36.2 (20 Oct 2020 07:56), Max: 36.7 (19 Oct 2020 21:31)  T(F): 97.2 (20 Oct 2020 07:56), Max: 98 (19 Oct 2020 21:31)  HR: 64 (20 Oct 2020 07:56) (64 - 80)  BP: 95/50 (20 Oct 2020 07:56) (95/50 - 98/58)  BP(mean): --  RR: 18 (20 Oct 2020 07:56) (17 - 18)  SpO2: 100% (20 Oct 2020 07:56) (100% - 100%)  CAPILLARY BLOOD GLUCOSE        I&O's Summary      PHYSICAL EXAM:  GENERAL: NAD, thinly built male  CHEST/LUNG: Clear to auscultation bilaterally; No wheeze  HEART: Regular rate and rhythm; No murmurs  ABDOMEN: Soft, Nontender, Nondistended  EXTREMITIES: no LE edema  PSYCH: Calm  NEUROLOGY: AAOx3  SKIN: No rashes or lesions    LABS:                        9.3    25.93 )-----------( 780      ( 20 Oct 2020 05:30 )             33.3     10-20    142  |  107  |  20  ----------------------------<  160<H>  4.0   |  24  |  0.52    Ca    9.1      20 Oct 2020 05:30  Phos  2.5     10-20  Mg     2.1     10-20    TPro  7.1  /  Alb  3.0<L>  /  TBili  < 0.2<L>  /  DBili  x   /  AST  37  /  ALT  71<H>  /  AlkPhos  223  10-20    PT/INR - ( 20 Oct 2020 05:30 )   PT: 13.5 SEC;   INR: 1.19          PTT - ( 20 Oct 2020 05:30 )  PTT:34.7 SEC          RADIOLOGY & ADDITIONAL TESTS:    Imaging Personally Reviewed:    Consultant(s) Notes Reviewed:      Care Discussed with Consultants/Other Providers:

## 2020-10-20 NOTE — SBIRT NOTE ADULT - NSSBIRTDRGBRIEFINTDET_GEN_A_CORE
This writer reviewed Mja Effects on the Body literature. Motivational interviewing provided and risk reduction discussed. This writer educated and encouraged pt to discuss medical mja card with PCP, if deemed medically eligible.

## 2020-10-20 NOTE — PROGRESS NOTE ADULT - PROBLEM SELECTOR PLAN 2
- Initial fevers with leukocytosis, improving with levofloxacin  -  started on 10/17, all cultures so far negative  - Possibly also has SIRS 2/2 lymphoma/steroids  fevers/leukocytosis likely due to lymphoma, discussed with kahlil oliva to d/c levaquin as pt also with diarrhea, will monitor for now, if diarrhea persists will get stool studies

## 2020-10-20 NOTE — PROGRESS NOTE ADULT - PROBLEM SELECTOR PLAN 4
- Noted to have significant coagulopathy at [facility], no significant LFT abnormalities, likely nutritional given his significant weight loss  - Will trend for now

## 2020-10-20 NOTE — PROGRESS NOTE ADULT - PROBLEM SELECTOR PLAN 3
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

## 2020-10-20 NOTE — PHYSICAL THERAPY INITIAL EVALUATION ADULT - LEVEL OF INDEPENDENCE: GAIT, REHAB EVAL
contact guard/pt received standing in hospital room, defers use of rolling walker, poor compliance to weight bearing status.

## 2020-10-20 NOTE — CHART NOTE - NSCHARTNOTEFT_GEN_A_CORE
patient seen, rationale for spine and left acetabular radiation discussed, also discussed with Dr Francis Delgadillo. Will plan on simulation today, treatment tomorrow to the left acetabulum, T and L-spine, single fraction to each site    Naomi Villavicencio MD  cell     Radiation Medicine Dep't at [facility]  [phone]

## 2020-10-20 NOTE — PROGRESS NOTE ADULT - PROBLEM SELECTOR PLAN 1
- Plan for RT, seen by rad onc going for RT stim today  - Heme consulted to evaluate for further therapy, discussed with heme ok to d/c levaquin, pt appears to be compliant with treatment, no need to get psych consult at this time  - c/w neuro checks q4h, c/w decadron  - Monitor CBC with diff and TLS labs (CMP, Phos, LDH, Uric Acid) - Plan for RT, seen by rad onc going for RT stim today  - Heme consulted to evaluate for further therapy, discussed with heme ok to d/c levaquin, pt appears to be compliant with treatment, no need to get psych consult at this time.  - c/w neuro checks q4h, c/w decadron  - cont ppi  - Monitor CBC with diff and TLS labs (CMP, Phos, LDH, Uric Acid) - Plan for RT, seen by rad onc going for RT stim today  - Heme consulted to evaluate for further therapy, discussed with heme ok to d/c mich, pt appears to be compliant with treatment, no need to get psych consult at this time.  - Ortho follg for L acetabulum lytic lesion and throughout spine,- ( likely metastatic disease. neurologically intact)  - Pain control  - Recommend partial weight bearing LLE with crutch/walker  - PT/OT  - c/w neuro checks q4h, c/w decadron  - cont ppi  - Monitor CBC with diff and TLS labs (CMP, Phos, LDH, Uric Acid)

## 2020-10-20 NOTE — PROGRESS NOTE ADULT - ASSESSMENT
19y Male with Stage 4 Hodgkins Lymphoma with L acetabulum lytic lesion and throughout spine, likely metastatic disease. neurologically intact  - Pain control  - Recommend partial weight bearing LLE with crutch/walker  - PT/OT  - Recommend Chemo/Radiation tx as per rad-onc and oncology  - no further acute orthopaedic intervention  - FU with Dr. Francis Delgadillo and Dr. Lind in 1-2 weeks following discharge 19y Male with Stage 4 Hodgkins Lymphoma with L acetabulum lytic lesion and throughout spine, likely metastatic disease. neurologically intact  - Pain control  - Recommend partial weight bearing LLE with crutch/walker  - PT/OT  - Recommend Chemo/Radiation tx as per rad-onc and oncology  - no further acute orthopaedic intervention  - FU with Dr. Francis Delgadillo and Dr. Lind in 1-2 weeks following discharge  - Dr Lind to continue to follow patient while in house

## 2020-10-20 NOTE — PHYSICAL THERAPY INITIAL EVALUATION ADULT - ADDITIONAL COMMENTS
Pt lives in house with mom, grandmother, brother and sister. Pt has 20 steps to negotiate to second floor bedroom. Pt was independent in functional activities prior to admission without use of assistive device.     pt left semi-daniels in bed, NAD. +call bell. +bed alarm. RN aware of session.

## 2020-10-20 NOTE — PROGRESS NOTE ADULT - ASSESSMENT
This is a 19M with metastatic lymphoma who presents to [facility] for evaluation for radiation therapy to his metastatic spine and pelvic lesions.

## 2020-10-20 NOTE — CONSULT NOTE ADULT - ASSESSMENT
Patient is a 20 y/o M w/ a PMHx of Stage IV CHL (Dx 9/2018, s/p multiple lines of therapy which has been c/b nonadherence and patient refusal of treatment) who was transferred to [facility] due to concern for cauda equina syndrome, found to have pathologic compression fractures with associated ventral epidural tumor involvement on imaging, and was admitted for further management. Now transferred to [facility] for radiation treatment    # Pathologic compression fractures  - Patient initially presented with worsening hip and back pain for 3 weeks and was ultimately found to have pathologic compression fractures  - MRI C/T/L spine was notable for extensive left cervical chain lymphadenopathy which appears to have increased since 2/20/2020, multiple marrow replacing lesions throughout the thoracic spine (including T2, T4, and a severe pathologic compression fracture with ventral epidural tumor involvement which contributes to mild central canal stenosis at T9), and multiple marrow replacing lesions throughout the lumbar spine and sacrum (including a moderate pathologic compression fracture and ventral epidural tumor involvement which contributes to moderate central canal stenosis at L1 as well as a severe pathologic compression fracture at L4)  - Appreciate Orthopedic evaluation, no acute plans for surgical intervention  - transferred to [facility], seen by rad onc with plans for simulation today an then RT   - c/w decadron per ortho and rad onc     # Stage IV CHL  - Diagnosed in 9/2018. He was initially treated following ABVE-PC which was completed in 12/2018 after 5 cycles due to nonadherence and patient refusal. He then relapsed in 2/2019. He was started on salvage treatment in April 2019 with gemzar/brentuximab which was also stopped due to the patient's refusal and nonadherence. Patient was then recommended to have 2 cycles of ICE with intent to pursue an autologous bone marrow transplant. The patient began the cycle and then left AMA unfortunately and so this was never completed.   - CT A+P obtained at [facility] (on a different MRN) was notable for diffuse lung and bony metastases  - will need systemic treatment after RT and continued education about compliance to treatments  - possible plans for pembrolizumab as next line of treatment as pt may be more compliant with this   - c/w CBC WITH DIFF and TLS labs (CMP, Phos, LDH, Uric acid) daily  - outpatient f/u with primary hematologist Dr. Emery upon discharge    Bridger Tellez, PGY-6  Hematology-Oncology Fellow  [phone] ([place]) [phone] ([facility])

## 2020-10-20 NOTE — SBIRT NOTE ADULT - NSSBIRTDRGPOSREINDET_GEN_A_CORE
Full screen positive. Brief Intervention Performed. Passive Referral To Treatment Attempted. Screening results were reviewed with the patient and patient was provided information about healthy guidelines and potential negative consequences associated with level of risk. Motivation and readiness to reduce or stop use was discussed and goals and activities to make changes were suggested/offered. Options discussed for further evaluation and treatment, but referral to treatment was not completed because: Patient refused.

## 2020-10-20 NOTE — CONSULT NOTE ADULT - SUBJECTIVE AND OBJECTIVE BOX
HPI:  18 yo M with pmhx Hodgkin's Lymphoma (extremely noncompliant pt) who initially presented to [facility] with complaints of severe hip and back pain with LE weakness. He was transferred to [facility] where work up revealed him to have extensive bony disease with multiple vertebral compression fractures and tumor invasion into the spinal canal (evaluated by orthopedics, determined to not need surgical intervention at this time). He was evaluated by rad onc and recommended RT to areas. At present, pt still has some weakness. Denies any back pain, incontinence, or sensory deficits. Reports lymph nodes improved after antibiotics. No fevers, chills, night sweats at present.     Hematologic History:  Patient was diagnosed with stage IV CHL in 9/2018. He was initially treated following ABVE-PC which was completed in 12/2018 after 5 cycles due to nonadherence and patient refusal. He then relapsed in 2/2019. He was started on salvage treatment in April 2019 with gemzar/brentuximab which was also stopped due to the patient's refusal and nonadherence. Patient was then recommended to have 2 cycles of ICE with intent to pursue an autologous bone marrow transplant. The patient began the cycle and then left AMA unfortunately and so this was never completed. Patient follows with Dr. Kalie Emery as an outpatient. He was last seen in clinic on 8/25/20. An extensive discussion regarding the need for treatment and the need for him to adhere to therapy took place; however, patient continued to not want to pursue further chemotherapy despite the discussion of death.       14 point ROS otherwise negative    PAST MEDICAL & SURGICAL HISTORY:  Hodgkins lymphoma in relapse  No significant past surgical history        Allergies    IV Contrast (Vomiting)  Rocephin (Pruritus)  Squash (Other)    Intolerances        MEDICATIONS  (STANDING):  calamine/zinc oxide Lotion 1 Application(s) Topical two times a day  dexAMETHasone     Tablet 4 milliGRAM(s) Oral every 6 hours  enoxaparin Injectable 30 milliGRAM(s) SubCutaneous daily  influenza   Vaccine 0.5 milliLiter(s) IntraMuscular once  levoFLOXacin  Tablet 500 milliGRAM(s) Oral every 24 hours  pantoprazole    Tablet 40 milliGRAM(s) Oral before breakfast  triamcinolone 0.1% Ointment 1 Application(s) Topical every 12 hours    MEDICATIONS  (PRN):  acetaminophen   Tablet .. 650 milliGRAM(s) Oral every 6 hours PRN Mild Pain (1 - 3), Moderate Pain (4 - 6)  ondansetron Injectable 4 milliGRAM(s) IV Push every 6 hours PRN Nausea      FAMILY HISTORY:  No pertinent family history in first degree relatives        SOCIAL HISTORY: No EtOH, no tobacco    Height (cm): 172.7 (10-19 @ 21:31)  Weight (kg): 36.6 (10-19 @ 21:31)  BMI (kg/m2): 12.3 (10-19 @ 21:31)  BSA (m2): 1.39 (10-19 @ 21:31)    VITALS:   T(F): 97.2 (10-20-20 @ 07:56), Max: 98 (10-19-20 @ 21:31)  HR: 64 (10-20-20 @ 07:56)  BP: 95/50 (10-20-20 @ 07:56)  RR: 18 (10-20-20 @ 07:56)  SpO2: 100% (10-20-20 @ 07:56)  Wt(kg): --    PHYSICAL EXAM    GENERAL: NAD, thin, cachetic appearing male   HEAD:  Atraumatic, Normocephalic  EYES: EOMI, PERRLA, conjunctiva and sclera clear  NECK: Significant palpable LAD   CHEST/LUNG: Clear to auscultation bilaterally  HEART: Regular rate and rhythm; No murmurs, rubs, or gallops  ABDOMEN: Soft, Nontender, Nondistended; Bowel sounds present  EXTREMITIES:  No clubbing, cyanosis, or edema  NEUROLOGY: non-focal  SKIN: No rashes or lesions    LABS:                         9.3    25.93 )-----------( 780      ( 20 Oct 2020 05:30 )             33.3     10-20    142  |  107  |  20  ----------------------------<  160<H>  4.0   |  24  |  0.52    Ca    9.1      20 Oct 2020 05:30  Phos  2.5     10-20  Mg     2.1     10-20    TPro  7.1  /  Alb  3.0<L>  /  TBili  < 0.2<L>  /  DBili  x   /  AST  37  /  ALT  71<H>  /  AlkPhos  223  10-20    Phosphorus Level, Serum: 2.5 mg/dL (10-20 @ 05:30)  Magnesium, Serum: 2.1 mg/dL (10-20 @ 05:30)  Lactate Dehydrogenase, Serum: 206 U/L (10-20 @ 05:30)  Uric Acid, Serum: 3.6 mg/dL (10-20 @ 05:30)    PT/INR - ( 20 Oct 2020 05:30 )   PT: 13.5 SEC;   INR: 1.19          PTT - ( 20 Oct 2020 05:30 )  PTT:34.7 SEC  .Blood Blood-Peripheral  10-17 @ 15:51   No growth to date.  --  --      .Blood Blood-Venous  08-16 @ 02:31   No Growth Final  --  --          IMAGING:

## 2020-10-20 NOTE — PHYSICAL THERAPY INITIAL EVALUATION ADULT - RANGE OF MOTION EXAMINATION, REHAB EVAL
Detail Level: Zone Detail Level: Detailed Detail Level: Simple bilateral upper extremity ROM was WFL (within functional limits)/bilateral lower extremity ROM was WFL (within functional limits)

## 2020-10-20 NOTE — PROGRESS NOTE ADULT - SUBJECTIVE AND OBJECTIVE BOX
Orthopedic Surgery Progress Note     S: Patient seen and examined today. No acute events overnight. Pain is well controlled. Transferred to [facility] for radiation treatment     MEDICATIONS  (STANDING):  calamine/zinc oxide Lotion 1 Application(s) Topical two times a day  dexAMETHasone     Tablet 4 milliGRAM(s) Oral every 6 hours  enoxaparin Injectable 30 milliGRAM(s) SubCutaneous daily  influenza   Vaccine 0.5 milliLiter(s) IntraMuscular once  levoFLOXacin  Tablet 500 milliGRAM(s) Oral every 24 hours  pantoprazole    Tablet 40 milliGRAM(s) Oral before breakfast  triamcinolone 0.1% Ointment 1 Application(s) Topical every 12 hours    MEDICATIONS  (PRN):  acetaminophen   Tablet .. 650 milliGRAM(s) Oral every 6 hours PRN Mild Pain (1 - 3), Moderate Pain (4 - 6)  ondansetron Injectable 4 milliGRAM(s) IV Push every 6 hours PRN Nausea      Physical Exam:    Vital Signs Last 24 Hrs  T(C): 36.7 (19 Oct 2020 21:31), Max: 36.7 (19 Oct 2020 11:30)  T(F): 98 (19 Oct 2020 21:31), Max: 98 (19 Oct 2020 11:30)  HR: 76 (19 Oct 2020 21:31) (76 - 80)  BP: 98/58 (19 Oct 2020 21:31) (95/60 - 125/76)  BP(mean): --  RR: 17 (19 Oct 2020 21:31) (17 - 18)  SpO2: 100% (19 Oct 2020 21:31) (100% - 100%)        PE   BLLE:  Skin intact; No ecchymosis/soft tissue swelling  ROM Hips 0-100 without pain  Endorses TTP and numbness over b/l AIIS   Able to SLR; Neg Log Roll/Heel Strike  Motor intact GS/TA/FHL/EHL  SILT L2-S1  DP/PT pulses 2+    Neuro  Motor:                   C5                C6              C7               C8           T1   R            5/5                5/5            5/5             5/5          5/5  L             5/5               5/5             5/5             5/5          5/5                L2             L3             L4               L5            S1  R         5/5           5/5          5/5             5/5           5/5  L          5/5          5/5           5/5             5/5           5/5    Sensory:            C5         C6         C7      C8       T1        (0=absent, 1=impaired, 2=normal, NT=not testable)  R         2            2           2        2         2  L          2            2           2        2         2               L2          L3         L4      L5       S1         (0=absent, 1=impaired, 2=normal, NT=not testable)  R         2            2            2        2        2  L          2            2           2        2         2       LABS:                        9.3    25.93 )-----------( 780      ( 20 Oct 2020 05:30 )             33.3     10-20    142  |  107  |  20  ----------------------------<  160<H>  4.0   |  24  |  0.52    Ca    9.1      20 Oct 2020 05:30  Phos  2.5     10-20  Mg     2.1     10-20    TPro  7.1  /  Alb  3.0<L>  /  TBili  < 0.2<L>  /  DBili  x   /  AST  37  /  ALT  71<H>  /  AlkPhos  223  10-20

## 2020-10-20 NOTE — PHYSICAL THERAPY INITIAL EVALUATION ADULT - PERTINENT HX OF CURRENT PROBLEM, REHAB EVAL
19 y.o. Male with Stage 4 Hodgkins Lymphoma with Left acetabulum lytic lesion and throughout spine, likely metastatic disease. neurologically intact

## 2020-10-20 NOTE — PHYSICAL THERAPY INITIAL EVALUATION ADULT - DISCHARGE DISPOSITION, PT EVAL
anticipate outpatient PT for LE strengthening and balance training when cleared by MD. However, monitor PT notes closely pending further ambulation assessment with improved weight bearing adherence and use of assistive device

## 2020-10-20 NOTE — CHART NOTE - NSREFPHYEXREFERTOOTHER_GEN_ALL_CORE
As per mother, pt was rocking back on forth on high chair, hit head on counter. +laceration noted to R eyebrow, no active bleeding at triage. Mother states patient cried right away, no LOC. No vomiting. not applicable

## 2020-10-21 ENCOUNTER — TRANSCRIPTION ENCOUNTER (OUTPATIENT)
Age: 20
End: 2020-10-21

## 2020-10-21 DIAGNOSIS — Z71.89 OTHER SPECIFIED COUNSELING: ICD-10-CM

## 2020-10-21 LAB
ALBUMIN SERPL ELPH-MCNC: 2.8 G/DL — LOW (ref 3.3–5)
ALP SERPL-CCNC: 185 U/L — SIGNIFICANT CHANGE UP (ref 60–270)
ALT FLD-CCNC: 58 U/L — HIGH (ref 4–41)
ANION GAP SERPL CALC-SCNC: 12 MMO/L — SIGNIFICANT CHANGE UP (ref 7–14)
AST SERPL-CCNC: 19 U/L — SIGNIFICANT CHANGE UP (ref 4–40)
BASOPHILS # BLD AUTO: 0.08 K/UL — SIGNIFICANT CHANGE UP (ref 0–0.2)
BASOPHILS NFR BLD AUTO: 0.4 % — SIGNIFICANT CHANGE UP (ref 0–2)
BILIRUB SERPL-MCNC: < 0.2 MG/DL — LOW (ref 0.2–1.2)
BUN SERPL-MCNC: 20 MG/DL — SIGNIFICANT CHANGE UP (ref 7–23)
CALCIUM SERPL-MCNC: 9 MG/DL — SIGNIFICANT CHANGE UP (ref 8.4–10.5)
CHLORIDE SERPL-SCNC: 103 MMOL/L — SIGNIFICANT CHANGE UP (ref 98–107)
CO2 SERPL-SCNC: 24 MMOL/L — SIGNIFICANT CHANGE UP (ref 22–31)
CREAT SERPL-MCNC: 0.5 MG/DL — SIGNIFICANT CHANGE UP (ref 0.5–1.3)
EOSINOPHIL # BLD AUTO: 0 K/UL — SIGNIFICANT CHANGE UP (ref 0–0.5)
EOSINOPHIL NFR BLD AUTO: 0 % — SIGNIFICANT CHANGE UP (ref 0–6)
GLUCOSE SERPL-MCNC: 141 MG/DL — HIGH (ref 70–99)
HCT VFR BLD CALC: 33.1 % — LOW (ref 39–50)
HGB BLD-MCNC: 9.3 G/DL — LOW (ref 13–17)
IMM GRANULOCYTES NFR BLD AUTO: 11.1 % — HIGH (ref 0–1.5)
LDH SERPL L TO P-CCNC: 202 U/L — SIGNIFICANT CHANGE UP (ref 135–225)
LYMPHOCYTES # BLD AUTO: 1.06 K/UL — SIGNIFICANT CHANGE UP (ref 1–3.3)
LYMPHOCYTES # BLD AUTO: 5.3 % — LOW (ref 13–44)
MAGNESIUM SERPL-MCNC: 2.1 MG/DL — SIGNIFICANT CHANGE UP (ref 1.6–2.6)
MANUAL SMEAR VERIFICATION: SIGNIFICANT CHANGE UP
MCHC RBC-ENTMCNC: 21.2 PG — LOW (ref 27–34)
MCHC RBC-ENTMCNC: 28.1 % — LOW (ref 32–36)
MCV RBC AUTO: 75.4 FL — LOW (ref 80–100)
MONOCYTES # BLD AUTO: 0.34 K/UL — SIGNIFICANT CHANGE UP (ref 0–0.9)
MONOCYTES NFR BLD AUTO: 1.7 % — LOW (ref 2–14)
NEUTROPHILS # BLD AUTO: 16.22 K/UL — HIGH (ref 1.8–7.4)
NEUTROPHILS NFR BLD AUTO: 81.5 % — HIGH (ref 43–77)
NRBC # FLD: 0.05 K/UL — SIGNIFICANT CHANGE UP (ref 0–0)
PHOSPHATE SERPL-MCNC: 2.7 MG/DL — SIGNIFICANT CHANGE UP (ref 2.5–4.5)
PLATELET # BLD AUTO: 693 K/UL — HIGH (ref 150–400)
PMV BLD: 9.1 FL — SIGNIFICANT CHANGE UP (ref 7–13)
POTASSIUM SERPL-MCNC: 4.1 MMOL/L — SIGNIFICANT CHANGE UP (ref 3.5–5.3)
POTASSIUM SERPL-SCNC: 4.1 MMOL/L — SIGNIFICANT CHANGE UP (ref 3.5–5.3)
PROT SERPL-MCNC: 6.6 G/DL — SIGNIFICANT CHANGE UP (ref 6–8.3)
RBC # BLD: 4.39 M/UL — SIGNIFICANT CHANGE UP (ref 4.2–5.8)
RBC # FLD: 19.6 % — HIGH (ref 10.3–14.5)
SODIUM SERPL-SCNC: 139 MMOL/L — SIGNIFICANT CHANGE UP (ref 135–145)
URATE SERPL-MCNC: 3.9 MG/DL — SIGNIFICANT CHANGE UP (ref 3.4–8.8)
WBC # BLD: 19.9 K/UL — HIGH (ref 3.8–10.5)
WBC # FLD AUTO: 19.9 K/UL — HIGH (ref 3.8–10.5)

## 2020-10-21 PROCEDURE — 77334 RADIATION TREATMENT AID(S): CPT | Mod: 26

## 2020-10-21 PROCEDURE — 99233 SBSQ HOSP IP/OBS HIGH 50: CPT

## 2020-10-21 PROCEDURE — 77280 THER RAD SIMULAJ FIELD SMPL: CPT | Mod: 26

## 2020-10-21 PROCEDURE — 77307 TELETHX ISODOSE PLAN CPLX: CPT | Mod: 26

## 2020-10-21 RX ADMIN — CALAMINE AND ZINC OXIDE AND PHENOL 1 APPLICATION(S): 160; 10 LOTION TOPICAL at 17:47

## 2020-10-21 RX ADMIN — Medication 4 MILLIGRAM(S): at 12:34

## 2020-10-21 RX ADMIN — Medication 4 MILLIGRAM(S): at 17:46

## 2020-10-21 RX ADMIN — Medication 1 APPLICATION(S): at 17:47

## 2020-10-21 RX ADMIN — Medication 4 MILLIGRAM(S): at 09:08

## 2020-10-21 RX ADMIN — PANTOPRAZOLE SODIUM 40 MILLIGRAM(S): 20 TABLET, DELAYED RELEASE ORAL at 09:08

## 2020-10-21 RX ADMIN — ONDANSETRON 4 MILLIGRAM(S): 8 TABLET, FILM COATED ORAL at 14:11

## 2020-10-21 RX ADMIN — Medication 4 MILLIGRAM(S): at 23:03

## 2020-10-21 NOTE — CHART NOTE - NSCHARTNOTEFT_GEN_A_CORE
Notified by NM that pt wishes to leave AMA. Spoke with Pt, as per pt he has issues that he need to take care of at home. pt informed that he has one more RT treatment tomorrow to his RT hip. Pt encouraged to considering staying to complete this treatment, he is agreeable but wants to leave tomorrow after treatment. Dr. Rizo made aware.

## 2020-10-21 NOTE — PROGRESS NOTE ADULT - SUBJECTIVE AND OBJECTIVE BOX
Patient is a 19y old  Male who presents with a chief complaint of Metastatic Lymphoma, RT eval (20 Oct 2020 15:58)      SUBJECTIVE / OVERNIGHT EVENTS: Pt seen and examined at 11:35am, no overnight events, pt has no more diarrhea, had soft stool yesterday, none today, back and hip pain under control, waiting to go for RT today. Appears comfortable talking on the phone. No other new issues reported.      MEDICATIONS  (STANDING):  calamine/zinc oxide Lotion 1 Application(s) Topical two times a day  chlorhexidine 4% Liquid 1 Application(s) Topical once  dexAMETHasone     Tablet 4 milliGRAM(s) Oral every 6 hours  enoxaparin Injectable 30 milliGRAM(s) SubCutaneous daily  influenza   Vaccine 0.5 milliLiter(s) IntraMuscular once  pantoprazole    Tablet 40 milliGRAM(s) Oral before breakfast  triamcinolone 0.1% Ointment 1 Application(s) Topical every 12 hours    MEDICATIONS  (PRN):  acetaminophen   Tablet .. 650 milliGRAM(s) Oral every 6 hours PRN Mild Pain (1 - 3), Moderate Pain (4 - 6)  ondansetron Injectable 4 milliGRAM(s) IV Push every 6 hours PRN Nausea      Vital Signs Last 24 Hrs  T(C): 36.7 (21 Oct 2020 15:11), Max: 36.7 (21 Oct 2020 15:11)  T(F): 98 (21 Oct 2020 15:11), Max: 98 (21 Oct 2020 15:11)  HR: 81 (21 Oct 2020 15:11) (60 - 81)  BP: 111/81 (21 Oct 2020 15:11) (98/56 - 111/81)  BP(mean): --  RR: 18 (21 Oct 2020 15:11) (18 - 18)  SpO2: 96% (21 Oct 2020 15:11) (96% - 98%)  CAPILLARY BLOOD GLUCOSE        I&O's Summary      PHYSICAL EXAM:  GENERAL: NAD, thinly built male  CHEST/LUNG: Clear to auscultation bilaterally; No wheeze  HEART: Regular rate and rhythm; No murmurs  ABDOMEN: Soft, Nontender, Nondistended  EXTREMITIES: no LE edema  PSYCH: Calm  NEUROLOGY: AAOx3  SKIN: No rashes or lesions  LABS:                        9.3    19.90 )-----------( 693      ( 21 Oct 2020 06:25 )             33.1     10-21    139  |  103  |  20  ----------------------------<  141<H>  4.1   |  24  |  0.50    Ca    9.0      21 Oct 2020 06:25  Phos  2.7     10-21  Mg     2.1     10-21    TPro  6.6  /  Alb  2.8<L>  /  TBili  < 0.2<L>  /  DBili  x   /  AST  19  /  ALT  58<H>  /  AlkPhos  185  10-21    PT/INR - ( 20 Oct 2020 05:30 )   PT: 13.5 SEC;   INR: 1.19          PTT - ( 20 Oct 2020 05:30 )  PTT:34.7 SEC          RADIOLOGY & ADDITIONAL TESTS:    Imaging Personally Reviewed:    Consultant(s) Notes Reviewed:      Care Discussed with Consultants/Other Providers:

## 2020-10-21 NOTE — PROGRESS NOTE ADULT - ASSESSMENT
This is a 19M with metastatic lymphoma who presents to [facility] for evaluation for radiation therapy to his metastatic spine and pelvic lesions.

## 2020-10-21 NOTE — PROGRESS NOTE ADULT - PROBLEM SELECTOR PLAN 4
- Noted to have significant coagulopathy at [facility], no significant LFT abnormalities, likely nutritional given his significant weight loss  - Will trend for now - Noted to have significant coagulopathy at [facility], no significant LFT abnormalities, likely nutritional given his significant weight loss  - Will trend for now.

## 2020-10-21 NOTE — DIETITIAN INITIAL EVALUATION ADULT. - ETIOLOGY
patient meets criteria for severe malnutrition in the context of chronic illness increased demand for nutrients

## 2020-10-21 NOTE — DISCHARGE NOTE PROVIDER - NSDCCPCAREPLAN_GEN_ALL_CORE_FT
PRINCIPAL DISCHARGE DIAGNOSIS  Diagnosis: Metastasis to bone  Assessment and Plan of Treatment: You recieved Radiation treatment in the hospital  continue Tylenol as needed for pain  complete steriods as directed  follow up with Oncology at [facility]  Follow up with Orthopedics Dr. Francis Delgadillo or Dr. Lind in 1-2 weeks following discharge. call to schedule an appointment      SECONDARY DISCHARGE DIAGNOSES  Diagnosis: SIRS (systemic inflammatory response syndrome)  Assessment and Plan of Treatment: Resolved    Diagnosis: Hodgkins lymphoma in relapse  Assessment and Plan of Treatment: follow up with Oncology at [facility] within 1 week of discharge, Call to schedule an appointment     PRINCIPAL DISCHARGE DIAGNOSIS  Diagnosis: Metastasis to bone  Assessment and Plan of Treatment: You recieved Radiation treatment in the hospital  continue Tylenol as needed for pain  complete steriods as directed  Toe toe Weight bearing to Left Lower Extremity with Crutch,  follow up with Oncology at [facility] within 1 week of discharge, Call to schedule an appointment   Follow up with Orthopedics Dr. Francis Delgadillo or Dr. Lind in 1-2 weeks following discharge. call to schedule an appointment      SECONDARY DISCHARGE DIAGNOSES  Diagnosis: SIRS (systemic inflammatory response syndrome)  Assessment and Plan of Treatment: Resolved    Diagnosis: Hodgkins lymphoma in relapse  Assessment and Plan of Treatment: follow up with Oncology at [facility] within 1 week of discharge, Call to schedule an appointment     PRINCIPAL DISCHARGE DIAGNOSIS  Diagnosis: Metastasis to bone  Assessment and Plan of Treatment: You recieved Radiation treatment in the hospital  continue Tylenol as needed for pain  complete steriods as directed  Toe toe Weight bearing to Left Lower Extremity with walker,  follow up with Oncology at [facility] within 1 week of discharge, Call to schedule an appointment   Follow up with Orthopedics Dr. Francis Delgadillo or Dr. Lind in 6 weeks  following discharge for repeat Xrays and advancement of weight bearing status. call to schedule an appointment      SECONDARY DISCHARGE DIAGNOSES  Diagnosis: Lichen simplex  Assessment and Plan of Treatment: Continue Topical cream to affected site  as prescribed  Follow up with Dermatology in 1 week, call to schedule an appointment [phone].  [address], [place] [address].    Diagnosis: SIRS (systemic inflammatory response syndrome)  Assessment and Plan of Treatment: Resolved    Diagnosis: Hodgkins lymphoma in relapse  Assessment and Plan of Treatment: follow up with Oncology at [facility] within 1 week of discharge, Call to schedule an appointment     PRINCIPAL DISCHARGE DIAGNOSIS  Diagnosis: Metastasis to bone  Assessment and Plan of Treatment: You recieved Radiation treatment in the hospital  continue Tylenol as needed for pain  complete steriods as directed  Toe toe Weight bearing to Left Lower Extremity with walker or Crutches  follow up with Oncology at [facility] within 1 week of discharge, Call to schedule an appointment   Follow up with Orthopedics Dr. Francis Delgadillo or Dr. Lind in 6 weeks  following discharge for repeat Xrays and advancement of weight bearing status. call to schedule an appointment      SECONDARY DISCHARGE DIAGNOSES  Diagnosis: Lichen simplex  Assessment and Plan of Treatment: Continue Topical cream to affected site  as prescribed  Follow up with Dermatology in 1 week, call to schedule an appointment [phone].  [address], [place] [address].    Diagnosis: SIRS (systemic inflammatory response syndrome)  Assessment and Plan of Treatment: Resolved    Diagnosis: Hodgkins lymphoma in relapse  Assessment and Plan of Treatment: follow up with Oncology at [facility] within 1 week of discharge, Call to schedule an appointment

## 2020-10-21 NOTE — CHART NOTE - TREATMENT: THE FOLLOWING DIET HAS BEEN RECOMMENDED
Please see full Dietitian Initial Eval completed in Documents Section of electronic chart for recommendations

## 2020-10-21 NOTE — DIETITIAN INITIAL EVALUATION ADULT. - PROBLEM SELECTOR PLAN 4
- Noted to have significant coagulopathy at [facility], no significant LFT abnormalities, likely nutritional given his significant weight loss  - Will trend for now

## 2020-10-21 NOTE — DISCHARGE NOTE PROVIDER - CARE PROVIDER_API CALL
Kalie Emery (DO)  Hematology; Medical Oncology  [address] [place] [address]  Phone: [phone]  Fax: [phone]  Follow Up Time:     Francis Delgadillo)  [facility]  [address], [place] [address]  Phone: [phone]  Fax: [phone]  Follow Up Time:

## 2020-10-21 NOTE — DISCHARGE NOTE PROVIDER - NSDCMRMEDTOKEN_GEN_ALL_CORE_FT
acetaminophen 325 mg oral tablet: 2 tab(s) orally every 6 hours, As needed, Temp greater or equal to 38C (100.4F), Mild Pain (1 - 3), Moderate Pain (4 - 6)  chlorhexidine 2% topical pad: 1 application topically once a day  dexamethasone: 4 milligram(s) orally every 6 hours  enoxaparin: 40 milligram(s) injectable once a day  levoFLOXacin: 500 milligram(s) intravenous once a day  ondansetron 2 mg/mL injectable solution: 4 milligram(s) injectable every 6 hours  pantoprazole 40 mg oral delayed release tablet: 1 tab(s) orally once a day (before a meal)  triamcinolone 0.1% topical ointment: 1 application topically every 12 hours   acetaminophen 325 mg oral tablet: 2 tab(s) orally every 6 hours, As needed, Temp greater or equal to 38C (100.4F), Mild Pain (1 - 3), Moderate Pain (4 - 6)  calamine topical lotion: 1 application topically 2 times a day  dexamethasone 4 mg oral tablet: 1 tab(s) orally every 8 hours x 1 day then  1 tab orally q 12hrs x 1 day then   1 tab orally q day x 1 day then STOP  lactobacillus acidophilus oral capsule: 1 cap(s) orally 3 times a day   pantoprazole 40 mg oral delayed release tablet: 1 tab(s) orally once a day (before a meal)  triamcinolone 0.1% topical ointment: 1 application topically every 12 hours   acetaminophen 325 mg oral tablet: 2 tab(s) orally every 6 hours, As needed, Temp greater or equal to 38C (100.4F), Mild Pain (1 - 3), Moderate Pain (4 - 6)  calamine topical lotion: 1 application topically 2 times a day  dexamethasone 4 mg oral tablet: 1 tab(s) orally every 8 hours x 1 day then  1 tab orally q 12hrs x 1 day then   1 tab orally q day x 1 day then STOP  lactobacillus acidophilus oral capsule: 1 cap(s) orally 3 times a day   Outpatient Physical therapy : 3x/ weekly x 6 weeks    Toe touch weight bearing to LLE  pantoprazole 40 mg oral delayed release tablet: 1 tab(s) orally once a day (before a meal)  triamcinolone 0.1% topical ointment: 1 application topically every 12 hours

## 2020-10-21 NOTE — CHART NOTE - NSCHARTNOTEFT_GEN_A_CORE
NUTRITION SERVICES     Upon Nutritional Assessment by the Registered Dietitian your patient was determined to meet criteria/ has evidence of the following diagnosis/diagnoses:  [ ] Mild Protein Calorie Malnutrition   [ ] Moderate Protein Calorie Malnutrition   [ X] Severe Protein Calorie Malnutrition   [ ] Unspecified Protein Calorie Malnutrition   [X ] Underweight / BMI <19  [ ] Morbid Obesity / BMI >40    Findings as based on:  •  Comprehensive nutritional assessment and consultation    Please refer to Initial Dietitian Evaluation or Nutrition Follow-up via documents section of [facility] for further recommendations.

## 2020-10-21 NOTE — PROGRESS NOTE ADULT - PROBLEM SELECTOR PLAN 3
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

## 2020-10-21 NOTE — PROGRESS NOTE ADULT - PROBLEM SELECTOR PLAN 2
- Initial fevers with leukocytosis, improving with levofloxacin  -  started on 10/17, all cultures so far negative  - Possibly also has SIRS 2/2 lymphoma/steroids  fevers/leukocytosis likely due to lymphoma, discussed with heme on 10/20 ok to d/c levaquin, pt had diarrhea today no more diarrhea

## 2020-10-21 NOTE — PROGRESS NOTE ADULT - PROBLEM SELECTOR PLAN 1
- Plan for RT, seen by rad onc, going for RT today, will need 1 more fraction tomorrow per RT  - Heme consulted to evaluate for further therapy, discussed with heme on 10/20 ok to d/c [facility] pt appears to be compliant with treatment, no need to get psych consult at this time.  - Ortho follg for L acetabulum lytic lesion and throughout spine,- ( likely metastatic disease. neurologically intact)  - Pain control  - Recommend partial weight bearing LLE with crutch/walker  - PT/OT  - c/w neuro checks q4h, c/w decadron  - cont ppi  - Monitor CBC with diff and TLS labs (CMP, Phos, LDH, Uric Acid)

## 2020-10-21 NOTE — DIETITIAN INITIAL EVALUATION ADULT. - PROBLEM SELECTOR PLAN 3
- Evaled by dermatology at [facility], determined to ne chronic nodular lichen simplex, recommended for sarna but not available at [facility]  - Will place on calamine lotion, c/w steroids as per prior [facility] stay as patient reports improvement in rash with that  - Monitor rash

## 2020-10-21 NOTE — DISCHARGE NOTE PROVIDER - CARE PROVIDERS DIRECT ADDRESSES
[email or web address]

## 2020-10-21 NOTE — DIETITIAN INITIAL EVALUATION ADULT. - OTHER INFO
Met with patient at bedside. Endorses good appetite and PO intake during the course of admission. He refuses PO supplements. Denies difficulty chewing/swallowing. Pt denies nausea, vomiting, diarrhea, or constipation. Patient reports weight loss despite increasing po intake PTA. Usual weight reported to be 110lbs and currently weighs 39kg/85.lbs indicative of 24 lbs weight loss over 1 year.     Encouraged continue good po intake of nutrient and protein dense foods. Reviewed High Biological Value Protein sources (i.e. chicken, turkey, fish, eggs, etc.). Discussed Strategies to increase kcal and protein intake help prevent further weight loss. Suggested nutrient-dense snacks between meals, adequate protein intake and menu order procedures in-house reviewed.

## 2020-10-21 NOTE — DIETITIAN INITIAL EVALUATION ADULT. - CHIEF COMPLAINT
20 y/o Male with medical history of metastatic lymphoma presents to [facility] for evaluation for RT to his metastatic spine and pelvic lesion per chart review.

## 2020-10-21 NOTE — DIETITIAN INITIAL EVALUATION ADULT. - ORAL INTAKE PTA/DIET HISTORY
Endorses good appetite and PO intake at home, denies changes in PO intake. Allergic to Squash and same implemented.  Observes no beef and pork otherwise, does not adhere to any diet restrictions and follows regular diet. Pt reports not taking any vitamins or nutritional supplements PTA. Patient states that all nutritional supplements leads to him vomiting therefore does not take them.

## 2020-10-21 NOTE — DISCHARGE NOTE PROVIDER - HOSPITAL COURSE
19M with history of Stage 4 Hodgkin's Lymphoma (Dx 9/2018, s/p 3 lines of therapy but limited 2/2 patient nonadherence/reluctance to continue treatment) who initially presented to  with complaints of severe hip and back pain with LE weakness (2/2 pain as per patient). Was transferred to [facility], workup, pt was found to have extensive bony disease with multiple vertebral compression fractures, and tumor invasion into the spinal canal, L. acetabulum lesion. Pt was evaluated by orthopedics, no surgical intervention. Orthopedics recommended Partial WB LLE w/crutch or walker. Rad/onc was consulted, RT recommended, pt was transferred to [facility] for RT. Pt is simulation 10/20 for T/L spine, R. hip RT. Pt s/p 1 fraction of RT to T/L. Spine,     Oncology also consulted, Pt has had multiple line of treatment however was inconsistent with treatment and non-adherent. Hx was also leaving AMA.     PT eval-Rec. outpatient PT 19M with history of Stage 4 Hodgkin's Lymphoma (Dx 9/2018, s/p 3 lines of therapy but limited 2/2 patient nonadherence/reluctance to continue treatment) who initially presented to  with complaints of severe hip and back pain with LE weakness (2/2 pain as per patient). Was transferred to [facility], workup, pt was found to have extensive bony disease with multiple vertebral compression fractures, and tumor invasion into the spinal canal, L. acetabulum lesion. Pt was evaluated by orthopedics, no surgical intervention. Orthopedics recommended Partial WB LLE w/crutch or walker. Rad/onc was consulted, RT recommended, pt was transferred to [facility] for RT. Pt is simulation 10/20 for T/L spine, R. hip RT. Pt s/p 1 fraction of RT to T/L. Spine on 10/21, and 1 fraction to R. hip on 10/22.     Oncology also consulted, Pt has had multiple line of treatment however was inconsistent with treatment and non-adherent. Hx was also leaving AMA.     PT eval-Rec. outpatient PT    Pt is medically stable for discharge. Pt is to f/u with Oncology, orthopedics, Dermatology. 19M with history of Stage 4 Hodgkin's Lymphoma (Dx 9/2018, s/p 3 lines of therapy but limited 2/2 patient nonadherence/reluctance to continue treatment) who initially presented to  with complaints of severe hip and back pain with LE weakness (2/2 pain as per patient). Was transferred to [facility], workup, pt was found to have extensive bony disease with multiple vertebral compression fractures, and tumor invasion into the spinal canal, L. acetabulum lesion. Pt was evaluated by orthopedics, no surgical intervention. Orthopedics recommended Partial WB LLE w/crutch or walker. Rad/onc was consulted, RT recommended, pt was transferred to [facility] for RT. Pt is simulation 10/20 for T/L spine, R. hip RT. Pt s/p 1 fraction of RT to T/L. Spine on 10/21, and 1 fraction to R. hip on 10/22.  As per discussion with hematology, decadron to be rapidly tapered off. Pt's pain improved and well- controlled with Tylenol at this time.    Oncology also consulted, Pt has had multiple line of treatment however was inconsistent with treatment and non-adherent. Hx was also leaving AMA.     PT eval-Rec. outpatient PT    Pt is medically stable for discharge. Pt is to f/u with Oncology, orthopedics, Dermatology. 19M with history of Stage 4 Hodgkin's Lymphoma (Dx 9/2018, s/p 3 lines of therapy but limited 2/2 patient nonadherence/reluctance to continue treatment) who initially presented to  with complaints of severe hip and back pain with LE weakness (2/2 pain as per patient). Was transferred to [facility], workup, pt was found to have extensive bony disease with multiple vertebral compression fractures, and tumor invasion into the spinal canal, L. acetabulum lesion. Pt was evaluated by orthopedics, no surgical intervention. Orthopedics recommended Partial WB LLE w/crutch or walker. Rad/onc was consulted, RT recommended, pt was transferred to [facility] for RT. Pt is simulation 10/20 for T/L spine, R. hip RT. Pt s/p 1 fraction of RT to T/L. Spine on 10/21, and 1 fraction to R. hip on 10/22.  As per discussion with hematology, decadron to be rapidly tapered off. Pt's pain improved and well- controlled with Tylenol at this time.    Oncology also consulted, Pt has had multiple line of treatment however was inconsistent with treatment and non-adherent. Pt need to f/u as outpt.    PT eval-Rec. outpatient PT    Pt is medically stable for discharge. Pt is to f/u with Oncology, orthopedics, Dermatology.

## 2020-10-21 NOTE — DIETITIAN INITIAL EVALUATION ADULT. - CONTINUE CURRENT NUTRITION CARE PLAN
yes/1. Continue current diet order, which remains appropriate at this time. 2. Monitor weights, labs, BM's, skin integrity, p.o. intake. 3. Please Encourage po intake, assist with meals and menu selections, provide alternatives PRN. 4. Honor food and beverage preferences within diet restriction of patient in an effort to maximize level of nutrient intake.

## 2020-10-21 NOTE — PROGRESS NOTE ADULT - PROBLEM SELECTOR PLAN 6
DVT ppx - Lovenox, dose adjusted 2/2 low weight/BMI   PT eval  Diet - regular diet, ensure shake supplementation DVT ppx - Lovenox, dose adjusted 2/2 low weight/BMI   PT eval  Diet - regular diet, ensure shake supplementation.

## 2020-10-21 NOTE — DIETITIAN INITIAL EVALUATION ADULT. - PERTINENT LABORATORY DATA
10-21 Na139 mmol/L Glu 141 mg/dL<H> K+ 4.1 mmol/L Cr  0.50 mg/dL BUN 20 mg/dL 10-21 Phos 2.7 mg/dL 10-21 Alb 2.8 g/dL<L>

## 2020-10-22 ENCOUNTER — TRANSCRIPTION ENCOUNTER (OUTPATIENT)
Age: 20
End: 2020-10-22

## 2020-10-22 VITALS
RESPIRATION RATE: 18 BRPM | HEART RATE: 66 BPM | TEMPERATURE: 97 F | SYSTOLIC BLOOD PRESSURE: 107 MMHG | OXYGEN SATURATION: 100 % | DIASTOLIC BLOOD PRESSURE: 54 MMHG

## 2020-10-22 LAB
ALBUMIN SERPL ELPH-MCNC: 2.9 G/DL — LOW (ref 3.3–5)
ALP SERPL-CCNC: 165 U/L — SIGNIFICANT CHANGE UP (ref 60–270)
ALT FLD-CCNC: 49 U/L — HIGH (ref 4–41)
ANION GAP SERPL CALC-SCNC: 11 MMO/L — SIGNIFICANT CHANGE UP (ref 7–14)
AST SERPL-CCNC: 13 U/L — SIGNIFICANT CHANGE UP (ref 4–40)
BASOPHILS # BLD AUTO: 0.06 K/UL — SIGNIFICANT CHANGE UP (ref 0–0.2)
BASOPHILS NFR BLD AUTO: 0.4 % — SIGNIFICANT CHANGE UP (ref 0–2)
BILIRUB SERPL-MCNC: < 0.2 MG/DL — LOW (ref 0.2–1.2)
BUN SERPL-MCNC: 24 MG/DL — HIGH (ref 7–23)
CALCIUM SERPL-MCNC: 8.8 MG/DL — SIGNIFICANT CHANGE UP (ref 8.4–10.5)
CHLORIDE SERPL-SCNC: 99 MMOL/L — SIGNIFICANT CHANGE UP (ref 98–107)
CO2 SERPL-SCNC: 26 MMOL/L — SIGNIFICANT CHANGE UP (ref 22–31)
CREAT SERPL-MCNC: 0.46 MG/DL — LOW (ref 0.5–1.3)
CULTURE RESULTS: SIGNIFICANT CHANGE UP
CULTURE RESULTS: SIGNIFICANT CHANGE UP
EOSINOPHIL # BLD AUTO: 0 K/UL — SIGNIFICANT CHANGE UP (ref 0–0.5)
EOSINOPHIL NFR BLD AUTO: 0 % — SIGNIFICANT CHANGE UP (ref 0–6)
GLUCOSE SERPL-MCNC: 106 MG/DL — HIGH (ref 70–99)
HCT VFR BLD CALC: 33.9 % — LOW (ref 39–50)
HGB BLD-MCNC: 9.6 G/DL — LOW (ref 13–17)
IMM GRANULOCYTES NFR BLD AUTO: 4.8 % — HIGH (ref 0–1.5)
LDH SERPL L TO P-CCNC: 161 U/L — SIGNIFICANT CHANGE UP (ref 135–225)
LYMPHOCYTES # BLD AUTO: 0.47 K/UL — LOW (ref 1–3.3)
LYMPHOCYTES # BLD AUTO: 3.5 % — LOW (ref 13–44)
MAGNESIUM SERPL-MCNC: 2.2 MG/DL — SIGNIFICANT CHANGE UP (ref 1.6–2.6)
MCHC RBC-ENTMCNC: 21.5 PG — LOW (ref 27–34)
MCHC RBC-ENTMCNC: 28.3 % — LOW (ref 32–36)
MCV RBC AUTO: 75.8 FL — LOW (ref 80–100)
MONOCYTES # BLD AUTO: 0.37 K/UL — SIGNIFICANT CHANGE UP (ref 0–0.9)
MONOCYTES NFR BLD AUTO: 2.8 % — SIGNIFICANT CHANGE UP (ref 2–14)
NEUTROPHILS # BLD AUTO: 11.86 K/UL — HIGH (ref 1.8–7.4)
NEUTROPHILS NFR BLD AUTO: 88.5 % — HIGH (ref 43–77)
NRBC # FLD: 0.05 K/UL — SIGNIFICANT CHANGE UP (ref 0–0)
PHOSPHATE SERPL-MCNC: 3.2 MG/DL — SIGNIFICANT CHANGE UP (ref 2.5–4.5)
PLATELET # BLD AUTO: 713 K/UL — HIGH (ref 150–400)
PMV BLD: 8.6 FL — SIGNIFICANT CHANGE UP (ref 7–13)
POTASSIUM SERPL-MCNC: 4.5 MMOL/L — SIGNIFICANT CHANGE UP (ref 3.5–5.3)
POTASSIUM SERPL-SCNC: 4.5 MMOL/L — SIGNIFICANT CHANGE UP (ref 3.5–5.3)
PROT SERPL-MCNC: 6.3 G/DL — SIGNIFICANT CHANGE UP (ref 6–8.3)
RBC # BLD: 4.47 M/UL — SIGNIFICANT CHANGE UP (ref 4.2–5.8)
RBC # FLD: 20.2 % — HIGH (ref 10.3–14.5)
SODIUM SERPL-SCNC: 136 MMOL/L — SIGNIFICANT CHANGE UP (ref 135–145)
SPECIMEN SOURCE: SIGNIFICANT CHANGE UP
SPECIMEN SOURCE: SIGNIFICANT CHANGE UP
URATE SERPL-MCNC: 5.3 MG/DL — SIGNIFICANT CHANGE UP (ref 3.4–8.8)
WBC # BLD: 13.4 K/UL — HIGH (ref 3.8–10.5)
WBC # FLD AUTO: 13.4 K/UL — HIGH (ref 3.8–10.5)

## 2020-10-22 PROCEDURE — 77280 THER RAD SIMULAJ FIELD SMPL: CPT | Mod: 26

## 2020-10-22 PROCEDURE — 77431 RADIATION THERAPY MANAGEMENT: CPT

## 2020-10-22 PROCEDURE — 99239 HOSP IP/OBS DSCHRG MGMT >30: CPT

## 2020-10-22 PROCEDURE — 99233 SBSQ HOSP IP/OBS HIGH 50: CPT

## 2020-10-22 RX ORDER — PANTOPRAZOLE SODIUM 20 MG/1
1 TABLET, DELAYED RELEASE ORAL
Qty: 14 | Refills: 0
Start: 2020-10-22 | End: 2020-11-04

## 2020-10-22 RX ORDER — LACTOBACILLUS ACIDOPHILUS 100MM CELL
1 CAPSULE ORAL
Qty: 30 | Refills: 0
Start: 2020-10-22 | End: 2020-10-31

## 2020-10-22 RX ORDER — DIPHENHYDRAMINE HYDROCHLORIDE AND LIDOCAINE HYDROCHLORIDE AND ALUMINUM HYDROXIDE AND MAGNESIUM HYDRO
10 KIT EVERY 6 HOURS
Refills: 0 | Status: DISCONTINUED | OUTPATIENT
Start: 2020-10-22 | End: 2020-10-22

## 2020-10-22 RX ORDER — DEXAMETHASONE 0.5 MG/5ML
1 ELIXIR ORAL
Qty: 6 | Refills: 0
Start: 2020-10-22

## 2020-10-22 RX ORDER — CALAMINE AND ZINC OXIDE AND PHENOL 160; 10 MG/ML; MG/ML
1 LOTION TOPICAL
Qty: 1 | Refills: 0
Start: 2020-10-22

## 2020-10-22 RX ORDER — CHLORHEXIDINE GLUCONATE 213 G/1000ML
1 SOLUTION TOPICAL DAILY
Refills: 0 | Status: DISCONTINUED | OUTPATIENT
Start: 2020-10-22 | End: 2020-10-22

## 2020-10-22 RX ORDER — INFLUENZA VIRUS VACCINE 15; 15; 15; 15 UG/.5ML; UG/.5ML; UG/.5ML; UG/.5ML
0.5 SUSPENSION INTRAMUSCULAR ONCE
Refills: 0 | Status: COMPLETED | OUTPATIENT
Start: 2020-10-22 | End: 2020-10-22

## 2020-10-22 RX ADMIN — Medication 4 MILLIGRAM(S): at 05:43

## 2020-10-22 RX ADMIN — Medication 1 APPLICATION(S): at 05:43

## 2020-10-22 RX ADMIN — INFLUENZA VIRUS VACCINE 0.5 MILLILITER(S): 15; 15; 15; 15 SUSPENSION INTRAMUSCULAR at 11:58

## 2020-10-22 RX ADMIN — CHLORHEXIDINE GLUCONATE 1 APPLICATION(S): 213 SOLUTION TOPICAL at 11:57

## 2020-10-22 RX ADMIN — CALAMINE AND ZINC OXIDE AND PHENOL 1 APPLICATION(S): 160; 10 LOTION TOPICAL at 05:43

## 2020-10-22 RX ADMIN — Medication 650 MILLIGRAM(S): at 05:46

## 2020-10-22 RX ADMIN — Medication 650 MILLIGRAM(S): at 06:15

## 2020-10-22 RX ADMIN — PANTOPRAZOLE SODIUM 40 MILLIGRAM(S): 20 TABLET, DELAYED RELEASE ORAL at 05:44

## 2020-10-22 RX ADMIN — Medication 4 MILLIGRAM(S): at 11:57

## 2020-10-22 NOTE — PROGRESS NOTE ADULT - PROBLEM SELECTOR PLAN 3
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

## 2020-10-22 NOTE — CONSULT NOTE ADULT - SUBJECTIVE AND OBJECTIVE BOX
Patient is a 19y Male Hx stage 4 Hodgkins lymphoma (s/p chemo) who presents c/o 1 month of low back pain and bilateral hip pain. Patient developed atraumatic low back pain 1 month ago, which has increased in severity over time. The pain spread to his bilateral hips. For the last 2 weeks he has not been able to ambulate due to bilateral hip pain and weakness, L>R. He points to his ASIS when describing his pain. Denies pain/injury elsewhere. Denies numbness/tingling/paresthesias/weakness in his extremities. Denies bowel/bladder incontinence. Denies fevers/chills. No other complaints at this time. Patient was initially transferred to [facility] from [place] ED due to CT chest showing L1, L4 pathologic compression fracture with severe spinal canal compression. Patient was then transferred to [facility] for inpatient XRT.     Today he reports doing well, no complaints of pain. Able to get OOB and ambulate comfortably with a walker.

## 2020-10-22 NOTE — PROGRESS NOTE ADULT - PROBLEM SELECTOR PLAN 4
- Noted to have significant coagulopathy at [facility], no significant LFT abnormalities, likely nutritional given his significant weight loss  - Will trend for now.

## 2020-10-22 NOTE — CONSULT NOTE ADULT - ASSESSMENT
NAD AOX3    LLE:   skin intact, SILT s/s/sp/dp/tib, Motor intact gs/ta/fhl/ehl, cap refill <3 sec   Full hip ROM without pain.   Neg SLR.

## 2020-10-22 NOTE — PROGRESS NOTE ADULT - ASSESSMENT
This is a 19M with metastatic lymphoma who presents to [facility] for evaluation for radiation therapy to his metastatic spine and pelvic lesions.

## 2020-10-22 NOTE — PROGRESS NOTE ADULT - PROBLEM SELECTOR PLAN 1
- Plan for RT, seen by rad onc, completed  RT today  - Heme consulted to evaluate for further therapy, discussed with heme on 10/20 ok to d/c [facility], pt appears to be compliant with treatment, no need to get psych consult at this time.  - Ortho follg for L acetabulum lytic lesion and throughout spine,- ( likely metastatic disease. neurologically intact)  - Pain control  - Recommend partial weight bearing LLE with crutch/walker  - PT/OT  - c/w neuro checks q4h, c/w decadron  - cont ppi  - per heme decadron taper, to f/u with heme as outpt  - ok to d/c today per heme, d/c today, d/c planning time spent in coordination 40 mts ( discussion with heme, preparing d/c summary and plan)

## 2020-10-22 NOTE — PROGRESS NOTE ADULT - PROBLEM SELECTOR PLAN 6
DVT ppx - Lovenox, dose adjusted 2/2 low weight/BMI   PT eval  Diet - regular diet, ensure shake supplementation.

## 2020-10-22 NOTE — DISCHARGE NOTE NURSING/CASE MANAGEMENT/SOCIAL WORK - PATIENT PORTAL LINK FT
You can access the FollowMyHealth Patient Portal offered by [facility] by registering at the following website: [email or web address]. By joining [facility]’s FollowMyHealth portal, you will also be able to view your health information using other applications (apps) compatible with our system.

## 2020-10-22 NOTE — CHART NOTE - NSCHARTNOTEFT_GEN_A_CORE
SUBJECTIVE:    CC: mouth sores  HPI: Notified by Rn that Pt c/o mouth sores. Pt assessed at bedside. He reports experiencing a bad taste in his mouth and his oral mucosa feeling "different" since this am. The feeling reminds him of when he had oral mucositis s/p chemo a few months ago. He states he is not currently in any pain but is concerned he may develop worsening painful sores. Denies F/C, N/V, HA, CP, SOB, palpitations, cough, dysphagia, odynophagia, diaphoresis, sore throat, abd pain, diarrhea, numbness, weakness, rashes, pain.       OBJECTIVE:  Vital Signs Last 24 Hrs  T(C): 36.9 (10-21-20 @ 20:10), Max: 36.9 (10-21-20 @ 20:10)  T(F): 98.4 (10-21-20 @ 20:10), Max: 98.4 (10-21-20 @ 20:10)  HR: 82 (10-21-20 @ 20:10) (60 - 82)  BP: 91/48 (10-21-20 @ 20:10) (91/48 - 111/81)  RR: 17 (10-21-20 @ 20:10) (17 - 18)  SpO2: 100% (10-21-20 @ 20:10) (96% - 100%) on (O2)    Physical Exam:   General: NAD, A&Ox3, WN/WD  ENT: no external lesions noted, oral mucosa moist, without erythema, edema, exudates, or ulceration, non tender to palpation  Neck: Palpable LAD as previously noted.   Resp: CTA b/l, no wheezing, rales, rhonchi  Cardio: RRR, nl S1/2, no murmurs, rubs, or gallops  Psych: appropriate mood/affect                          9.3    19.90 )-----------( 693      ( 21 Oct 2020 06:25 )             33.1     10-21    139  |  103  |  20  ----------------------------<  141<H>  4.1   |  24  |  0.50    Ca    9.0      21 Oct 2020 06:25  Phos  2.7     10-21  Mg     2.1     10-21    TPro  6.6  /  Alb  2.8<L>  /  TBili  < 0.2<L>  /  DBili  x   /  AST  19  /  ALT  58<H>  /  AlkPhos  185  10-21      MEDICATIONS  (STANDING):  calamine/zinc oxide Lotion 1 Application(s) Topical two times a day  chlorhexidine 4% Liquid 1 Application(s) Topical once  dexAMETHasone     Tablet 4 milliGRAM(s) Oral every 6 hours  enoxaparin Injectable 30 milliGRAM(s) SubCutaneous daily  influenza   Vaccine 0.5 milliLiter(s) IntraMuscular once  pantoprazole    Tablet 40 milliGRAM(s) Oral before breakfast  triamcinolone 0.1% Ointment 1 Application(s) Topical every 12 hours    MEDICATIONS  (PRN):  acetaminophen   Tablet .. 650 milliGRAM(s) Oral every 6 hours PRN Mild Pain (1 - 3), Moderate Pain (4 - 6)  FIRST- Mouthwash  BLM 10 milliLiter(s) Swish and Spit every 6 hours PRN oral pain  ondansetron Injectable 4 milliGRAM(s) IV Push every 6 hours PRN Nausea    Reviewed all relevant lab results, tests, radiology studies, medications, telemetry, nursing assessments, and EKG.     ASSESSMENT:    19y Male admitted with complaints of Patient is a 19y old  Male who presents with a chief complaint of Metastatic Lymphoma, RT eval (21 Oct 2020 17:21)  Now c/o mouth sores     Problem/Plan   1) Possible Oral mucositis 2/2 RT  Magic mouthwash prn      Case d/w Pt/RN, in agreement w/ assessment and plan. Pt to alert staff of any new or worsening symptoms.     Will continue to monitor

## 2020-10-22 NOTE — PROGRESS NOTE ADULT - REASON FOR ADMISSION
Metastatic Lymphoma, RT eval

## 2020-10-22 NOTE — CONSULT NOTE ADULT - ATTENDING COMMENTS
Agree with above. This is a 19M w/ hx of CHL (9/2018), s/p multiple lines of therapy, with progression of disease s/p lapse in chemo for the past several months. CT (10/16/20) reveals a large erosive lesion with cortical breakthrough within the left anterior acetabuluar column. Currently admitted to [facility] for XRT.    Rec:  s/p XRT to L acetabulum.  Chemo per heme-onc.  DVT Px per primary team.  PWB LLE with walker.  No acute orthopaedic surgical intervention.  FU in 6 weeks for repeat xrays and possible advancement of weight bearing.  Call [phone] to make an appointment.  All questions answered.
ARMANDO Lai is a 19 year old male with a history of relapsed Hodgkins Lymphoma; The patient has not been fully in compliance with IV chemotherapy. Patient is now transferred to [facility] for radiation therapy which may include mantle inverted Y technique.   The patient is comfortable on examination today and no peripheral lymph nodes are felt.

## 2020-10-22 NOTE — PROGRESS NOTE ADULT - SUBJECTIVE AND OBJECTIVE BOX
Patient is a 19y old  Male who presents with a chief complaint of Metastatic Lymphoma, RT eval (22 Oct 2020 10:29)      SUBJECTIVE / OVERNIGHT EVENTS: Pt seen and examined at 10:40am, no overnight events, pt had RT again today, is anxious to go home. Denies any complaints. No other new issues reported.        MEDICATIONS  (STANDING):  calamine/zinc oxide Lotion 1 Application(s) Topical two times a day  chlorhexidine 4% Liquid 1 Application(s) Topical daily  dexAMETHasone     Tablet 4 milliGRAM(s) Oral every 6 hours  enoxaparin Injectable 30 milliGRAM(s) SubCutaneous daily  pantoprazole    Tablet 40 milliGRAM(s) Oral before breakfast  triamcinolone 0.1% Ointment 1 Application(s) Topical every 12 hours    MEDICATIONS  (PRN):  acetaminophen   Tablet .. 650 milliGRAM(s) Oral every 6 hours PRN Mild Pain (1 - 3), Moderate Pain (4 - 6)  FIRST- Mouthwash  BLM 10 milliLiter(s) Swish and Spit every 6 hours PRN oral pain  ondansetron Injectable 4 milliGRAM(s) IV Push every 6 hours PRN Nausea      Vital Signs Last 24 Hrs  T(C): 36.3 (22 Oct 2020 06:00), Max: 36.9 (21 Oct 2020 20:10)  T(F): 97.4 (22 Oct 2020 06:00), Max: 98.4 (21 Oct 2020 20:10)  HR: 66 (22 Oct 2020 06:00) (66 - 82)  BP: 107/54 (22 Oct 2020 06:00) (91/48 - 111/81)  BP(mean): --  RR: 18 (22 Oct 2020 06:00) (17 - 18)  SpO2: 100% (22 Oct 2020 06:00) (96% - 100%)  CAPILLARY BLOOD GLUCOSE        I&O's Summary      PHYSICAL EXAM:  GENERAL: NAD, thinly built male  HEENT: no oral lesions  CHEST/LUNG: Clear to auscultation bilaterally; No wheeze  HEART: Regular rate and rhythm; No murmurs  ABDOMEN: Soft, Nontender, Nondistended  EXTREMITIES: no LE edema  PSYCH: Calm  NEUROLOGY: AAOx3  SKIN: No rashes or lesions    LABS:                        9.6    13.40 )-----------( 713      ( 22 Oct 2020 06:25 )             33.9     10-22    136  |  99  |  24<H>  ----------------------------<  106<H>  4.5   |  26  |  0.46<L>    Ca    8.8      22 Oct 2020 06:25  Phos  3.2     10-22  Mg     2.2     10-22    TPro  6.3  /  Alb  2.9<L>  /  TBili  < 0.2<L>  /  DBili  x   /  AST  13  /  ALT  49<H>  /  AlkPhos  165  10-22              RADIOLOGY & ADDITIONAL TESTS:    Imaging Personally Reviewed:    Consultant(s) Notes Reviewed:      Care Discussed with Consultants/Other Providers:

## 2020-10-27 ENCOUNTER — INPATIENT (INPATIENT)
Facility: HOSPITAL | Age: 20
LOS: 5 days | Discharge: HOME | DRG: 847 | End: 2020-11-02
Attending: HOSPITALIST | Admitting: STUDENT IN AN ORGANIZED HEALTH CARE EDUCATION/TRAINING PROGRAM
Payer: MEDICAID

## 2020-10-27 VITALS
HEIGHT: 68 IN | RESPIRATION RATE: 18 BRPM | DIASTOLIC BLOOD PRESSURE: 63 MMHG | WEIGHT: 85.1 LBS | HEART RATE: 115 BPM | TEMPERATURE: 98 F | OXYGEN SATURATION: 99 % | SYSTOLIC BLOOD PRESSURE: 114 MMHG

## 2020-10-27 DIAGNOSIS — C81.70 OTHER HODGKIN LYMPHOMA, UNSPECIFIED SITE: ICD-10-CM

## 2020-10-27 DIAGNOSIS — Z29.9 ENCOUNTER FOR PROPHYLACTIC MEASURES, UNSPECIFIED: ICD-10-CM

## 2020-10-27 DIAGNOSIS — C81.90 HODGKIN LYMPHOMA, UNSPECIFIED, UNSPECIFIED SITE: ICD-10-CM

## 2020-10-27 DIAGNOSIS — R63.6 UNDERWEIGHT: ICD-10-CM

## 2020-10-27 LAB
ALBUMIN SERPL ELPH-MCNC: 3.1 G/DL — LOW (ref 3.3–5)
ALP SERPL-CCNC: 133 U/L — HIGH (ref 40–120)
ALT FLD-CCNC: 29 U/L — SIGNIFICANT CHANGE UP (ref 10–45)
ANION GAP SERPL CALC-SCNC: 10 MMOL/L — SIGNIFICANT CHANGE UP (ref 5–17)
ANISOCYTOSIS BLD QL: SLIGHT — SIGNIFICANT CHANGE UP
APPEARANCE UR: CLEAR — SIGNIFICANT CHANGE UP
APTT BLD: 34.7 SEC — SIGNIFICANT CHANGE UP (ref 27.5–35.5)
AST SERPL-CCNC: 10 U/L — SIGNIFICANT CHANGE UP (ref 10–40)
BACTERIA # UR AUTO: NEGATIVE — SIGNIFICANT CHANGE UP
BASOPHILS # BLD AUTO: 0 K/UL — SIGNIFICANT CHANGE UP (ref 0–0.2)
BASOPHILS NFR BLD AUTO: 0 % — SIGNIFICANT CHANGE UP (ref 0–2)
BILIRUB SERPL-MCNC: 0.3 MG/DL — SIGNIFICANT CHANGE UP (ref 0.2–1.2)
BILIRUB UR-MCNC: NEGATIVE — SIGNIFICANT CHANGE UP
BUN SERPL-MCNC: 14 MG/DL — SIGNIFICANT CHANGE UP (ref 7–23)
CALCIUM SERPL-MCNC: 8.8 MG/DL — SIGNIFICANT CHANGE UP (ref 8.4–10.5)
CHLORIDE SERPL-SCNC: 101 MMOL/L — SIGNIFICANT CHANGE UP (ref 96–108)
CO2 SERPL-SCNC: 29 MMOL/L — SIGNIFICANT CHANGE UP (ref 22–31)
COLOR SPEC: YELLOW — SIGNIFICANT CHANGE UP
CREAT SERPL-MCNC: 0.37 MG/DL — LOW (ref 0.5–1.3)
DIFF PNL FLD: NEGATIVE — SIGNIFICANT CHANGE UP
EOSINOPHIL # BLD AUTO: 0.49 K/UL — SIGNIFICANT CHANGE UP (ref 0–0.5)
EOSINOPHIL NFR BLD AUTO: 3.5 % — SIGNIFICANT CHANGE UP (ref 0–6)
EPI CELLS # UR: 0 /HPF — SIGNIFICANT CHANGE UP
GLUCOSE SERPL-MCNC: 94 MG/DL — SIGNIFICANT CHANGE UP (ref 70–99)
GLUCOSE UR QL: NEGATIVE — SIGNIFICANT CHANGE UP
HCT VFR BLD CALC: 32.5 % — LOW (ref 39–50)
HGB BLD-MCNC: 9.4 G/DL — LOW (ref 13–17)
HYPOCHROMIA BLD QL: SLIGHT — SIGNIFICANT CHANGE UP
INR BLD: 1.04 RATIO — SIGNIFICANT CHANGE UP (ref 0.88–1.16)
KETONES UR-MCNC: NEGATIVE — SIGNIFICANT CHANGE UP
LEUKOCYTE ESTERASE UR-ACNC: NEGATIVE — SIGNIFICANT CHANGE UP
LYMPHOCYTES # BLD AUTO: 0.73 K/UL — LOW (ref 1–3.3)
LYMPHOCYTES # BLD AUTO: 5.2 % — LOW (ref 13–44)
MACROCYTES BLD QL: SLIGHT — SIGNIFICANT CHANGE UP
MAGNESIUM SERPL-MCNC: 1.9 MG/DL — SIGNIFICANT CHANGE UP (ref 1.6–2.6)
MANUAL SMEAR VERIFICATION: SIGNIFICANT CHANGE UP
MCHC RBC-ENTMCNC: 22.6 PG — LOW (ref 27–34)
MCHC RBC-ENTMCNC: 28.9 GM/DL — LOW (ref 32–36)
MCV RBC AUTO: 78.1 FL — LOW (ref 80–100)
MICROCYTES BLD QL: SLIGHT — SIGNIFICANT CHANGE UP
MONOCYTES # BLD AUTO: 0.48 K/UL — SIGNIFICANT CHANGE UP (ref 0–0.9)
MONOCYTES NFR BLD AUTO: 3.4 % — SIGNIFICANT CHANGE UP (ref 2–14)
NEUTROPHILS # BLD AUTO: 12.3 K/UL — HIGH (ref 1.8–7.4)
NEUTROPHILS NFR BLD AUTO: 87.9 % — HIGH (ref 43–77)
NITRITE UR-MCNC: NEGATIVE — SIGNIFICANT CHANGE UP
PH UR: 7 — SIGNIFICANT CHANGE UP (ref 5–8)
PHOSPHATE SERPL-MCNC: 2.9 MG/DL — SIGNIFICANT CHANGE UP (ref 2.5–4.5)
PLAT MORPH BLD: NORMAL — SIGNIFICANT CHANGE UP
PLATELET # BLD AUTO: 384 K/UL — SIGNIFICANT CHANGE UP (ref 150–400)
POLYCHROMASIA BLD QL SMEAR: SLIGHT — SIGNIFICANT CHANGE UP
POTASSIUM SERPL-MCNC: 3.9 MMOL/L — SIGNIFICANT CHANGE UP (ref 3.5–5.3)
POTASSIUM SERPL-SCNC: 3.9 MMOL/L — SIGNIFICANT CHANGE UP (ref 3.5–5.3)
PROT SERPL-MCNC: 6.1 G/DL — SIGNIFICANT CHANGE UP (ref 6–8.3)
PROT UR-MCNC: NEGATIVE — SIGNIFICANT CHANGE UP
PROTHROM AB SERPL-ACNC: 12.5 SEC — SIGNIFICANT CHANGE UP (ref 10.6–13.6)
RBC # BLD: 4.16 M/UL — LOW (ref 4.2–5.8)
RBC # FLD: 24 % — HIGH (ref 10.3–14.5)
RBC BLD AUTO: ABNORMAL
RBC CASTS # UR COMP ASSIST: 0 /HPF — SIGNIFICANT CHANGE UP (ref 0–4)
SARS-COV-2 RNA SPEC QL NAA+PROBE: SIGNIFICANT CHANGE UP
SODIUM SERPL-SCNC: 140 MMOL/L — SIGNIFICANT CHANGE UP (ref 135–145)
SP GR SPEC: 1.02 — SIGNIFICANT CHANGE UP (ref 1.01–1.02)
TSH SERPL-MCNC: 1.6 UIU/ML — SIGNIFICANT CHANGE UP (ref 0.27–4.2)
URATE SERPL-MCNC: 3.7 MG/DL — SIGNIFICANT CHANGE UP (ref 3.4–8.8)
UROBILINOGEN FLD QL: NEGATIVE — SIGNIFICANT CHANGE UP
WBC # BLD: 13.99 K/UL — HIGH (ref 3.8–10.5)
WBC # FLD AUTO: 13.99 K/UL — HIGH (ref 3.8–10.5)
WBC UR QL: 0 /HPF — SIGNIFICANT CHANGE UP (ref 0–5)

## 2020-10-27 PROCEDURE — 99223 1ST HOSP IP/OBS HIGH 75: CPT | Mod: GC

## 2020-10-27 PROCEDURE — 93010 ELECTROCARDIOGRAM REPORT: CPT

## 2020-10-27 PROCEDURE — 99223 1ST HOSP IP/OBS HIGH 75: CPT

## 2020-10-27 PROCEDURE — 71045 X-RAY EXAM CHEST 1 VIEW: CPT | Mod: 26

## 2020-10-27 PROCEDURE — 99285 EMERGENCY DEPT VISIT HI MDM: CPT

## 2020-10-27 RX ORDER — OXYCODONE HYDROCHLORIDE 5 MG/1
5 TABLET ORAL EVERY 6 HOURS
Refills: 0 | Status: DISCONTINUED | OUTPATIENT
Start: 2020-10-27 | End: 2020-11-02

## 2020-10-27 RX ORDER — CALAMINE AND ZINC OXIDE AND PHENOL 160; 10 MG/ML; MG/ML
1 LOTION TOPICAL
Refills: 0 | Status: DISCONTINUED | OUTPATIENT
Start: 2020-10-27 | End: 2020-11-02

## 2020-10-27 RX ORDER — ACETAMINOPHEN 500 MG
650 TABLET ORAL EVERY 4 HOURS
Refills: 0 | Status: DISCONTINUED | OUTPATIENT
Start: 2020-10-27 | End: 2020-11-02

## 2020-10-27 RX ORDER — ENOXAPARIN SODIUM 100 MG/ML
40 INJECTION SUBCUTANEOUS DAILY
Refills: 0 | Status: DISCONTINUED | OUTPATIENT
Start: 2020-10-27 | End: 2020-11-02

## 2020-10-27 RX ADMIN — CALAMINE AND ZINC OXIDE AND PHENOL 1 APPLICATION(S): 160; 10 LOTION TOPICAL at 22:00

## 2020-10-27 RX ADMIN — OXYCODONE HYDROCHLORIDE 5 MILLIGRAM(S): 5 TABLET ORAL at 19:26

## 2020-10-27 NOTE — CONSULT NOTE ADULT - ASSESSMENT
Patient is a 20 y/o M w/ a PMHx of Stage IV CHL (Dx 9/2018, s/p multiple lines of therapy which has been c/b nonadherence and patient refusal of treatment) who has been admitted for considering of inpatient chemo.       # Stage IV CHL  - Diagnosed in 9/2018. He was initially treated following ABVE-PC which was completed in 12/2018 after 5 cycles due to nonadherence and patient refusal. He then relapsed in 2/2019. He was started on salvage treatment in April 2019 with gemzar/brentuximab which was also stopped due to the patient's refusal and nonadherence. Patient was then recommended to have 2 cycles of ICE with intent to pursue an autologous bone marrow transplant. The patient began the cycle and then left AMA unfortunately and so this was never completed.   - Recently complete XRT to spine on 10/22 at [facility]   - c/w CBC WITH DIFF and TLS labs (CMP, Phos, LDH, Uric acid) daily  - Will d/w Dr. Emery inpatient nivolumab; once dose and regimen decided this will be begun asap (depending on pharm availability); the duration of treatment in usually only an hour and it is given every 2 or 4 weeks  - Patient states mediport not drawing- please arrange for port study  - outpatient f/u with primary hematologist Dr. Emery upon discharge      Taylor Duvall, PGY-4  Hematology-Oncology Fellow  [phone] ([place]) [phone] ([facility])  Please page fellow on call after 5pm and on weekends

## 2020-10-27 NOTE — H&P ADULT - ASSESSMENT
18 yo male w/pmh stage 4 Hodgkin lymphoma who presents to [facility] for inpatient chemo.

## 2020-10-27 NOTE — ED PROVIDER NOTE - NS ED ROS FT
GENERAL: No fever or chills, EYES: no change in vision, HEENT: no trouble swallowing or speaking, CARDIAC: no chest pain, PULMONARY: no cough or SOB, GI: no abdominal pain, no nausea, no vomiting, no diarrhea or constipation, : No changes in urination, SKIN: no rashes, NEURO: no headache,  MSK: +hip pain, No joint pain ~Peter Liang M.D. Resident

## 2020-10-27 NOTE — H&P ADULT - PROBLEM SELECTOR PLAN 3
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, [place] [address], phone number for appointment: [phone]

## 2020-10-27 NOTE — ED ADULT NURSE REASSESSMENT NOTE - NS ED NURSE REASSESS COMMENT FT1
pt introduced to oncoming RN, sitting comfortably in bed, on phone w/ family. pt reports L hip pain, requesting medication states his pain is 7/10 at rest and 10/10 when moving.

## 2020-10-27 NOTE — CONSULT NOTE ADULT - SUBJECTIVE AND OBJECTIVE BOX
HPI:  20 yo male w/pmh stage 4 Hodgkin lymphoma who presents to [facility] for inpatient chemo. He was recently discharged  on 10/22 from [facility] after receiving radiation therapy for extensive bony disease with multiple vertebral compression fractures, tumor invasion into the spinal canal, and L. acetabulum. He was seen by orthopedics who did not recommend surgery. He was called by his oncologist Dr. Emery to come in again for further inpatient treatment. He currently feels ok, has some baseline hip pain. He says he can walk independently, without a cane or walker, but needs to hold onto things when he walks. No recent falls. No fevers, chills, abdominal pain. He feels very hungry right now. He also has been having diarrhea since his last hospitalization, which is loose, not watery, non-bloody, but hasn't had any bowl movements yet today. Per both inpatient and outpatient notes, the patient has been non-compliant w/treatment due to concerns for side effects and concerns that treatment wasn't working. Per review of outpatient onc notes, there are many social/financial risk factors and barriers to care. Currently, he is ready to receive inpatient therapy for his cancer.    Hematologic History:  Patient was diagnosed with stage IV CHL in 9/2018. He was initially treated following ABVE-PC which was completed in 12/2018 after 5 cycles due to nonadherence and patient refusal. He then relapsed in 2/2019. He was started on salvage treatment in April 2019 with gemzar/brentuximab which was also stopped due to the patient's refusal and nonadherence. Patient was then recommended to have 2 cycles of ICE with intent to pursue an autologous bone marrow transplant. The patient began the cycle and then left AMA unfortunately and so this was never completed. Patient follows with Dr. Kalie Emery as an outpatient. He was last seen in clinic on 8/25/20. An extensive discussion regarding the need for treatment and the need for him to adhere to therapy took place; however, patient continued to not want to pursue further chemotherapy despite the discussion of death.         14 point ROS otherwise negative    PAST MEDICAL & SURGICAL HISTORY:  Hodgkins lymphoma in relapse    No significant past surgical history        Allergies    IV Contrast (Vomiting)  Rocephin (Pruritus)  Squash (Other)    Intolerances        MEDICATIONS  (STANDING):  calamine/zinc oxide Lotion 1 Application(s) Topical two times a day  enoxaparin Injectable 40 milliGRAM(s) SubCutaneous daily  multivitamin 1 Tablet(s) Oral daily  triamcinolone 0.1% Oral Paste 1 Application(s) Topical daily    MEDICATIONS  (PRN):  acetaminophen   Tablet .. 650 milliGRAM(s) Oral every 4 hours PRN Mild Pain (1 - 3)  oxyCODONE    IR 5 milliGRAM(s) Oral every 6 hours PRN Moderate Pain (4 - 6)      FAMILY HISTORY:  FH: Hodgkins disease  multiple distant family members        SOCIAL HISTORY: No EtOH, no tobacco    Height (cm): 172.7 (10-27 @ 09:28)  Weight (kg): 38.6 (10-27 @ 09:28)  BMI (kg/m2): 12.9 (10-27 @ 09:28)  BSA (m2): 1.42 (10-27 @ 09:28)    VITALS:   T(F): 98.4 (10-27-20 @ 19:05), Max: 98.5 (10-27-20 @ 12:10)  HR: 90 (10-27-20 @ 19:05)  BP: 110/69 (10-27-20 @ 19:05)  RR: 20 (10-27-20 @ 19:05)  SpO2: 99% (10-27-20 @ 19:05)  Wt(kg): --    PHYSICAL EXAM    GENERAL: NAD, well-developed  HEAD:  Atraumatic, Normocephalic  EYES: EOMI, PERRLA, conjunctiva and sclera clear  NECK: Supple, No JVD  CHEST/LUNG: Clear to auscultation bilaterally; No wheeze  HEART: Regular rate and rhythm; No murmurs, rubs, or gallops  ABDOMEN: Soft, Nontender, Nondistended; Bowel sounds present  EXTREMITIES:  2+ Peripheral Pulses, No clubbing, cyanosis, or edema  NEUROLOGY: non-focal; gait not assessed   SKIN: No rashes or lesions    LABS:                         9.4    13.99 )-----------( 384      ( 27 Oct 2020 10:59 )             32.5     10-27    140  |  101  |  14  ----------------------------<  94  3.9   |  29  |  0.37<L>    Ca    8.8      27 Oct 2020 10:59  Phos  2.9     10-27  Mg     1.9     10-27    TPro  6.1  /  Alb  3.1<L>  /  TBili  0.3  /  DBili  x   /  AST  10  /  ALT  29  /  AlkPhos  133<H>  10-27    Magnesium, Serum: 1.9 mg/dL (10-27 @ 10:59)  Phosphorus Level, Serum: 2.9 mg/dL (10-27 @ 10:59)  Uric Acid, Serum: 3.7 mg/dL (10-27 @ 10:59)    PT/INR - ( 27 Oct 2020 10:59 )   PT: 12.5 sec;   INR: 1.04 ratio         PTT - ( 27 Oct 2020 10:59 )  PTT:34.7 sec  .Blood Blood-Peripheral  10-17 @ 15:51   No Growth Final  --  --      .Blood Blood-Venous  08-16 @ 02:31   No Growth Final  --  --          IMAGING:

## 2020-10-27 NOTE — ED ADULT NURSE NOTE - NSIMPLEMENTINTERV_GEN_ALL_ED
Implemented All Universal Safety Interventions:  [place] to call system. Call bell, personal items and telephone within reach. Instruct patient to call for assistance. Room bathroom lighting operational. Non-slip footwear when patient is off stretcher. Physically safe environment: no spills, clutter or unnecessary equipment. Stretcher in lowest position, wheels locked, appropriate side rails in place.

## 2020-10-27 NOTE — H&P ADULT - PROBLEM SELECTOR PLAN 1
- f/u oncology consult - plan for inpatient therapy  - dx'ed in 2018, many social and financial barriers, and well as concerns for side effects, leading to issues with compliance with treatments  - extensive bony disease with multiple vertebral compression fractures, tumor invasion into the spinal canal, and L. acetabulum  - pain regimen for mild/moderate pain    Anemia  - stable hgb 9-10  - chronic  - iron studies done 10/19 normal iron    Leukocytosis  - was recently taking decadron, which has been tapered off  - chronic  - cont to monitor

## 2020-10-27 NOTE — ED PROVIDER NOTE - ATTENDING CONTRIBUTION TO CARE
------------ATTENDING NOTE------------   pt sent to ED by his oncologist for admission to start new therapy for Hodgkin's Lymphoma, pt otherwise at his baseline w/o acute complaint -->  - Hosea Khan MD    ----------------------------------------------

## 2020-10-27 NOTE — ED ADULT NURSE NOTE - OBJECTIVE STATEMENT
19YOM [facility] hodgkin's lymphoma presnets to ED sent by Dr. Reyes for admission. Pt was seen in [facility] last week, had radiation and discharged on Thursday. Denies fever, chills, cough, SOB, abd pain, NN/V/D, burning upon urination. Only complain is itching to his feet. Safety and comfort measures provided. Will continue to monitor.

## 2020-10-27 NOTE — ED PROVIDER NOTE - PHYSICAL EXAMINATION
Gen: AAOx3, non-toxic  Head: NCAT  HEENT: EOMI, oral mucosa moist, normal conjunctiva  Lung: CTAB, no respiratory distress, speaking in full sentences  CV: RRR, no murmurs, rubs or gallops  Abd: soft, NTND, no guarding, no CVA tenderness  MSK: no visible deformities, port site intact without signs of infections, erythema or drainage   Neuro: No focal sensory or motor deficits  Skin: Warm, well perfused, no rash  Psych: normal affect.   ~Peter Liang M.D. Resident

## 2020-10-27 NOTE — H&P ADULT - NSHPLABSRESULTS_GEN_ALL_CORE
Personally reviewed:  EKG: NSR, TWI in V1,     CXR: right side opacities present, unchanged from prior CXR, no effusions or congestion.

## 2020-10-27 NOTE — H&P ADULT - HISTORY OF PRESENT ILLNESS
20 yo male w/pmh stage 4 Hodgkin lymphoma who presents to [facility] for inpatient chemo. He was recently discharged  on 10/22 from [facility] after receiving radiation therapy for extensive bony disease with multiple vertebral compression fractures, tumor invasion into the spinal canal, and L. acetabulum. He was seen by orthopedics who did not recommend surgery. He was called by his oncologist Dr. Emery to come in again for further inpatient treatment. He currently feels ok, has some baseline hip pain. He says he can walk independently, without a cane or walker, but needs to hold onto things when he walks. No recent falls. No fevers, chills, abdominal pain. He feels very hungry right now. He also has been having diarrhea since his last hospitalization, which is loose, not watery, non-bloody, but hasn't had any bowl movements yet today. Per both inpatient and outpatient notes, the patient has been non-compliant w/treatment due to concerns for side effects and concerns that treatment wasn't working. Per review of outpatient onc notes, there are many social/financial risk factors and barriers to care. Currently, he is ready to receive inpatient therapy for his cancer.    In the ED, patient was hemodynamically stable, tachycardic to 110s, afebrile.

## 2020-10-27 NOTE — ED PROVIDER NOTE - PROGRESS NOTE DETAILS
Azul COCHRAN: Dr. Emery has been contacted and states that patient can be admitted to hospitalist and her team will follow

## 2020-10-27 NOTE — ED PROVIDER NOTE - OBJECTIVE STATEMENT
19yM h/o Hodgkin Lymphoma Stage IV B (diagnosed 2017) presents at request of Oncologist Dr. Kalie Emery to be admitted for new therapy. Endorse chronic b/l hip pain. No fever, chest pain, abd pain, nausea, vomiting.

## 2020-10-27 NOTE — ED ADULT NURSE REASSESSMENT NOTE - NS ED NURSE REASSESS COMMENT FT1
Tried accessing the port, pt states "it does not feel good, take it out." No blood return. Mediport de-accessed. MD Khan aware,

## 2020-10-28 DIAGNOSIS — F43.25 ADJUSTMENT DISORDER WITH MIXED DISTURBANCE OF EMOTIONS AND CONDUCT: ICD-10-CM

## 2020-10-28 LAB
ALBUMIN SERPL ELPH-MCNC: 3.2 G/DL — LOW (ref 3.3–5)
ALP SERPL-CCNC: 119 U/L — SIGNIFICANT CHANGE UP (ref 40–120)
ALT FLD-CCNC: 25 U/L — SIGNIFICANT CHANGE UP (ref 10–45)
ANION GAP SERPL CALC-SCNC: 9 MMOL/L — SIGNIFICANT CHANGE UP (ref 5–17)
AST SERPL-CCNC: 10 U/L — SIGNIFICANT CHANGE UP (ref 10–40)
BASOPHILS # BLD AUTO: 0.04 K/UL — SIGNIFICANT CHANGE UP (ref 0–0.2)
BASOPHILS NFR BLD AUTO: 0.3 % — SIGNIFICANT CHANGE UP (ref 0–2)
BILIRUB SERPL-MCNC: 0.3 MG/DL — SIGNIFICANT CHANGE UP (ref 0.2–1.2)
BUN SERPL-MCNC: 16 MG/DL — SIGNIFICANT CHANGE UP (ref 7–23)
CALCIUM SERPL-MCNC: 9.1 MG/DL — SIGNIFICANT CHANGE UP (ref 8.4–10.5)
CHLORIDE SERPL-SCNC: 101 MMOL/L — SIGNIFICANT CHANGE UP (ref 96–108)
CO2 SERPL-SCNC: 29 MMOL/L — SIGNIFICANT CHANGE UP (ref 22–31)
CREAT SERPL-MCNC: 0.38 MG/DL — LOW (ref 0.5–1.3)
EOSINOPHIL # BLD AUTO: 0.3 K/UL — SIGNIFICANT CHANGE UP (ref 0–0.5)
EOSINOPHIL NFR BLD AUTO: 2 % — SIGNIFICANT CHANGE UP (ref 0–6)
GLUCOSE SERPL-MCNC: 86 MG/DL — SIGNIFICANT CHANGE UP (ref 70–99)
HCT VFR BLD CALC: 31.1 % — LOW (ref 39–50)
HGB BLD-MCNC: 9.2 G/DL — LOW (ref 13–17)
IMM GRANULOCYTES NFR BLD AUTO: 1 % — SIGNIFICANT CHANGE UP (ref 0–1.5)
LDH SERPL L TO P-CCNC: 121 U/L — SIGNIFICANT CHANGE UP (ref 50–242)
LYMPHOCYTES # BLD AUTO: 0.71 K/UL — LOW (ref 1–3.3)
LYMPHOCYTES # BLD AUTO: 4.8 % — LOW (ref 13–44)
MAGNESIUM SERPL-MCNC: 1.9 MG/DL — SIGNIFICANT CHANGE UP (ref 1.6–2.6)
MCHC RBC-ENTMCNC: 23.2 PG — LOW (ref 27–34)
MCHC RBC-ENTMCNC: 29.6 GM/DL — LOW (ref 32–36)
MCV RBC AUTO: 78.3 FL — LOW (ref 80–100)
MONOCYTES # BLD AUTO: 0.87 K/UL — SIGNIFICANT CHANGE UP (ref 0–0.9)
MONOCYTES NFR BLD AUTO: 5.8 % — SIGNIFICANT CHANGE UP (ref 2–14)
NEUTROPHILS # BLD AUTO: 12.86 K/UL — HIGH (ref 1.8–7.4)
NEUTROPHILS NFR BLD AUTO: 86.1 % — HIGH (ref 43–77)
NRBC # BLD: 0 /100 WBCS — SIGNIFICANT CHANGE UP (ref 0–0)
PHOSPHATE SERPL-MCNC: 4 MG/DL — SIGNIFICANT CHANGE UP (ref 2.5–4.5)
PLATELET # BLD AUTO: 370 K/UL — SIGNIFICANT CHANGE UP (ref 150–400)
POTASSIUM SERPL-MCNC: 4.1 MMOL/L — SIGNIFICANT CHANGE UP (ref 3.5–5.3)
POTASSIUM SERPL-SCNC: 4.1 MMOL/L — SIGNIFICANT CHANGE UP (ref 3.5–5.3)
PROT SERPL-MCNC: 6 G/DL — SIGNIFICANT CHANGE UP (ref 6–8.3)
RBC # BLD: 3.97 M/UL — LOW (ref 4.2–5.8)
RBC # FLD: 24.2 % — HIGH (ref 10.3–14.5)
SODIUM SERPL-SCNC: 139 MMOL/L — SIGNIFICANT CHANGE UP (ref 135–145)
URATE SERPL-MCNC: 4.4 MG/DL — SIGNIFICANT CHANGE UP (ref 3.4–8.8)
WBC # BLD: 14.93 K/UL — HIGH (ref 3.8–10.5)
WBC # FLD AUTO: 14.93 K/UL — HIGH (ref 3.8–10.5)

## 2020-10-28 PROCEDURE — 99232 SBSQ HOSP IP/OBS MODERATE 35: CPT

## 2020-10-28 PROCEDURE — 99232 SBSQ HOSP IP/OBS MODERATE 35: CPT | Mod: GC

## 2020-10-28 RX ORDER — LIDOCAINE AND PRILOCAINE CREAM 25; 25 MG/G; MG/G
1 CREAM TOPICAL ONCE
Refills: 0 | Status: COMPLETED | OUTPATIENT
Start: 2020-10-28 | End: 2020-10-28

## 2020-10-28 RX ORDER — ALTEPLASE 100 MG
2 KIT INTRAVENOUS ONCE
Refills: 0 | Status: DISCONTINUED | OUTPATIENT
Start: 2020-10-28 | End: 2020-11-02

## 2020-10-28 RX ORDER — CHLORHEXIDINE GLUCONATE 213 G/1000ML
1 SOLUTION TOPICAL DAILY
Refills: 0 | Status: DISCONTINUED | OUTPATIENT
Start: 2020-10-28 | End: 2020-11-02

## 2020-10-28 RX ADMIN — CALAMINE AND ZINC OXIDE AND PHENOL 1 APPLICATION(S): 160; 10 LOTION TOPICAL at 12:35

## 2020-10-28 RX ADMIN — CHLORHEXIDINE GLUCONATE 1 APPLICATION(S): 213 SOLUTION TOPICAL at 11:26

## 2020-10-28 RX ADMIN — Medication 1 TABLET(S): at 13:41

## 2020-10-28 RX ADMIN — LIDOCAINE AND PRILOCAINE CREAM 1 APPLICATION(S): 25; 25 CREAM TOPICAL at 14:26

## 2020-10-28 RX ADMIN — CALAMINE AND ZINC OXIDE AND PHENOL 1 APPLICATION(S): 160; 10 LOTION TOPICAL at 18:05

## 2020-10-28 NOTE — BEHAVIORAL HEALTH ASSESSMENT NOTE - RISK ASSESSMENT
Low Acute Suicide Risk Risk factors: acute/chronic medical issues    Protective factors: no current suicidal/homicidal ideations intent or plan, No previous suicide attempt or self injurious behavior, no h/o psych admissions, no psychosis, domiciled, social supports
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
## 2020-10-28 NOTE — BEHAVIORAL HEALTH ASSESSMENT NOTE - OTHER PAST PSYCHIATRIC HISTORY (INCLUDE DETAILS REGARDING ONSET, COURSE OF ILLNESS, INPATIENT/OUTPATIENT TREATMENT)
anxiety 1 year ago, was treated with Xanax at [facility], stopped treatment 2 months after prescribed.

## 2020-10-28 NOTE — BEHAVIORAL HEALTH ASSESSMENT NOTE - NSBHCHARTREVIEWLAB_PSY_A_CORE FT
CBC Full  -  ( 28 Oct 2020 08:36 )  WBC Count : 14.93 K/uL  RBC Count : 3.97 M/uL  Hemoglobin : 9.2 g/dL  Hematocrit : 31.1 %  Platelet Count - Automated : 370 K/uL  Mean Cell Volume : 78.3 fl  Mean Cell Hemoglobin : 23.2 pg  Mean Cell Hemoglobin Concentration : 29.6 gm/dL  Auto Neutrophil # : 12.86 K/uL  Auto Lymphocyte # : 0.71 K/uL  Auto Monocyte # : 0.87 K/uL  Auto Eosinophil # : 0.30 K/uL  Auto Basophil # : 0.04 K/uL  Auto Neutrophil % : 86.1 %  Auto Lymphocyte % : 4.8 %  Auto Monocyte % : 5.8 %  Auto Eosinophil % : 2.0 %  Auto Basophil % : 0.3 %    10-28    139  |  101  |  16  ----------------------------<  86  4.1   |  29  |  0.38<L>    Ca    9.1      28 Oct 2020 08:36  Phos  4.0     10-28  Mg     1.9     10-28    TPro  6.0  /  Alb  3.2<L>  /  TBili  0.3  /  DBili  x   /  AST  10  /  ALT  25  /  AlkPhos  119  10-28    LIVER FUNCTIONS - ( 28 Oct 2020 08:36 )  Alb: 3.2 g/dL / Pro: 6.0 g/dL / ALK PHOS: 119 U/L / ALT: 25 U/L / AST: 10 U/L / GGT: x           Urinalysis Basic - ( 27 Oct 2020 20:08 )    Color: Yellow / Appearance: Clear / S.021 / pH: x  Gluc: x / Ketone: Negative  / Bili: Negative / Urobili: Negative   Blood: x / Protein: Negative / Nitrite: Negative   Leuk Esterase: Negative / RBC: 0 /hpf / WBC 0 /HPF   Sq Epi: x / Non Sq Epi: 0 /hpf / Bacteria: Negative

## 2020-10-28 NOTE — BEHAVIORAL HEALTH ASSESSMENT NOTE - NSBHCONSULTRECOMMENDOTHER_PSY_A_CORE FT
1. Pt appears to have decisional capacity to make treatment decisions going forward  2. If pt amendable start Remeron 15mg PO qhs PRN for weight gain and sleep aid 1. Pt appears to have decisional capacity to make treatment decisions going forward  2. If pt amendable start Remeron 15mg PO qhs PRN for weight gain and sleep aid (patient declined)

## 2020-10-28 NOTE — BEHAVIORAL HEALTH ASSESSMENT NOTE - SUMMARY
Patient is a 20 y/o [place]ian male, domiciled with mother, grandma, sister, brother, brother's wife, and girlfriend; PPHx of adjustment disorder, h/o social drinking and social use of cannabis, xanax, and promethazine, no psychiatric admissions, PMH of relapsing Hodgkin Lymphoma. Pt presented on 10/27/2020 for inpatient treatment for lymphoma. Psych consult called to assess capacity; patient has been skipping outpatient treatment.   Pt is A&Ox3, cooperative, appears underweight. Pt demonstrates linear thought process, appears euthymic with congruent affect. Pt states he is not refusing treatment since it no longer makes his hair fall out. States he does not attend outpatient chemo because the appointments are too early and chemo "makes him feel like crap"; reports being compliant with current inpatient treatment. Pt states he is amenable to doing outpatient treatment every 3 weeks as long as it's not too early. Pt demonstrates insight into disease, pt states he is okay with dying. Endorses feelings of sadness relating to his current situation. Pt endorses adequate sleep, only complaining of night sweats 2/2 GMC. Pt reports intact concentration, appetite, and energy. Denies SI/HI/ visual or auditory hallucinations.

## 2020-10-28 NOTE — BEHAVIORAL HEALTH ASSESSMENT NOTE - NSBHCONSULTFOLLOWAFTERCARE_PSY_A_CORE FT
Patient can be referred to [facility] outpatient clinic [phone].

## 2020-10-28 NOTE — BEHAVIORAL HEALTH ASSESSMENT NOTE - DESCRIPTION (FIRST USE, LAST USE, QUANTITY, FREQUENCY, DURATION)
Socially drinks social use of marijuana, first used at age 14. xanax, uses "occasionally", first used for sleep in 2017, last use a couple of months ago promethazine, has used 4 times, started a couple of months ago,  prescribed to mother for cough

## 2020-10-28 NOTE — BEHAVIORAL HEALTH ASSESSMENT NOTE - NSBHCHARTREVIEWVS_PSY_A_CORE FT
ICU Vital Signs Last 24 Hrs  T(C): 36.6 (28 Oct 2020 07:17), Max: 36.9 (27 Oct 2020 19:05)  T(F): 97.9 (28 Oct 2020 07:17), Max: 98.5 (27 Oct 2020 22:19)  HR: 85 (28 Oct 2020 07:17) (82 - 94)  BP: 102/64 (28 Oct 2020 07:17) (102/64 - 122/75)  BP(mean): --  ABP: --  ABP(mean): --  RR: 18 (28 Oct 2020 07:17) (18 - 20)  SpO2: 98% (28 Oct 2020 07:17) (98% - 100%)

## 2020-10-28 NOTE — CONSULT NOTE ADULT - ASSESSMENT
Vascular & Interventional Radiology Brief Consult Note    Evaluate for Procedure: Port evaluation study    HPI: 19y Male with Stage IV CHL (Dx 9/2018, s/p multiple lines of therapy which has been c/b nonadherence and patient refusal of treatment) who has been admitted for inpatient chemotherapy.       Allergies: IV Contrast (Vomiting)  Rocephin (Pruritus)    Medications (Abx/Cardiac/Anticoagulation/Blood Products)      Data:    T(C): 36.6  HR: 85  BP: 102/64  RR: 18  SpO2: 98%    -WBC 14.93 / HgB 9.2 / Hct 31.1 / Plt 370  -Na 139 / Cl 101 / BUN 16 / Glucose 86  -K 4.1 / CO2 29 / Cr 0.38  -ALT 25 / Alk Phos 119 / T.Bili 0.3  -INR 1.04 / PTT 34.7    Assessment/Plan:   - 19y Male with Stage IV CHL (Dx 9/2018, s/p multiple lines of therapy which has been c/b nonadherence and patient refusal of treatment) who has been admitted for inpatient chemotherapy. Port was noted to not be appropriately functioning/"not drawing".     - IR consulted for port evaluation study.   - Chart reviewed with Dr. Hernandez.   - Place IR procedure order in sunrise under Dr. Hernandez with plan to be performed within 24-48hours.

## 2020-10-28 NOTE — BEHAVIORAL HEALTH ASSESSMENT NOTE - CASE SUMMARY
This is a 19-y.o. CAM pt, single, with no kids, domiciled with mother and grandma and sister, now with his girlfriend, with PPHx of adjustment disorder, h/o social drinking and social use of cannabis, no psychiatric admissions,  h/o arrests for robbery, racing, violating parole, h/o dropping out from school due to fights, PMH of relapsing Hodgkin Lymphoma, admitted to the hospital for treatment of his lymphoma. Patient has a hx. of a lack of interest in treatment, h/o signing out AMA, refusing chemotherapy tx in past. Decisional capacity to participate in treatment planning assessed.    I have seen and evaluated this patient myself. Chart, labs, meds reviewed. At present time, patient has decisional capacity to participate in treatment planning.  I agree with trainee's assessment and plan.

## 2020-10-28 NOTE — BEHAVIORAL HEALTH ASSESSMENT NOTE - NSBHREFERDETAILS_PSY_A_CORE_FT
Capacity to skip outpatient treatments, pt does not attend tx then shows up in the ED for tx when he starts to decompensate; home life not good; depressed/ no SI

## 2020-10-28 NOTE — CHART NOTE - NSCHARTNOTEFT_GEN_A_CORE
IR no longer needed for port study   L CW Mediport accessed flushing well with blood return   Department of Medicine   LEX Edwards AGNP-c [id number]

## 2020-10-29 LAB
ALBUMIN SERPL ELPH-MCNC: 3.5 G/DL — SIGNIFICANT CHANGE UP (ref 3.3–5)
ALP SERPL-CCNC: 127 U/L — HIGH (ref 40–120)
ALT FLD-CCNC: 31 U/L — SIGNIFICANT CHANGE UP (ref 10–45)
ANION GAP SERPL CALC-SCNC: 9 MMOL/L — SIGNIFICANT CHANGE UP (ref 5–17)
AST SERPL-CCNC: 13 U/L — SIGNIFICANT CHANGE UP (ref 10–40)
BASOPHILS # BLD AUTO: 0.03 K/UL — SIGNIFICANT CHANGE UP (ref 0–0.2)
BASOPHILS NFR BLD AUTO: 0.2 % — SIGNIFICANT CHANGE UP (ref 0–2)
BILIRUB SERPL-MCNC: 0.4 MG/DL — SIGNIFICANT CHANGE UP (ref 0.2–1.2)
BUN SERPL-MCNC: 15 MG/DL — SIGNIFICANT CHANGE UP (ref 7–23)
CALCIUM SERPL-MCNC: 9.3 MG/DL — SIGNIFICANT CHANGE UP (ref 8.4–10.5)
CHLORIDE SERPL-SCNC: 99 MMOL/L — SIGNIFICANT CHANGE UP (ref 96–108)
CO2 SERPL-SCNC: 28 MMOL/L — SIGNIFICANT CHANGE UP (ref 22–31)
CREAT SERPL-MCNC: 0.4 MG/DL — LOW (ref 0.5–1.3)
EOSINOPHIL # BLD AUTO: 0.35 K/UL — SIGNIFICANT CHANGE UP (ref 0–0.5)
EOSINOPHIL NFR BLD AUTO: 2.2 % — SIGNIFICANT CHANGE UP (ref 0–6)
GLUCOSE SERPL-MCNC: 80 MG/DL — SIGNIFICANT CHANGE UP (ref 70–99)
HCT VFR BLD CALC: 31.6 % — LOW (ref 39–50)
HGB BLD-MCNC: 9.5 G/DL — LOW (ref 13–17)
IMM GRANULOCYTES NFR BLD AUTO: 1 % — SIGNIFICANT CHANGE UP (ref 0–1.5)
LYMPHOCYTES # BLD AUTO: 0.8 K/UL — LOW (ref 1–3.3)
LYMPHOCYTES # BLD AUTO: 5 % — LOW (ref 13–44)
MCHC RBC-ENTMCNC: 23.2 PG — LOW (ref 27–34)
MCHC RBC-ENTMCNC: 30.1 GM/DL — LOW (ref 32–36)
MCV RBC AUTO: 77.1 FL — LOW (ref 80–100)
MONOCYTES # BLD AUTO: 0.91 K/UL — HIGH (ref 0–0.9)
MONOCYTES NFR BLD AUTO: 5.6 % — SIGNIFICANT CHANGE UP (ref 2–14)
NEUTROPHILS # BLD AUTO: 13.91 K/UL — HIGH (ref 1.8–7.4)
NEUTROPHILS NFR BLD AUTO: 86 % — HIGH (ref 43–77)
NRBC # BLD: 0 /100 WBCS — SIGNIFICANT CHANGE UP (ref 0–0)
PLATELET # BLD AUTO: 344 K/UL — SIGNIFICANT CHANGE UP (ref 150–400)
POTASSIUM SERPL-MCNC: 4.2 MMOL/L — SIGNIFICANT CHANGE UP (ref 3.5–5.3)
POTASSIUM SERPL-SCNC: 4.2 MMOL/L — SIGNIFICANT CHANGE UP (ref 3.5–5.3)
PROT SERPL-MCNC: 6.3 G/DL — SIGNIFICANT CHANGE UP (ref 6–8.3)
RBC # BLD: 4.1 M/UL — LOW (ref 4.2–5.8)
RBC # FLD: 24.9 % — HIGH (ref 10.3–14.5)
SODIUM SERPL-SCNC: 136 MMOL/L — SIGNIFICANT CHANGE UP (ref 135–145)
TSH SERPL-MCNC: 1.11 UIU/ML — SIGNIFICANT CHANGE UP (ref 0.27–4.2)
WBC # BLD: 16.16 K/UL — HIGH (ref 3.8–10.5)
WBC # FLD AUTO: 16.16 K/UL — HIGH (ref 3.8–10.5)

## 2020-10-29 PROCEDURE — 93010 ELECTROCARDIOGRAM REPORT: CPT

## 2020-10-29 PROCEDURE — 99233 SBSQ HOSP IP/OBS HIGH 50: CPT

## 2020-10-29 PROCEDURE — 99232 SBSQ HOSP IP/OBS MODERATE 35: CPT | Mod: GC

## 2020-10-29 RX ORDER — NIVOLUMAB 10 MG/ML
240 INJECTION INTRAVENOUS ONCE
Refills: 0 | Status: COMPLETED | OUTPATIENT
Start: 2020-10-29 | End: 2020-10-29

## 2020-10-29 RX ORDER — ONDANSETRON 8 MG/1
4 TABLET, FILM COATED ORAL ONCE
Refills: 0 | Status: DISCONTINUED | OUTPATIENT
Start: 2020-10-29 | End: 2020-11-02

## 2020-10-29 RX ADMIN — CHLORHEXIDINE GLUCONATE 1 APPLICATION(S): 213 SOLUTION TOPICAL at 12:14

## 2020-10-29 RX ADMIN — CALAMINE AND ZINC OXIDE AND PHENOL 1 APPLICATION(S): 160; 10 LOTION TOPICAL at 05:16

## 2020-10-29 RX ADMIN — Medication 1 TABLET(S): at 12:13

## 2020-10-29 RX ADMIN — NIVOLUMAB 74 MILLIGRAM(S): 10 INJECTION INTRAVENOUS at 15:21

## 2020-10-29 RX ADMIN — CALAMINE AND ZINC OXIDE AND PHENOL 1 APPLICATION(S): 160; 10 LOTION TOPICAL at 17:44

## 2020-10-29 NOTE — PROVIDER CONTACT NOTE (OTHER) - ACTION/TREATMENT ORDERED:
SERGIO Oleary notified. To do EKG stat and rectal temp. Pt also refused for CAT scan , notified. SERGIO Oleary notified. To do EKG stat and rectal temp. Pt also refused for CAT scan , notified. PO tylenol 975 mg stat . blood c/s , u/a to be sent. Continue to monitor the pt.

## 2020-10-29 NOTE — ADVANCED PRACTICE NURSE CONSULT - ASSESSMENT
Pt with day 1/1 tx, okay to proceed with tx as per MD Hickman.  Pt with left chest wall port + blood return noted, no pain, redness or swelling noted at site.  Pt administered Nivolumab 240 mg iv over 1 hour no premeds needed.  Pt educated on nivolumab regimen and signs and symptoms to report to area nurse and staff, pt verbalized understanding.  Emotional support provided.  Report given to area nurse.

## 2020-10-29 NOTE — DIETITIAN INITIAL EVALUATION ADULT. - SIGNS/SYMPTOMS
diagnosis of stage IV Hodgkin lymphoma, weight loss x 1 year, weight <IBW, BMI<20 nutrition focused physical exam findings, weight loss and periods of suboptimal PO intake

## 2020-10-29 NOTE — PROVIDER CONTACT NOTE (OTHER) - SITUATION
hr - 135/m , rr - 18/m , oral temp - 100.1 . Pt denies any chest pain, palpitations or any shortness of breath. hr - 135/m , rr - 18/m , oral temp - 100.1 . rectal temp - 101.2 Pt denies any chest pain, palpitations or any shortness of breath.

## 2020-10-29 NOTE — DIETITIAN INITIAL EVALUATION ADULT. - PROBLEM SELECTOR PLAN 3
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, [place] [address], phone number for appointment: [phone]

## 2020-10-29 NOTE — DIETITIAN INITIAL EVALUATION ADULT. - ORAL NUTRITION SUPPLEMENTS
Recommend Ensure Enlive QD for additional protein and calories Recommend Ensure Enlive QD for additional protein and calories; discussed c ANIVAL Ordaz, pending verification placed

## 2020-10-29 NOTE — DIETITIAN INITIAL EVALUATION ADULT. - ORAL INTAKE PTA/DIET HISTORY
PTA, patient with good appetite and states that he was consuming 100% of meals. Patient does not consume pork or beef, noted. Squash allergy noted, will address.

## 2020-10-29 NOTE — DIETITIAN INITIAL EVALUATION ADULT. - ADD RECOMMEND
1. Continue to promote optimal PO intake to promote optimal nutrition status and to prevent weight loss. 2. Recommend patient c Ensure Enlive QD.

## 2020-10-29 NOTE — DIETITIAN INITIAL EVALUATION ADULT. - NAME AND PHONE
Marissa Hellermann, Dietetic Intern, #[phone] with Sophia Briones, MS RD CDN [facility], #[phone]

## 2020-10-29 NOTE — CHART NOTE - TREATMENT: THE FOLLOWING DIET HAS BEEN RECOMMENDED
Diet, Regular:   Supplement Feeding Modality:  Oral  Ensure Enlive Cans or Servings Per Day:  1       Frequency:  Daily (10-29-20 @ 15:17) [Pending Verification By Attending]  Diet, Regular:   No Beef  No Pork  Supplement Feeding Modality:  Oral  Ensure Clear Cans or Servings Per Day:  1       Frequency:  Three Times a day (10-29-20 @ 14:38) [Active]

## 2020-10-29 NOTE — DIETITIAN INITIAL EVALUATION ADULT. - OTHER INFO
Noted in house patient c >75% intake at meals. Attempted to obtain weight history from patient, however he could not recall but did state his current weight of 89 pounds. As per previous dietitian note, 110#, 21 pound weight loss x 1 year. Recommend Ensure Enlive supplement QD to promote additional protein and calorie intake, patient is willing to have 1 per day. Diet education provided on importance of protein intake. Reviewed high protein foods (chicken, eggs, and fish), and nutrient dense snacks (puddings, and yogurt) patient understands. Encouraged patient to fill out his menu, adherence is expected. Requested permission for nutrition focused physical exam (with RD and intern), patient accepted. Will revisit to perform exam once patient has eaten breakfast. PTA, patient reports episodes of loose stools and diarrhea, no recent episodes noted. Last BM: 10/28.     Prior to visit, RN reports patient did not consume breakfast yet this morning, patient does like to sleep late. Will continue to monitor and promote optimal PO intake. Noted in house patient c >75% intake at meals. Attempted to obtain weight history from patient, however he could not recall but did state his current weight of 89 pounds. As per previous dietitian note, 110#, 21 pound weight loss x 1 year. Recommend Ensure Enlive supplement QD to promote additional protein and calorie intake, patient is willing to have 1 per day. Diet education provided on importance of protein intake. Reviewed high protein foods (chicken, eggs, and fish), and nutrient dense snacks (puddings, and yogurt) patient understands. Encouraged patient to fill out his menu, adherence is expected. Requested permission for nutrition focused physical exam (with RD and intern), patient accepted. PTA, patient reports episodes of loose stools and diarrhea, no recent episodes noted. Last BM: 10/28.     Prior to visit, RN reports patient did not consume breakfast yet this morning, patient does like to sleep late. Will continue to monitor and promote optimal PO intake.

## 2020-10-30 DIAGNOSIS — R50.9 FEVER, UNSPECIFIED: ICD-10-CM

## 2020-10-30 LAB
ALBUMIN SERPL ELPH-MCNC: 3.6 G/DL — SIGNIFICANT CHANGE UP (ref 3.3–5)
ALP SERPL-CCNC: 132 U/L — HIGH (ref 40–120)
ALT FLD-CCNC: 32 U/L — SIGNIFICANT CHANGE UP (ref 10–45)
ANION GAP SERPL CALC-SCNC: 10 MMOL/L — SIGNIFICANT CHANGE UP (ref 5–17)
APPEARANCE UR: CLEAR — SIGNIFICANT CHANGE UP
AST SERPL-CCNC: 14 U/L — SIGNIFICANT CHANGE UP (ref 10–40)
BASOPHILS # BLD AUTO: 0.03 K/UL — SIGNIFICANT CHANGE UP (ref 0–0.2)
BASOPHILS NFR BLD AUTO: 0.1 % — SIGNIFICANT CHANGE UP (ref 0–2)
BILIRUB SERPL-MCNC: 0.4 MG/DL — SIGNIFICANT CHANGE UP (ref 0.2–1.2)
BILIRUB UR-MCNC: NEGATIVE — SIGNIFICANT CHANGE UP
BUN SERPL-MCNC: 13 MG/DL — SIGNIFICANT CHANGE UP (ref 7–23)
CALCIUM SERPL-MCNC: 9 MG/DL — SIGNIFICANT CHANGE UP (ref 8.4–10.5)
CHLORIDE SERPL-SCNC: 98 MMOL/L — SIGNIFICANT CHANGE UP (ref 96–108)
CO2 SERPL-SCNC: 26 MMOL/L — SIGNIFICANT CHANGE UP (ref 22–31)
COLOR SPEC: SIGNIFICANT CHANGE UP
CREAT SERPL-MCNC: 0.44 MG/DL — LOW (ref 0.5–1.3)
DIFF PNL FLD: NEGATIVE — SIGNIFICANT CHANGE UP
EOSINOPHIL # BLD AUTO: 0.35 K/UL — SIGNIFICANT CHANGE UP (ref 0–0.5)
EOSINOPHIL NFR BLD AUTO: 1.5 % — SIGNIFICANT CHANGE UP (ref 0–6)
GLUCOSE SERPL-MCNC: 114 MG/DL — HIGH (ref 70–99)
GLUCOSE UR QL: NEGATIVE — SIGNIFICANT CHANGE UP
HCT VFR BLD CALC: 33.5 % — LOW (ref 39–50)
HGB BLD-MCNC: 10.1 G/DL — LOW (ref 13–17)
IMM GRANULOCYTES NFR BLD AUTO: 0.5 % — SIGNIFICANT CHANGE UP (ref 0–1.5)
KETONES UR-MCNC: NEGATIVE — SIGNIFICANT CHANGE UP
LDH SERPL L TO P-CCNC: 164 U/L — SIGNIFICANT CHANGE UP (ref 50–242)
LEUKOCYTE ESTERASE UR-ACNC: NEGATIVE — SIGNIFICANT CHANGE UP
LYMPHOCYTES # BLD AUTO: 0.73 K/UL — LOW (ref 1–3.3)
LYMPHOCYTES # BLD AUTO: 3.1 % — LOW (ref 13–44)
MAGNESIUM SERPL-MCNC: 2 MG/DL — SIGNIFICANT CHANGE UP (ref 1.6–2.6)
MCHC RBC-ENTMCNC: 23.2 PG — LOW (ref 27–34)
MCHC RBC-ENTMCNC: 30.1 GM/DL — LOW (ref 32–36)
MCV RBC AUTO: 77 FL — LOW (ref 80–100)
MONOCYTES # BLD AUTO: 1.24 K/UL — HIGH (ref 0–0.9)
MONOCYTES NFR BLD AUTO: 5.2 % — SIGNIFICANT CHANGE UP (ref 2–14)
NEUTROPHILS # BLD AUTO: 21.16 K/UL — HIGH (ref 1.8–7.4)
NEUTROPHILS NFR BLD AUTO: 89.6 % — HIGH (ref 43–77)
NITRITE UR-MCNC: NEGATIVE — SIGNIFICANT CHANGE UP
NRBC # BLD: 0 /100 WBCS — SIGNIFICANT CHANGE UP (ref 0–0)
PH UR: 6.5 — SIGNIFICANT CHANGE UP (ref 5–8)
PHOSPHATE SERPL-MCNC: 3 MG/DL — SIGNIFICANT CHANGE UP (ref 2.5–4.5)
PLATELET # BLD AUTO: 341 K/UL — SIGNIFICANT CHANGE UP (ref 150–400)
POTASSIUM SERPL-MCNC: 3.7 MMOL/L — SIGNIFICANT CHANGE UP (ref 3.5–5.3)
POTASSIUM SERPL-SCNC: 3.7 MMOL/L — SIGNIFICANT CHANGE UP (ref 3.5–5.3)
PROT SERPL-MCNC: 6.5 G/DL — SIGNIFICANT CHANGE UP (ref 6–8.3)
PROT UR-MCNC: NEGATIVE — SIGNIFICANT CHANGE UP
RBC # BLD: 4.35 M/UL — SIGNIFICANT CHANGE UP (ref 4.2–5.8)
RBC # FLD: 24.9 % — HIGH (ref 10.3–14.5)
SODIUM SERPL-SCNC: 134 MMOL/L — LOW (ref 135–145)
SP GR SPEC: 1.01 — SIGNIFICANT CHANGE UP (ref 1.01–1.02)
URATE SERPL-MCNC: 5.1 MG/DL — SIGNIFICANT CHANGE UP (ref 3.4–8.8)
UROBILINOGEN FLD QL: SIGNIFICANT CHANGE UP
WBC # BLD: 23.63 K/UL — HIGH (ref 3.8–10.5)
WBC # FLD AUTO: 23.63 K/UL — HIGH (ref 3.8–10.5)

## 2020-10-30 PROCEDURE — 99223 1ST HOSP IP/OBS HIGH 75: CPT | Mod: GC

## 2020-10-30 PROCEDURE — 71045 X-RAY EXAM CHEST 1 VIEW: CPT | Mod: 26

## 2020-10-30 PROCEDURE — 71260 CT THORAX DX C+: CPT | Mod: 26

## 2020-10-30 PROCEDURE — 74177 CT ABD & PELVIS W/CONTRAST: CPT | Mod: 26

## 2020-10-30 PROCEDURE — 99233 SBSQ HOSP IP/OBS HIGH 50: CPT

## 2020-10-30 PROCEDURE — 99232 SBSQ HOSP IP/OBS MODERATE 35: CPT | Mod: GC

## 2020-10-30 RX ORDER — ACETAMINOPHEN 500 MG
975 TABLET ORAL ONCE
Refills: 0 | Status: DISCONTINUED | OUTPATIENT
Start: 2020-10-30 | End: 2020-10-30

## 2020-10-30 RX ORDER — ACETAMINOPHEN 500 MG
325 TABLET ORAL ONCE
Refills: 0 | Status: COMPLETED | OUTPATIENT
Start: 2020-10-30 | End: 2020-10-30

## 2020-10-30 RX ADMIN — Medication 325 MILLIGRAM(S): at 00:03

## 2020-10-30 RX ADMIN — Medication 650 MILLIGRAM(S): at 00:03

## 2020-10-30 RX ADMIN — Medication 650 MILLIGRAM(S): at 01:20

## 2020-10-30 RX ADMIN — Medication 325 MILLIGRAM(S): at 01:20

## 2020-10-30 RX ADMIN — Medication 1 TABLET(S): at 11:30

## 2020-10-30 RX ADMIN — CALAMINE AND ZINC OXIDE AND PHENOL 1 APPLICATION(S): 160; 10 LOTION TOPICAL at 06:19

## 2020-10-30 RX ADMIN — CALAMINE AND ZINC OXIDE AND PHENOL 1 APPLICATION(S): 160; 10 LOTION TOPICAL at 17:15

## 2020-10-30 NOTE — CHART NOTE - NSCHARTNOTEFT_GEN_A_CORE
MEDICINE PA    Notified by RN patient with HR of 135 w/ temp of 100.1. Rectal temp of 101.2.   Seen and examined patient at bedside. Patient is alert and oriented x3, NAD.   Denies HA, CP, SOB, cough, N/V, or abd pain.      VITAL SIGNS:  T(C): 38.4 (10-29-20 @ 23:58), Max: 38.4 (10-29-20 @ 23:58)  HR: 135 (10-29-20 @ 23:14) (96 - 135)  BP: 93/59 (10-29-20 @ 23:14) (93/59 - 115/76)  RR: 18 (10-29-20 @ 23:14) (18 - 18)  SpO2: 98% (10-29-20 @ 23:14) (98% - 100%)        LABORATORY:                          9.5    16.16 )-----------( 344      ( 29 Oct 2020 10:47 )             31.6       10-29    136  |  99  |  15  ----------------------------<  80  4.2   |  28  |  0.40<L>    Ca    9.3      29 Oct 2020 10:47  Phos  4.0     10-28  Mg     1.9     10-28    TPro  6.3  /  Alb  3.5  /  TBili  0.4  /  DBili  x   /  AST  13  /  ALT  31  /  AlkPhos  127<H>  10-29      PHYSICAL EXAM:    Constitutional: AOx3. NAD.  Respiratory: clear lungs bilaterally. No wheezing, rhonchi, or crackles.  Cardiovascular: S1 S2. No murmurs.  Gastrointestinal: BS X4 active. soft. nontender.  Extremities/Vascular: +2 pulses bilaterally. No BLE edema.    Radiology:   < from: Xray Chest 1 View- PORTABLE-Urgent (Xray Chest 1 View- PORTABLE-Urgent .) (10.27.20 @ 10:15) >    The left lung appears to be clear. A MediPort is seen on the left and the tip is in superior vena cava. No pneumothorax. No pleural effusion.    < end of copied text >      ASSESSMENT/PLAN:   18 yo male w/pmh stage 4 Hodgkin lymphoma who presents to [facility] for inpatient chemo. He was recently discharged  on 10/22 from [facility] after receiving radiation therapy for extensive bony disease with multiple vertebral compression fractures, tumor invasion into the spinal canal, and L. acetabulum. He was seen by orthopedics who did not recommend surgery. He was called by his oncologist Dr. Emery to come in again for further inpatient treatment. He currently feels ok, has some baseline hip pain. He says he can walk independently, without a cane or walker, but needs to hold onto things when he walks. No recent falls. No fevers, chills, abdominal pain. He feels very hungry right now. He also has been having diarrhea since his last hospitalization, which is loose, not watery, non-bloody, but hasn't had any bowl movements yet today. Per both inpatient and outpatient notes, the patient has been non-compliant w/treatment due to concerns for side effects and concerns that treatment wasn't working. Per review of outpatient onc notes, there are many social/financial risk factors and barriers to care. Currently, he is ready to receive inpatient therapy for his cancer.      1) Isolated Fever  -tylenol and cooling measures prn for pyrexia  -BC x2, UA ordered   - Will monitor off abx at this time as pt is hemodynamically stable   - Not neutropenic   -F/U primary team in         Sukhdev Oleary  Dept of Medicine   [id number] MEDICINE [facility]    Notified by RN patient with HR of 135 w/ temp of 100.1. Rectal temp of 101.2.   Seen and examined patient at bedside. Patient is alert and oriented x3, NAD.   Denies HA, CP, SOB, cough, N/V, or abd pain.      VITAL SIGNS:  T(C): 38.4 (10-29-20 @ 23:58), Max: 38.4 (10-29-20 @ 23:58)  HR: 135 (10-29-20 @ 23:14) (96 - 135)  BP: 93/59 (10-29-20 @ 23:14) (93/59 - 115/76)  RR: 18 (10-29-20 @ 23:14) (18 - 18)  SpO2: 98% (10-29-20 @ 23:14) (98% - 100%)        LABORATORY:                          9.5    16.16 )-----------( 344      ( 29 Oct 2020 10:47 )             31.6       10-29    136  |  99  |  15  ----------------------------<  80  4.2   |  28  |  0.40<L>    Ca    9.3      29 Oct 2020 10:47  Phos  4.0     10-28  Mg     1.9     10-28    TPro  6.3  /  Alb  3.5  /  TBili  0.4  /  DBili  x   /  AST  13  /  ALT  31  /  AlkPhos  127<H>  10-29      PHYSICAL EXAM:    Constitutional: AOx3. NAD.  Respiratory: clear lungs bilaterally. No wheezing, rhonchi, or crackles.  Cardiovascular: S1 S2. No murmurs.  Gastrointestinal: BS X4 active. soft. nontender.  Extremities/Vascular: +2 pulses bilaterally. No BLE edema.    Radiology:   < from: Xray Chest 1 View- PORTABLE-Urgent (Xray Chest 1 View- PORTABLE-Urgent .) (10.27.20 @ 10:15) >    The left lung appears to be clear. A MediPort is seen on the left and the tip is in superior vena cava. No pneumothorax. No pleural effusion.    < end of copied text >      ASSESSMENT/PLAN:   18 yo male w/pmh stage 4 Hodgkin lymphoma who presents to [facility] for inpatient chemo. He was recently discharged  on 10/22 from [facility] after receiving radiation therapy for extensive bony disease with multiple vertebral compression fractures, tumor invasion into the spinal canal, and L. acetabulum. He was seen by orthopedics who did not recommend surgery. He was called by his oncologist Dr. Emery to come in again for further inpatient treatment. He currently feels ok, has some baseline hip pain. He says he can walk independently, without a cane or walker, but needs to hold onto things when he walks. No recent falls. No fevers, chills, abdominal pain. He feels very hungry right now. He also has been having diarrhea since his last hospitalization, which is loose, not watery, non-bloody, but hasn't had any bowl movements yet today. Per both inpatient and outpatient notes, the patient has been non-compliant w/treatment due to concerns for side effects and concerns that treatment wasn't working. Per review of outpatient onc notes, there are many social/financial risk factors and barriers to care. Currently, he is ready to receive inpatient therapy for his cancer.      1) Isolated Fever  -tylenol and cooling measures prn for pyrexia  -BC x2, UA ordered   - Will monitor off abx at this time as pt is hemodynamically stable   - Not neutropenic   -F/U primary team in AM    2. Tachycardia likely 2/2 fever  - STAT EKG - Sinus tachycardia w/ 132 bpm, no ST-T wave abnormalities no prior ekg available for comparison   - Monitor and reassess when pt is afebrile       Sukhdev Oleary  Dept of Medicine   [id number] MEDICINE [facility]    Notified by RN patient with HR of 135 w/ temp of 100.1. Rectal temp of 101.2.   Seen and examined patient at bedside. Patient is alert and oriented x3, NAD.   Denies HA, palpitation, CP, SOB, cough, N/V, or abd pain.      VITAL SIGNS:  T(C): 38.4 (10-29-20 @ 23:58), Max: 38.4 (10-29-20 @ 23:58)  HR: 135 (10-29-20 @ 23:14) (96 - 135)  BP: 93/59 (10-29-20 @ 23:14) (93/59 - 115/76)  RR: 18 (10-29-20 @ 23:14) (18 - 18)  SpO2: 98% (10-29-20 @ 23:14) (98% - 100%)        LABORATORY:                          9.5    16.16 )-----------( 344      ( 29 Oct 2020 10:47 )             31.6       10-29    136  |  99  |  15  ----------------------------<  80  4.2   |  28  |  0.40<L>    Ca    9.3      29 Oct 2020 10:47  Phos  4.0     10-28  Mg     1.9     10-28    TPro  6.3  /  Alb  3.5  /  TBili  0.4  /  DBili  x   /  AST  13  /  ALT  31  /  AlkPhos  127<H>  10-29      PHYSICAL EXAM:    Constitutional: AOx3. NAD.  Respiratory: clear lungs bilaterally. No wheezing, rhonchi, or crackles.  Cardiovascular: S1 S2. No murmurs.  Gastrointestinal: BS X4 active. soft. nontender.  Extremities/Vascular: +2 pulses bilaterally. No BLE edema.    Radiology:   < from: Xray Chest 1 View- PORTABLE-Urgent (Xray Chest 1 View- PORTABLE-Urgent .) (10.27.20 @ 10:15) >    The left lung appears to be clear. A MediPort is seen on the left and the tip is in superior vena cava. No pneumothorax. No pleural effusion.    < end of copied text >      ASSESSMENT/PLAN:   20 yo male w/pmh stage 4 Hodgkin lymphoma who presents to [facility] for inpatient chemo. He was recently discharged  on 10/22 from [facility] after receiving radiation therapy for extensive bony disease with multiple vertebral compression fractures, tumor invasion into the spinal canal, and L. acetabulum. He was seen by orthopedics who did not recommend surgery. He was called by his oncologist Dr. Emery to come in again for further inpatient treatment. He currently feels ok, has some baseline hip pain. He says he can walk independently, without a cane or walker, but needs to hold onto things when he walks. No recent falls. No fevers, chills, abdominal pain. He feels very hungry right now. He also has been having diarrhea since his last hospitalization, which is loose, not watery, non-bloody, but hasn't had any bowl movements yet today. Per both inpatient and outpatient notes, the patient has been non-compliant w/treatment due to concerns for side effects and concerns that treatment wasn't working. Per review of outpatient onc notes, there are many social/financial risk factors and barriers to care. Currently, he is ready to receive inpatient therapy for his cancer.      1) Isolated Fever  -tylenol and cooling measures prn for pyrexia  -BC x2, UA ordered   - Will monitor off abx at this time as pt is hemodynamically stable   - Not neutropenic   -F/U primary team in AM    2. Tachycardia likely 2/2 fever  - STAT EKG - Sinus tachycardia w/ 132 bpm, no ST-T wave abnormalities no prior ekg available for comparison   - Monitor and reassess when pt is afebrile       Sukhdev Oleary  Dept of Medicine   [id number]

## 2020-10-30 NOTE — CHART NOTE - NSCHARTNOTEFT_GEN_A_CORE
PA Note (episodic)     Called by RN     Denies CP, SOB, dizziness, abdominal  pain, N/V/D, numbness/tingling, extremity weakness, dysuria.     ROS: see HPI     Physical Exam:    Vital Signs Last 24 Hrs  T(C): 38.4 (29 Oct 2020 23:58), Max: 38.4 (29 Oct 2020 23:58)  T(F): 101.2 (29 Oct 2020 23:58), Max: 101.2 (29 Oct 2020 23:58)  HR: 135 (29 Oct 2020 23:14) (96 - 135)  BP: 93/59 (29 Oct 2020 23:14) (93/59 - 115/76)  BP(mean): --  RR: 18 (29 Oct 2020 23:14) (18 - 18)  SpO2: 98% (29 Oct 2020 23:14) (98% - 100%)    General: WN/WD NAD, AOx3, nontoxic appearing  Head:  NC/AT, no scleral injection, no JVD   CV: RRR, S1S2   Respiratory: CTA B/L, nonlabored  Abdominal: Soft, NT, ND no palpable mass, no guarding or rebound tenderness  MSK: No BLLE edema, + peripheral pulses, FROM all 4 extremity      Labs:                          9.5    16.16 )-----------( 344      ( 29 Oct 2020 10:47 )             31.6     10-29    136  |  99  |  15  ----------------------------<  80  4.2   |  28  |  0.40<L>    Ca    9.3      29 Oct 2020 10:47  Phos  4.0     10-28  Mg     1.9     10-28    TPro  6.3  /  Alb  3.5  /  TBili  0.4  /  DBili  x   /  AST  13  /  ALT  31  /  AlkPhos  127<H>  10-29      Urinalysis Basic - ( 10-27 @ 20:08 )    Color: Negative / Appearance: Negative / SG: -- / pH: Negative  Gluc: Negative / Ketone: Yellow  / Bili: -- / Urobili: --   Blood: 0 / Protein: -- / Nitrite: Negative   Leuk Esterase: Negative / RBC: 1.021 / WBC --   Sq Epi: Negative / Non Sq Epi: 0 / Bacteria: 7.0        Radiology:          Assessment & Plan:  HPI:  18 yo male w/pmh stage 4 Hodgkin lymphoma who presents to [facility] for inpatient chemo. He was recently discharged  on 10/22 from [facility] after receiving radiation therapy for extensive bony disease with multiple vertebral compression fractures, tumor invasion into the spinal canal, and L. acetabulum. He was seen by orthopedics who did not recommend surgery. He was called by his oncologist Dr. Emery to come in again for further inpatient treatment. He currently feels ok, has some baseline hip pain. He says he can walk independently, without a cane or walker, but needs to hold onto things when he walks. No recent falls. No fevers, chills, abdominal pain. He feels very hungry right now. He also has been having diarrhea since his last hospitalization, which is loose, not watery, non-bloody, but hasn't had any bowl movements yet today. Per both inpatient and outpatient notes, the patient has been non-compliant w/treatment due to concerns for side effects and concerns that treatment wasn't working. Per review of outpatient onc notes, there are many social/financial risk factors and barriers to care. Currently, he is ready to receive inpatient therapy for his cancer.    In the ED, patient was hemodynamically stable, tachycardic to 110s, afebrile.  (27 Oct 2020 13:42)          Follow up with Primary Team in [facility].      Sukhdev Oleary PA-C  Dept of Medicine

## 2020-10-30 NOTE — CONSULT NOTE ADULT - ASSESSMENT
Assessment:  The patient is a 19 year old male with past medical history of stage 4 Hodgkin lymphoma who presents to [facility] for inpatient chemotherapy. He was recently discharged  on 10/22 from [facility] after receiving radiation therapy for extensive bony disease with multiple vertebral compression fractures, tumor invasion into the spinal canal, and the left acetabulum. He was seen by orthopedics who did not recommend surgery. He was called by his oncologist Dr. Emery to come in again for further inpatient treatment. He currently complains of hip pain. He says he can walk independently, without a cane or walker, but needs to hold onto things when he walks. He denies fevers, chills, chest pain, dizziness, palpitations, nausea, vomiting, constipation or abdominal pain. He feels very hungry right now. He also has been having diarrhea since his last hospitalization, which is loose, not watery, non-bloody, but hasn't had any bowl movements yet today.    Plan:  # Fever, diarrhea, rule out c. diff infection  - Order C. diff panel  - Recommend GI PCR  - Trend CBC and BMP daily  - Monitor fever curve  - ID will continue to follow Assessment:  The patient is a 19 year old male with past medical history of stage 4 Hodgkin lymphoma who presents to [facility] for inpatient chemotherapy. He was recently discharged  on 10/22 from [facility] after receiving radiation therapy for extensive bony disease with multiple vertebral compression fractures, tumor invasion into the spinal canal, and the left acetabulum. He was seen by orthopedics who did not recommend surgery. He was called by his oncologist Dr. Emery to come in again for further inpatient treatment. He currently complains of hip pain. He says he can walk independently, without a cane or walker, but needs to hold onto things when he walks. He denies fevers, chills, chest pain, dizziness, palpitations, nausea, vomiting, constipation or abdominal pain. He feels very hungry right now. He also has been having diarrhea since his last hospitalization, which is loose, not watery, non-bloody, but hasn't had any bowl movements yet today.    Plan:  # Fever s/p nivolumab injection in the setting of Hodgkin's lymphoma  - Monitor off antibiotics for now  - Follow final blood cultures  - Obtain port site blood cultures  - If patient spikes another fever, start on intravenous vancomycin  - Obtain left upper extremity doppler to rule out port site thrombosis  - Trend CBC and BMP daily  - Monitor fever curve  - ID will continue to follow     Houston Conde MD PGY-4   Fellow, Infectious Diseases   Pager: [phone]  If no response, after 5pm and on weekends: Call [phone]   Assessment:  The patient is a 19 year old male with past medical history of stage 4 Hodgkin lymphoma who presents to [facility] for inpatient chemotherapy. He was recently discharged  on 10/22 from [facility] after receiving radiation therapy for extensive bony disease with multiple vertebral compression fractures, tumor invasion into the spinal canal, and the left acetabulum. He was seen by orthopedics who did not recommend surgery. He was called by his oncologist Dr. Emery to come in again for further inpatient treatment. He currently complains of hip pain. He says he can walk independently, without a cane or walker, but needs to hold onto things when he walks. He denies fevers, chills, chest pain, dizziness, palpitations, nausea, vomiting, constipation or abdominal pain. He feels very hungry right now. He also has been having diarrhea since his last hospitalization, which is loose, not watery, non-bloody, but hasn't had any bowl movements yet today.    Plan:  # Fever s/p nivolumab injection in the setting of Hodgkin's lymphoma  - Monitor off antibiotics for now  - Follow final blood cultures  - Obtain port site blood cultures  - If patient spikes another fever, start on intravenous vancomycin  - Obtain  doppler to rule out port site thrombosis  - Trend CBC and BMP daily  - Monitor fever curve  - ID will continue to follow     Houston Coned MD PGY-4   Fellow, Infectious Diseases   Pager: [phone]  If no response, after 5pm and on weekends: Call [phone]

## 2020-10-30 NOTE — CHART NOTE - NSCHARTNOTEFT_GEN_A_CORE
Nutrition Follow Up Note     Patient admitted for in-patient chemotherapy treatment secondary to stage 4 Hodgkin Lymphoma    Patient seen for: severe malnutrition follow up    Source: Patient    Diet: Regular, no pork, no beef, supplement: Ensure clear TID    Patient reports: Patient reports feeling good, no changes in status.     PO intake : Patient reports that he has a good appetite, and is consuming >75% of meals. He states that he is not eating hospital meals, and is getting food brought in from his family (chicken, rice, shrimp and roti). Patient likes ensure clear that he is receiving. Obtained preferences, will trial ensure berry at lunch. Patient also stated that he likes applesauce, and pears, will provide.     Reviewed the importance of heating up foods brought in by family to appropriate temperature (140 degrees or higher) to prevent foodborne illness.    Pertinent Medications: cathflo, lovenox, zofran, MVI, oxycodone   Chemotherapy: Nivolumab 240mg in sodium chloride 0.9% (50ml infused over 60 minutes)    Pertinent Labs: 10/29: Creat 0.40<L>     Skin per nursing documentation: No alterations in skin integrity  Edema: No edema     Estimated Needs:   [X] no change since previous assessment    Previous Nutrition Diagnosis: severe malnutrition   Care plan in progress, providing Ensure Clear supplementation TID for additional protein and calories    Previous Nutrition Diagnosis: Underweight status  Care plan in progress    Interventions:   1. Encourage PO meal and supplement intake to promote optimal nutrition status.   2. Provide preferences (applesauce, and pears) as per patient request.    Recommend:  1) Continue with current diet  2) Continue with Ensure clear TID  3) Recommend Ensure Enlive QD    Monitoring and Evaluation: Continue to monitor Nutritional intake, Tolerance to diet prescription, weights, labs, skin integrity.    RD remains available upon request and will follow up per protocol  Marissa Hellermann, Dietetic intern #[phone] with Sophia Briones RD

## 2020-10-30 NOTE — CONSULT NOTE ADULT - ATTENDING COMMENTS
Agree with above. Pt seen and examined at bedside. Laboratory data reviewed by me.     19 M w/ relapsed HL, noncompliance with treatment, recent hospitalization for cord compression s/p RT to the spine, now admitted to start disease modifying therapy.     - plan for Nivolumab 240 mg Q 2 weeks   - will need cathflow to make sure port works   - baseline labs, CT CAP w/co   - out pt f/u with Dr. Emery  - palliative and psych consults to help with pain management or personality disorder respectively
The patient is a 19 year old male with past medical history of stage 4 Hodgkin lymphoma who presents to [facility] for inpatient chemotherapy. He was recently discharged  on 10/22 from [facility] after receiving radiation therapy for extensive bony disease with multiple vertebral compression fractures, tumor invasion into the spinal canal, and the left acetabulum. He was seen by orthopedics who did not recommend surgery. He was called by his oncologist Dr. Emery to come in again for further inpatient treatment. He currently complains of hip pain. He says he can walk independently, without a cane or walker, but needs to hold onto things when he walks. He denies fevers, chills, chest pain, dizziness, palpitations, nausea, vomiting, constipation or abdominal pain. He feels very hungry right now. He also has been having diarrhea since his last hospitalization, which is loose, not watery, non-bloody, but hasn't had any bowl movements yet today.    Plan:  # Fever s/p nivolumab injection in the setting of Hodgkin's lymphoma  - Monitor off antibiotics for now  - Follow final blood cultures  - Obtain port site blood cultures  - If patient spikes another fever, start on intravenous vancomycin  - Obtain  doppler to rule out port site thrombosis  - Trend CBC and BMP daily  - Monitor fever curve      Opal Gomez M.D. ,   Pager [phone]     after 5PM/ weekends [phone]      ID service will be covering over the weekend. Please call for acute issues or questions. [phone]

## 2020-10-30 NOTE — CONSULT NOTE ADULT - SUBJECTIVE AND OBJECTIVE BOX
Patient is a 19 year old male who presents with a chief complaint of inpatient chemotherapy (30 Oct 2020 12:50)    HPI:  The patient is a 19 year old male with past medical history of stage 4 Hodgkin lymphoma who presents to [facility] for inpatient chemotherapy. He was recently discharged  on 10/22 from [facility] after receiving radiation therapy for extensive bony disease with multiple vertebral compression fractures, tumor invasion into the spinal canal, and the left acetabulum. He was seen by orthopedics who did not recommend surgery. He was called by his oncologist Dr. Emery to come in again for further inpatient treatment. He currently complains of hip pain. He says he can walk independently, without a cane or walker, but needs to hold onto things when he walks. He denies fevers, chills, chest pain, dizziness, palpitations, nausea, vomiting, constipation or abdominal pain. He feels very hungry right now. He also has been having diarrhea since his last hospitalization, which is loose, not watery, non-bloody, but hasn't had any bowl movements yet today. Per both inpatient and outpatient notes, the patient has been non-compliant w/treatment due to concerns for side effects and concerns that treatment wasn't working. Per review of outpatient onc notes, there are many social/financial risk factors and barriers to care. Currently, he is ready to receive inpatient therapy for his cancer. He was found to be tachycardic. CBC showed neutrophilic leukocytosis with microcytic anemia. CMP was unremarkable. Alkaline phosphatase was mildly elevated. Urinalysis and COVID-19 PCR is negative. CXR showed multiple rounded opacities are seen in the right mid to lower lung fields and are unchanged when compared to previous study done on August 15, 2020. The left lung appears to be clear. A MediPort is seen on the left and the tip is in superior vena cava without pneumothorax or pleural effusion. The patient was found to have Tmax of 101.2 with tachycardia and hypotension. Blood cultures were drawn pending results.        prior hospital charts reviewed [x]  primary team notes reviewed [x]  other consultant notes reviewed [x]    PAST MEDICAL & SURGICAL HISTORY:  Hodgkins lymphoma in relapse  No significant past surgical history    Allergies  IV Contrast (Vomiting)  Rocephin (Pruritus)  Squash (Other)    ANTIMICROBIALS (past 90 days)  MEDICATIONS  (STANDING):      OTHER MEDS: MEDICATIONS  (STANDING):  acetaminophen   Tablet .. 650 every 4 hours PRN  alteplase for catheter clearance 2 once  enoxaparin Injectable 40 daily  ondansetron Injectable 4 once  oxyCODONE    IR 5 every 6 hours PRN    SOCIAL HISTORY:       FAMILY HISTORY:  FH: Hodgkins disease  multiple distant family members      REVIEW OF SYSTEMS  [  ] ROS unobtainable because:    [x] All other systems negative except as noted below:	    Constitutional:  [ ] fever [ ] chills  [ ] weight loss  [ ] weakness  Skin:  [ ] rash [ ] phlebitis	  Eyes: [ ] icterus [ ] pain  [ ] discharge	  ENMT: [ ] sore throat  [ ] thrush [ ] ulcers [ ] exudates  Respiratory: [ ] dyspnea [ ] hemoptysis [ ] cough [ ] sputum	  Cardiovascular:  [ ] chest pain [ ] palpitations [ ] edema	  Gastrointestinal:  [ ] nausea [ ] vomiting [ ] diarrhea [ ] constipation [ ] pain	  Genitourinary:  [ ] dysuria [ ] frequency [ ] hematuria [ ] discharge [ ] flank pain  [ ] incontinence  Musculoskeletal:  [ ] myalgias [ ] arthralgias [ ] arthritis  [ ] back pain  Neurological:  [ ] headache [ ] seizures  [ ] confusion/altered mental status  Psychiatric:  [ ] anxiety [ ] depression	  Hematology/Lymphatics:  [ ] lymphadenopathy  Endocrine:  [ ] adrenal [ ] thyroid  Allergic/Immunologic:	 [ ] transplant [ ] seasonal    Vital Signs Last 24 Hrs  T(F): 98.4 (10-30-20 @ 08:22), Max: 101.2 (10-29-20 @ 23:58)  Vital Signs Last 24 Hrs  HR: 115 (10-30-20 @ 08:22) (110 - 135)  BP: 109/73 (10-30-20 @ 08:22) (93/59 - 115/76)  RR: 18 (10-30-20 @ 08:22)  SpO2: 98% (10-30-20 @ 08:22) (98% - 100%)  Wt(kg): --    PHYSICAL EXAM:  Constitutional: non-toxic, no distress  HEAD/EYES: anicteric, no conjunctival injection  ENT:  supple, no thrush  Cardiovascular:   normal S1, S2, no murmur, no edema  Respiratory:  clear BS bilaterally, no wheezes, no rales  GI:  soft, non-tender, normal bowel sounds  :  no wu, no CVA tenderness  Musculoskeletal:  no synovitis, normal ROM  Neurologic: awake and alert, normal strength, no focal findings  Skin:  no rash, no erythema, no phlebitis  Heme/Onc: no lymphadenopathy   Psychiatric:  awake, alert, appropriate mood                            9.5    16.16 )-----------( 344      ( 29 Oct 2020 10:47 )             31.6   10-29    136  |  99  |  15  ----------------------------<  80  4.2   |  28  |  0.40<L>    Ca    9.3      29 Oct 2020 10:47    TPro  6.3  /  Alb  3.5  /  TBili  0.4  /  DBili  x   /  AST  13  /  ALT  31  /  AlkPhos  127<H>  10-29    Urinalysis Basic - ( 30 Oct 2020 09:12 )    Color: Light Yellow / Appearance: Clear / S.011 / pH: x  Gluc: x / Ketone: Negative  / Bili: Negative / Urobili: <2 mg/dL   Blood: x / Protein: Negative / Nitrite: Negative   Leuk Esterase: Negative / RBC: x / WBC x   Sq Epi: x / Non Sq Epi: x / Bacteria: x    MICROBIOLOGY:    Blood cultures ordered, pending results        RADIOLOGY:  < from: Xray Chest 1 View- PORTABLE-Urgent (Xray Chest 1 View- PORTABLE-Urgent .) (10.27.20 @ 10:15) >  Impression:    The heart is normal in size.Multiple rounded opacities are seen in the right mid to lower lung fields and are unchanged when compared to previous study done on August 15, 2020. The left lung appears to be clear. A MediPort is seen on the left and the tip is in superior vena cava. No pneumothorax. No pleural effusion.       Patient is a 19 year old male who presents with a chief complaint of (30 Oct 2020 12:50)    HPI:  The patient is a 19 year old male with past medical history of stage 4 Hodgkin lymphoma who presents to [facility] for inpatient chemotherapy. He was recently discharged  on 10/22 from [facility] after receiving radiation therapy for extensive bony disease with multiple vertebral compression fractures, tumor invasion into the spinal canal, and the left acetabulum. He was seen by orthopedics who did not recommend surgery. He was called by his oncologist Dr. Emery to come in again for further inpatient treatment. He currently complains of hip pain. He says he can walk independently, without a cane or walker, but needs to hold onto things when he walks. He denies fevers, chills, chest pain, dizziness, palpitations, nausea, vomiting, constipation or abdominal pain. He feels very hungry right now. He also has been having diarrhea since his last hospitalization, which is loose, not watery, non-bloody, but hasn't had any bowl movements yet today. Per both inpatient and outpatient notes, the patient has been non-compliant w/ treatment due to concerns for side effects and concerns that treatment wasn't working. Per review of outpatient oncology ryan, there are many social/financial risk factors and barriers to care. Currently, he is ready to receive inpatient therapy for his cancer. He was found to be tachycardic. He is s/p nivolumab treatment. CBC showed neutrophilic leukocytosis with microcytic anemia. CMP was unremarkable. Alkaline phosphatase was mildly elevated. Urinalysis and COVID-19 PCR is negative. CXR showed multiple rounded opacities are seen in the right mid to lower lung fields and are unchanged when compared to previous study done on August 15, 2020. The left lung appears to be clear. A MediPort is seen on the left and the tip is in superior vena cava without pneumothorax or pleural effusion. The patient was found to have Tmax of 101.2 with tachycardia and hypotension. Blood cultures were drawn, pending results.       Patient was diagnosed with stage IV CHL in 2018. He was initially treated following ABVE-PC which was completed in 2018 after 5 cycles due to nonadherence and patient refusal. He then relapsed in 2019. He was started on salvage treatment in 2019 with gemzar/brentuximab which was also stopped due to the patient's refusal and nonadherence. Patient was then recommended to have 2 cycles of ICE with intent to pursue an autologous bone marrow transplant. The patient began the cycle and then left AMA unfortunately and so this was never completed. Patient follows with Dr. Kalie Emery as an outpatient. He was last seen in clinic on 20. An extensive discussion regarding the need for treatment and the need for him to adhere to therapy took place; however, patient continued to not want to pursue further chemotherapy despite the discussion of death.        prior hospital charts reviewed [x]  primary team notes reviewed [x]  other consultant notes reviewed [x]    PAST MEDICAL & SURGICAL HISTORY:  Hodgkins lymphoma in relapse  No significant past surgical history    Allergies  IV Contrast (Vomiting)  Rocephin (Pruritus)  Squash (Other)    ANTIMICROBIALS (past 90 days)  MEDICATIONS  (STANDING):      OTHER MEDS: MEDICATIONS  (STANDING):  acetaminophen   Tablet .. 650 every 4 hours PRN  alteplase for catheter clearance 2 once  enoxaparin Injectable 40 daily  ondansetron Injectable 4 once  oxyCODONE    IR 5 every 6 hours PRN    SOCIAL HISTORY:       FAMILY HISTORY:  FH: Hodgkins disease  multiple distant family members      REVIEW OF SYSTEMS  [  ] ROS unobtainable because:    [x] All other systems negative except as noted below:	    Constitutional:  [ ] fever [ ] chills  [ ] weight loss  [ ] weakness  Skin:  [ ] rash [ ] phlebitis	  Eyes: [ ] icterus [ ] pain  [ ] discharge	  ENMT: [ ] sore throat  [ ] thrush [ ] ulcers [ ] exudates  Respiratory: [ ] dyspnea [ ] hemoptysis [ ] cough [ ] sputum	  Cardiovascular:  [ ] chest pain [ ] palpitations [ ] edema	  Gastrointestinal:  [ ] nausea [ ] vomiting [ ] diarrhea [ ] constipation [ ] pain	  Genitourinary:  [ ] dysuria [ ] frequency [ ] hematuria [ ] discharge [ ] flank pain  [ ] incontinence  Musculoskeletal:  [ ] myalgias [ ] arthralgias [ ] arthritis  [ ] back pain  Neurological:  [ ] headache [ ] seizures  [ ] confusion/altered mental status  Psychiatric:  [ ] anxiety [ ] depression	  Hematology/Lymphatics:  [ ] lymphadenopathy  Endocrine:  [ ] adrenal [ ] thyroid  Allergic/Immunologic:	 [ ] transplant [ ] seasonal    Vital Signs Last 24 Hrs  T(F): 98.4 (10-30-20 @ 08:22), Max: 101.2 (10-29-20 @ 23:58)  Vital Signs Last 24 Hrs  HR: 115 (10-30-20 @ 08:22) (110 - 135)  BP: 109/73 (10-30-20 @ 08:22) (93/59 - 115/76)  RR: 18 (10-30-20 @ 08:22)  SpO2: 98% (10-30-20 @ 08:22) (98% - 100%)  Wt(kg): --    PHYSICAL EXAM:  Constitutional: non-toxic, no distress  HEAD/EYES: anicteric, no conjunctival injection  ENT:  supple, no thrush  Cardiovascular:   normal S1, S2, no murmur, no edema, + left chest mediport   Respiratory:  clear BS bilaterally, no wheezes, no rales  GI:  soft, non-tender, normal bowel sounds  :  no CVA tenderness  Musculoskeletal:  no synovitis, normal ROM  Neurologic: awake and alert, normal strength, no focal findings  Skin:  no rash, no erythema, no phlebitis  Heme/Onc: no lymphadenopathy   Psychiatric:  awake, alert, appropriate mood                            9.5    16.16 )-----------( 344      ( 29 Oct 2020 10:47 )             31.6   10    136  |  99  |  15  ----------------------------<  80  4.2   |  28  |  0.40<L>    Ca    9.3      29 Oct 2020 10:47    TPro  6.3  /  Alb  3.5  /  TBili  0.4  /  DBili  x   /  AST  13  /  ALT  31  /  AlkPhos  127<H>  10    Urinalysis Basic - ( 30 Oct 2020 09:12 )    Color: Light Yellow / Appearance: Clear / S.011 / pH: x  Gluc: x / Ketone: Negative  / Bili: Negative / Urobili: <2 mg/dL   Blood: x / Protein: Negative / Nitrite: Negative   Leuk Esterase: Negative / RBC: x / WBC x   Sq Epi: x / Non Sq Epi: x / Bacteria: x    MICROBIOLOGY:    Blood cultures ordered, pending results        RADIOLOGY:  < from: Xray Chest 1 View- PORTABLE-Urgent (Xray Chest 1 View- PORTABLE-Urgent .) (10.27.20 @ 10:15) >  Impression:    The heart is normal in size.Multiple rounded opacities are seen in the right mid to lower lung fields and are unchanged when compared to previous study done on August 15, 2020. The left lung appears to be clear. A MediPort is seen on the left and the tip is in superior vena cava. No pneumothorax. No pleural effusion.       Patient is a 19 year old male who presents with a chief complaint of (30 Oct 2020 12:50)    HPI:  The patient is a 19 year old male with past medical history of stage 4 Hodgkin lymphoma who presents to [facility] for inpatient chemotherapy. He was recently discharged  on 10/22 from [facility] after receiving radiation therapy for extensive bony disease with multiple vertebral compression fractures, tumor invasion into the spinal canal, and the left acetabulum. He was seen by orthopedics who did not recommend surgery. He was called by his oncologist Dr. Emery to come in again for further inpatient treatment. He currently complains of hip pain. He says he can walk independently, without a cane or walker, but needs to hold onto things when he walks. He denies fevers, chills, chest pain, dizziness, palpitations, nausea, vomiting, constipation or abdominal pain. He feels very hungry right now. He also has been having diarrhea since his last hospitalization, which is loose, not watery, non-bloody, but hasn't had any bowl movements yet today. Per both inpatient and outpatient notes, the patient has been non-compliant w/ treatment due to concerns for side effects and concerns that treatment wasn't working. Per review of outpatient oncology ryan, there are many social/financial risk factors and barriers to care. Currently, he is ready to receive inpatient therapy for his cancer. He was found to be tachycardic. He is s/p nivolumab treatment. CBC showed neutrophilic leukocytosis with microcytic anemia. CMP was unremarkable. Alkaline phosphatase was mildly elevated. Urinalysis and COVID-19 PCR is negative. CXR showed multiple rounded opacities are seen in the right mid to lower lung fields and are unchanged when compared to previous study done on August 15, 2020. The left lung appears to be clear. A MediPort is seen on the left and the tip is in superior vena cava without pneumothorax or pleural effusion. The patient was found to have Tmax of 101.2 with tachycardia and hypotension. Blood cultures were drawn, pending results.        prior hospital charts reviewed [x]  primary team notes reviewed [x]  other consultant notes reviewed [x]    PAST MEDICAL & SURGICAL HISTORY:  Hodgkins lymphoma in relapse  No significant past surgical history    Allergies  IV Contrast (Vomiting)  Rocephin (Pruritus)  Squash (Other)    ANTIMICROBIALS (past 90 days)  MEDICATIONS  (STANDING):      OTHER MEDS: MEDICATIONS  (STANDING):  acetaminophen   Tablet .. 650 every 4 hours PRN  alteplase for catheter clearance 2 once  enoxaparin Injectable 40 daily  ondansetron Injectable 4 once  oxyCODONE    IR 5 every 6 hours PRN    SOCIAL HISTORY:       FAMILY HISTORY:  FH: Hodgkins disease  multiple distant family members      REVIEW OF SYSTEMS  [  ] ROS unobtainable because:    [x] All other systems negative except as noted below:	    Constitutional:  [x] fever [ ] chills  [ ] weight loss  [ ] weakness  Skin:  [ ] rash [ ] phlebitis	  Eyes: [ ] icterus [ ] pain  [ ] discharge	  ENMT: [ ] sore throat  [ ] thrush [ ] ulcers [ ] exudates  Respiratory: [ ] dyspnea [ ] hemoptysis [ ] cough [ ] sputum	  Cardiovascular:  [ ] chest pain [ ] palpitations [ ] edema	  Gastrointestinal:  [ ] nausea [ ] vomiting [x] diarrhea [ ] constipation [ ] pain	  Genitourinary:  [ ] dysuria [ ] frequency [ ] hematuria [ ] discharge [ ] flank pain  [ ] incontinence  Musculoskeletal:  [ ] myalgias [ ] arthralgias [ ] arthritis  [ ] back pain  Neurological:  [ ] headache [ ] seizures  [ ] confusion/altered mental status  Psychiatric:  [ ] anxiety [ ] depression	  Hematology/Lymphatics:  [ ] lymphadenopathy  Endocrine:  [ ] adrenal [ ] thyroid  Allergic/Immunologic:	 [ ] transplant [ ] seasonal    Vital Signs Last 24 Hrs  T(F): 98.4 (10-30-20 @ 08:22), Max: 101.2 (10-29-20 @ 23:58)  Vital Signs Last 24 Hrs  HR: 115 (10-30-20 @ 08:22) (110 - 135)  BP: 109/73 (10-30-20 @ 08:22) (93/59 - 115/76)  RR: 18 (10-30-20 @ 08:22)  SpO2: 98% (10-30-20 @ 08:22) (98% - 100%)  Wt(kg): --    PHYSICAL EXAM:  Constitutional: non-toxic, no distress  HEAD/EYES: anicteric, no conjunctival injection  ENT:  supple, no thrush  Cardiovascular:   normal S1, S2, no murmur, no edema, + left chest mediport   Respiratory:  clear BS bilaterally, no wheezes, no rales  GI:  soft, non-tender, normal bowel sounds  :  no CVA tenderness  Musculoskeletal:  no synovitis, normal ROM  Neurologic: awake and alert, normal strength, no focal findings  Skin:  no rash, no erythema, no phlebitis  Heme/Onc: no lymphadenopathy   Psychiatric:  awake, alert, appropriate mood                            9.5    16.16 )-----------( 344      ( 29 Oct 2020 10:47 )             31.6   10    136  |  99  |  15  ----------------------------<  80  4.2   |  28  |  0.40<L>    Ca    9.3      29 Oct 2020 10:47    TPro  6.3  /  Alb  3.5  /  TBili  0.4  /  DBili  x   /  AST  13  /  ALT  31  /  AlkPhos  127<H>  10-29    Urinalysis Basic - ( 30 Oct 2020 09:12 )    Color: Light Yellow / Appearance: Clear / S.011 / pH: x  Gluc: x / Ketone: Negative  / Bili: Negative / Urobili: <2 mg/dL   Blood: x / Protein: Negative / Nitrite: Negative   Leuk Esterase: Negative / RBC: x / WBC x   Sq Epi: x / Non Sq Epi: x / Bacteria: x    MICROBIOLOGY:    Blood cultures ordered, pending results        RADIOLOGY:  < from: Xray Chest 1 View- PORTABLE-Urgent (Xray Chest 1 View- PORTABLE-Urgent .) (10.27.20 @ 10:15) >  Impression:    The heart is normal in size.Multiple rounded opacities are seen in the right mid to lower lung fields and are unchanged when compared to previous study done on August 15, 2020. The left lung appears to be clear. A MediPort is seen on the left and the tip is in superior vena cava. No pneumothorax. No pleural effusion.       Patient is a 19 year old male who presents with a chief complaint of (30 Oct 2020 12:50)    HPI:  The patient is a 19 year old male with past medical history of stage 4 Hodgkin lymphoma who presents to [facility] for inpatient chemotherapy. He was recently discharged  on 10/22 from [facility] after receiving radiation therapy for extensive bony disease with multiple vertebral compression fractures, tumor invasion into the spinal canal, and the left acetabulum. He was seen by orthopedics who did not recommend surgery. He was called by his oncologist Dr. Emery to come in again for further inpatient treatment. He currently complains of hip pain. He says he can walk independently, without a cane or walker, but needs to hold onto things when he walks. He denies fevers, chills, chest pain, dizziness, palpitations, nausea, vomiting, constipation or abdominal pain. He feels very hungry right now. He also has been having diarrhea since his last hospitalization, which is loose, not watery, non-bloody, but hasn't had any bowl movements yet today. Per both inpatient and outpatient notes, the patient has been non-compliant w/ treatment due to concerns for side effects and concerns that treatment wasn't working. Per review of outpatient oncology ryan, there are many social/financial risk factors and barriers to care. Currently, he is ready to receive inpatient therapy for his cancer. He was found to be tachycardic. He is s/p nivolumab treatment. CBC showed neutrophilic leukocytosis with microcytic anemia. CMP was unremarkable. Alkaline phosphatase was mildly elevated. Urinalysis and COVID-19 PCR is negative. CXR showed multiple rounded opacities are seen in the right mid to lower lung fields and are unchanged when compared to previous study done on August 15, 2020. The left lung appears to be clear. A MediPort is seen on the left and the tip is in superior vena cava without pneumothorax or pleural effusion. The patient was found to have Tmax of 101.2 with tachycardia and hypotension. Blood cultures were drawn, pending results.        prior hospital charts reviewed [x]  primary team notes reviewed [x]  other consultant notes reviewed [x]    PAST MEDICAL & SURGICAL HISTORY:  Hodgkins lymphoma in relapse  No significant past surgical history    Allergies  IV Contrast (Vomiting)  Rocephin (Pruritus)  Squash (Other)    ANTIMICROBIALS (past 90 days)  MEDICATIONS  (STANDING):      OTHER MEDS: MEDICATIONS  (STANDING):  acetaminophen   Tablet .. 650 every 4 hours PRN  alteplase for catheter clearance 2 once  enoxaparin Injectable 40 daily  ondansetron Injectable 4 once  oxyCODONE    IR 5 every 6 hours PRN    SOCIAL HISTORY:     smokes marijuana daily    FAMILY HISTORY:  FH: Hodgkins disease  multiple distant family members      REVIEW OF SYSTEMS  [  ] ROS unobtainable because:    [x] All other systems negative except as noted below:	    Constitutional:  [x] fever [ ] chills  [ ] weight loss  [ ] weakness  Skin:  [ ] rash [ ] phlebitis	  Eyes: [ ] icterus [ ] pain  [ ] discharge	  ENMT: [ ] sore throat  [ ] thrush [ ] ulcers [ ] exudates  Respiratory: [ ] dyspnea [ ] hemoptysis [ ] cough [ ] sputum	  Cardiovascular:  [ ] chest pain [ ] palpitations [ ] edema	 HAD NOT flushed mediport for 7 months prior to admission  Gastrointestinal:  [ ] nausea [ ] vomiting [x] diarrhea [ ] constipation [ ] pain	  Genitourinary:  [ ] dysuria [ ] frequency [ ] hematuria [ ] discharge [ ] flank pain  [ ] incontinence  Musculoskeletal:  [ ] myalgias [ ] arthralgias [ ] arthritis  [ ] back pain  Neurological:  [ ] headache [ ] seizures  [ ] confusion/altered mental status  Psychiatric:  [ ] anxiety [ ] depression	  Hematology/Lymphatics:  [ ] lymphadenopathy  Endocrine:  [ ] adrenal [ ] thyroid  Allergic/Immunologic:	 [ ] transplant [ ] seasonal    Vital Signs Last 24 Hrs  T(F): 98.4 (10-30-20 @ 08:22), Max: 101.2 (10-29-20 @ 23:58)  Vital Signs Last 24 Hrs  HR: 115 (10-30-20 @ 08:22) (110 - 135)  BP: 109/73 (10-30-20 @ 08:22) (93/59 - 115/76)  RR: 18 (10-30-20 @ 08:22)  SpO2: 98% (10-30-20 @ 08:22) (98% - 100%)  Wt(kg): --    PHYSICAL EXAM:  Constitutional: non-toxic, no distress  HEAD/EYES: anicteric, no conjunctival injection  ENT:  supple, no thrush  Cardiovascular:   normal S1, S2, no murmur, no edema, + left chest mediport   Respiratory:  clear BS bilaterally, no wheezes, no rales  GI:  soft, non-tender, normal bowel sounds  :  no CVA tenderness  Musculoskeletal:  no synovitis, normal ROM  Neurologic: awake and alert, normal strength, no focal findings  Skin:  no rash, no erythema, no phlebitis  Heme/Onc: no lymphadenopathy   Psychiatric:  awake, alert, appropriate mood                            9.5    16.16 )-----------( 344      ( 29 Oct 2020 10:47 )             31.6   10-29    136  |  99  |  15  ----------------------------<  80  4.2   |  28  |  0.40<L>    Ca    9.3      29 Oct 2020 10:47    TPro  6.3  /  Alb  3.5  /  TBili  0.4  /  DBili  x   /  AST  13  /  ALT  31  /  AlkPhos  127<H>  10-29    Urinalysis Basic - ( 30 Oct 2020 09:12 )    Color: Light Yellow / Appearance: Clear / S.011 / pH: x  Gluc: x / Ketone: Negative  / Bili: Negative / Urobili: <2 mg/dL   Blood: x / Protein: Negative / Nitrite: Negative   Leuk Esterase: Negative / RBC: x / WBC x   Sq Epi: x / Non Sq Epi: x / Bacteria: x    MICROBIOLOGY:    Blood cultures ordered, pending results        RADIOLOGY:  < from: Xray Chest 1 View- PORTABLE-Urgent (Xray Chest 1 View- PORTABLE-Urgent .) (10.27.20 @ 10:15) >  Impression:    The heart is normal in size.Multiple rounded opacities are seen in the right mid to lower lung fields and are unchanged when compared to previous study done on August 15, 2020. The left lung appears to be clear. A MediPort is seen on the left and the tip is in superior vena cava. No pneumothorax. No pleural effusion.

## 2020-10-31 LAB
APPEARANCE UR: CLEAR — SIGNIFICANT CHANGE UP
BILIRUB UR-MCNC: NEGATIVE — SIGNIFICANT CHANGE UP
COLOR SPEC: YELLOW — SIGNIFICANT CHANGE UP
CULTURE RESULTS: NO GROWTH — SIGNIFICANT CHANGE UP
DIFF PNL FLD: NEGATIVE — SIGNIFICANT CHANGE UP
GLUCOSE UR QL: NEGATIVE — SIGNIFICANT CHANGE UP
KETONES UR-MCNC: NEGATIVE — SIGNIFICANT CHANGE UP
LEUKOCYTE ESTERASE UR-ACNC: NEGATIVE — SIGNIFICANT CHANGE UP
NITRITE UR-MCNC: NEGATIVE — SIGNIFICANT CHANGE UP
PH UR: 6 — SIGNIFICANT CHANGE UP (ref 5–8)
PROT UR-MCNC: SIGNIFICANT CHANGE UP
SP GR SPEC: 1.03 — HIGH (ref 1.01–1.02)
SPECIMEN SOURCE: SIGNIFICANT CHANGE UP
UROBILINOGEN FLD QL: SIGNIFICANT CHANGE UP

## 2020-10-31 PROCEDURE — 99233 SBSQ HOSP IP/OBS HIGH 50: CPT

## 2020-10-31 PROCEDURE — 93971 EXTREMITY STUDY: CPT | Mod: 26

## 2020-10-31 RX ADMIN — CALAMINE AND ZINC OXIDE AND PHENOL 1 APPLICATION(S): 160; 10 LOTION TOPICAL at 06:51

## 2020-10-31 RX ADMIN — CALAMINE AND ZINC OXIDE AND PHENOL 1 APPLICATION(S): 160; 10 LOTION TOPICAL at 17:07

## 2020-10-31 RX ADMIN — Medication 1 TABLET(S): at 11:02

## 2020-11-01 LAB
ALBUMIN SERPL ELPH-MCNC: 3.6 G/DL — SIGNIFICANT CHANGE UP (ref 3.3–5)
ALP SERPL-CCNC: 118 U/L — SIGNIFICANT CHANGE UP (ref 40–120)
ALT FLD-CCNC: 25 U/L — SIGNIFICANT CHANGE UP (ref 10–45)
ANION GAP SERPL CALC-SCNC: 10 MMOL/L — SIGNIFICANT CHANGE UP (ref 5–17)
AST SERPL-CCNC: 14 U/L — SIGNIFICANT CHANGE UP (ref 10–40)
BILIRUB SERPL-MCNC: 0.2 MG/DL — SIGNIFICANT CHANGE UP (ref 0.2–1.2)
BUN SERPL-MCNC: 11 MG/DL — SIGNIFICANT CHANGE UP (ref 7–23)
CALCIUM SERPL-MCNC: 9.5 MG/DL — SIGNIFICANT CHANGE UP (ref 8.4–10.5)
CHLORIDE SERPL-SCNC: 98 MMOL/L — SIGNIFICANT CHANGE UP (ref 96–108)
CO2 SERPL-SCNC: 28 MMOL/L — SIGNIFICANT CHANGE UP (ref 22–31)
CREAT SERPL-MCNC: 0.44 MG/DL — LOW (ref 0.5–1.3)
GLUCOSE SERPL-MCNC: 85 MG/DL — SIGNIFICANT CHANGE UP (ref 70–99)
LDH SERPL L TO P-CCNC: 142 U/L — SIGNIFICANT CHANGE UP (ref 50–242)
PHOSPHATE SERPL-MCNC: 3.9 MG/DL — SIGNIFICANT CHANGE UP (ref 2.5–4.5)
POTASSIUM SERPL-MCNC: 3.9 MMOL/L — SIGNIFICANT CHANGE UP (ref 3.5–5.3)
POTASSIUM SERPL-SCNC: 3.9 MMOL/L — SIGNIFICANT CHANGE UP (ref 3.5–5.3)
PROT SERPL-MCNC: 6.6 G/DL — SIGNIFICANT CHANGE UP (ref 6–8.3)
SODIUM SERPL-SCNC: 136 MMOL/L — SIGNIFICANT CHANGE UP (ref 135–145)

## 2020-11-01 PROCEDURE — 99232 SBSQ HOSP IP/OBS MODERATE 35: CPT

## 2020-11-01 RX ADMIN — CALAMINE AND ZINC OXIDE AND PHENOL 1 APPLICATION(S): 160; 10 LOTION TOPICAL at 17:04

## 2020-11-01 RX ADMIN — Medication 1 TABLET(S): at 11:06

## 2020-11-01 NOTE — PROGRESS NOTE ADULT - PROBLEM SELECTOR PROBLEM 3
Underweight due to inadequate caloric intake
Prophylactic measure
Underweight due to inadequate caloric intake
Underweight due to inadequate caloric intake

## 2020-11-01 NOTE — PROGRESS NOTE ADULT - PROBLEM SELECTOR PROBLEM 1
Other classical Hodgkin lymphoma

## 2020-11-01 NOTE — PROGRESS NOTE ADULT - ATTENDING COMMENTS
Agree with above. Pt seen and evaluated at bedside. Laboratory data reviewed by me     - fever work up ongoing, not neutropenic  - pt refused CT CAP w/co last night. Is agreeable to doing it today  - can be d/c if fever free for 46 hours and cultures negative
Awaiting echo, once that is done, if it is nl, can be discharged home.
CT showing pericardial effusion, will check echo.

## 2020-11-01 NOTE — PROGRESS NOTE ADULT - PROBLEM SELECTOR PLAN 1
-plan is for inpatient chemo with nivolumab, which he received this admission   -monitor CBC and TLS labs   -f/u CT chest A/P results   -has L acetabular involvement, known from prior admission, c/w pain control as needed   -s/p radiation treatment to the spine on 10/22
-plan is for inpatient chemo with nivolumab  -monitor CBC and TLS labs   -CT chest abd/pelvis pending, f/u results  -has L acetabular involvement, known from prior admission, c/w pain control as needed   -s/p radiation treatment to the spine on 10/22
-plan is for inpatient chemo with nivolumab, which he received this admission   -monitor CBC and TLS labs   -CT chest showing small pericardial effusion, echo ordered   -has L acetabular involvement, known from prior admission, c/w pain control as needed   -s/p radiation treatment to the spine on 10/22  -outpt f/u with Dr. Knight
-plan is for inpatient chemo with nivolumab, which he received yesterday   -monitor CBC and TLS labs   -CT chest abd/pelvis was ordered, but patient decline last night when transport arrived. Discussed with patient and he is reconsidering   -has L acetabular involvement, known from prior admission, c/w pain control as needed   -s/p radiation treatment to the spine on 10/22

## 2020-11-01 NOTE — PROGRESS NOTE ADULT - ASSESSMENT
18 yo male w/pmh stage 4 Hodgkin lymphoma who presents to [facility] for inpatient chemo.
18 yo male w/pmh stage 4 Hodgkin lymphoma who presents to [facility] for inpatient chemo.
20 yo male w/pmh stage 4 Hodgkin lymphoma who presents to [facility] for inpatient chemo.
20 yo male w/pmh stage 4 Hodgkin lymphoma who presents to [facility] for inpatient chemo.
Assessment:  The patient is a 19 year old male with past medical history of stage 4 Hodgkin lymphoma who presents to [facility] for inpatient chemotherapy. He was recently discharged  on 10/22 from [facility] after receiving radiation therapy for extensive bony disease with multiple vertebral compression fractures, tumor invasion into the spinal canal, and the left acetabulum. He was seen by orthopedics who did not recommend surgery. He was called by his oncologist Dr. Emery to come in again for further inpatient treatment. He currently complains of hip pain. He says he can walk independently, without a cane or walker, but needs to hold onto things when he walks. He denies fevers, chills, chest pain, dizziness, palpitations, nausea, vomiting, constipation or abdominal pain. He feels very hungry right now. He also has been having diarrhea since his last hospitalization, which is loose, not watery, non-bloody, but hasn't had any bowl movements yet today.    Plan:  # Fever s/p nivolumab injection in the setting of Hodgkin's lymphoma  - Monitor off antibiotics for now  - Blood cultures sent on 10/30 are all NGTD  - Doppler of upper extremities was negative for DVT  - Leukocytosis remains- possibly related to the underlying Hodgkin's marrow involvement    Duglas Portillo MD  [phone]  After 5pm/weekends [phone]    
Patient is a 18 y/o M w/ a PMHx of Stage IV CHL (Dx 9/2018, s/p multiple lines of therapy which has been c/b nonadherence and patient refusal of treatment) who has been admitted for inpatient chemo.     # Stage IV CHL  - Diagnosed in 9/2018. He was initially treated following ABVE-PC which was completed in 12/2018 after 5 cycles due to nonadherence and patient refusal. He then relapsed in 2/2019. He was started on salvage treatment in April 2019 with gemzar/brentuximab which was also stopped due to the patient's refusal and nonadherence. Patient was then recommended to have 2 cycles of ICE with intent to pursue an autologous bone marrow transplant. The patient began the cycle and then left AMA unfortunately and so this was never completed.   - Recently complete XRT to spine on 10/22 at [facility]   - c/w CBC WITH DIFF and TLS labs (CMP, Phos, LDH, Uric acid) daily  - Plan to give nivolumab tomorrow; risks and benefits d/w patient  - Will prefer to monitor patient's counts a day after chemo   - outpatient f/u with primary hematologist Dr. Emery upon discharge      Taylor Duvall, PGY-4  Hematology-Oncology Fellow  [phone] ([place]) [phone] ([facility])  Please page fellow on call after 5pm and on weekends        
Patient is a 20 y/o M w/ a PMHx of Stage IV CHL (Dx 9/2018, s/p multiple lines of therapy which has been c/b nonadherence and patient refusal of treatment) who has been admitted for inpatient chemo.     # Stage IV CHL  - Diagnosed in 9/2018. He was initially treated following ABVE-PC which was completed in 12/2018 after 5 cycles due to nonadherence and patient refusal. He then relapsed in 2/2019. He was started on salvage treatment in April 2019 with gemzar/brentuximab which was also stopped due to the patient's refusal and nonadherence. Patient was then recommended to have 2 cycles of ICE with intent to pursue an autologous bone marrow transplant. The patient began the cycle and then left AMA unfortunately and so this was never completed.   - Recently complete XRT to spine on 10/22 at [facility]   - c/w CBC WITH DIFF and TLS labs (CMP, Phos, LDH, Uric acid) daily. PLEASE make sure uric acid and LDH checked tomorrow AM.  - Plan for Nivolumab today; risks and benefits d/w patient  - Will assess counts in AM; if stable may consider discharge; patient insistent on going home tomorrow  - outpatient f/u with primary hematologist Dr. Emery upon discharge      Taylor Duvall, PGY-4  Hematology-Oncology Fellow  [phone] ([place]) [phone] ([facility])  Please page fellow on call after 5pm and on weekends        
Patient is a 20 y/o M w/ a PMHx of Stage IV CHL (Dx 9/2018, s/p multiple lines of therapy which has been c/b nonadherence and patient refusal of treatment) who has been admitted for inpatient chemo.     # Stage IV CHL  - Diagnosed in 9/2018. He was initially treated following ABVE-PC which was completed in 12/2018 after 5 cycles due to nonadherence and patient refusal. He then relapsed in 2/2019. He was started on salvage treatment in April 2019 with gemzar/brentuximab which was also stopped due to the patient's refusal and nonadherence. Patient was then recommended to have 2 cycles of ICE with intent to pursue an autologous bone marrow transplant. The patient began the cycle and then left AMA unfortunately and so this was never completed.   - Recently complete XRT to spine on 10/22 at [facility]   - c/w CBC WITH DIFF and TLS labs (CMP, Phos, LDH, Uric acid) daily  - Patient received Nivolumab on 10/29; risks and benefits d/w patient  - Patient was febrile on 10/29. This is not an expected side-effect of Nivolumab. CT A/P is pending to assess for an infectious source and to assess for disease. This was discussed with the patient at length today and he is agreeable to having this test performed. Will follow-up  - outpatient f/u with primary hematologist Dr. Emery upon discharge    Sumaya Reis MD  Hematology/Oncology Fellow, PGY-4  Pager: [phone]  After 5pm and on weekends please page on-call fellow

## 2020-11-01 NOTE — PROGRESS NOTE ADULT - SUBJECTIVE AND OBJECTIVE BOX
INFECTIOUS DISEASES FOLLOW UP-- Samara Portillo  [phone]    This is a follow up note for this  19yMale with  Hodgkin lymphoma not in remission  s/p out pt immunotherapy with subsequent fever prompting admission  feeling better and asking to be discharged        ROS:  CONSTITUTIONAL:  No fever, good appetite  CARDIOVASCULAR:  No chest pain or palpitations  RESPIRATORY:  No dyspnea  GASTROINTESTINAL:  No nausea, vomiting, diarrhea, or abdominal pain  GENITOURINARY:  No dysuria  NEUROLOGIC:  No headache,     Allergies    IV Contrast (Vomiting)  Rocephin (Pruritus)  Squash (Other)    Intolerances        ANTIBIOTICS/RELEVANT:  antimicrobials    immunologic:    OTHER:  acetaminophen   Tablet .. 650 milliGRAM(s) Oral every 4 hours PRN  alteplase for catheter clearance 2 milliGRAM(s) Catheter once  calamine/zinc oxide Lotion 1 Application(s) Topical two times a day  chlorhexidine 2% Cloths 1 Application(s) Topical daily  enoxaparin Injectable 40 milliGRAM(s) SubCutaneous daily  multivitamin 1 Tablet(s) Oral daily  ondansetron Injectable 4 milliGRAM(s) IV Push once  oxyCODONE    IR 5 milliGRAM(s) Oral every 6 hours PRN  triamcinolone 0.1% Oral Paste 1 Application(s) Topical daily      Objective:  Vital Signs Last 24 Hrs  T(C): 36.8 (2020 08:49), Max: 37.6 (31 Oct 2020 23:47)  T(F): 98.3 (2020 08:49), Max: 99.6 (31 Oct 2020 23:47)  HR: 100 (2020 08:49) (81 - 105)  BP: 108/74 (2020 08:49) (104/66 - 108/74)  BP(mean): --  RR: 18 (2020 08:49) (18 - 18)  SpO2: 96% (2020 08:49) (96% - 100%)    PHYSICAL EXAM:  Constitutional:no acute distress, thin appearing  Eyes:ELSA, EOMI  Ear/Nose/Throat: no oral lesions, 	  Respiratory: clear BL left chest port site no tenderness or erythma, functioning well  Cardiovascular: S1S2  Gastrointestinal:soft, (+) BS, no tenderness  Extremities:no e/e/c  IV sites not inflammed.    LABS:                        10.1   23.63 )-----------( 341      ( 30 Oct 2020 13:32 )             33.5     -    136  |  98  |  11  ----------------------------<  85  3.9   |  28  |  0.44<L>    Ca    9.5      2020 08:47  Phos  3.9       Mg     2.0     10-30    TPro  6.6  /  Alb  3.6  /  TBili  0.2  /  DBili  x   /  AST  14  /  ALT  25  /  AlkPhos  118        Urinalysis Basic - ( 31 Oct 2020 02:50 )    Color: Yellow / Appearance: Clear / S.028 / pH: x  Gluc: x / Ketone: Negative  / Bili: Negative / Urobili: <2 mg/dL   Blood: x / Protein: Trace / Nitrite: Negative   Leuk Esterase: Negative / RBC: x / WBC x   Sq Epi: x / Non Sq Epi: x / Bacteria: x        MICROBIOLOGY:            RECENT CULTURES:  10-30 @ 22:52  .Urine Clean Catch (Midstream)  --  --  --    No growth  --  10-30 @ 18:42  .Blood Blood  --  --  --    No growth to date.  --  10-30 @ 02:15  .Blood Blood-Peripheral  --  --  --    No growth to date.  --      RADIOLOGY & ADDITIONAL STUDIES:    < from: [facility] Duplex Upper Ext Vein Scan, Left (10.31.20 @ 11:48) >  IMPRESSION:  No evidence of left upper extremity deep venous thrombosis.    < end of copied text >  
INTERVAL HPI/OVERNIGHT EVENTS:  No overnight events.  Doing well this AM.     MEDICATIONS  (STANDING):  alteplase for catheter clearance 2 milliGRAM(s) Catheter once  calamine/zinc oxide Lotion 1 Application(s) Topical two times a day  chlorhexidine 2% Cloths 1 Application(s) Topical daily  enoxaparin Injectable 40 milliGRAM(s) SubCutaneous daily  multivitamin 1 Tablet(s) Oral daily  ondansetron Injectable 4 milliGRAM(s) IV Push once  triamcinolone 0.1% Oral Paste 1 Application(s) Topical daily    MEDICATIONS  (PRN):  acetaminophen   Tablet .. 650 milliGRAM(s) Oral every 4 hours PRN Mild Pain (1 - 3)  oxyCODONE    IR 5 milliGRAM(s) Oral every 6 hours PRN Moderate Pain (4 - 6)    Allergies    IV Contrast (Vomiting)  Rocephin (Pruritus)  Squash (Other)    Intolerances          VITAL SIGNS:  T(F): 98.9 (10-29-20 @ 16:19)  HR: 110 (10-29-20 @ 16:19)  BP: 115/76 (10-29-20 @ 16:19)  RR: 18 (10-29-20 @ 16:19)  SpO2: 100% (10-29-20 @ 16:19)  Wt(kg): --    PHYSICAL EXAM:    Constitutional: NAD, lying comfortably in bed  Eyes: EOMI, PERRLA  Neck: supple, no masses, no JVD  Respiratory: CTAB; no r/r/w  Cardiovascular: RRR, no M/R/G  Gastrointestinal: +BS, soft, NTND, no hepatosplenomegaly  Extremities: no c/c/e  Neurological: AAOx3, nonfocal    LABS:                        9.5    16.16 )-----------( 344      ( 29 Oct 2020 10:47 )             31.6     10-29    136  |  99  |  15  ----------------------------<  80  4.2   |  28  |  0.40<L>    Ca    9.3      29 Oct 2020 10:47  Phos  4.0     10-  Mg     1.9     10-    TPro  6.3  /  Alb  3.5  /  TBili  0.4  /  DBili  x   /  AST  13  /  ALT  31  /  AlkPhos  127<H>  10-29      Urinalysis Basic - ( 27 Oct 2020 20:08 )    Color: Yellow / Appearance: Clear / S.021 / pH: x  Gluc: x / Ketone: Negative  / Bili: Negative / Urobili: Negative   Blood: x / Protein: Negative / Nitrite: Negative   Leuk Esterase: Negative / RBC: 0 /hpf / WBC 0 /HPF   Sq Epi: x / Non Sq Epi: 0 /hpf / Bacteria: Negative        RADIOLOGY & ADDITIONAL TESTS:  Studies reviewed.  
INTERVAL HPI/OVERNIGHT EVENTS:  No overnight events. Assessed at bedside today. Appears happy and comfortable with girlfriend at bedside. No complains     MEDICATIONS  (STANDING):  alteplase for catheter clearance 2 milliGRAM(s) Catheter once  calamine/zinc oxide Lotion 1 Application(s) Topical two times a day  chlorhexidine 2% Cloths 1 Application(s) Topical daily  enoxaparin Injectable 40 milliGRAM(s) SubCutaneous daily  multivitamin 1 Tablet(s) Oral daily  triamcinolone 0.1% Oral Paste 1 Application(s) Topical daily    MEDICATIONS  (PRN):  acetaminophen   Tablet .. 650 milliGRAM(s) Oral every 4 hours PRN Mild Pain (1 - 3)  oxyCODONE    IR 5 milliGRAM(s) Oral every 6 hours PRN Moderate Pain (4 - 6)    Allergies    IV Contrast (Vomiting)  Rocephin (Pruritus)  Squash (Other)    Intolerances          VITAL SIGNS:  T(F): 98.5 (10-28-20 @ 17:00)  HR: 104 (10-28-20 @ 17:00)  BP: 108/70 (10-28-20 @ 17:00)  RR: 18 (10-28-20 @ 17:00)  SpO2: 100% (10-28-20 @ 17:00)  Wt(kg): --    PHYSICAL EXAM:    Constitutional: NAD, lying comfortably in bed  Eyes: EOMI, PERRLA  Neck: supple, no masses, no JVD  Respiratory: CTAB; no r/r/w  Cardiovascular: RRR, no M/R/G  Gastrointestinal: +BS, soft, NTND, no hepatosplenomegaly  Extremities: no c/c/e  Neurological: AAOx3, nonfocal    LABS:                        9.2    14.93 )-----------( 370      ( 28 Oct 2020 08:36 )             31.1     10-28    139  |  101  |  16  ----------------------------<  86  4.1   |  29  |  0.38<L>    Ca    9.1      28 Oct 2020 08:36  Phos  4.0     10-  Mg     1.9     10-28    TPro  6.0  /  Alb  3.2<L>  /  TBili  0.3  /  DBili  x   /  AST  10  /  ALT  25  /  AlkPhos  119  10-28    PT/INR - ( 27 Oct 2020 10:59 )   PT: 12.5 sec;   INR: 1.04 ratio         PTT - ( 27 Oct 2020 10:59 )  PTT:34.7 sec  Urinalysis Basic - ( 27 Oct 2020 20:08 )    Color: Yellow / Appearance: Clear / S.021 / pH: x  Gluc: x / Ketone: Negative  / Bili: Negative / Urobili: Negative   Blood: x / Protein: Negative / Nitrite: Negative   Leuk Esterase: Negative / RBC: 0 /hpf / WBC 0 /HPF   Sq Epi: x / Non Sq Epi: 0 /hpf / Bacteria: Negative        RADIOLOGY & ADDITIONAL TESTS:  Studies reviewed.  
INTERVAL HPI/OVERNIGHT EVENTS:  Patient S&E at bedside. Patient was febrile to 101 last night, though was not symptomatic. He has refused his CT scan and is unhappy about being in the hospital.     VITAL SIGNS:  T(F): 98.1 (10-30-20 @ 16:07)  HR: 112 (10-30-20 @ 16:07)  BP: 105/67 (10-30-20 @ 16:07)  RR: 18 (10-30-20 @ 16:07)  SpO2: 100% (10-30-20 @ 16:07)  Wt(kg): --    PHYSICAL EXAM:    Constitutional: NAD  Eyes: EOMI, sclera non-icteric  Neck: supple  Respiratory: CTAB, no wheezes or crackles   Cardiovascular: RRR  Gastrointestinal: soft, NTND, + BS  Extremities: no cyanosis, clubbing or edema   Neurological: awake and alert      MEDICATIONS  (STANDING):  alteplase for catheter clearance 2 milliGRAM(s) Catheter once  calamine/zinc oxide Lotion 1 Application(s) Topical two times a day  chlorhexidine 2% Cloths 1 Application(s) Topical daily  enoxaparin Injectable 40 milliGRAM(s) SubCutaneous daily  multivitamin 1 Tablet(s) Oral daily  ondansetron Injectable 4 milliGRAM(s) IV Push once  triamcinolone 0.1% Oral Paste 1 Application(s) Topical daily    MEDICATIONS  (PRN):  acetaminophen   Tablet .. 650 milliGRAM(s) Oral every 4 hours PRN Mild Pain (1 - 3)  oxyCODONE    IR 5 milliGRAM(s) Oral every 6 hours PRN Moderate Pain (4 - 6)      Allergies    IV Contrast (Vomiting)  Rocephin (Pruritus)  Squash (Other)    Intolerances        LABS:                        10.1   23.63 )-----------( 341      ( 30 Oct 2020 13:32 )             33.5     10-30    134<L>  |  98  |  13  ----------------------------<  114<H>  3.7   |  26  |  0.44<L>    Ca    9.0      30 Oct 2020 13:32  Phos  3.0     10-30  Mg     2.0     10-30    TPro  6.5  /  Alb  3.6  /  TBili  0.4  /  DBili  x   /  AST  14  /  ALT  32  /  AlkPhos  132<H>  10-30      Urinalysis Basic - ( 30 Oct 2020 09:12 )    Color: Light Yellow / Appearance: Clear / S.011 / pH: x  Gluc: x / Ketone: Negative  / Bili: Negative / Urobili: <2 mg/dL   Blood: x / Protein: Negative / Nitrite: Negative   Leuk Esterase: Negative / RBC: x / WBC x   Sq Epi: x / Non Sq Epi: x / Bacteria: x        RADIOLOGY & ADDITIONAL TESTS:  Studies reviewed.  
Patient is a 19y old  Male who presents with a chief complaint of inpatient chemo (2020 11:56)    SUBJECTIVE / OVERNIGHT EVENTS: no acute events overnight     MEDICATIONS  (STANDING):  alteplase for catheter clearance 2 milliGRAM(s) Catheter once  calamine/zinc oxide Lotion 1 Application(s) Topical two times a day  chlorhexidine 2% Cloths 1 Application(s) Topical daily  enoxaparin Injectable 40 milliGRAM(s) SubCutaneous daily  multivitamin 1 Tablet(s) Oral daily  ondansetron Injectable 4 milliGRAM(s) IV Push once  triamcinolone 0.1% Oral Paste 1 Application(s) Topical daily    MEDICATIONS  (PRN):  acetaminophen   Tablet .. 650 milliGRAM(s) Oral every 4 hours PRN Mild Pain (1 - 3)  oxyCODONE    IR 5 milliGRAM(s) Oral every 6 hours PRN Moderate Pain (4 - 6)      Vital Signs Last 24 Hrs  T(C): 36.9 (2020 16:08), Max: 37.6 (31 Oct 2020 23:47)  T(F): 98.5 (2020 16:08), Max: 99.6 (31 Oct 2020 23:47)  HR: 82 (2020 16:08) (82 - 105)  BP: 107/71 (2020 16:08) (107/62 - 108/74)  BP(mean): --  RR: 18 (2020 16:08) (18 - 18)  SpO2: 96% (2020 16:08) (96% - 99%)  CAPILLARY BLOOD GLUCOSE        I&O's Summary    31 Oct 2020 08:01  -  2020 07:00  --------------------------------------------------------  IN: 0 mL / OUT: 1070 mL / NET: -1070 mL    2020 07:01  -  2020 16:10  --------------------------------------------------------  IN: 540 mL / OUT: 200 mL / NET: 340 mL        PHYSICAL EXAM:  GENERAL: NAD  EYES:  conjunctiva and sclera clear  CHEST/LUNG: Clear to auscultation bilaterally; No wheeze  HEART: +s1/S2, reg   ABDOMEN: Soft, Nontender, Nondistended  EXTREMITIES:   no LE edema   PSYCH: AAOx3      LABS:        136  |  98  |  11  ----------------------------<  85  3.9   |  28  |  0.44<L>    Ca    9.5      2020 08:47  Phos  3.9     -    TPro  6.6  /  Alb  3.6  /  TBili  0.2  /  DBili  x   /  AST  14  /  ALT  25  /  AlkPhos  118            Urinalysis Basic - ( 31 Oct 2020 02:50 )    Color: Yellow / Appearance: Clear / S.028 / pH: x  Gluc: x / Ketone: Negative  / Bili: Negative / Urobili: <2 mg/dL   Blood: x / Protein: Trace / Nitrite: Negative   Leuk Esterase: Negative / RBC: x / WBC x   Sq Epi: x / Non Sq Epi: x / Bacteria: x      
Patient is a 19y old  Male who presents with a chief complaint of inpatient chemo (29 Oct 2020 10:05)    SUBJECTIVE / OVERNIGHT EVENTS: no acute events overnight     MEDICATIONS  (STANDING):  alteplase for catheter clearance 2 milliGRAM(s) Catheter once  calamine/zinc oxide Lotion 1 Application(s) Topical two times a day  chlorhexidine 2% Cloths 1 Application(s) Topical daily  enoxaparin Injectable 40 milliGRAM(s) SubCutaneous daily  multivitamin 1 Tablet(s) Oral daily  nivolumab IVPB (eMAR) 240 milliGRAM(s) IV Intermittent once  triamcinolone 0.1% Oral Paste 1 Application(s) Topical daily    MEDICATIONS  (PRN):  acetaminophen   Tablet .. 650 milliGRAM(s) Oral every 4 hours PRN Mild Pain (1 - 3)  oxyCODONE    IR 5 milliGRAM(s) Oral every 6 hours PRN Moderate Pain (4 - 6)      Vital Signs Last 24 Hrs  T(C): 36.7 (29 Oct 2020 09:42), Max: 36.9 (28 Oct 2020 17:00)  T(F): 98.1 (29 Oct 2020 09:42), Max: 98.5 (28 Oct 2020 17:00)  HR: 96 (29 Oct 2020 09:42) (96 - 107)  BP: 96/65 (29 Oct 2020 09:42) (96/54 - 108/70)  BP(mean): --  RR: 18 (29 Oct 2020 09:42) (18 - 18)  SpO2: 100% (29 Oct 2020 09:42) (99% - 100%)  CAPILLARY BLOOD GLUCOSE        I&O's Summary    28 Oct 2020 07:  -  29 Oct 2020 07:00  --------------------------------------------------------  IN: 520 mL / OUT: 1400 mL / NET: -880 mL    29 Oct 2020 07:01  -  29 Oct 2020 14:35  --------------------------------------------------------  IN: 440 mL / OUT: 600 mL / NET: -160 mL    PHYSICAL EXAM:  GENERAL: NAD  EYES: conjunctiva and sclera clear  CHEST/LUNG: Clear to auscultation bilaterally; No wheeze  HEART: +S1/S2, reg   ABDOMEN: Soft, Nontender, Nondistended  EXTREMITIES: no LE edema   PSYCH: AAOx3      LABS:                        9.5    16.16 )-----------( 344      ( 29 Oct 2020 10:47 )             31.6     10-29    136  |  99  |  15  ----------------------------<  80  4.2   |  28  |  0.40<L>    Ca    9.3      29 Oct 2020 10:47  Phos  4.0     10  Mg     1.9     10-28    TPro  6.3  /  Alb  3.5  /  TBili  0.4  /  DBili  x   /  AST  13  /  ALT  31  /  AlkPhos  127<H>  10-29          Urinalysis Basic - ( 27 Oct 2020 20:08 )    Color: Yellow / Appearance: Clear / S.021 / pH: x  Gluc: x / Ketone: Negative  / Bili: Negative / Urobili: Negative   Blood: x / Protein: Negative / Nitrite: Negative   Leuk Esterase: Negative / RBC: 0 /hpf / WBC 0 /HPF   Sq Epi: x / Non Sq Epi: 0 /hpf / Bacteria: Negative    
Patient is a 19y old  Male who presents with a chief complaint of inpatient chemo (29 Oct 2020 18:20)    SUBJECTIVE / OVERNIGHT EVENTS: patient was febrile overnight, currently denies any new symptoms, asking to go home     MEDICATIONS  (STANDING):  alteplase for catheter clearance 2 milliGRAM(s) Catheter once  calamine/zinc oxide Lotion 1 Application(s) Topical two times a day  chlorhexidine 2% Cloths 1 Application(s) Topical daily  enoxaparin Injectable 40 milliGRAM(s) SubCutaneous daily  multivitamin 1 Tablet(s) Oral daily  ondansetron Injectable 4 milliGRAM(s) IV Push once  triamcinolone 0.1% Oral Paste 1 Application(s) Topical daily    MEDICATIONS  (PRN):  acetaminophen   Tablet .. 650 milliGRAM(s) Oral every 4 hours PRN Mild Pain (1 - 3)  oxyCODONE    IR 5 milliGRAM(s) Oral every 6 hours PRN Moderate Pain (4 - 6)      Vital Signs Last 24 Hrs  T(C): 36.9 (30 Oct 2020 08:22), Max: 38.4 (29 Oct 2020 23:58)  T(F): 98.4 (30 Oct 2020 08:22), Max: 101.2 (29 Oct 2020 23:58)  HR: 115 (30 Oct 2020 08:22) (110 - 135)  BP: 109/73 (30 Oct 2020 08:22) (93/59 - 115/76)  BP(mean): --  RR: 18 (30 Oct 2020 08:22) (18 - 18)  SpO2: 98% (30 Oct 2020 08:22) (98% - 100%)  CAPILLARY BLOOD GLUCOSE        I&O's Summary    29 Oct 2020 07:01  -  30 Oct 2020 07:00  --------------------------------------------------------  IN: 754 mL / OUT: 1450 mL / NET: -696 mL        PHYSICAL EXAM:  GENERAL: NAD  EYES:  conjunctiva and sclera clear  CHEST/LUNG: Clear to auscultation bilaterally; No wheeze  HEART: +S1/S2, reg   ABDOMEN: Soft, Nontender  EXTREMITIES: no LE edema   PSYCH: AAOx3      LABS:                        9.5    16.16 )-----------( 344      ( 29 Oct 2020 10:47 )             31.6     10    136  |  99  |  15  ----------------------------<  80  4.2   |  28  |  0.40<L>    Ca    9.3      29 Oct 2020 10:47    TPro  6.3  /  Alb  3.5  /  TBili  0.4  /  DBili  x   /  AST  13  /  ALT  31  /  AlkPhos  127<H>  10-          Urinalysis Basic - ( 30 Oct 2020 09:12 )    Color: Light Yellow / Appearance: Clear / S.011 / pH: x  Gluc: x / Ketone: Negative  / Bili: Negative / Urobili: <2 mg/dL   Blood: x / Protein: Negative / Nitrite: Negative   Leuk Esterase: Negative / RBC: x / WBC x   Sq Epi: x / Non Sq Epi: x / Bacteria: x    
Patient is a 19y old  Male who presents with a chief complaint of inpatient chemo (31 Oct 2020 15:12)    SUBJECTIVE / OVERNIGHT EVENTS: no acute events overnight, was afebrile     MEDICATIONS  (STANDING):  alteplase for catheter clearance 2 milliGRAM(s) Catheter once  calamine/zinc oxide Lotion 1 Application(s) Topical two times a day  chlorhexidine 2% Cloths 1 Application(s) Topical daily  enoxaparin Injectable 40 milliGRAM(s) SubCutaneous daily  multivitamin 1 Tablet(s) Oral daily  ondansetron Injectable 4 milliGRAM(s) IV Push once  triamcinolone 0.1% Oral Paste 1 Application(s) Topical daily    MEDICATIONS  (PRN):  acetaminophen   Tablet .. 650 milliGRAM(s) Oral every 4 hours PRN Mild Pain (1 - 3)  oxyCODONE    IR 5 milliGRAM(s) Oral every 6 hours PRN Moderate Pain (4 - 6)      Vital Signs Last 24 Hrs  T(C): 36.8 (31 Oct 2020 15:15), Max: 36.9 (30 Oct 2020 22:15)  T(F): 98.2 (31 Oct 2020 15:15), Max: 98.4 (30 Oct 2020 22:15)  HR: 81 (31 Oct 2020 15:15) (81 - 88)  BP: 104/66 (31 Oct 2020 15:15) (103/63 - 104/66)  BP(mean): --  RR: 18 (31 Oct 2020 15:15) (18 - 18)  SpO2: 100% (31 Oct 2020 15:15) (98% - 100%)  CAPILLARY BLOOD GLUCOSE        I&O's Summary    30 Oct 2020 07:01  -  31 Oct 2020 07:00  --------------------------------------------------------  IN: 240 mL / OUT: 600 mL / NET: -360 mL    31 Oct 2020 08:01  -  31 Oct 2020 17:04  --------------------------------------------------------  IN: 0 mL / OUT: 570 mL / NET: -570 mL        PHYSICAL EXAM:  GENERAL: NAD  EYES:  conjunctiva and sclera clear  CHEST/LUNG: Clear to auscultation bilaterally; No wheeze  HEART: +S1/S2, reg   ABDOMEN: Soft, Nontender, Nondistended  EXTREMITIES: no LE edema   PSYCH: AAOx3      LABS:                        10.1   23.63 )-----------( 341      ( 30 Oct 2020 13:32 )             33.5     10-30    134<L>  |  98  |  13  ----------------------------<  114<H>  3.7   |  26  |  0.44<L>    Ca    9.0      30 Oct 2020 13:32  Phos  3.0     10-  Mg     2.0     10-30    TPro  6.5  /  Alb  3.6  /  TBili  0.4  /  DBili  x   /  AST  14  /  ALT  32  /  AlkPhos  132<H>  10-30          Urinalysis Basic - ( 31 Oct 2020 02:50 )    Color: Yellow / Appearance: Clear / S.028 / pH: x  Gluc: x / Ketone: Negative  / Bili: Negative / Urobili: <2 mg/dL   Blood: x / Protein: Trace / Nitrite: Negative   Leuk Esterase: Negative / RBC: x / WBC x   Sq Epi: x / Non Sq Epi: x / Bacteria: x

## 2020-11-01 NOTE — PROGRESS NOTE ADULT - PROBLEM SELECTOR PLAN 4
dvt ppx lovenox, SCDs
dvt ppx lovenox, SCDs
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, [place] [address], phone number for appointment: [phone]

## 2020-11-01 NOTE — PROGRESS NOTE ADULT - PROBLEM SELECTOR PLAN 2
-UA neg  -f/u cultures, no growth to date  -f/u imaging   -no fever over the past 24 hrs  -doppler of the port site ordered   -appreciate ID recs
- due to cancer  - regular diet, no beef/pork  - encourage PO intake  - started on ensure clears
-UA neg  -f/u cultures, no growth to date  -no fever over the past 24 hrs  -doppler of the port site was neg for DVT  -cleared from infectious standpoint for discharge
-fever noted overnight  -cultures sent  -monitor off abx for now  -ID consult placed  -will check UA/UC, CXR  -denies any specific symptoms currently

## 2020-11-02 ENCOUNTER — TRANSCRIPTION ENCOUNTER (OUTPATIENT)
Age: 20
End: 2020-11-02

## 2020-11-02 VITALS
DIASTOLIC BLOOD PRESSURE: 61 MMHG | HEART RATE: 98 BPM | TEMPERATURE: 100 F | RESPIRATION RATE: 18 BRPM | OXYGEN SATURATION: 98 % | SYSTOLIC BLOOD PRESSURE: 99 MMHG

## 2020-11-02 LAB
ANISOCYTOSIS BLD QL: SLIGHT — SIGNIFICANT CHANGE UP
BASOPHILS # BLD AUTO: 0.02 K/UL — SIGNIFICANT CHANGE UP (ref 0–0.2)
BASOPHILS NFR BLD AUTO: 0.3 % — SIGNIFICANT CHANGE UP (ref 0–2)
EOSINOPHIL # BLD AUTO: 0.45 K/UL — SIGNIFICANT CHANGE UP (ref 0–0.5)
EOSINOPHIL NFR BLD AUTO: 6.4 % — HIGH (ref 0–6)
HCT VFR BLD CALC: 35 % — LOW (ref 39–50)
HGB BLD-MCNC: 10.6 G/DL — LOW (ref 13–17)
HYPOCHROMIA BLD QL: SLIGHT — SIGNIFICANT CHANGE UP
IMM GRANULOCYTES NFR BLD AUTO: 0.3 % — SIGNIFICANT CHANGE UP (ref 0–1.5)
LYMPHOCYTES # BLD AUTO: 0.84 K/UL — LOW (ref 1–3.3)
LYMPHOCYTES # BLD AUTO: 12 % — LOW (ref 13–44)
MANUAL SMEAR VERIFICATION: YES — SIGNIFICANT CHANGE UP
MCHC RBC-ENTMCNC: 23.5 PG — LOW (ref 27–34)
MCHC RBC-ENTMCNC: 30.3 GM/DL — LOW (ref 32–36)
MCV RBC AUTO: 77.6 FL — LOW (ref 80–100)
MICROCYTES BLD QL: SLIGHT — SIGNIFICANT CHANGE UP
MONOCYTES # BLD AUTO: 0.57 K/UL — SIGNIFICANT CHANGE UP (ref 0–0.9)
MONOCYTES NFR BLD AUTO: 8.2 % — SIGNIFICANT CHANGE UP (ref 2–14)
NEUTROPHILS # BLD AUTO: 5.09 K/UL — SIGNIFICANT CHANGE UP (ref 1.8–7.4)
NEUTROPHILS NFR BLD AUTO: 72.8 % — SIGNIFICANT CHANGE UP (ref 43–77)
NRBC # BLD: 0 /100 WBCS — SIGNIFICANT CHANGE UP (ref 0–0)
PLAT MORPH BLD: NORMAL — SIGNIFICANT CHANGE UP
PLATELET # BLD AUTO: 195 K/UL — SIGNIFICANT CHANGE UP (ref 150–400)
POIKILOCYTOSIS BLD QL AUTO: SLIGHT — SIGNIFICANT CHANGE UP
RBC # BLD: 4.51 M/UL — SIGNIFICANT CHANGE UP (ref 4.2–5.8)
RBC # FLD: 24 % — HIGH (ref 10.3–14.5)
RBC BLD AUTO: ABNORMAL
WBC # BLD: 6.99 K/UL — SIGNIFICANT CHANGE UP (ref 3.8–10.5)
WBC # FLD AUTO: 6.99 K/UL — SIGNIFICANT CHANGE UP (ref 3.8–10.5)

## 2020-11-02 PROCEDURE — 99285 EMERGENCY DEPT VISIT HI MDM: CPT

## 2020-11-02 PROCEDURE — 71045 X-RAY EXAM CHEST 1 VIEW: CPT

## 2020-11-02 PROCEDURE — 99239 HOSP IP/OBS DSCHRG MGMT >30: CPT

## 2020-11-02 PROCEDURE — 84443 ASSAY THYROID STIM HORMONE: CPT

## 2020-11-02 PROCEDURE — 84550 ASSAY OF BLOOD/URIC ACID: CPT

## 2020-11-02 PROCEDURE — 93306 TTE W/DOPPLER COMPLETE: CPT

## 2020-11-02 PROCEDURE — 81001 URINALYSIS AUTO W/SCOPE: CPT

## 2020-11-02 PROCEDURE — 93971 EXTREMITY STUDY: CPT

## 2020-11-02 PROCEDURE — 84100 ASSAY OF PHOSPHORUS: CPT

## 2020-11-02 PROCEDURE — 74177 CT ABD & PELVIS W/CONTRAST: CPT

## 2020-11-02 PROCEDURE — 83615 LACTATE (LD) (LDH) ENZYME: CPT

## 2020-11-02 PROCEDURE — U0003: CPT

## 2020-11-02 PROCEDURE — 85025 COMPLETE CBC W/AUTO DIFF WBC: CPT

## 2020-11-02 PROCEDURE — 80053 COMPREHEN METABOLIC PANEL: CPT

## 2020-11-02 PROCEDURE — 71260 CT THORAX DX C+: CPT

## 2020-11-02 PROCEDURE — 93306 TTE W/DOPPLER COMPLETE: CPT | Mod: 26

## 2020-11-02 PROCEDURE — 87086 URINE CULTURE/COLONY COUNT: CPT

## 2020-11-02 PROCEDURE — 85730 THROMBOPLASTIN TIME PARTIAL: CPT

## 2020-11-02 PROCEDURE — 93005 ELECTROCARDIOGRAM TRACING: CPT

## 2020-11-02 PROCEDURE — 85610 PROTHROMBIN TIME: CPT

## 2020-11-02 PROCEDURE — 87040 BLOOD CULTURE FOR BACTERIA: CPT

## 2020-11-02 PROCEDURE — 81003 URINALYSIS AUTO W/O SCOPE: CPT

## 2020-11-02 PROCEDURE — 83735 ASSAY OF MAGNESIUM: CPT

## 2020-11-02 RX ADMIN — CALAMINE AND ZINC OXIDE AND PHENOL 1 APPLICATION(S): 160; 10 LOTION TOPICAL at 05:00

## 2020-11-02 RX ADMIN — CHLORHEXIDINE GLUCONATE 1 APPLICATION(S): 213 SOLUTION TOPICAL at 12:09

## 2020-11-02 RX ADMIN — Medication 1 TABLET(S): at 12:09

## 2020-11-02 RX ADMIN — Medication 1 APPLICATION(S): at 12:00

## 2020-11-02 NOTE — DISCHARGE NOTE PROVIDER - NSDCMRMEDTOKEN_GEN_ALL_CORE_FT
calamine topical lotion: 1 application topically 2 times a day  Multiple Vitamins oral tablet: 1 tab(s) orally once a day  Outpatient Physical therapy : 3x/ weekly x 6 weeks    Toe touch weight bearing to LLE  pantoprazole 40 mg oral delayed release tablet: 1 tab(s) orally once a day (before a meal)  triamcinolone 0.1% topical ointment: 1 application topically every 12 hours

## 2020-11-02 NOTE — CHART NOTE - NSCHARTNOTEFT_GEN_A_CORE
Patient seen and examined. Feels well. Mild R hip pain - has not taken oxy since 10/27, 1 dose.   Vitals labs reviewed. TTE no pericardial effusion seen. Discussed case with heme team Dr Duvall agree with present planning.   Pt is stable for discharge to home with close hematology outpatient follow up.   Gabyyue mayorga.   38 minutes spent orchestrating discharge

## 2020-11-02 NOTE — DISCHARGE NOTE PROVIDER - NSDCCPCAREPLAN_GEN_ALL_CORE_FT
PRINCIPAL DISCHARGE DIAGNOSIS  Diagnosis: Hodgkin lymphoma  Assessment and Plan of Treatment: Follow up with Dr Emery   Take meds as directed

## 2020-11-02 NOTE — DISCHARGE NOTE PROVIDER - CARE PROVIDER_API CALL
Kalie Emery (DO)  Hematology; Medical Oncology  [address] [place] [address]  Phone: [phone]  Fax: [phone]  Follow Up Time:

## 2020-11-02 NOTE — PROGRESS NOTE ADULT - REASON FOR ADMISSION
inpatient chemo

## 2020-11-02 NOTE — DISCHARGE NOTE NURSING/CASE MANAGEMENT/SOCIAL WORK - PATIENT PORTAL LINK FT
You can access the FollowMyHealth Patient Portal offered by [facility] by registering at the following website: [email or web address]. By joining [employer]’s FollowMyHealth portal, you will also be able to view your health information using other applications (apps) compatible with our system.

## 2020-11-02 NOTE — DISCHARGE NOTE PROVIDER - HOSPITAL COURSE
classical Hodgkin lymphoma.  Plan: -plan is for inpatient chemo with nivolumab, which he received this admission   monitor CBC and TLS labs   CT chest showing small pericardial effusion, echo ordered   has L acetabular involvement, known from prior admission, c/w pain control as needed   s/p radiation treatment to the spine on 10/22  Problem: Fever.  Plan: -UA neg  f/u cultures, no growth to date  no fever over the past 24 hrs  doppler of the port site was neg for DVT  cleared from infectious standpoint for discharge.   Problem: Underweight due to inadequate caloric intake.  Plan: - due to cancer  regular diet, no beef/pork  encourage PO intake    echo unremarkable wnl, can be discharged home  med rec discussed with Dr Fishman PT is cleared for DC home with no skilled need out PT Physical Therapy   Follow up with Dr Emery classical Hodgkin lymphoma.  Plan: -plan is for inpatient chemo with nivolumab, which he received this admission   monitor CBC and TLS labs   CT chest showing small pericardial effusion, echo ordered   has L acetabular involvement, known from prior admission, c/w pain control as needed   s/p radiation treatment to the spine on 10/22  Problem: Fever.  Plan: -UA neg  f/u cultures, no growth to date  no fever over the past 24 hrs  doppler of the port site was neg for DVT  cleared from infectious standpoint for discharge.   Problem: Underweight due to inadequate caloric intake.  Plan: - due to cancer  regular diet, no beef/pork  encourage PO intake    echo unremarkable wnl, can be discharged home  med rec discussed with Dr Fishman PT is cleared for DC home with no skilled need out PT Physical Therapy   Follow up with Dr Emery     Attending Addendum  Discharge Diagnoses  1) Stage IV Hodgkin Lymphoma on antineoplastic treatment  2) Fever  3) Underweight classical Hodgkin lymphoma.  Plan: -plan is for inpatient chemo with nivolumab, which he received this admission   monitor CBC and TLS labs   CT chest showing small pericardial effusion, echo ordered   has L acetabular involvement, known from prior admission, c/w pain control as needed   s/p radiation treatment to the spine on 10/22  Problem: Fever.  Plan: -UA neg  f/u cultures, no growth to date  no fever over the past 24 hrs  doppler of the port site was neg for DVT  cleared from infectious standpoint for discharge.   Problem: Underweight due to inadequate caloric intake.  Plan: - due to cancer  regular diet, no beef/pork  encourage PO intake    echo unremarkable wnl, can be discharged home  med rec discussed with Dr Fishman PT is cleared for DC home with no skilled need out PT Physical Therapy   Follow up with Dr Emery     Attending Addendum  Discharge Diagnoses  1) Stage IV Hodgkin Lymphoma on antineoplastic treatment  2) Fever  3) Severe Protein Calorie Malnutrition - following dietician recs

## 2020-11-04 ENCOUNTER — OUTPATIENT (OUTPATIENT)
Dept: OUTPATIENT SERVICES | Facility: HOSPITAL | Age: 20
LOS: 1 days | Discharge: HOME | End: 2020-11-04

## 2020-11-04 DIAGNOSIS — C81.98 HODGKIN LYMPHOMA, UNSPECIFIED, LYMPH NODES OF MULTIPLE SITES: ICD-10-CM

## 2020-11-04 LAB
CULTURE RESULTS: SIGNIFICANT CHANGE UP
SPECIMEN SOURCE: SIGNIFICANT CHANGE UP

## 2020-11-05 ENCOUNTER — RESULT REVIEW (OUTPATIENT)
Age: 20
End: 2020-11-05

## 2020-11-05 ENCOUNTER — APPOINTMENT (OUTPATIENT)
Dept: HEMATOLOGY ONCOLOGY | Facility: CLINIC | Age: 20
End: 2020-11-05
Payer: MEDICAID

## 2020-11-05 VITALS
WEIGHT: 91.91 LBS | BODY MASS INDEX: 15.31 KG/M2 | TEMPERATURE: 97.7 F | SYSTOLIC BLOOD PRESSURE: 99 MMHG | DIASTOLIC BLOOD PRESSURE: 65 MMHG | HEART RATE: 74 BPM | RESPIRATION RATE: 17 BRPM | HEIGHT: 64.88 IN | OXYGEN SATURATION: 100 %

## 2020-11-05 LAB
BASOPHILS # BLD AUTO: 0.05 K/UL — SIGNIFICANT CHANGE UP (ref 0–0.2)
BASOPHILS NFR BLD AUTO: 0.8 % — SIGNIFICANT CHANGE UP (ref 0–2)
EOSINOPHIL # BLD AUTO: 0.84 K/UL — HIGH (ref 0–0.5)
EOSINOPHIL NFR BLD AUTO: 13.2 % — HIGH (ref 0–6)
HCT VFR BLD CALC: 34 % — LOW (ref 39–50)
HGB BLD-MCNC: 10.2 G/DL — LOW (ref 13–17)
IMM GRANULOCYTES NFR BLD AUTO: 1.3 % — SIGNIFICANT CHANGE UP (ref 0–1.5)
LYMPHOCYTES # BLD AUTO: 1.16 K/UL — SIGNIFICANT CHANGE UP (ref 1–3.3)
LYMPHOCYTES # BLD AUTO: 18.2 % — SIGNIFICANT CHANGE UP (ref 13–44)
MCHC RBC-ENTMCNC: 23.4 PG — LOW (ref 27–34)
MCHC RBC-ENTMCNC: 30 G/DL — LOW (ref 32–36)
MCV RBC AUTO: 78 FL — LOW (ref 80–100)
MONOCYTES # BLD AUTO: 0.47 K/UL — SIGNIFICANT CHANGE UP (ref 0–0.9)
MONOCYTES NFR BLD AUTO: 7.4 % — SIGNIFICANT CHANGE UP (ref 2–14)
NEUTROPHILS # BLD AUTO: 3.77 K/UL — SIGNIFICANT CHANGE UP (ref 1.8–7.4)
NEUTROPHILS NFR BLD AUTO: 59.1 % — SIGNIFICANT CHANGE UP (ref 43–77)
NRBC # BLD: 0 /100 WBCS — SIGNIFICANT CHANGE UP (ref 0–0)
PLATELET # BLD AUTO: 121 K/UL — LOW (ref 150–400)
RBC # BLD: 4.36 M/UL — SIGNIFICANT CHANGE UP (ref 4.2–5.8)
RBC # FLD: 23.1 % — HIGH (ref 10.3–14.5)
WBC # BLD: 6.37 K/UL — SIGNIFICANT CHANGE UP (ref 3.8–10.5)
WBC # FLD AUTO: 6.37 K/UL — SIGNIFICANT CHANGE UP (ref 3.8–10.5)

## 2020-11-05 PROCEDURE — 99215 OFFICE O/P EST HI 40 MIN: CPT

## 2020-11-05 PROCEDURE — 99072 ADDL SUPL MATRL&STAF TM PHE: CPT

## 2020-11-08 NOTE — ASSESSMENT
[FreeTextEntry1] : 20 year old male who was diagnosed with a stage IV CHL in 9/2018 who appears to have a relapsed or refractory Hodgkin's lymphoma.  He was initially treated following ABVE-PC completed 12/2018 after 5 cycles due to noncompliance and patient refusal, relapsed diagnosed in Feb 2019, started on salvage treatment in April 2019 with gemzar/brentuximab which was also stopped due to the patient's refusal and noncompliance. \par \par He was recommended to have 2 cycles of ICE with intent to pursue an autologous bone marrow transplant.  The patient began the cycle and then left AMA unfortunately.  Despite multiple discussions with the patient and his mother, he did not follow up and refused further treatment.  Recently with progression of disease/osseous mets which he was recommended to go to the hospital, he again refused and left the office.   \par \par He was now readmitted for diffuse vertebral disease/compression fractures.  He was treated with radiation, 800 cGy to T1-T5, T8-L5, left hip/acetabulum.  He also received nivolumab, day 8 with improvement in his lymphadenopathy and symptoms.  \par Again extensive discussion had regarding his current condition and prognosis.  He appears to have better insight and appears to be more amenable to compliance with appointments and treatment.  \par I have again discussed with him, with his girlfriend present, that if he does not pursue therapy, he is likely to pass soon.  He understands and wishes to continue.  \par Will plan for another 1.5 months of nivolumab, 240 mg every 2 weeks, then will scan him after.  If he is able to continue with compliance to his visits/appointments, will ask the BMT team to reevaluate him for potential autologous bone marrow transplant. \par \par All of his appointments are made, he will follow up in 2 weeks.

## 2020-11-08 NOTE — HISTORY OF PRESENT ILLNESS
[de-identified] : Hodgkins Lymphoma. [place] [facility] [place] from [facility] with a mediastinal mass.  Upon work up he was found to have Stage IVB disease.  Ultrasound guided right supraclavicular lymph node Classical Hodgkins Lymphoma- large atypical cells to be CD30, CD15, PAX5+, MUM1+, LCA(-), CD20(-), CD79a(-), CD3(-), ALK(-).\par Bilateral bone marrow biopsy on 9/6/18- Cellular marrow with maturing trilineage hematopoiesis and no morphologic evidence of lymphoma\par PET/CT completed on 9/10/18-  Hypermetabolic lymphadenopathy in neck and thorax,  multiple hypermetabolic foci in the bilateral lower cervical and supraclavicular regions, measuring 0.9 x 0.7 cm demonstrates SUV 4.3 anterior mediastinal mass measuring 4.2 x 3 cm demonstrating peripheral FDG avidity and central photopenia, SUV: 6.9. Marked mediastinal and bilateral hilar lymphadenopathy demonstrating FDG avidity, right hilar lymph node measures 3.8 x 2.4 cm, SUV: 10.3.  5 mm nodule is again noted in the right middle lobe.  Several hypermetabolic foci identified predominantly throughout the axial skeleton with foci noted scattered throughout the upper thoracic spine and a singular focus in the right anterior eighth rib FDG avid focus in the right side of the T7 vertebral body with corresponding lucency on CT measuring 0.9 x 0.9 cm, SUV 14.7.  Two hypermetabolic foci in the appendicular skeleton with corresponding underlying lucency on CT. There is an FDG avid focus in the left inferior glenoid and a corresponding lucency on CT measuring 0.9 x 0.7 cm, SUV 12. There is also a 1.1 x 0.7 cm lucency in the right femoral head with corresponding FDG avidity, SUV 7.3.  He was placed on/following a protocol- OC 1331, treatment with ABVE-PC (adriamycin, bleomycin, vincristine, etoposide, cytoxan, prednisone, dexrazoxane).  He was withdrawn from the study due to his social situation, difficulty with compliance.  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  He was treated with chemotherapy from Sept 2018- Dec 2018.  Interim PET/CT completed on 11/3/18- Partially hypermetabolic anterior mediastinal mass is decreased in \par size and metabolism as compared to prior study dated 9/10/2018, compatible with a partial response to interval therapy (Deauville 4).  Resolution of additional previously seen hypermetabolic mediastinal, hilar, and cardiophrenic lymph nodes.Marked interval decrease in hypermetabolic foci in the axial and appendicular skeleton with residual hypermetabolic focus in left glenoid which may represent a small focus of residual disease (Deauville 4).  He completed 5 cycles of chemotherapy, but missed about 10 appointments, and refused further chemotherapy after cycle 5 due to side effects.  He underwent MRI Neck, Abdomen, and Pelvis from 2/2019 showed new lymph node enlargement to the L neck and redemonstrated osseous infiltration involving the bilateral pelvic bones and proximal femurs. CT Chest from 3/2019 showed mediastinal enlarged lymph nodes which were either unchanged or demonstrate interval progression since 11/2018, progressive R hilar lymphadenopathy, and a new cluster of small nodular opacities within the right lower lobe. PET Scan done 3/2019 revealed a new hypermetabolic lymphadenopathy in the left neck measuring approximately 2.4 x 2.1cm  in the left level III/V cervical regions. There is also new hypermetabolic 1.2 x 0.8 cm left level VB cervical lymph node and chest new hypermetabolic approximately 1.1 x 0.9 cm right upper paratracheal node. Reference approximately 3.4 x 2.0 cm subcarinal node. Approximately 3.8 x 3.1cm right perihilar mass. as compared to PET/CT from 11/3/2018 suspicious for recurrent lymphoma. A nonspecific new diffuse splenic hypermetabolism.  A new hypermetabolic opacity/consolidation in the right lower lung, approximately 2 x 1.3cm opacity/consolidation in the superior segment of the right lower lobe.  Ultrasound guided core biopsy of left cervical lymph node done on 4/11/19-  confirmed refractory disease.  Bone marrow biopsy was negative.  He was started on treatment 4/13 with salvage treatment with Gemzar/brentuximab which he received 2 cycles but again had compliance issues along with behavioral issues with the staff.  He was then recommended to have \par \par During chemotherapy, he was experiencing severe symptoms of fatigue, headache, vomiting and nausea. His states his compliance issue may be due to believing the treatment was not working. \par  [de-identified] : He was admitted to [facility] 10/17-10/19, then [facility] from 10/19-10/22 due to severe hip and back pain with LE weakness. Pt was found to have extensive bony disease with multiple vertebral compression fractures, and tumor invasion into the spinal canal, L. acetabulum lesion. Pt was evaluated by orthopedics, no surgical intervention. \par He underwent RT to T1-T5, T8-L5, left hip/acetabulum 800cGy each.\par \par MRI of C/T/L spine no contrast 10/17/20- There are multiple marrow replacing lesions throughout the thoracic spine.  At T2 there is epidural tumor involvement which contributes to moderate central canal stenosis without underlying cord signal abnormality. At T4 there is a possible mild pathologic compression fracture. At T9 there is a severe pathologic compression fracture. At T9 there is ventral epidural tumor involvement which contributes to mild central canal stenosis. There is associated cord signal abnormality extending from T8-T11.\par \par MR lumbar spine: There are multiple marrow replacing lesions throughout the lumbar spine and sacrum as described. At L1 there is associated moderate pathologic compression fracture. At L1 there is ventral epidural tumor involvement which contributes to moderate central canal stenosis. At L4 there is severe pathologic compression fracture with associated levocurvature of the lumbar spine.\par \par \par CT chest/abd/pelvis 10/30/20- Perilymphatic nodules and masses- a reference superior segment right lower lobe nodule , currently measuring 3.9 x 1.9 cm, previously 2.9 x 4.2 cm. Second right lower lobe nodule measures 2.9 x 2.1 cm in size image 83 previously measuring 2.9 x 2.6 cm.  Stable subcarinal node, measuring up to 4.5x 1.9 cm in a subcarinal lymph node, previously 4.7 x 2.1cm.  Slightly decreased bilateral hilar lymph nodes measuring up to 2.3 x 2.7 cm on the right, previously 2.6 x 3.0, and 1.7 x 1.8 cm on the left, previously 2.0 x 2.1 cm.  Enlarged supraclavicular lymph nodes, with a reference left cervical node measuring 1.5 x 1.8 cm, previously 1.4 x 2.6 cm. Enlarged left axillary lymph nodes measuring 2.6 x 2.1 cm on the left, previously 2.5 x 1.9 cm.  Two indeterminate splenic hypodensities measuring up to 1.5 cm are unchanged since 8/15/2020 but new since 12/19/2019. Extensive bony metastases better delineated on patient's prior spine MRI dated 10/20.  Lytic focus is seen involving the left anterior acetabulum.\par \par \par He is accompanied by his girlfriend.  She is now living with him.  He was treated with nivolumab on 10/29 which he tolerated it well.  His pain is improved, he is able to ambulate more, he is eating more as well and has gained 6 pounds.  He denies any loss of bowel or bladder function.

## 2020-11-08 NOTE — REVIEW OF SYSTEMS
[Fatigue] : fatigue [Joint Pain] : joint pain [Negative] : Allergic/Immunologic [Fever] : no fever [Chills] : no chills [Night Sweats] : no night sweats [Recent Change In Weight] : ~T recent weight change [Shortness Of Breath] : no shortness of breath [Wheezing] : no wheezing [Cough] : no cough [SOB on Exertion] : shortness of breath during exertion [Muscle Pain] : muscle pain [Muscle Weakness] : muscle weakness [Difficulty Walking] : difficulty walking [Suicidal] : not suicidal

## 2020-11-08 NOTE — PHYSICAL EXAM
[Fully active, able to carry on all pre-disease performance without restriction] : Status 0 - Fully active, able to carry on all pre-disease performance without restriction [Thin] : thin [Normal] : affect appropriate [de-identified] : cachetic appearing [de-identified] : multiple left cervical lymph nodes about 1 cm in size [de-identified] : no palpable axillary or inguinal LAD [de-identified] : CN intact, 5/5 strength UE/LE bilaterally

## 2020-11-09 ENCOUNTER — NON-APPOINTMENT (OUTPATIENT)
Age: 20
End: 2020-11-09

## 2020-11-12 ENCOUNTER — RESULT REVIEW (OUTPATIENT)
Age: 20
End: 2020-11-12

## 2020-11-12 ENCOUNTER — APPOINTMENT (OUTPATIENT)
Dept: INFUSION THERAPY | Facility: HOSPITAL | Age: 20
End: 2020-11-12

## 2020-11-12 DIAGNOSIS — Z51.11 ENCOUNTER FOR ANTINEOPLASTIC CHEMOTHERAPY: ICD-10-CM

## 2020-11-12 LAB
BASOPHILS # BLD AUTO: 0.06 K/UL — SIGNIFICANT CHANGE UP (ref 0–0.2)
BASOPHILS NFR BLD AUTO: 0.6 % — SIGNIFICANT CHANGE UP (ref 0–2)
EOSINOPHIL # BLD AUTO: 0.57 K/UL — HIGH (ref 0–0.5)
EOSINOPHIL NFR BLD AUTO: 5.5 % — SIGNIFICANT CHANGE UP (ref 0–6)
HCT VFR BLD CALC: 37.4 % — LOW (ref 39–50)
HGB BLD-MCNC: 10.9 G/DL — LOW (ref 13–17)
IMM GRANULOCYTES NFR BLD AUTO: 0.6 % — SIGNIFICANT CHANGE UP (ref 0–1.5)
LYMPHOCYTES # BLD AUTO: 1.72 K/UL — SIGNIFICANT CHANGE UP (ref 1–3.3)
LYMPHOCYTES # BLD AUTO: 16.5 % — SIGNIFICANT CHANGE UP (ref 13–44)
MCHC RBC-ENTMCNC: 23.2 PG — LOW (ref 27–34)
MCHC RBC-ENTMCNC: 29.1 G/DL — LOW (ref 32–36)
MCV RBC AUTO: 79.7 FL — LOW (ref 80–100)
MONOCYTES # BLD AUTO: 0.84 K/UL — SIGNIFICANT CHANGE UP (ref 0–0.9)
MONOCYTES NFR BLD AUTO: 8.1 % — SIGNIFICANT CHANGE UP (ref 2–14)
NEUTROPHILS # BLD AUTO: 7.15 K/UL — SIGNIFICANT CHANGE UP (ref 1.8–7.4)
NEUTROPHILS NFR BLD AUTO: 68.7 % — SIGNIFICANT CHANGE UP (ref 43–77)
NRBC # BLD: 0 /100 WBCS — SIGNIFICANT CHANGE UP (ref 0–0)
PLATELET # BLD AUTO: 248 K/UL — SIGNIFICANT CHANGE UP (ref 150–400)
RBC # BLD: 4.69 M/UL — SIGNIFICANT CHANGE UP (ref 4.2–5.8)
RBC # FLD: 22.6 % — HIGH (ref 10.3–14.5)
WBC # BLD: 10.4 K/UL — SIGNIFICANT CHANGE UP (ref 3.8–10.5)
WBC # FLD AUTO: 10.4 K/UL — SIGNIFICANT CHANGE UP (ref 3.8–10.5)

## 2020-11-13 ENCOUNTER — NON-APPOINTMENT (OUTPATIENT)
Age: 20
End: 2020-11-13

## 2020-11-19 ENCOUNTER — NON-APPOINTMENT (OUTPATIENT)
Age: 20
End: 2020-11-19

## 2020-11-20 ENCOUNTER — NON-APPOINTMENT (OUTPATIENT)
Age: 20
End: 2020-11-20

## 2020-11-24 ENCOUNTER — APPOINTMENT (OUTPATIENT)
Dept: INFUSION THERAPY | Facility: HOSPITAL | Age: 20
End: 2020-11-24

## 2020-11-27 ENCOUNTER — RESULT REVIEW (OUTPATIENT)
Age: 20
End: 2020-11-27

## 2020-11-27 ENCOUNTER — APPOINTMENT (OUTPATIENT)
Dept: INFUSION THERAPY | Facility: HOSPITAL | Age: 20
End: 2020-11-27

## 2020-11-27 LAB
BASOPHILS # BLD AUTO: 0.03 K/UL — SIGNIFICANT CHANGE UP (ref 0–0.2)
BASOPHILS NFR BLD AUTO: 0.3 % — SIGNIFICANT CHANGE UP (ref 0–2)
EOSINOPHIL # BLD AUTO: 0.7 K/UL — HIGH (ref 0–0.5)
EOSINOPHIL NFR BLD AUTO: 7.8 % — HIGH (ref 0–6)
HCT VFR BLD CALC: 38.5 % — LOW (ref 39–50)
HGB BLD-MCNC: 11.7 G/DL — LOW (ref 13–17)
IMM GRANULOCYTES NFR BLD AUTO: 0.3 % — SIGNIFICANT CHANGE UP (ref 0–1.5)
LYMPHOCYTES # BLD AUTO: 2.05 K/UL — SIGNIFICANT CHANGE UP (ref 1–3.3)
LYMPHOCYTES # BLD AUTO: 23 % — SIGNIFICANT CHANGE UP (ref 13–44)
MCHC RBC-ENTMCNC: 23.8 PG — LOW (ref 27–34)
MCHC RBC-ENTMCNC: 30.4 G/DL — LOW (ref 32–36)
MCV RBC AUTO: 78.4 FL — LOW (ref 80–100)
MONOCYTES # BLD AUTO: 0.73 K/UL — SIGNIFICANT CHANGE UP (ref 0–0.9)
MONOCYTES NFR BLD AUTO: 8.2 % — SIGNIFICANT CHANGE UP (ref 2–14)
NEUTROPHILS # BLD AUTO: 5.38 K/UL — SIGNIFICANT CHANGE UP (ref 1.8–7.4)
NEUTROPHILS NFR BLD AUTO: 60.4 % — SIGNIFICANT CHANGE UP (ref 43–77)
NRBC # BLD: 0 /100 WBCS — SIGNIFICANT CHANGE UP (ref 0–0)
PLATELET # BLD AUTO: 231 K/UL — SIGNIFICANT CHANGE UP (ref 150–400)
RBC # BLD: 4.91 M/UL — SIGNIFICANT CHANGE UP (ref 4.2–5.8)
RBC # FLD: 19.8 % — HIGH (ref 10.3–14.5)
WBC # BLD: 8.92 K/UL — SIGNIFICANT CHANGE UP (ref 3.8–10.5)
WBC # FLD AUTO: 8.92 K/UL — SIGNIFICANT CHANGE UP (ref 3.8–10.5)

## 2020-12-03 ENCOUNTER — RESULT REVIEW (OUTPATIENT)
Age: 20
End: 2020-12-03

## 2020-12-03 ENCOUNTER — APPOINTMENT (OUTPATIENT)
Dept: HEMATOLOGY ONCOLOGY | Facility: CLINIC | Age: 20
End: 2020-12-03
Payer: MEDICAID

## 2020-12-03 VITALS
HEIGHT: 64.76 IN | DIASTOLIC BLOOD PRESSURE: 76 MMHG | SYSTOLIC BLOOD PRESSURE: 104 MMHG | RESPIRATION RATE: 18 BRPM | WEIGHT: 103.18 LBS | BODY MASS INDEX: 17.4 KG/M2 | OXYGEN SATURATION: 100 % | TEMPERATURE: 97 F | HEART RATE: 65 BPM

## 2020-12-03 LAB
BASOPHILS # BLD AUTO: 0.03 K/UL — SIGNIFICANT CHANGE UP (ref 0–0.2)
BASOPHILS NFR BLD AUTO: 0.4 % — SIGNIFICANT CHANGE UP (ref 0–2)
EOSINOPHIL # BLD AUTO: 0.59 K/UL — HIGH (ref 0–0.5)
EOSINOPHIL NFR BLD AUTO: 7.7 % — HIGH (ref 0–6)
HCT VFR BLD CALC: 40.8 % — SIGNIFICANT CHANGE UP (ref 39–50)
HGB BLD-MCNC: 12.3 G/DL — LOW (ref 13–17)
IMM GRANULOCYTES NFR BLD AUTO: 0.4 % — SIGNIFICANT CHANGE UP (ref 0–1.5)
LYMPHOCYTES # BLD AUTO: 1.89 K/UL — SIGNIFICANT CHANGE UP (ref 1–3.3)
LYMPHOCYTES # BLD AUTO: 24.6 % — SIGNIFICANT CHANGE UP (ref 13–44)
MCHC RBC-ENTMCNC: 23.7 PG — LOW (ref 27–34)
MCHC RBC-ENTMCNC: 30.1 G/DL — LOW (ref 32–36)
MCV RBC AUTO: 78.6 FL — LOW (ref 80–100)
MONOCYTES # BLD AUTO: 0.64 K/UL — SIGNIFICANT CHANGE UP (ref 0–0.9)
MONOCYTES NFR BLD AUTO: 8.3 % — SIGNIFICANT CHANGE UP (ref 2–14)
NEUTROPHILS # BLD AUTO: 4.51 K/UL — SIGNIFICANT CHANGE UP (ref 1.8–7.4)
NEUTROPHILS NFR BLD AUTO: 58.6 % — SIGNIFICANT CHANGE UP (ref 43–77)
NRBC # BLD: 0 /100 WBCS — SIGNIFICANT CHANGE UP (ref 0–0)
PLATELET # BLD AUTO: 240 K/UL — SIGNIFICANT CHANGE UP (ref 150–400)
RBC # BLD: 5.19 M/UL — SIGNIFICANT CHANGE UP (ref 4.2–5.8)
RBC # FLD: 19.9 % — HIGH (ref 10.3–14.5)
WBC # BLD: 7.69 K/UL — SIGNIFICANT CHANGE UP (ref 3.8–10.5)
WBC # FLD AUTO: 7.69 K/UL — SIGNIFICANT CHANGE UP (ref 3.8–10.5)

## 2020-12-03 PROCEDURE — 99214 OFFICE O/P EST MOD 30 MIN: CPT

## 2020-12-03 PROCEDURE — 99072 ADDL SUPL MATRL&STAF TM PHE: CPT

## 2020-12-03 RX ORDER — CHLORHEXIDINE GLUCONATE 1.2 MG/ML
0.12 RINSE ORAL
Qty: 1 | Refills: 5 | Status: DISCONTINUED | COMMUNITY
Start: 2019-04-15 | End: 2020-12-03

## 2020-12-03 RX ORDER — POLYETHYLENE GLYCOL 3350 17 G/17G
17 POWDER, FOR SOLUTION ORAL
Qty: 1 | Refills: 5 | Status: DISCONTINUED | COMMUNITY
Start: 2019-04-15 | End: 2020-12-03

## 2020-12-03 RX ORDER — LIDOCAINE AND PRILOCAINE 25; 25 MG/G; MG/G
2.5-2.5 CREAM TOPICAL
Qty: 1 | Refills: 6 | Status: DISCONTINUED | COMMUNITY
Start: 2019-04-15 | End: 2020-12-03

## 2020-12-03 RX ORDER — ONDANSETRON 8 MG/1
8 TABLET ORAL
Qty: 90 | Refills: 5 | Status: DISCONTINUED | COMMUNITY
Start: 2019-04-15 | End: 2020-12-03

## 2020-12-03 RX ORDER — OXYCODONE 5 MG/1
5 TABLET ORAL EVERY 6 HOURS
Qty: 12 | Refills: 0 | Status: DISCONTINUED | COMMUNITY
Start: 2019-04-15 | End: 2020-12-03

## 2020-12-03 RX ORDER — CLOTRIMAZOLE 10 MG/1
10 LOZENGE ORAL
Qty: 60 | Refills: 5 | Status: DISCONTINUED | COMMUNITY
Start: 2019-04-15 | End: 2020-12-03

## 2020-12-03 RX ORDER — PENTAMIDINE ISETHIONATE 300 MG/3ML
300 INJECTION, POWDER, LYOPHILIZED, FOR SOLUTION INTRAMUSCULAR; INTRAVENOUS
Qty: 1 | Refills: 0 | Status: DISCONTINUED | COMMUNITY
Start: 2019-04-15 | End: 2020-12-03

## 2020-12-03 RX ORDER — OXYCODONE 5 MG/1
5 TABLET ORAL
Qty: 20 | Refills: 0 | Status: DISCONTINUED | COMMUNITY
Start: 2020-01-28 | End: 2020-12-03

## 2020-12-03 RX ORDER — NUT.TX.IMPAIRED DIGESTIVE FXN 0.035-1/ML
LIQUID (ML) ORAL
Qty: 1 | Refills: 0 | Status: ACTIVE | COMMUNITY
Start: 2020-12-03 | End: 1900-01-01

## 2020-12-03 NOTE — ASSESSMENT
[FreeTextEntry1] : 20 year old male who was diagnosed with a stage IV CHL in 9/2018 who appears to have a relapsed or refractory Hodgkin's lymphoma.  He was initially treated following ABVE-PC completed 12/2018 after 5 cycles due to noncompliance and patient refusal, relapsed diagnosed in Feb 2019, started on salvage treatment in April 2019 with gemzar/brentuximab which was also stopped due to the patient's refusal and noncompliance. Recently recommended to have 2 cycles of ICE with intent to pursue an autologous bone marrow transplant. The patient began the cycle and then left AMA unfortunately. Recently with progression of disease/osseous mets which he was recommended to go to the hospital, he again refused and left the office. He was now readmitted to [facility] (10/27-11/3/20) for diffuse vertebral disease/compression fractures.  He was treated with radiation, 800 cGy to T1-T5, T8-L5, left hip/acetabulum.  He also received nivolumab, day 8 with improvement in his lymphadenopathy and symptoms. \par \par Will plan for another 2 weeks of nivolumab then will obtain PET scan.\par Continue nivolumab 240 mg [id number]. \par If he shows continued compliance to his visits/appointments, will ask the BMT team to reevaluate him for potential autologous bone marrow transplant. \par Continue Decardon 2mg daily.  \par Counselled on minimizing use of oxycodone to avoid dependence. Continue to use prune juice for constipation relief. \par \par All of his appointments are made, he will follow up in 2 weeks. \par Case and management discussed with Dr. Emery

## 2020-12-03 NOTE — PHYSICAL EXAM
[Normal] : grossly intact [de-identified] : multiple left cervical lymph nodes <1 cm in size [de-identified] : no palpable axillary or inguinal LAD

## 2020-12-03 NOTE — HISTORY OF PRESENT ILLNESS
[de-identified] : Hodgkins Lymphoma. [place] [facility] [place] from [facility] with a mediastinal mass.  Upon work up he was found to have Stage IVB disease.  Ultrasound guided right supraclavicular lymph node Classical Hodgkins Lymphoma- large atypical cells to be CD30, CD15, PAX5+, MUM1+, LCA(-), CD20(-), CD79a(-), CD3(-), ALK(-).\par Bilateral bone marrow biopsy on 9/6/18- Cellular marrow with maturing trilineage hematopoiesis and no morphologic evidence of lymphoma\par PET/CT completed on 9/10/18-  Hypermetabolic lymphadenopathy in neck and thorax,  multiple hypermetabolic foci in the bilateral lower cervical and supraclavicular regions, measuring 0.9 x 0.7 cm demonstrates SUV 4.3 anterior mediastinal mass measuring 4.2 x 3 cm demonstrating peripheral FDG avidity and central photopenia, SUV: 6.9. Marked mediastinal and bilateral hilar lymphadenopathy demonstrating FDG avidity, right hilar lymph node measures 3.8 x 2.4 cm, SUV: 10.3.  5 mm nodule is again noted in the right middle lobe.  Several hypermetabolic foci identified predominantly throughout the axial skeleton with foci noted scattered throughout the upper thoracic spine and a singular focus in the right anterior eighth rib FDG avid focus in the right side of the T7 vertebral body with corresponding lucency on CT measuring 0.9 x 0.9 cm, SUV 14.7.  Two hypermetabolic foci in the appendicular skeleton with corresponding underlying lucency on CT. There is an FDG avid focus in the left inferior glenoid and a corresponding lucency on CT measuring 0.9 x 0.7 cm, SUV 12. There is also a 1.1 x 0.7 cm lucency in the right femoral head with corresponding FDG avidity, SUV 7.3.  He was placed on/following a protocol- OC 1331, treatment with ABVE-PC (adriamycin, bleomycin, vincristine, etoposide, cytoxan, prednisone, dexrazoxane).  He was withdrawn from the study due to his social situation, difficulty with compliance.  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  He was treated with chemotherapy from Sept 2018- Dec 2018.  Interim PET/CT completed on 11/3/18- Partially hypermetabolic anterior mediastinal mass is decreased in \par size and metabolism as compared to prior study dated 9/10/2018, compatible with a partial response to interval therapy (Deauville 4).  Resolution of additional previously seen hypermetabolic mediastinal, hilar, and cardiophrenic lymph nodes.Marked interval decrease in hypermetabolic foci in the axial and appendicular skeleton with residual hypermetabolic focus in left glenoid which may represent a small focus of residual disease (Deauville 4).  He completed 5 cycles of chemotherapy, but missed about 10 appointments, and refused further chemotherapy after cycle 5 due to side effects.  He underwent MRI Neck, Abdomen, and Pelvis from 2/2019 showed new lymph node enlargement to the L neck and redemonstrated osseous infiltration involving the bilateral pelvic bones and proximal femurs. CT Chest from 3/2019 showed mediastinal enlarged lymph nodes which were either unchanged or demonstrate interval progression since 11/2018, progressive R hilar lymphadenopathy, and a new cluster of small nodular opacities within the right lower lobe. PET Scan done 3/2019 revealed a new hypermetabolic lymphadenopathy in the left neck measuring approximately 2.4 x 2.1cm  in the left level III/V cervical regions. There is also new hypermetabolic 1.2 x 0.8 cm left level VB cervical lymph node and chest new hypermetabolic approximately 1.1 x 0.9 cm right upper paratracheal node. Reference approximately 3.4 x 2.0 cm subcarinal node. Approximately 3.8 x 3.1cm right perihilar mass. as compared to PET/CT from 11/3/2018 suspicious for recurrent lymphoma. A nonspecific new diffuse splenic hypermetabolism.  A new hypermetabolic opacity/consolidation in the right lower lung, approximately 2 x 1.3cm opacity/consolidation in the superior segment of the right lower lobe.  Ultrasound guided core biopsy of left cervical lymph node done on 4/11/19-  confirmed refractory disease.  Bone marrow biopsy was negative.  He was started on treatment 4/13 with salvage treatment with Gemzar/brentuximab which he received 2 cycles but again had compliance issues along with behavioral issues with the staff.  He was then recommended to have \par \par During chemotherapy, he was experiencing severe symptoms of fatigue, headache, vomiting and nausea. His states his compliance issue may be due to believing the treatment was not working. \par  [de-identified] : Patient presents for follow up appointment accompanied by his brother, Jose Rogers. He has been receiving treatment with nivolumab every 2 weeks, which he tolerated it well. His pain is improved, and is able to ambulate without a cane now. Still notes to have back pain, that is controlled with oxycodene. Patient denies fever, chills, night sweats, headache, abdominal pain, nausea, vomiting, diarrhea, constipation, chest pain, shortness of breath, peripheral edema, or peripheral neuropathy. Good appetite, weight gain of 12 pounds.\par

## 2020-12-03 NOTE — REVIEW OF SYSTEMS
[Fever] : no fever [Chills] : no chills [Night Sweats] : no night sweats [Fatigue] : no fatigue [Suicidal] : not suicidal [Negative] : Respiratory [FreeTextEntry2] : +12 pounds

## 2020-12-07 ENCOUNTER — OUTPATIENT (OUTPATIENT)
Dept: OUTPATIENT SERVICES | Facility: HOSPITAL | Age: 20
LOS: 1 days | Discharge: HOME | End: 2020-12-07

## 2020-12-07 DIAGNOSIS — C81.98 HODGKIN LYMPHOMA, UNSPECIFIED, LYMPH NODES OF MULTIPLE SITES: ICD-10-CM

## 2020-12-10 ENCOUNTER — RESULT REVIEW (OUTPATIENT)
Age: 20
End: 2020-12-10

## 2020-12-10 ENCOUNTER — APPOINTMENT (OUTPATIENT)
Dept: INFUSION THERAPY | Facility: HOSPITAL | Age: 20
End: 2020-12-10

## 2020-12-10 ENCOUNTER — LABORATORY RESULT (OUTPATIENT)
Age: 20
End: 2020-12-10

## 2020-12-10 DIAGNOSIS — R11.2 NAUSEA WITH VOMITING, UNSPECIFIED: ICD-10-CM

## 2020-12-10 DIAGNOSIS — Z51.11 ENCOUNTER FOR ANTINEOPLASTIC CHEMOTHERAPY: ICD-10-CM

## 2020-12-10 LAB
BASOPHILS # BLD AUTO: 0.03 K/UL — SIGNIFICANT CHANGE UP (ref 0–0.2)
BASOPHILS NFR BLD AUTO: 0.3 % — SIGNIFICANT CHANGE UP (ref 0–2)
EOSINOPHIL # BLD AUTO: 0.11 K/UL — SIGNIFICANT CHANGE UP (ref 0–0.5)
EOSINOPHIL NFR BLD AUTO: 1.1 % — SIGNIFICANT CHANGE UP (ref 0–6)
HCT VFR BLD CALC: 45.1 % — SIGNIFICANT CHANGE UP (ref 39–50)
HGB BLD-MCNC: 15.1 G/DL — SIGNIFICANT CHANGE UP (ref 13–17)
IMM GRANULOCYTES NFR BLD AUTO: 0.4 % — SIGNIFICANT CHANGE UP (ref 0–1.5)
LYMPHOCYTES # BLD AUTO: 1.09 K/UL — SIGNIFICANT CHANGE UP (ref 1–3.3)
LYMPHOCYTES # BLD AUTO: 11.3 % — LOW (ref 13–44)
MCHC RBC-ENTMCNC: 31.2 PG — SIGNIFICANT CHANGE UP (ref 27–34)
MCHC RBC-ENTMCNC: 33.5 G/DL — SIGNIFICANT CHANGE UP (ref 32–36)
MCV RBC AUTO: 93.2 FL — SIGNIFICANT CHANGE UP (ref 80–100)
MONOCYTES # BLD AUTO: 0.82 K/UL — SIGNIFICANT CHANGE UP (ref 0–0.9)
MONOCYTES NFR BLD AUTO: 8.5 % — SIGNIFICANT CHANGE UP (ref 2–14)
NEUTROPHILS # BLD AUTO: 7.55 K/UL — HIGH (ref 1.8–7.4)
NEUTROPHILS NFR BLD AUTO: 78.4 % — HIGH (ref 43–77)
NRBC # BLD: 0 /100 WBCS — SIGNIFICANT CHANGE UP (ref 0–0)
PLATELET # BLD AUTO: 192 K/UL — SIGNIFICANT CHANGE UP (ref 150–400)
RBC # BLD: 4.84 M/UL — SIGNIFICANT CHANGE UP (ref 4.2–5.8)
RBC # FLD: 13.3 % — SIGNIFICANT CHANGE UP (ref 10.3–14.5)
WBC # BLD: 9.64 K/UL — SIGNIFICANT CHANGE UP (ref 3.8–10.5)
WBC # FLD AUTO: 9.64 K/UL — SIGNIFICANT CHANGE UP (ref 3.8–10.5)

## 2020-12-16 ENCOUNTER — APPOINTMENT (OUTPATIENT)
Dept: HEMATOLOGY ONCOLOGY | Facility: CLINIC | Age: 20
End: 2020-12-16

## 2020-12-23 ENCOUNTER — APPOINTMENT (OUTPATIENT)
Dept: NUCLEAR MEDICINE | Facility: IMAGING CENTER | Age: 20
End: 2020-12-23

## 2020-12-23 ENCOUNTER — APPOINTMENT (OUTPATIENT)
Dept: HEMATOLOGY ONCOLOGY | Facility: CLINIC | Age: 20
End: 2020-12-23

## 2020-12-23 ENCOUNTER — APPOINTMENT (OUTPATIENT)
Dept: INFUSION THERAPY | Facility: HOSPITAL | Age: 20
End: 2020-12-23

## 2020-12-24 ENCOUNTER — APPOINTMENT (OUTPATIENT)
Dept: HEMATOLOGY ONCOLOGY | Facility: CLINIC | Age: 20
End: 2020-12-24

## 2020-12-24 ENCOUNTER — RESULT REVIEW (OUTPATIENT)
Age: 20
End: 2020-12-24

## 2020-12-24 ENCOUNTER — APPOINTMENT (OUTPATIENT)
Dept: INFUSION THERAPY | Facility: HOSPITAL | Age: 20
End: 2020-12-24

## 2020-12-24 ENCOUNTER — APPOINTMENT (OUTPATIENT)
Dept: HEMATOLOGY ONCOLOGY | Facility: CLINIC | Age: 20
End: 2020-12-24
Payer: MEDICAID

## 2020-12-24 DIAGNOSIS — Z80.7 FAMILY HISTORY OF OTHER MALIGNANT NEOPLASMS OF LYMPHOID, HEMATOPOIETIC AND RELATED TISSUES: ICD-10-CM

## 2020-12-24 LAB
BASOPHILS # BLD AUTO: 0.03 K/UL — SIGNIFICANT CHANGE UP (ref 0–0.2)
BASOPHILS NFR BLD AUTO: 0.4 % — SIGNIFICANT CHANGE UP (ref 0–2)
EOSINOPHIL # BLD AUTO: 0.41 K/UL — SIGNIFICANT CHANGE UP (ref 0–0.5)
EOSINOPHIL NFR BLD AUTO: 5.5 % — SIGNIFICANT CHANGE UP (ref 0–6)
HCT VFR BLD CALC: 40.1 % — SIGNIFICANT CHANGE UP (ref 39–50)
HGB BLD-MCNC: 12.7 G/DL — LOW (ref 13–17)
IMM GRANULOCYTES NFR BLD AUTO: 0.4 % — SIGNIFICANT CHANGE UP (ref 0–1.5)
LYMPHOCYTES # BLD AUTO: 1.96 K/UL — SIGNIFICANT CHANGE UP (ref 1–3.3)
LYMPHOCYTES # BLD AUTO: 26.3 % — SIGNIFICANT CHANGE UP (ref 13–44)
MCHC RBC-ENTMCNC: 24.8 PG — LOW (ref 27–34)
MCHC RBC-ENTMCNC: 31.7 G/DL — LOW (ref 32–36)
MCV RBC AUTO: 78.4 FL — LOW (ref 80–100)
MONOCYTES # BLD AUTO: 0.49 K/UL — SIGNIFICANT CHANGE UP (ref 0–0.9)
MONOCYTES NFR BLD AUTO: 6.6 % — SIGNIFICANT CHANGE UP (ref 2–14)
NEUTROPHILS # BLD AUTO: 4.52 K/UL — SIGNIFICANT CHANGE UP (ref 1.8–7.4)
NEUTROPHILS NFR BLD AUTO: 60.8 % — SIGNIFICANT CHANGE UP (ref 43–77)
NRBC # BLD: 0 /100 WBCS — SIGNIFICANT CHANGE UP (ref 0–0)
PLATELET # BLD AUTO: 179 K/UL — SIGNIFICANT CHANGE UP (ref 150–400)
RBC # BLD: 5.12 M/UL — SIGNIFICANT CHANGE UP (ref 4.2–5.8)
RBC # FLD: 17.9 % — HIGH (ref 10.3–14.5)
WBC # BLD: 7.44 K/UL — SIGNIFICANT CHANGE UP (ref 3.8–10.5)
WBC # FLD AUTO: 7.44 K/UL — SIGNIFICANT CHANGE UP (ref 3.8–10.5)

## 2020-12-24 PROCEDURE — 99072 ADDL SUPL MATRL&STAF TM PHE: CPT

## 2020-12-24 PROCEDURE — 99214 OFFICE O/P EST MOD 30 MIN: CPT

## 2020-12-24 NOTE — ASSESSMENT
[FreeTextEntry1] : 20 year old male who was diagnosed with a stage IV CHL in 9/2018 who appears to have a relapsed or refractory Hodgkin's lymphoma.  He was initially treated following ABVE-PC completed 12/2018 after 5 cycles due to noncompliance and patient refusal, relapsed diagnosed in Feb 2019, started on salvage treatment in April 2019 with gemzar/brentuximab which was also stopped due to the patient's refusal and noncompliance. Recently recommended to have 2 cycles of ICE with intent to pursue an autologous bone marrow transplant. The patient began the cycle and then left AMA unfortunately. Recently with progression of disease/osseous mets which he was recommended to go to the hospital, he again refused and left the office. He was now readmitted to [facility] (10/27-11/3/20) for diffuse vertebral disease/compression fractures.  He was treated with radiation, 800 cGy to T1-T5, T8-L5, left hip/acetabulum.  He also received nivolumab, day 8 with improvement in his lymphadenopathy and symptoms. \par \par Continue nivolumab 240 mg [id number]. \par PET needs to be rescheduled\par Schedule with BMT team again to reevaluate him for potential autologous bone marrow transplant. \par Continue Decardon 2mg daily.  \par Counselled on minimizing use of oxycodone to avoid dependence. Continue to use prune juice for constipation relief. \par \par All of his appointments are made, he will follow up in 2 weeks. \par Case and management discussed with Dr. Emery

## 2020-12-24 NOTE — HISTORY OF PRESENT ILLNESS
[de-identified] : Hodgkins Lymphoma. Patient initially presented to the [facility] ED on 9/4/18 from [facility] with a mediastinal mass.  Upon work up he was found to have Stage IVB disease.  Ultrasound guided right supraclavicular lymph node Classical Hodgkins Lymphoma- large atypical cells to be CD30, CD15, PAX5+, MUM1+, LCA(-), CD20(-), CD79a(-), CD3(-), ALK(-).\par Bilateral bone marrow biopsy on 9/6/18- Cellular marrow with maturing trilineage hematopoiesis and no morphologic evidence of lymphoma\par PET/CT completed on 9/10/18-  Hypermetabolic lymphadenopathy in neck and thorax,  multiple hypermetabolic foci in the bilateral lower cervical and supraclavicular regions, measuring 0.9 x 0.7 cm demonstrates SUV 4.3 anterior mediastinal mass measuring 4.2 x 3 cm demonstrating peripheral FDG avidity and central photopenia, SUV: 6.9. Marked mediastinal and bilateral hilar lymphadenopathy demonstrating FDG avidity, right hilar lymph node measures 3.8 x 2.4 cm, SUV: 10.3.  5 mm nodule is again noted in the right middle lobe.  Several hypermetabolic foci identified predominantly throughout the axial skeleton with foci noted scattered throughout the upper thoracic spine and a singular focus in the right anterior eighth rib FDG avid focus in the right side of the T7 vertebral body with corresponding lucency on CT measuring 0.9 x 0.9 cm, SUV 14.7.  Two hypermetabolic foci in the appendicular skeleton with corresponding underlying lucency on CT. There is an FDG avid focus in the left inferior glenoid and a corresponding lucency on CT measuring 0.9 x 0.7 cm, SUV 12. There is also a 1.1 x 0.7 cm lucency in the right femoral head with corresponding FDG avidity, SUV 7.3.  He was placed on/following a protocol- OC 1331, treatment with ABVE-PC (adriamycin, bleomycin, vincristine, etoposide, cytoxan, prednisone, dexrazoxane).  He was withdrawn from the study due to his social situation, difficulty with compliance.  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  He was treated with chemotherapy from Sept 2018- Dec 2018.  Interim PET/CT completed on 11/3/18- Partially hypermetabolic anterior mediastinal mass is decreased in \par size and metabolism as compared to prior study dated 9/10/2018, compatible with a partial response to interval therapy (Deauville 4).  Resolution of additional previously seen hypermetabolic mediastinal, hilar, and cardiophrenic lymph nodes.Marked interval decrease in hypermetabolic foci in the axial and appendicular skeleton with residual hypermetabolic focus in left glenoid which may represent a small focus of residual disease (Deauville 4).  He completed 5 cycles of chemotherapy, but missed about 10 appointments, and refused further chemotherapy after cycle 5 due to side effects.  He underwent MRI Neck, Abdomen, and Pelvis from 2/2019 showed new lymph node enlargement to the L neck and redemonstrated osseous infiltration involving the bilateral pelvic bones and proximal femurs. CT Chest from 3/2019 showed mediastinal enlarged lymph nodes which were either unchanged or demonstrate interval progression since 11/2018, progressive R hilar lymphadenopathy, and a new cluster of small nodular opacities within the right lower lobe. PET Scan done 3/2019 revealed a new hypermetabolic lymphadenopathy in the left neck measuring approximately 2.4 x 2.1cm  in the left level III/V cervical regions. There is also new hypermetabolic 1.2 x 0.8 cm left level VB cervical lymph node and chest new hypermetabolic approximately 1.1 x 0.9 cm right upper paratracheal node. Reference approximately 3.4 x 2.0 cm subcarinal node. Approximately 3.8 x 3.1cm right perihilar mass. as compared to PET/CT from 11/3/2018 suspicious for recurrent lymphoma. A nonspecific new diffuse splenic hypermetabolism.  A new hypermetabolic opacity/consolidation in the right lower lung, approximately 2 x 1.3cm opacity/consolidation in the superior segment of the right lower lobe.  Ultrasound guided core biopsy of left cervical lymph node done on 4/11/19-  confirmed refractory disease.  Bone marrow biopsy was negative.  He was started on treatment 4/13 with salvage treatment with Gemzar/brentuximab which he received 2 cycles but again had compliance issues along with behavioral issues with the staff.  He was then recommended to have \par \par During chemotherapy, he was experiencing severe symptoms of fatigue, headache, vomiting and nausea. His states his compliance issue may be due to believing the treatment was not working. \par  [de-identified] : Patient presents for follow up appointment. In the interim, he missed both his PET/CT appointment and his appointment with BMT. He has been receiving treatment with nivolumab every 2 weeks, which he tolerated it well. Continues to ambulate without a cane. Still notes to have back pain, that is controlled with oxycodene. Patient denies fever, chills, night sweats, headache, abdominal pain, nausea, vomiting, diarrhea, constipation, chest pain, shortness of breath, peripheral edema, or peripheral neuropathy. Good appetite, stable weight (+2 pounds).\par

## 2020-12-24 NOTE — REVIEW OF SYSTEMS
[Fever] : no fever [Chills] : no chills [Night Sweats] : no night sweats [Fatigue] : no fatigue [Difficulty Walking] : no difficulty walking [Suicidal] : not suicidal [FreeTextEntry2] : +2 pounds [FreeTextEntry9] : Back pain

## 2020-12-24 NOTE — PHYSICAL EXAM
[de-identified] : multiple left cervical lymph nodes <1 cm in size [de-identified] : no palpable axillary or inguinal LAD

## 2020-12-28 ENCOUNTER — NON-APPOINTMENT (OUTPATIENT)
Age: 20
End: 2020-12-28

## 2021-01-04 PROBLEM — Z00.00 ENCOUNTER FOR PREVENTIVE HEALTH EXAMINATION: Noted: 2021-01-04

## 2021-01-06 ENCOUNTER — APPOINTMENT (OUTPATIENT)
Dept: INFUSION THERAPY | Facility: HOSPITAL | Age: 21
End: 2021-01-06

## 2021-01-06 ENCOUNTER — APPOINTMENT (OUTPATIENT)
Dept: HEMATOLOGY ONCOLOGY | Facility: CLINIC | Age: 21
End: 2021-01-06

## 2021-01-06 ENCOUNTER — APPOINTMENT (OUTPATIENT)
Dept: NUCLEAR MEDICINE | Facility: IMAGING CENTER | Age: 21
End: 2021-01-06
Payer: MEDICAID

## 2021-01-06 ENCOUNTER — LABORATORY RESULT (OUTPATIENT)
Age: 21
End: 2021-01-06

## 2021-01-06 ENCOUNTER — OUTPATIENT (OUTPATIENT)
Dept: OUTPATIENT SERVICES | Facility: HOSPITAL | Age: 21
LOS: 1 days | End: 2021-01-06
Payer: MEDICAID

## 2021-01-06 ENCOUNTER — RESULT REVIEW (OUTPATIENT)
Age: 21
End: 2021-01-06

## 2021-01-06 ENCOUNTER — OUTPATIENT (OUTPATIENT)
Dept: OUTPATIENT SERVICES | Facility: HOSPITAL | Age: 21
LOS: 1 days | Discharge: HOME | End: 2021-01-06

## 2021-01-06 DIAGNOSIS — C81.98 HODGKIN LYMPHOMA, UNSPECIFIED, LYMPH NODES OF MULTIPLE SITES: ICD-10-CM

## 2021-01-06 DIAGNOSIS — Z00.8 ENCOUNTER FOR OTHER GENERAL EXAMINATION: ICD-10-CM

## 2021-01-06 PROCEDURE — 78815 PET IMAGE W/CT SKULL-THIGH: CPT

## 2021-01-06 PROCEDURE — A9552: CPT

## 2021-01-06 PROCEDURE — 78815 PET IMAGE W/CT SKULL-THIGH: CPT | Mod: 26,PS

## 2021-01-06 NOTE — ASSESSMENT
[FreeTextEntry1] : 20 year old male who was diagnosed with a stage IV CHL in 9/2018 who appears to have a relapsed or refractory Hodgkin's lymphoma.  He was initially treated following ABVE-PC completed 12/2018 after 5 cycles due to noncompliance and patient refusal, relapsed diagnosed in Feb 2019, started on salvage treatment in April 2019 with gemzar/brentuximab which was also stopped due to the patient's refusal and noncompliance. Recently recommended to have 2 cycles of ICE with intent to pursue an autologous bone marrow transplant. The patient began the cycle and then left AMA unfortunately. Recently with progression of disease/osseous mets which he was recommended to go to the hospital, he again refused and left the office. He was now readmitted to [facility] (10/27-11/3/20) for diffuse vertebral disease/compression fractures.  He was treated with radiation, 800 cGy to T1-T5, T8-L5, left hip/acetabulum.  He also received nivolumab, day 8 with improvement in his lymphadenopathy and symptoms. \par \par Continue nivolumab 240 mg [id number]. \par Follow up PET/CT from today. \par Schedule to see BMT team on 1/27/21 for evaluation of potential autologous bone marrow transplant. \par Continue Decardon 2mg daily.  \par Counselled on minimizing use of oxycodone to avoid dependence. Continue to use prune juice for constipation relief. \par \par All of his appointments are made, he will follow up in 2 weeks. \par Case and management discussed with Dr. Emery

## 2021-01-06 NOTE — REVIEW OF SYSTEMS
[Recent Change In Weight] : ~T recent weight change [Joint Pain] : joint pain [Muscle Pain] : muscle pain [Muscle Weakness] : muscle weakness [Negative] : Allergic/Immunologic [Fever] : no fever [Chills] : no chills [Night Sweats] : no night sweats [Fatigue] : no fatigue [Difficulty Walking] : no difficulty walking [Suicidal] : not suicidal [FreeTextEntry2] : +2 pounds [FreeTextEntry9] : Back pain

## 2021-01-06 NOTE — PHYSICAL EXAM
[Fully active, able to carry on all pre-disease performance without restriction] : Status 0 - Fully active, able to carry on all pre-disease performance without restriction [Thin] : thin [Normal] : affect appropriate [de-identified] : multiple left cervical lymph nodes <1 cm in size [de-identified] : no palpable axillary or inguinal LAD

## 2021-01-06 NOTE — HISTORY OF PRESENT ILLNESS
[de-identified] : Hodgkins Lymphoma. Patient initially presented to the [facility] ED on 9/4/18 from [facility] with a mediastinal mass.  Upon work up he was found to have Stage IVB disease.  Ultrasound guided right supraclavicular lymph node Classical Hodgkins Lymphoma- large atypical cells to be CD30, CD15, PAX5+, MUM1+, LCA(-), CD20(-), CD79a(-), CD3(-), ALK(-).\par Bilateral bone marrow biopsy on 9/6/18- Cellular marrow with maturing trilineage hematopoiesis and no morphologic evidence of lymphoma\par PET/CT completed on 9/10/18-  Hypermetabolic lymphadenopathy in neck and thorax,  multiple hypermetabolic foci in the bilateral lower cervical and supraclavicular regions, measuring 0.9 x 0.7 cm demonstrates SUV 4.3 anterior mediastinal mass measuring 4.2 x 3 cm demonstrating peripheral FDG avidity and central photopenia, SUV: 6.9. Marked mediastinal and bilateral hilar lymphadenopathy demonstrating FDG avidity, right hilar lymph node measures 3.8 x 2.4 cm, SUV: 10.3.  5 mm nodule is again noted in the right middle lobe.  Several hypermetabolic foci identified predominantly throughout the axial skeleton with foci noted scattered throughout the upper thoracic spine and a singular focus in the right anterior eighth rib FDG avid focus in the right side of the T7 vertebral body with corresponding lucency on CT measuring 0.9 x 0.9 cm, SUV 14.7.  Two hypermetabolic foci in the appendicular skeleton with corresponding underlying lucency on CT. There is an FDG avid focus in the left inferior glenoid and a corresponding lucency on CT measuring 0.9 x 0.7 cm, SUV 12. There is also a 1.1 x 0.7 cm lucency in the right femoral head with corresponding FDG avidity, SUV 7.3.  He was placed on/following a protocol- OC 1331, treatment with ABVE-PC (adriamycin, bleomycin, vincristine, etoposide, cytoxan, prednisone, dexrazoxane).  He was withdrawn from the study due to his social situation, difficulty with compliance.  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  He was treated with chemotherapy from Sept 2018- Dec 2018.  Interim PET/CT completed on 11/3/18- Partially hypermetabolic anterior mediastinal mass is decreased in \par size and metabolism as compared to prior study dated 9/10/2018, compatible with a partial response to interval therapy (Deauville 4).  Resolution of additional previously seen hypermetabolic mediastinal, hilar, and cardiophrenic lymph nodes.Marked interval decrease in hypermetabolic foci in the axial and appendicular skeleton with residual hypermetabolic focus in left glenoid which may represent a small focus of residual disease (Deauville 4).  He completed 5 cycles of chemotherapy, but missed about 10 appointments, and refused further chemotherapy after cycle 5 due to side effects.  He underwent MRI Neck, Abdomen, and Pelvis from 2/2019 showed new lymph node enlargement to the L neck and redemonstrated osseous infiltration involving the bilateral pelvic bones and proximal femurs. CT Chest from 3/2019 showed mediastinal enlarged lymph nodes which were either unchanged or demonstrate interval progression since 11/2018, progressive R hilar lymphadenopathy, and a new cluster of small nodular opacities within the right lower lobe. PET Scan done 3/2019 revealed a new hypermetabolic lymphadenopathy in the left neck measuring approximately 2.4 x 2.1cm  in the left level III/V cervical regions. There is also new hypermetabolic 1.2 x 0.8 cm left level VB cervical lymph node and chest new hypermetabolic approximately 1.1 x 0.9 cm right upper paratracheal node. Reference approximately 3.4 x 2.0 cm subcarinal node. Approximately 3.8 x 3.1cm right perihilar mass. as compared to PET/CT from 11/3/2018 suspicious for recurrent lymphoma. A nonspecific new diffuse splenic hypermetabolism.  A new hypermetabolic opacity/consolidation in the right lower lung, approximately 2 x 1.3cm opacity/consolidation in the superior segment of the right lower lobe.  Ultrasound guided core biopsy of left cervical lymph node done on 4/11/19-  confirmed refractory disease.  Bone marrow biopsy was negative.  He was started on treatment 4/13 with salvage treatment with Gemzar/brentuximab which he received 2 cycles but again had compliance issues along with behavioral issues with the staff.  He was then recommended to have \par \par During chemotherapy, he was experiencing severe symptoms of fatigue, headache, vomiting and nausea. His states his compliance issue may be due to believing the treatment was not working. \par  [de-identified] : Patient presents for follow up appointment. In the interim, he got his PET/CT done today and his appointment with BMT was rescheduled to 1/27/21. He has been receiving treatment with nivolumab every 2 weeks, which he tolerated it well. Ambulating well. Still notes to have back pain, that is controlled with oxycodene. Patient denies fever, chills, night sweats, headache, abdominal pain, nausea, vomiting, diarrhea, constipation, chest pain, shortness of breath, peripheral edema, or peripheral neuropathy. Good appetite, stable weight (+2 pounds).\par

## 2021-01-07 ENCOUNTER — APPOINTMENT (OUTPATIENT)
Dept: INFUSION THERAPY | Facility: HOSPITAL | Age: 21
End: 2021-01-07

## 2021-01-07 ENCOUNTER — LABORATORY RESULT (OUTPATIENT)
Age: 21
End: 2021-01-07

## 2021-01-07 ENCOUNTER — RESULT REVIEW (OUTPATIENT)
Age: 21
End: 2021-01-07

## 2021-01-07 DIAGNOSIS — Z51.11 ENCOUNTER FOR ANTINEOPLASTIC CHEMOTHERAPY: ICD-10-CM

## 2021-01-07 LAB
BASOPHILS # BLD AUTO: 0.04 K/UL — SIGNIFICANT CHANGE UP (ref 0–0.2)
BASOPHILS NFR BLD AUTO: 0.6 % — SIGNIFICANT CHANGE UP (ref 0–2)
EOSINOPHIL # BLD AUTO: 0.47 K/UL — SIGNIFICANT CHANGE UP (ref 0–0.5)
EOSINOPHIL NFR BLD AUTO: 6.8 % — HIGH (ref 0–6)
HCT VFR BLD CALC: 43.9 % — SIGNIFICANT CHANGE UP (ref 39–50)
HGB BLD-MCNC: 14.3 G/DL — SIGNIFICANT CHANGE UP (ref 13–17)
IMM GRANULOCYTES NFR BLD AUTO: 0.1 % — SIGNIFICANT CHANGE UP (ref 0–1.5)
LYMPHOCYTES # BLD AUTO: 2.08 K/UL — SIGNIFICANT CHANGE UP (ref 1–3.3)
LYMPHOCYTES # BLD AUTO: 30.1 % — SIGNIFICANT CHANGE UP (ref 13–44)
MCHC RBC-ENTMCNC: 25.3 PG — LOW (ref 27–34)
MCHC RBC-ENTMCNC: 32.6 G/DL — SIGNIFICANT CHANGE UP (ref 32–36)
MCV RBC AUTO: 77.6 FL — LOW (ref 80–100)
MONOCYTES # BLD AUTO: 0.27 K/UL — SIGNIFICANT CHANGE UP (ref 0–0.9)
MONOCYTES NFR BLD AUTO: 3.9 % — SIGNIFICANT CHANGE UP (ref 2–14)
NEUTROPHILS # BLD AUTO: 4.05 K/UL — SIGNIFICANT CHANGE UP (ref 1.8–7.4)
NEUTROPHILS NFR BLD AUTO: 58.5 % — SIGNIFICANT CHANGE UP (ref 43–77)
NRBC # BLD: 0 /100 WBCS — SIGNIFICANT CHANGE UP (ref 0–0)
PLATELET # BLD AUTO: 225 K/UL — SIGNIFICANT CHANGE UP (ref 150–400)
RBC # BLD: 5.66 M/UL — SIGNIFICANT CHANGE UP (ref 4.2–5.8)
RBC # FLD: 17.8 % — HIGH (ref 10.3–14.5)
WBC # BLD: 6.92 K/UL — SIGNIFICANT CHANGE UP (ref 3.8–10.5)
WBC # FLD AUTO: 6.92 K/UL — SIGNIFICANT CHANGE UP (ref 3.8–10.5)

## 2021-01-13 NOTE — PATIENT PROFILE ADULT - NSPRESCRALCAMT_GEN_A_NUR
Pt presents with hypoxia, pt has history of Down syndrome and pt mother checks o2 saturations regularly and early this morning pts o2 sats were reading 89%. Pt mother also reports pt had a low grade temperature yesterday of 100.3. pt and pt family were exposed to someone who was COVID positive on new years day. Pt and pt family tested yesterday at and outpatient clinic.   
3 or 4

## 2021-01-21 ENCOUNTER — RESULT REVIEW (OUTPATIENT)
Age: 21
End: 2021-01-21

## 2021-01-21 ENCOUNTER — APPOINTMENT (OUTPATIENT)
Dept: INFUSION THERAPY | Facility: HOSPITAL | Age: 21
End: 2021-01-21

## 2021-01-21 LAB
BASOPHILS # BLD AUTO: 0.04 K/UL — SIGNIFICANT CHANGE UP (ref 0–0.2)
BASOPHILS NFR BLD AUTO: 0.5 % — SIGNIFICANT CHANGE UP (ref 0–2)
EOSINOPHIL # BLD AUTO: 0.26 K/UL — SIGNIFICANT CHANGE UP (ref 0–0.5)
EOSINOPHIL NFR BLD AUTO: 3.5 % — SIGNIFICANT CHANGE UP (ref 0–6)
HCT VFR BLD CALC: 41.7 % — SIGNIFICANT CHANGE UP (ref 39–50)
HGB BLD-MCNC: 13.5 G/DL — SIGNIFICANT CHANGE UP (ref 13–17)
IMM GRANULOCYTES NFR BLD AUTO: 0.4 % — SIGNIFICANT CHANGE UP (ref 0–1.5)
LYMPHOCYTES # BLD AUTO: 2.2 K/UL — SIGNIFICANT CHANGE UP (ref 1–3.3)
LYMPHOCYTES # BLD AUTO: 29.8 % — SIGNIFICANT CHANGE UP (ref 13–44)
MCHC RBC-ENTMCNC: 25.4 PG — LOW (ref 27–34)
MCHC RBC-ENTMCNC: 32.4 G/DL — SIGNIFICANT CHANGE UP (ref 32–36)
MCV RBC AUTO: 78.4 FL — LOW (ref 80–100)
MONOCYTES # BLD AUTO: 0.31 K/UL — SIGNIFICANT CHANGE UP (ref 0–0.9)
MONOCYTES NFR BLD AUTO: 4.2 % — SIGNIFICANT CHANGE UP (ref 2–14)
NEUTROPHILS # BLD AUTO: 4.54 K/UL — SIGNIFICANT CHANGE UP (ref 1.8–7.4)
NEUTROPHILS NFR BLD AUTO: 61.6 % — SIGNIFICANT CHANGE UP (ref 43–77)
NRBC # BLD: 0 /100 WBCS — SIGNIFICANT CHANGE UP (ref 0–0)
PLATELET # BLD AUTO: 190 K/UL — SIGNIFICANT CHANGE UP (ref 150–400)
RBC # BLD: 5.32 M/UL — SIGNIFICANT CHANGE UP (ref 4.2–5.8)
RBC # FLD: 18.1 % — HIGH (ref 10.3–14.5)
WBC # BLD: 7.38 K/UL — SIGNIFICANT CHANGE UP (ref 3.8–10.5)
WBC # FLD AUTO: 7.38 K/UL — SIGNIFICANT CHANGE UP (ref 3.8–10.5)

## 2021-01-21 RX ORDER — DEXAMETHASONE 2 MG/1
2 TABLET ORAL DAILY
Qty: 30 | Refills: 0 | Status: DISCONTINUED | COMMUNITY
Start: 2020-11-05 | End: 2021-01-21

## 2021-01-21 RX ORDER — LEVOFLOXACIN 500 MG/1
500 TABLET, FILM COATED ORAL DAILY
Qty: 10 | Refills: 0 | Status: DISCONTINUED | COMMUNITY
Start: 2021-01-07 | End: 2021-01-21

## 2021-01-21 RX ORDER — DEXAMETHASONE 2 MG/1
2 TABLET ORAL DAILY
Qty: 5 | Refills: 0 | Status: DISCONTINUED | COMMUNITY
Start: 2021-01-07 | End: 2021-01-21

## 2021-01-22 ENCOUNTER — APPOINTMENT (OUTPATIENT)
Dept: PULMONOLOGY | Facility: CLINIC | Age: 21
End: 2021-01-22

## 2021-01-25 ENCOUNTER — APPOINTMENT (OUTPATIENT)
Dept: HEMATOLOGY ONCOLOGY | Facility: CLINIC | Age: 21
End: 2021-01-25

## 2021-01-27 ENCOUNTER — APPOINTMENT (OUTPATIENT)
Dept: HEMATOLOGY ONCOLOGY | Facility: CLINIC | Age: 21
End: 2021-01-27
Payer: MEDICAID

## 2021-01-27 ENCOUNTER — RESULT REVIEW (OUTPATIENT)
Age: 21
End: 2021-01-27

## 2021-01-27 VITALS
SYSTOLIC BLOOD PRESSURE: 92 MMHG | OXYGEN SATURATION: 100 % | HEART RATE: 83 BPM | RESPIRATION RATE: 17 BRPM | WEIGHT: 102.29 LBS | TEMPERATURE: 96.4 F | BODY MASS INDEX: 17.25 KG/M2 | HEIGHT: 64.76 IN | DIASTOLIC BLOOD PRESSURE: 60 MMHG

## 2021-01-27 DIAGNOSIS — F17.200 NICOTINE DEPENDENCE, UNSPECIFIED, UNCOMPLICATED: ICD-10-CM

## 2021-01-27 DIAGNOSIS — Z95.828 PRESENCE OF OTHER VASCULAR IMPLANTS AND GRAFTS: ICD-10-CM

## 2021-01-27 LAB
BASOPHILS # BLD AUTO: 0.03 K/UL — SIGNIFICANT CHANGE UP (ref 0–0.2)
BASOPHILS NFR BLD AUTO: 0.6 % — SIGNIFICANT CHANGE UP (ref 0–2)
EOSINOPHIL # BLD AUTO: 0.24 K/UL — SIGNIFICANT CHANGE UP (ref 0–0.5)
EOSINOPHIL NFR BLD AUTO: 4.8 % — SIGNIFICANT CHANGE UP (ref 0–6)
HCT VFR BLD CALC: 42.5 % — SIGNIFICANT CHANGE UP (ref 39–50)
HGB BLD-MCNC: 13.7 G/DL — SIGNIFICANT CHANGE UP (ref 13–17)
IMM GRANULOCYTES NFR BLD AUTO: 0.2 % — SIGNIFICANT CHANGE UP (ref 0–1.5)
LYMPHOCYTES # BLD AUTO: 1.59 K/UL — SIGNIFICANT CHANGE UP (ref 1–3.3)
LYMPHOCYTES # BLD AUTO: 32.1 % — SIGNIFICANT CHANGE UP (ref 13–44)
MCHC RBC-ENTMCNC: 25.4 PG — LOW (ref 27–34)
MCHC RBC-ENTMCNC: 32.2 G/DL — SIGNIFICANT CHANGE UP (ref 32–36)
MCV RBC AUTO: 78.8 FL — LOW (ref 80–100)
MONOCYTES # BLD AUTO: 0.17 K/UL — SIGNIFICANT CHANGE UP (ref 0–0.9)
MONOCYTES NFR BLD AUTO: 3.4 % — SIGNIFICANT CHANGE UP (ref 2–14)
NEUTROPHILS # BLD AUTO: 2.91 K/UL — SIGNIFICANT CHANGE UP (ref 1.8–7.4)
NEUTROPHILS NFR BLD AUTO: 58.9 % — SIGNIFICANT CHANGE UP (ref 43–77)
NRBC # BLD: 0 /100 WBCS — SIGNIFICANT CHANGE UP (ref 0–0)
PLATELET # BLD AUTO: 180 K/UL — SIGNIFICANT CHANGE UP (ref 150–400)
RBC # BLD: 5.39 M/UL — SIGNIFICANT CHANGE UP (ref 4.2–5.8)
RBC # FLD: 17.9 % — HIGH (ref 10.3–14.5)
WBC # BLD: 4.95 K/UL — SIGNIFICANT CHANGE UP (ref 3.8–10.5)
WBC # FLD AUTO: 4.95 K/UL — SIGNIFICANT CHANGE UP (ref 3.8–10.5)

## 2021-01-27 PROCEDURE — 99072 ADDL SUPL MATRL&STAF TM PHE: CPT

## 2021-01-27 PROCEDURE — 99215 OFFICE O/P EST HI 40 MIN: CPT

## 2021-01-27 NOTE — HISTORY OF PRESENT ILLNESS
[de-identified] : Mr. Lai is a 19 year old male who is referred by Dr. Kalie Emery for a consultation. Recent PET/CT and bx of the left neck mass shows evidence of  relapsed classical Hodgkins lymphoma.\par Patient initially presented to the [facility] ED on 9/4/18 from [facility] with a mediastinal mass. Upon work up he was found to have Stage IVB disease. Ultrasound guided right supraclavicular lymph node Classical Hodgkins Lymphoma- large atypical cells to be CD30, CD15, PAX5+, MUM1+, LCA(-), CD20(-), CD79a(-), CD3(-), ALK(-).\par Bilateral bone marrow biopsy on 9/6/18- Cellular marrow with maturing trilineage hematopoiesis and no morphologic evidence of lymphoma\par PET/CT completed on 9/10/18- Hypermetabolic lymphadenopathy in neck and thorax, multiple hypermetabolic foci in the bilateral lower cervical and supraclavicular regions, measuring 0.9 x 0.7 cm demonstrates SUV 4.3 anterior mediastinal mass measuring 4.2 x 3 cm demonstrating peripheral FDG avidity and central photopenia, SUV: 6.9. Marked mediastinal and bilateral hilar lymphadenopathy demonstrating FDG avidity, right hilar lymph node measures 3.8 x 2.4 cm, SUV: 10.3. 5 mm nodule is again noted in the right middle lobe. Several hypermetabolic foci identified predominantly throughout the axial skeleton with foci noted scattered throughout the upper thoracic spine and a singular focus in the right anterior eighth rib FDG avid focus in the right side of the T7 vertebral body with corresponding lucency on CT measuring 0.9 x 0.9 cm, SUV 14.7. Two hypermetabolic foci in the appendicular skeleton with corresponding underlying lucency on CT. There is an FDG avid focus in the left inferior glenoid and a corresponding lucency on CT measuring 0.9 x 0.7 cm, SUV 12. There is also a 1.1 x 0.7 cm lucency in the right femoral head with corresponding FDG avidity, SUV 7.3. He was placed on/following a protocol- [facility] 1331, treatment with ABVE-PC (adriamycin, bleomycin, vincristine, etoposide, cytoxan, prednisone, dexrazoxane). He was withdrawn from the study due to his social situation, difficulty with compliance. 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 He was treated with chemotherapy from Sept 2018- Dec 2018. Interim PET/CT completed on 11/3/18- Partially hypermetabolic anterior mediastinal mass is decreased in \par size and metabolism as compared to prior study dated 9/10/2018, compatible with a partial response to interval therapy (Deauville 4). Resolution of additional previously seen hypermetabolic mediastinal, hilar, and cardiophrenic lymph nodes.Marked interval decrease in hypermetabolic foci in the axial and appendicular skeleton with residual hypermetabolic focus in left glenoid which may represent a small focus of residual disease (Deauville 4). He completed 5 cycles of chemotherapy, but missed about 10 appointments, and refused further chemotherapy after cycle 5 due to side effects. He underwent MRI Neck, Abdomen, and Pelvis from 2/2019 showed new lymph node enlargement to the L neck and redemonstrated osseous infiltration involving the bilateral pelvic bones and proximal femurs. CT Chest from 3/2019 showed mediastinal enlarged lymph nodes which were either unchanged or demonstrate interval progression since 11/2018, progressive R hilar lymphadenopathy, and a new cluster of small nodular opacities within the right lower lobe. PET Scan done 3/2019 revealed a new hypermetabolic lymphadenopathy in the left neck measuring approximately 2.4 x 2.1cm in the left level III/V cervical regions. There is also new hypermetabolic 1.2 x 0.8 cm left level VB cervical lymph node and chest new hypermetabolic approximately 1.1 x 0.9 cm right upper paratracheal node. Reference approximately 3.4 x 2.0 cm subcarinal node. Approximately 3.8 x 3.1cm right perihilar mass. as compared to PET/CT from 11/3/2018 suspicious for recurrent lymphoma. A nonspecific new diffuse splenic hypermetabolism. A new hypermetabolic opacity/consolidation in the right lower lung, approximately 2 x 1.3cm opacity/consolidation in the superior segment of the right lower lobe. Ultrasound guided core biopsy of left cervical lymph node done on 4/11/19- confirmed refractory disease. Bone marrow biopsy was negative. \par \par  [de-identified] : The patient is here today for a follow-up consultation for Auto-PSCT. Patient does not want to get  his mother on the phone He is on opdivo every 2 weeks....He has not been compliant and has had POD now with spine mets.... He is tolerating opdivo.... Denies fever/chills, night sweats, mouth sores, eye dryness, blurred vision, nausea, vomiting, diarrhea. No CP, SOB or LE edema. He requests the use of Emla numbing cream when his port is being accessed. He expresses interest in auto transplant....Progression of disease and gravity of the situation  discussed ....

## 2021-01-27 NOTE — ASSESSMENT
[FreeTextEntry1] : The patient is a 21 y/o M with relapsed classical  Hodgkins lymphoma. He was being seen by Dr. Emery for treatment and was started on ICE- had day 1 of etoposide, experienced nausea/vomiting, he was admitted to have day 2 of treatment but then left AMA. He cancelled further appointments and did not want any further treatment despite numerous attempts to discuss with patient, mother, grandmother. He received ABVE at [facility]. Compliance was an issue then. Since he has had treatment with gemzar and brentuximab...with POD\par Biopsy on 9/23/19 showed evidence of dz. He was referred here for a consultation for a autoPSCT evaluation.\par Patient has a reputation of not being compliant with office visit as well as therapy. He has had further POD on scan. Gravity of situation discussed. Pt left from appt last week w/o being seen. Compliance is a major issue. He is not following up as he needs to. Now with spine disease...tolerating opdivo...\par \par I have had another  long discussion with the patient regarding the risks, benefits, alternatives and logistics to autologous stem cell transplantation. I have reviewed with the patient the success rates with various treatments including chemotherapy only as well as chemotherapy and autologous stem cell transplant. \par \par I have also reviewed the pre-transplant testing for vital organ testing including, but not limited to echocardiogram, MUGA, PFTs, urine collection, bone marrow biopsy, sinus x-rays, dental evaluation and liver function.\par \par I have reviewed with the patient the role of Neupogen/Mozobil for 5 days prior to collecting stem cells. I have also discussed the process of apheresis as a way to collect the peripheral stem cells. Anticoagulation with Citrate during apheresis was discussed, along with side effects including but not limited to hypocalcemia mild dysesthesias (most common) to tetany, seizures and cardiac arrhythmias. Treatment with oral or intravenous calcium supplementation in the setting of hypocalcemia was also discussed. \par \par I have discussed with the patient the pre-administration of Kepivance IV x 3 days, as a GI prophylaxis with the aim to lessen swelling, irritation, sores, and ulcers in the GI tract caused by high dose chemotherapy. Side effects including but not limited to flushing, itching from Kepivance were also discussed. \par \par I reviewed with the patient the process of autologous stem cell transplant. I have reviewed the four week admission in the hospital including initial chemotherapy, lasting for 6 days, followed by a 1-2 day rest period, followed by the transplant via a triple lumen catheter. I reviewed post-transplant expectations and possible need for supportive care in the post transplant state as well as post-transplant chemotherapy. I instructed the patient, he may require various transfusions including, but not limited to, PRBCs, platelets, fluids, electrolytes, etc.\par \par I have also discussed the need for antifungal, antiviral, antibiotics,  post transplant and discharge. Possible risks were discussed including infection, bleeding, inflammation in bowel, inflammation in the kidneys and liver. \par \par Post-discharge instructions were reviewed including diet control, crowd controls for ideally 6 weeks post transplant. I also discussed common complaints in the post-discharge state including fatigue, weakness, and supportive care. The patient has a supportive environment at home. I have also reviewed treatment options if the transplant is not effective. \par \par All questions were answered, patient has verbalized understanding. Emotional support provided. Literature and consents were provided for the patient for their review. Patient to consider options.\par \par Plan for ICE treatment prior to Auto-PSCT which would consist of three days in the hospital every 2-3 weeks, aiming for 2 sessions of chemotherapy. After second session of ICE, would repeat scans to  response to treatment. and collect stem cells...pt offers that he does not want to receive ICE chemo.... Plan for pretesting.\par Patient will receive Neulasta shots and will have blood work monitored twice every week off from chemotherapy.\par Gravity of situation discussed\par Will continue discussing plan of care with Dr. Emery.\par Compliance stressed...I asked the pt to return in 4 weeks\par he should remain on opdivo\par I would want him to receive at least 1 cycle of ICE....shiley and tlc catheters discussed\par I need to confirm compliance...in order to proceed with auto\par set up orientation

## 2021-01-27 NOTE — REVIEW OF SYSTEMS
[Negative] : Endocrine [Recent Change In Weight] : ~T no recent weight change [de-identified] : enlarged cervical LNs

## 2021-02-01 ENCOUNTER — APPOINTMENT (OUTPATIENT)
Dept: PULMONOLOGY | Facility: CLINIC | Age: 21
End: 2021-02-01
Payer: MEDICAID

## 2021-02-01 DIAGNOSIS — R93.89 ABNORMAL FINDINGS ON DIAGNOSTIC IMAGING OF OTHER SPECIFIED BODY STRUCTURES: ICD-10-CM

## 2021-02-01 DIAGNOSIS — C78.01 SECONDARY MALIGNANT NEOPLASM OF RIGHT LUNG: ICD-10-CM

## 2021-02-01 DIAGNOSIS — R05 COUGH: ICD-10-CM

## 2021-02-01 PROCEDURE — 99204 OFFICE O/P NEW MOD 45 MIN: CPT | Mod: 95

## 2021-02-01 NOTE — REVIEW OF SYSTEMS
[Arthralgias] : arthralgias [Chronic Pain] : chronic pain [Fever] : no fever [Chills] : no chills [Postnasal Drip] : no postnasal drip [Cough] : no cough [Hemoptysis] : no hemoptysis [Chest Tightness] : no chest tightness [Frequent URIs] : no frequent URIs [Sputum] : no sputum [Dyspnea] : no dyspnea [Pleuritic Pain] : no pleuritic pain [Wheezing] : no wheezing [Edema] : no edema [Hives] : no hives [Nausea] : no nausea [Vomiting] : no vomiting

## 2021-02-01 NOTE — HISTORY OF PRESENT ILLNESS
[Current] : current [Difficulty Breathing During Exertion] : dyspnea on exertion [Wheezing] : wheezing [Feelings Of Weakness On Exertion] : exercise intolerance [Nasal Passage Blockage (Stuffiness)] : edema [Nonspecific Pain, Swelling, And Stiffness] : chest pain [Fever] : fever [Cough] : coughing [0  -  Nothing at all] : 0, nothing at all [TextBox_4] : 20M relapsed classical Hodgkin's Lymphoma currently on treatment with Opdivo and being planned for possible auto-PSCT presenting for INITIAL PULMONARY TELEHEALTH visit in the setting of a recent cough.\par \par Patient has had evidence of pulmonary nodules on lung imaging which is thought related to his lymphoma. He has a CT scan in October which showed quite significant bilateral nodules but more notable on the right side as well as mediastinal adenopathy. He had a PET/CT in January 2021 which showed overall improvement in these right sided nodules. However, in the interim he developed a cough and there was some concern for pneumonitis. \par \par He was given Tessalon perles and now tells me that his cough has resolved. He currently does not have any cough, chest pain, sputum production, or hemoptysis. He denies any history of lung disease. He did have prior PFTs in 2018 which showed a moderate reduction in diffusion capacity but were otherwise normal. These PFTs were done in the setting of planned Bleomycin use.\par \par He is otherwise without distress. He was seen over telehealth today and had no coughing and no evidence of respiratory distress.\par \par He is following up with Dr. Emery and Dr. Oneal. He will schedule followup PFTs. He does not have any other complaints at this time. His chart was reviewed and he has had times of non-adherence to recommended therapies. He is in good spirits today.

## 2021-02-01 NOTE — CONSULT LETTER
[Dear  ___] : Dear  [unfilled], [Consult Letter:] : I had the pleasure of evaluating your patient, [unfilled]. [Please see my note below.] : Please see my note below. [Consult Closing:] : Thank you very much for allowing me to participate in the care of this patient.  If you have any questions, please do not hesitate to contact me. [Sincerely,] : Sincerely, [FreeTextEntry3] : Kamari Davis MD, FACP, FCCP\par

## 2021-02-01 NOTE — ASSESSMENT
[FreeTextEntry1] : 20M classical hodgkins lymphoma seen for INITIAL PULMONARY TELEHEALTH visit in the setting of cough. There was concern that his symptoms may be related to a pneumonitis but his cough has resolved at this time.\par \par 1. Cough - now resolved on Tessalon perles. He will continue this therapy as needed. He continues to smoke marijuana as well which may be contributing to his cough. He has no prior history of asthma. \par - He is due for PFTs prior to auto-PSCT\par - PET/CT from 1/2021 reviewed - overall improvement in lung disease. At this point given improvement in symptoms I think pneumonitis due to Opdivo is less likely\par - He is nontoxic appearing\par \par 2. Hodgkins Lymphoma - continue current care as per Dr. Emery and Dr. Oneal with plan for possible auto-PSCT.\par - His prior lung imaging is concerning for metastatic disease within the chest however his most recent PET/CT shows improvement, though not complete resolution, of these lung nodules\par - These should continue to be followed with imaging. At this time there is no need for bronchoscopy or interventional pulmonary procedure.\par \par 3. Followup after PFTs or sooner as needed. I provided patient with my office information so that he can call if any questions or concerns arise

## 2021-02-01 NOTE — REASON FOR VISIT
[Home] : at home, [unfilled] , at the time of the visit. [Other Location: e.g. Home (Enter Location, City,State)___] : at [unfilled] [Verbal consent obtained from patient] : the patient, [unfilled] [Initial] : an initial visit [Abnormal CXR/ Chest CT] : an abnormal CXR/ chest CT [Cough] : cough

## 2021-02-01 NOTE — PHYSICAL EXAM
[No Acute Distress] : no acute distress [Well Nourished] : well nourished [Well Developed] : well developed [Normal Oropharynx] : normal oropharynx [Normal Appearance] : normal appearance [No Resp Distress] : no resp distress [No Acc Muscle Use] : no acc muscle use [No Focal Deficits] : no focal deficits [Oriented x3] : oriented x3 [Normal Affect] : normal affect [TextBox_11] : anicteric, EOMI, MMM [TextBox_68] : no cough during telehealth visit

## 2021-02-03 ENCOUNTER — APPOINTMENT (OUTPATIENT)
Dept: INTERNAL MEDICINE | Facility: CLINIC | Age: 21
End: 2021-02-03

## 2021-02-04 ENCOUNTER — LABORATORY RESULT (OUTPATIENT)
Age: 21
End: 2021-02-04

## 2021-02-04 ENCOUNTER — APPOINTMENT (OUTPATIENT)
Dept: INFUSION THERAPY | Facility: HOSPITAL | Age: 21
End: 2021-02-04

## 2021-02-06 DIAGNOSIS — Z01.818 ENCOUNTER FOR OTHER PREPROCEDURAL EXAMINATION: ICD-10-CM

## 2021-02-07 ENCOUNTER — APPOINTMENT (OUTPATIENT)
Dept: DISASTER EMERGENCY | Facility: CLINIC | Age: 21
End: 2021-02-07

## 2021-02-10 ENCOUNTER — APPOINTMENT (OUTPATIENT)
Dept: PULMONOLOGY | Facility: CLINIC | Age: 21
End: 2021-02-10

## 2021-02-10 ENCOUNTER — NON-APPOINTMENT (OUTPATIENT)
Age: 21
End: 2021-02-10

## 2021-02-15 ENCOUNTER — OUTPATIENT (OUTPATIENT)
Dept: OUTPATIENT SERVICES | Facility: HOSPITAL | Age: 21
LOS: 1 days | Discharge: HOME | End: 2021-02-15

## 2021-02-15 DIAGNOSIS — C81.98 HODGKIN LYMPHOMA, UNSPECIFIED, LYMPH NODES OF MULTIPLE SITES: ICD-10-CM

## 2021-02-16 DIAGNOSIS — Z01.818 ENCOUNTER FOR OTHER PREPROCEDURAL EXAMINATION: ICD-10-CM

## 2021-02-18 ENCOUNTER — APPOINTMENT (OUTPATIENT)
Dept: HEMATOLOGY ONCOLOGY | Facility: CLINIC | Age: 21
End: 2021-02-18

## 2021-02-18 ENCOUNTER — NON-APPOINTMENT (OUTPATIENT)
Age: 21
End: 2021-02-18

## 2021-02-20 ENCOUNTER — APPOINTMENT (OUTPATIENT)
Dept: INFUSION THERAPY | Facility: HOSPITAL | Age: 21
End: 2021-02-20

## 2021-02-24 ENCOUNTER — APPOINTMENT (OUTPATIENT)
Dept: ENDOCRINOLOGY | Facility: CLINIC | Age: 21
End: 2021-02-24

## 2021-02-25 ENCOUNTER — APPOINTMENT (OUTPATIENT)
Dept: HEMATOLOGY ONCOLOGY | Facility: CLINIC | Age: 21
End: 2021-02-25

## 2021-03-03 ENCOUNTER — APPOINTMENT (OUTPATIENT)
Dept: HEMATOLOGY ONCOLOGY | Facility: CLINIC | Age: 21
End: 2021-03-03

## 2021-03-03 ENCOUNTER — LABORATORY RESULT (OUTPATIENT)
Age: 21
End: 2021-03-03

## 2021-03-04 ENCOUNTER — APPOINTMENT (OUTPATIENT)
Dept: INFUSION THERAPY | Facility: HOSPITAL | Age: 21
End: 2021-03-04

## 2021-03-04 ENCOUNTER — NON-APPOINTMENT (OUTPATIENT)
Age: 21
End: 2021-03-04

## 2021-03-04 ENCOUNTER — APPOINTMENT (OUTPATIENT)
Dept: HEMATOLOGY ONCOLOGY | Facility: CLINIC | Age: 21
End: 2021-03-04

## 2021-03-15 ENCOUNTER — OUTPATIENT (OUTPATIENT)
Dept: OUTPATIENT SERVICES | Facility: HOSPITAL | Age: 21
LOS: 1 days | Discharge: HOME | End: 2021-03-15

## 2021-03-15 DIAGNOSIS — C81.98 HODGKIN LYMPHOMA, UNSPECIFIED, LYMPH NODES OF MULTIPLE SITES: ICD-10-CM

## 2021-03-17 ENCOUNTER — LABORATORY RESULT (OUTPATIENT)
Age: 21
End: 2021-03-17

## 2021-03-18 ENCOUNTER — RESULT REVIEW (OUTPATIENT)
Age: 21
End: 2021-03-18

## 2021-03-18 ENCOUNTER — APPOINTMENT (OUTPATIENT)
Dept: INFUSION THERAPY | Facility: HOSPITAL | Age: 21
End: 2021-03-18

## 2021-03-18 ENCOUNTER — APPOINTMENT (OUTPATIENT)
Dept: HEMATOLOGY ONCOLOGY | Facility: CLINIC | Age: 21
End: 2021-03-18
Payer: MEDICAID

## 2021-03-18 VITALS
HEIGHT: 64.76 IN | BODY MASS INDEX: 17.1 KG/M2 | TEMPERATURE: 97.7 F | WEIGHT: 101.41 LBS | HEART RATE: 70 BPM | RESPIRATION RATE: 17 BRPM | OXYGEN SATURATION: 98 % | SYSTOLIC BLOOD PRESSURE: 97 MMHG | DIASTOLIC BLOOD PRESSURE: 64 MMHG

## 2021-03-18 LAB
BASOPHILS # BLD AUTO: 0.03 K/UL — SIGNIFICANT CHANGE UP (ref 0–0.2)
BASOPHILS NFR BLD AUTO: 0.5 % — SIGNIFICANT CHANGE UP (ref 0–2)
EOSINOPHIL # BLD AUTO: 0.21 K/UL — SIGNIFICANT CHANGE UP (ref 0–0.5)
EOSINOPHIL NFR BLD AUTO: 3.2 % — SIGNIFICANT CHANGE UP (ref 0–6)
HCT VFR BLD CALC: 40.9 % — SIGNIFICANT CHANGE UP (ref 39–50)
HGB BLD-MCNC: 13.6 G/DL — SIGNIFICANT CHANGE UP (ref 13–17)
IMM GRANULOCYTES NFR BLD AUTO: 0.3 % — SIGNIFICANT CHANGE UP (ref 0–1.5)
LYMPHOCYTES # BLD AUTO: 1.7 K/UL — SIGNIFICANT CHANGE UP (ref 1–3.3)
LYMPHOCYTES # BLD AUTO: 26.2 % — SIGNIFICANT CHANGE UP (ref 13–44)
MCHC RBC-ENTMCNC: 27.3 PG — SIGNIFICANT CHANGE UP (ref 27–34)
MCHC RBC-ENTMCNC: 33.3 G/DL — SIGNIFICANT CHANGE UP (ref 32–36)
MCV RBC AUTO: 82 FL — SIGNIFICANT CHANGE UP (ref 80–100)
MONOCYTES # BLD AUTO: 0.36 K/UL — SIGNIFICANT CHANGE UP (ref 0–0.9)
MONOCYTES NFR BLD AUTO: 5.5 % — SIGNIFICANT CHANGE UP (ref 2–14)
NEUTROPHILS # BLD AUTO: 4.17 K/UL — SIGNIFICANT CHANGE UP (ref 1.8–7.4)
NEUTROPHILS NFR BLD AUTO: 64.3 % — SIGNIFICANT CHANGE UP (ref 43–77)
NRBC # BLD: 0 /100 WBCS — SIGNIFICANT CHANGE UP (ref 0–0)
PLATELET # BLD AUTO: 205 K/UL — SIGNIFICANT CHANGE UP (ref 150–400)
RBC # BLD: 4.99 M/UL — SIGNIFICANT CHANGE UP (ref 4.2–5.8)
RBC # FLD: 17.7 % — HIGH (ref 10.3–14.5)
WBC # BLD: 6.49 K/UL — SIGNIFICANT CHANGE UP (ref 3.8–10.5)
WBC # FLD AUTO: 6.49 K/UL — SIGNIFICANT CHANGE UP (ref 3.8–10.5)

## 2021-03-18 PROCEDURE — 99214 OFFICE O/P EST MOD 30 MIN: CPT

## 2021-03-18 PROCEDURE — 99072 ADDL SUPL MATRL&STAF TM PHE: CPT

## 2021-03-19 ENCOUNTER — APPOINTMENT (OUTPATIENT)
Dept: ENDOCRINOLOGY | Facility: CLINIC | Age: 21
End: 2021-03-19
Payer: MEDICAID

## 2021-03-19 PROCEDURE — 99203 OFFICE O/P NEW LOW 30 MIN: CPT | Mod: 95

## 2021-03-19 RX ORDER — LEVOTHYROXINE SODIUM 0.03 MG/1
25 TABLET ORAL DAILY
Qty: 30 | Refills: 0 | Status: DISCONTINUED | COMMUNITY
Start: 2021-02-10 | End: 2021-03-19

## 2021-03-19 RX ORDER — LEVOTHYROXINE SODIUM 0.05 MG/1
50 TABLET ORAL DAILY
Qty: 90 | Refills: 2 | Status: COMPLETED | COMMUNITY
Start: 2021-03-19 | End: 2021-12-14

## 2021-03-19 NOTE — HISTORY OF PRESENT ILLNESS
[de-identified] : Hodgkins Lymphoma. Patient initially presented to the [facility] ED on 9/4/18 from [facility] with a mediastinal mass. Upon work up he was found to have Stage IVB disease. Ultrasound guided right supraclavicular lymph node Classical Hodgkins Lymphoma- large atypical cells to be CD30, CD15, PAX5+, MUM1+, LCA(-), CD20(-), CD79a(-), CD3(-), ALK(-).\par Bilateral bone marrow biopsy on 9/6/18- Cellular marrow with maturing trilineage hematopoiesis and no morphologic evidence of lymphoma\par PET/CT completed on 9/10/18- Hypermetabolic lymphadenopathy in neck and thorax, multiple hypermetabolic foci in the bilateral lower cervical and supraclavicular regions, measuring 0.9 x 0.7 cm demonstrates SUV 4.3 anterior mediastinal mass measuring 4.2 x 3 cm demonstrating peripheral FDG avidity and central photopenia, SUV: 6.9. Marked mediastinal and bilateral hilar lymphadenopathy demonstrating FDG avidity, right hilar lymph node measures 3.8 x 2.4 cm, SUV: 10.3. 5 mm nodule is again noted in the right middle lobe. Several hypermetabolic foci identified predominantly throughout the axial skeleton with foci noted scattered throughout the upper thoracic spine and a singular focus in the right anterior eighth rib FDG avid focus in the right side of the T7 vertebral body with corresponding lucency on CT measuring 0.9 x 0.9 cm, SUV 14.7. Two hypermetabolic foci in the appendicular skeleton with corresponding underlying lucency on CT. There is an FDG avid focus in the left inferior glenoid and a corresponding lucency on CT measuring 0.9 x 0.7 cm, SUV 12. There is also a 1.1 x 0.7 cm lucency in the right femoral head with corresponding FDG avidity, SUV 7.3. He was placed on/following a protocol- OC 1331, treatment with ABVE-PC (adriamycin, bleomycin, vincristine, etoposide, cytoxan, prednisone, dexrazoxane). He was withdrawn from the study due to his social situation, difficulty with compliance. 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 He was treated with chemotherapy from Sept 2018- Dec 2018. Interim PET/CT completed on 11/3/18- Partially hypermetabolic anterior mediastinal mass is decreased in \par size and metabolism as compared to prior study dated 9/10/2018, compatible with a partial response to interval therapy (Deauville 4). Resolution of additional previously seen hypermetabolic mediastinal, hilar, and cardiophrenic lymph nodes.Marked interval decrease in hypermetabolic foci in the axial and appendicular skeleton with residual hypermetabolic focus in left glenoid which may represent a small focus of residual disease (Deauville 4). He completed 5 cycles of chemotherapy, but missed about 10 appointments, and refused further chemotherapy after cycle 5 due to side effects. He underwent MRI Neck, Abdomen, and Pelvis from 2/2019 showed new lymph node enlargement to the L neck and redemonstrated osseous infiltration involving the bilateral pelvic bones and proximal femurs. CT Chest from 3/2019 showed mediastinal enlarged lymph nodes which were either unchanged or demonstrate interval progression since 11/2018, progressive R hilar lymphadenopathy, and a new cluster of small nodular opacities within the right lower lobe. PET Scan done 3/2019 revealed a new hypermetabolic lymphadenopathy in the left neck measuring approximately 2.4 x 2.1cm in the left level III/V cervical regions. There is also new hypermetabolic 1.2 x 0.8 cm left level VB cervical lymph node and chest new hypermetabolic approximately 1.1 x 0.9 cm right upper paratracheal node. Reference approximately 3.4 x 2.0 cm subcarinal node. Approximately 3.8 x 3.1cm right perihilar mass. as compared to PET/CT from 11/3/2018 suspicious for recurrent lymphoma. A nonspecific new diffuse splenic hypermetabolism. A new hypermetabolic opacity/consolidation in the right lower lung, approximately 2 x 1.3cm opacity/consolidation in the superior segment of the right lower lobe. Ultrasound guided core biopsy of left cervical lymph node done on 4/11/19- confirmed refractory disease. Bone marrow biopsy was negative. He was started on treatment 4/13 with salvage treatment with Gemzar/brentuximab which he received 2 cycles but again had compliance issues along with behavioral issues with the staff.  [de-identified] : The patient is here today for a follow-up.  He last received a dose of nivolumab on 2/4- cycle 8.  Treatment was held due to development of hypothyroidism.  He was started on synthroid for which he has been taking it for 1 month now.  He states he is compliant with it.  He then developed COVID about 3 weeks ago, had some loss of smell/taste, URI symptoms that have since resolved.  He states he continues to have back pain, asking if can get surgery for it.  He requires oxycodone 2-3 times a day for the pain.  It is not worse, stable.  He states he wants to proceed with the transplant.  He has no fever/chills, night sweats.  His weight/appetite is stable.  He has no cough, no SOB, no abdominal c/o, no bleeding.

## 2021-03-19 NOTE — PHYSICAL EXAM
[Restricted in physically strenuous activity but ambulatory and able to carry out work of a light or sedentary nature] : Status 1- Restricted in physically strenuous activity but ambulatory and able to carry out work of a light or sedentary nature, e.g., light house work, office work [Normal] : affect appropriate [de-identified] : left cervical lymph nodes- around 1-2 cm.

## 2021-03-19 NOTE — REVIEW OF SYSTEMS
[Negative] : Allergic/Immunologic [Recent Change In Weight] : ~T no recent weight change [Joint Pain] : joint pain [FreeTextEntry9] : back

## 2021-03-19 NOTE — ASSESSMENT
[FreeTextEntry1] : 20 year old male who was diagnosed with a stage IV CHL in 9/2018 who appears to have a relapsed or refractory Hodgkin's lymphoma. He was initially treated following ABVE-PC completed 12/2018 after 5 cycles due to noncompliance and patient refusal, relapsed diagnosed in Feb 2019, started on salvage treatment in April 2019 with gemzar/brentuximab which was also stopped due to the patient's refusal and noncompliance. \par \par He was recommended to have 2 cycles of ICE with intent to pursue an autologous bone marrow transplant. The patient began the cycle and then left AMA while the chemotherapy was running unfortunately. Despite multiple discussions with the patient and his mother, he did not follow up and refused further treatment. Recently with progression of disease/osseous mets which he was recommended to go to the hospital, he again refused and left the office. \par \par He was now readmitted for diffuse vertebral disease/compression fractures. He was treated with radiation, 800 cGy to T1-T5, T8-L5, left hip/acetabulum. He also received nivolumab with improvement in his lymphadenopathy and symptoms. \par He has now received 8 doses of nivolumab with improvement, but has been with some noncompliance with patient visits, rescheduling treatments.  His last dose was 1.5 months ago due to development of hypothyroidism and COVID.  He appears improved, has been on the synthroid for about 1 month.  He appears to be recovered from COVID as well. \par Will plan to resume the nivolumab, rescheduled for Monday.  COVID swab today. \par Repeat his labs- CMP, TSH today.  \par Plan for a PET/CT scan to assess his disease.  \par Recommend follow up with BMT in the interim.  \par Follow up monthly.  Again, I explained that compliance is 100% necessary, especially if we are to pursue a bone marrow transplant. \par If his symptoms worsen, he will call and come in sooner.  Otherwise follow up in 1 month. \par \par

## 2021-03-22 ENCOUNTER — APPOINTMENT (OUTPATIENT)
Dept: INFUSION THERAPY | Facility: HOSPITAL | Age: 21
End: 2021-03-22

## 2021-03-22 NOTE — REVIEW OF SYSTEMS
[Constipation] : constipation [Back Pain] : back pain [Hair Loss] : hair loss [All other systems negative] : All other systems negative [de-identified] : Brittle nails

## 2021-03-22 NOTE — REASON FOR VISIT
[Home] : at home, [unfilled] , at the time of the visit. [Medical Office: ([facility])___] : at the medical office located in  [Other:____] : [unfilled] [Verbal consent obtained from patient] : the patient, [unfilled] [Consultation] : a consultation visit [Hypothyroidism] : hypothyroidism [FreeTextEntry2] : Dr. Emery

## 2021-03-22 NOTE — HISTORY OF PRESENT ILLNESS
[FreeTextEntry1] : CC: Thyroid problem\par This is a 20-year-old male with stage IV Hodgkin's lymphoma diagnosed in September 2018.  He has had progression of disease with compression fractures status post radiation therapy.\par He was started on levothyroxine 25 mcg daily after he developed hypothyroidism on nivolumab.  There is no prior history of thyroid disease.  There is no family history of thyroid disease.\par He is not taking levothyroxine in the morning on an empty stomach.\par TSH from January 7, 2021 was found to be 17.7.  Repeat TSH on February 4, 2021 was 70.10.  Most recent TSH from March 18, 2021 was 46.\par He reports hair loss, brittle nails, back pain, and constipation.\par He was diagnosed with Covid on March 3, 2021 and reports loss of smell and taste.

## 2021-03-22 NOTE — CONSULT LETTER
[Dear  ___] : Dear  [unfilled], [Consult Letter:] : I had the pleasure of evaluating your patient, [unfilled]. [Please see my note below.] : Please see my note below. [Sincerely,] : Sincerely, [FreeTextEntry3] : Mika Huynh MD, FACE\par

## 2021-03-22 NOTE — ASSESSMENT
[FreeTextEntry1] : This is a 20-year-old male with stage IV Hodgkin's lymphoma diagnosed in September 2018.  He has had progression of disease with compression fractures status post radiation therapy.\par He was started on levothyroxine 25 mcg daily after he developed hypothyroidism on nivolumab.  There is no prior history of thyroid disease.  There is no family history of thyroid disease.\par He is not taking levothyroxine in the morning on an empty stomach.\par Advised to take levothyroxine in the morning on an empty stomach.\par TSH from January 7, 2021 was found to be 17.7.  Repeat TSH on February 4, 2021 was 70.10.  Most recent TSH from March 18, 2021 was 46.\par Increase levothyroxine from 25 mcg daily to 50 mcg daily.\par Follow-up in 4 weeks to check TFTs.\par All questions answered.\par

## 2021-03-24 ENCOUNTER — APPOINTMENT (OUTPATIENT)
Dept: HEMATOLOGY ONCOLOGY | Facility: CLINIC | Age: 21
End: 2021-03-24

## 2021-03-24 ENCOUNTER — LABORATORY RESULT (OUTPATIENT)
Age: 21
End: 2021-03-24

## 2021-03-25 ENCOUNTER — RESULT REVIEW (OUTPATIENT)
Age: 21
End: 2021-03-25

## 2021-03-25 ENCOUNTER — APPOINTMENT (OUTPATIENT)
Dept: INFUSION THERAPY | Facility: HOSPITAL | Age: 21
End: 2021-03-25

## 2021-03-25 DIAGNOSIS — Z51.11 ENCOUNTER FOR ANTINEOPLASTIC CHEMOTHERAPY: ICD-10-CM

## 2021-03-25 LAB
BASOPHILS # BLD AUTO: 0.05 K/UL — SIGNIFICANT CHANGE UP (ref 0–0.2)
BASOPHILS NFR BLD AUTO: 0.7 % — SIGNIFICANT CHANGE UP (ref 0–2)
EOSINOPHIL # BLD AUTO: 0.16 K/UL — SIGNIFICANT CHANGE UP (ref 0–0.5)
EOSINOPHIL NFR BLD AUTO: 2.3 % — SIGNIFICANT CHANGE UP (ref 0–6)
HCT VFR BLD CALC: 37.3 % — LOW (ref 39–50)
HGB BLD-MCNC: 12.6 G/DL — LOW (ref 13–17)
IMM GRANULOCYTES NFR BLD AUTO: 0.3 % — SIGNIFICANT CHANGE UP (ref 0–1.5)
LYMPHOCYTES # BLD AUTO: 1.88 K/UL — SIGNIFICANT CHANGE UP (ref 1–3.3)
LYMPHOCYTES # BLD AUTO: 26.7 % — SIGNIFICANT CHANGE UP (ref 13–44)
MCHC RBC-ENTMCNC: 27.8 PG — SIGNIFICANT CHANGE UP (ref 27–34)
MCHC RBC-ENTMCNC: 33.8 G/DL — SIGNIFICANT CHANGE UP (ref 32–36)
MCV RBC AUTO: 82.2 FL — SIGNIFICANT CHANGE UP (ref 80–100)
MONOCYTES # BLD AUTO: 0.37 K/UL — SIGNIFICANT CHANGE UP (ref 0–0.9)
MONOCYTES NFR BLD AUTO: 5.2 % — SIGNIFICANT CHANGE UP (ref 2–14)
NEUTROPHILS # BLD AUTO: 4.57 K/UL — SIGNIFICANT CHANGE UP (ref 1.8–7.4)
NEUTROPHILS NFR BLD AUTO: 64.8 % — SIGNIFICANT CHANGE UP (ref 43–77)
NRBC # BLD: 0 /100 WBCS — SIGNIFICANT CHANGE UP (ref 0–0)
PLATELET # BLD AUTO: 192 K/UL — SIGNIFICANT CHANGE UP (ref 150–400)
RBC # BLD: 4.54 M/UL — SIGNIFICANT CHANGE UP (ref 4.2–5.8)
RBC # FLD: 17 % — HIGH (ref 10.3–14.5)
WBC # BLD: 7.05 K/UL — SIGNIFICANT CHANGE UP (ref 3.8–10.5)
WBC # FLD AUTO: 7.05 K/UL — SIGNIFICANT CHANGE UP (ref 3.8–10.5)

## 2021-04-01 ENCOUNTER — APPOINTMENT (OUTPATIENT)
Dept: INFUSION THERAPY | Facility: HOSPITAL | Age: 21
End: 2021-04-01

## 2021-04-23 ENCOUNTER — OUTPATIENT (OUTPATIENT)
Dept: OUTPATIENT SERVICES | Facility: HOSPITAL | Age: 21
LOS: 1 days | Discharge: HOME | End: 2021-04-23

## 2021-04-23 ENCOUNTER — APPOINTMENT (OUTPATIENT)
Dept: ENDOCRINOLOGY | Facility: CLINIC | Age: 21
End: 2021-04-23
Payer: MEDICAID

## 2021-04-23 DIAGNOSIS — E03.9 HYPOTHYROIDISM, UNSPECIFIED: ICD-10-CM

## 2021-04-23 DIAGNOSIS — C81.98 HODGKIN LYMPHOMA, UNSPECIFIED, LYMPH NODES OF MULTIPLE SITES: ICD-10-CM

## 2021-04-23 PROCEDURE — 99213 OFFICE O/P EST LOW 20 MIN: CPT | Mod: 95

## 2021-04-26 ENCOUNTER — APPOINTMENT (OUTPATIENT)
Dept: HEMATOLOGY ONCOLOGY | Facility: CLINIC | Age: 21
End: 2021-04-26

## 2021-04-29 ENCOUNTER — APPOINTMENT (OUTPATIENT)
Dept: HEMATOLOGY ONCOLOGY | Facility: CLINIC | Age: 21
End: 2021-04-29

## 2021-04-29 NOTE — HISTORY OF PRESENT ILLNESS
[FreeTextEntry1] : CC: Thyroid problem\par This is a 20-year-old male with stage IV Hodgkin's lymphoma diagnosed in September 2018.  He has had progression of disease with compression fractures status post radiation therapy.\par He was started on levothyroxine 25 mcg daily after he developed hypothyroidism on nivolumab.  He is now on levothyroxine 50 mcg daily.  There is no prior history of thyroid disease.  There is no family history of thyroid disease.\par He is taking levothyroxine in the morning on an empty stomach.\par TSH from January 7, 2021 was found to be 17.7.  Repeat TSH on February 4, 2021 was 70.10.  Most recent TSH from March 18, 2021 was 46.\par He denies complaints today.

## 2021-04-29 NOTE — ASSESSMENT
[FreeTextEntry1] : This is a 20-year-old male with stage IV Hodgkin's lymphoma diagnosed in September 2018.  He has had progression of disease with compression fractures status post radiation therapy.\par He was started on levothyroxine 25 mcg daily after he developed hypothyroidism on nivolumab.  He is now on levothyroxine 50 mcg daily.  He takes levothyroxine in the morning on empty stomach.\par Check TFTs. \par All questions answered.\par

## 2021-05-06 ENCOUNTER — APPOINTMENT (OUTPATIENT)
Dept: HEMATOLOGY ONCOLOGY | Facility: CLINIC | Age: 21
End: 2021-05-06

## 2021-05-06 ENCOUNTER — RESULT REVIEW (OUTPATIENT)
Age: 21
End: 2021-05-06

## 2021-05-07 ENCOUNTER — LABORATORY RESULT (OUTPATIENT)
Age: 21
End: 2021-05-07

## 2021-05-07 ENCOUNTER — APPOINTMENT (OUTPATIENT)
Dept: INFUSION THERAPY | Facility: HOSPITAL | Age: 21
End: 2021-05-07

## 2021-05-07 ENCOUNTER — RESULT REVIEW (OUTPATIENT)
Age: 21
End: 2021-05-07

## 2021-05-07 LAB
BASOPHILS # BLD AUTO: 0.05 K/UL — SIGNIFICANT CHANGE UP (ref 0–0.2)
BASOPHILS NFR BLD AUTO: 0.7 % — SIGNIFICANT CHANGE UP (ref 0–2)
EOSINOPHIL # BLD AUTO: 0.3 K/UL — SIGNIFICANT CHANGE UP (ref 0–0.5)
EOSINOPHIL NFR BLD AUTO: 4 % — SIGNIFICANT CHANGE UP (ref 0–6)
HCT VFR BLD CALC: 42.1 % — SIGNIFICANT CHANGE UP (ref 39–50)
HGB BLD-MCNC: 14 G/DL — SIGNIFICANT CHANGE UP (ref 13–17)
IMM GRANULOCYTES NFR BLD AUTO: 0.3 % — SIGNIFICANT CHANGE UP (ref 0–1.5)
LYMPHOCYTES # BLD AUTO: 1.86 K/UL — SIGNIFICANT CHANGE UP (ref 1–3.3)
LYMPHOCYTES # BLD AUTO: 24.6 % — SIGNIFICANT CHANGE UP (ref 13–44)
MCHC RBC-ENTMCNC: 29.5 PG — SIGNIFICANT CHANGE UP (ref 27–34)
MCHC RBC-ENTMCNC: 33.3 G/DL — SIGNIFICANT CHANGE UP (ref 32–36)
MCV RBC AUTO: 88.8 FL — SIGNIFICANT CHANGE UP (ref 80–100)
MONOCYTES # BLD AUTO: 0.42 K/UL — SIGNIFICANT CHANGE UP (ref 0–0.9)
MONOCYTES NFR BLD AUTO: 5.5 % — SIGNIFICANT CHANGE UP (ref 2–14)
NEUTROPHILS # BLD AUTO: 4.92 K/UL — SIGNIFICANT CHANGE UP (ref 1.8–7.4)
NEUTROPHILS NFR BLD AUTO: 64.9 % — SIGNIFICANT CHANGE UP (ref 43–77)
NRBC # BLD: 0 /100 WBCS — SIGNIFICANT CHANGE UP (ref 0–0)
PLATELET # BLD AUTO: 218 K/UL — SIGNIFICANT CHANGE UP (ref 150–400)
RBC # BLD: 4.74 M/UL — SIGNIFICANT CHANGE UP (ref 4.2–5.8)
RBC # FLD: 15 % — HIGH (ref 10.3–14.5)
WBC # BLD: 7.57 K/UL — SIGNIFICANT CHANGE UP (ref 3.8–10.5)
WBC # FLD AUTO: 7.57 K/UL — SIGNIFICANT CHANGE UP (ref 3.8–10.5)

## 2021-05-14 ENCOUNTER — APPOINTMENT (OUTPATIENT)
Dept: CT IMAGING | Facility: IMAGING CENTER | Age: 21
End: 2021-05-14

## 2021-05-17 ENCOUNTER — RESULT REVIEW (OUTPATIENT)
Age: 21
End: 2021-05-17

## 2021-05-17 ENCOUNTER — APPOINTMENT (OUTPATIENT)
Dept: HEMATOLOGY ONCOLOGY | Facility: CLINIC | Age: 21
End: 2021-05-17
Payer: MEDICAID

## 2021-05-17 ENCOUNTER — APPOINTMENT (OUTPATIENT)
Dept: INFUSION THERAPY | Facility: HOSPITAL | Age: 21
End: 2021-05-17

## 2021-05-17 VITALS
HEART RATE: 78 BPM | DIASTOLIC BLOOD PRESSURE: 65 MMHG | OXYGEN SATURATION: 98 % | TEMPERATURE: 96.6 F | RESPIRATION RATE: 17 BRPM | SYSTOLIC BLOOD PRESSURE: 103 MMHG | BODY MASS INDEX: 18.14 KG/M2 | HEIGHT: 64.17 IN | WEIGHT: 106.26 LBS

## 2021-05-17 LAB
BASOPHILS # BLD AUTO: 0.03 K/UL — SIGNIFICANT CHANGE UP (ref 0–0.2)
BASOPHILS NFR BLD AUTO: 0.4 % — SIGNIFICANT CHANGE UP (ref 0–2)
EOSINOPHIL # BLD AUTO: 0.17 K/UL — SIGNIFICANT CHANGE UP (ref 0–0.5)
EOSINOPHIL NFR BLD AUTO: 2 % — SIGNIFICANT CHANGE UP (ref 0–6)
HCT VFR BLD CALC: 41.6 % — SIGNIFICANT CHANGE UP (ref 39–50)
HGB BLD-MCNC: 13.6 G/DL — SIGNIFICANT CHANGE UP (ref 13–17)
IMM GRANULOCYTES NFR BLD AUTO: 0.4 % — SIGNIFICANT CHANGE UP (ref 0–1.5)
LYMPHOCYTES # BLD AUTO: 1.55 K/UL — SIGNIFICANT CHANGE UP (ref 1–3.3)
LYMPHOCYTES # BLD AUTO: 18.1 % — SIGNIFICANT CHANGE UP (ref 13–44)
MCHC RBC-ENTMCNC: 29.2 PG — SIGNIFICANT CHANGE UP (ref 27–34)
MCHC RBC-ENTMCNC: 32.7 G/DL — SIGNIFICANT CHANGE UP (ref 32–36)
MCV RBC AUTO: 89.5 FL — SIGNIFICANT CHANGE UP (ref 80–100)
MONOCYTES # BLD AUTO: 0.34 K/UL — SIGNIFICANT CHANGE UP (ref 0–0.9)
MONOCYTES NFR BLD AUTO: 4 % — SIGNIFICANT CHANGE UP (ref 2–14)
NEUTROPHILS # BLD AUTO: 6.45 K/UL — SIGNIFICANT CHANGE UP (ref 1.8–7.4)
NEUTROPHILS NFR BLD AUTO: 75.1 % — SIGNIFICANT CHANGE UP (ref 43–77)
NRBC # BLD: 0 /100 WBCS — SIGNIFICANT CHANGE UP (ref 0–0)
PLATELET # BLD AUTO: 231 K/UL — SIGNIFICANT CHANGE UP (ref 150–400)
RBC # BLD: 4.65 M/UL — SIGNIFICANT CHANGE UP (ref 4.2–5.8)
RBC # FLD: 15.2 % — HIGH (ref 10.3–14.5)
WBC # BLD: 8.57 K/UL — SIGNIFICANT CHANGE UP (ref 3.8–10.5)
WBC # FLD AUTO: 8.57 K/UL — SIGNIFICANT CHANGE UP (ref 3.8–10.5)

## 2021-05-17 PROCEDURE — 99072 ADDL SUPL MATRL&STAF TM PHE: CPT

## 2021-05-17 PROCEDURE — 99214 OFFICE O/P EST MOD 30 MIN: CPT

## 2021-05-18 ENCOUNTER — NON-APPOINTMENT (OUTPATIENT)
Age: 21
End: 2021-05-18

## 2021-05-18 DIAGNOSIS — Z51.11 ENCOUNTER FOR ANTINEOPLASTIC CHEMOTHERAPY: ICD-10-CM

## 2021-05-19 ENCOUNTER — APPOINTMENT (OUTPATIENT)
Dept: CT IMAGING | Facility: IMAGING CENTER | Age: 21
End: 2021-05-19
Payer: MEDICAID

## 2021-05-19 ENCOUNTER — OUTPATIENT (OUTPATIENT)
Dept: OUTPATIENT SERVICES | Facility: HOSPITAL | Age: 21
LOS: 1 days | End: 2021-05-19
Payer: MEDICAID

## 2021-05-19 DIAGNOSIS — C81.90 HODGKIN LYMPHOMA, UNSPECIFIED, UNSPECIFIED SITE: ICD-10-CM

## 2021-05-19 DIAGNOSIS — Z00.8 ENCOUNTER FOR OTHER GENERAL EXAMINATION: ICD-10-CM

## 2021-05-19 PROCEDURE — 70491 CT SOFT TISSUE NECK W/DYE: CPT | Mod: 26

## 2021-05-19 PROCEDURE — 71260 CT THORAX DX C+: CPT

## 2021-05-19 PROCEDURE — 74177 CT ABD & PELVIS W/CONTRAST: CPT | Mod: 26

## 2021-05-19 PROCEDURE — 74177 CT ABD & PELVIS W/CONTRAST: CPT

## 2021-05-19 PROCEDURE — 71260 CT THORAX DX C+: CPT | Mod: 26

## 2021-05-19 PROCEDURE — 70491 CT SOFT TISSUE NECK W/DYE: CPT

## 2021-05-25 ENCOUNTER — NON-APPOINTMENT (OUTPATIENT)
Age: 21
End: 2021-05-25

## 2021-05-26 ENCOUNTER — APPOINTMENT (OUTPATIENT)
Dept: INTERNAL MEDICINE | Facility: CLINIC | Age: 21
End: 2021-05-26

## 2021-05-31 NOTE — PHYSICAL EXAM
[Restricted in physically strenuous activity but ambulatory and able to carry out work of a light or sedentary nature] : Status 1- Restricted in physically strenuous activity but ambulatory and able to carry out work of a light or sedentary nature, e.g., light house work, office work [Normal] : affect appropriate [Thin] : thin [de-identified] : bilateral cervical lymph nodes- 1-2 cm in size

## 2021-05-31 NOTE — HISTORY OF PRESENT ILLNESS
[de-identified] : Hodgkins Lymphoma. Patient initially presented to the [facility] ED on 9/4/18 from [facility] with a mediastinal mass. Upon work up he was found to have Stage IVB disease. Ultrasound guided right supraclavicular lymph node Classical Hodgkins Lymphoma- large atypical cells to be CD30, CD15, PAX5+, MUM1+, LCA(-), CD20(-), CD79a(-), CD3(-), ALK(-).\par Bilateral bone marrow biopsy on 9/6/18- Cellular marrow with maturing trilineage hematopoiesis and no morphologic evidence of lymphoma\par PET/CT completed on 9/10/18- Hypermetabolic lymphadenopathy in neck and thorax, multiple hypermetabolic foci in the bilateral lower cervical and supraclavicular regions, measuring 0.9 x 0.7 cm demonstrates SUV 4.3 anterior mediastinal mass measuring 4.2 x 3 cm demonstrating peripheral FDG avidity and central photopenia, SUV: 6.9. Marked mediastinal and bilateral hilar lymphadenopathy demonstrating FDG avidity, right hilar lymph node measures 3.8 x 2.4 cm, SUV: 10.3. 5 mm nodule is again noted in the right middle lobe. Several hypermetabolic foci identified predominantly throughout the axial skeleton with foci noted scattered throughout the upper thoracic spine and a singular focus in the right anterior eighth rib FDG avid focus in the right side of the T7 vertebral body with corresponding lucency on CT measuring 0.9 x 0.9 cm, SUV 14.7. Two hypermetabolic foci in the appendicular skeleton with corresponding underlying lucency on CT. There is an FDG avid focus in the left inferior glenoid and a corresponding lucency on CT measuring 0.9 x 0.7 cm, SUV 12. There is also a 1.1 x 0.7 cm lucency in the right femoral head with corresponding FDG avidity, SUV 7.3. He was placed on/following a protocol- OC 1331, treatment with ABVE-PC (adriamycin, bleomycin, vincristine, etoposide, cytoxan, prednisone, dexrazoxane). He was withdrawn from the study due to his social situation, difficulty with compliance. 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 He was treated with chemotherapy from Sept 2018- Dec 2018. Interim PET/CT completed on 11/3/18- Partially hypermetabolic anterior mediastinal mass is decreased in \par size and metabolism as compared to prior study dated 9/10/2018, compatible with a partial response to interval therapy (Deauville 4). Resolution of additional previously seen hypermetabolic mediastinal, hilar, and cardiophrenic lymph nodes.Marked interval decrease in hypermetabolic foci in the axial and appendicular skeleton with residual hypermetabolic focus in left glenoid which may represent a small focus of residual disease (Deauville 4). He completed 5 cycles of chemotherapy, but missed about 10 appointments, and refused further chemotherapy after cycle 5 due to side effects. He underwent MRI Neck, Abdomen, and Pelvis from 2/2019 showed new lymph node enlargement to the L neck and redemonstrated osseous infiltration involving the bilateral pelvic bones and proximal femurs. CT Chest from 3/2019 showed mediastinal enlarged lymph nodes which were either unchanged or demonstrate interval progression since 11/2018, progressive R hilar lymphadenopathy, and a new cluster of small nodular opacities within the right lower lobe. PET Scan done 3/2019 revealed a new hypermetabolic lymphadenopathy in the left neck measuring approximately 2.4 x 2.1cm in the left level III/V cervical regions. There is also new hypermetabolic 1.2 x 0.8 cm left level VB cervical lymph node and chest new hypermetabolic approximately 1.1 x 0.9 cm right upper paratracheal node. Reference approximately 3.4 x 2.0 cm subcarinal node. Approximately 3.8 x 3.1cm right perihilar mass. as compared to PET/CT from 11/3/2018 suspicious for recurrent lymphoma. A nonspecific new diffuse splenic hypermetabolism. A new hypermetabolic opacity/consolidation in the right lower lung, approximately 2 x 1.3cm opacity/consolidation in the superior segment of the right lower lobe. Ultrasound guided core biopsy of left cervical lymph node done on 4/11/19- confirmed refractory disease. Bone marrow biopsy was negative. He was started on treatment 4/13 with salvage treatment with Gemzar/brentuximab which he received 2 cycles but again had compliance issues along with behavioral issues with the staff.  [de-identified] : The patient is here today for a follow-up.  He last received a dose of nivolumab on 3/25.  It was held for him to address his thyroid dysfunction, but due to multiple missed appointments/COVID infection he has not been treated.  He was scheduled for imaging on 5/14, came 1.5 hours late and was unable to have it completed.  He states it was due to his transportation.  He overall has clinically improved, his appetite/weight are better.  He continues to have chronic low back pain, not requiring increased pain medication but does take oxycodone about 3 times a day.  He is on synthroid 50 mcg a day- he states he is compliant with the treatment.  He has no fever/chills, no night sweats.

## 2021-05-31 NOTE — ASSESSMENT
[FreeTextEntry1] : 20 year old male who was diagnosed with a stage IV CHL in 9/2018 who appears to have a relapsed or refractory Hodgkin's lymphoma. He was initially treated following ABVE-PC completed 12/2018 after 5 cycles due to noncompliance and patient refusal, relapsed diagnosed in Feb 2019, started on salvage treatment in April 2019 with gemzar/brentuximab which was also stopped due to the patient's refusal and noncompliance. \par \par He was recommended to have 2 cycles of ICE with intent to pursue an autologous bone marrow transplant. The patient began the cycle and then left AMA while the chemotherapy was running unfortunately. Despite multiple discussions with the patient and his mother, he did not follow up and refused further treatment. Recently with progression of disease/osseous mets which he was recommended to go to the hospital, he again refused and left the office. \par \par He was now readmitted for diffuse vertebral disease/compression fractures. He was treated with radiation, 800 cGy to T1-T5, T8-L5, left hip/acetabulum. He also received nivolumab with improvement in his lymphadenopathy and symptoms. \par He has now received 8 doses of nivolumab with improvement, but has been with some noncompliance with patient visits, rescheduling treatments.  Treatment was delayed due to development of hypothyroidism and COVID.  He appears improved, has been on the synthroid with improvement.  \par Plan to give nivolumab today.  \par Imaging to be rescheduled this week. \par Expressed my concern about his disease/compliance.  Explained that he will not be a candidate for an autologous bone marrow transplant if he is unable to be compliant with his visits/treatments.  \par Repeat his labs- CMP, TSH today.  \par BMT follow up.  \par Follow up in 1 month.  \par

## 2021-06-03 ENCOUNTER — OUTPATIENT (OUTPATIENT)
Dept: OUTPATIENT SERVICES | Facility: HOSPITAL | Age: 21
LOS: 1 days | Discharge: HOME | End: 2021-06-03

## 2021-06-04 ENCOUNTER — APPOINTMENT (OUTPATIENT)
Dept: INFUSION THERAPY | Facility: HOSPITAL | Age: 21
End: 2021-06-04

## 2021-06-07 ENCOUNTER — NON-APPOINTMENT (OUTPATIENT)
Age: 21
End: 2021-06-07

## 2021-06-15 ENCOUNTER — LABORATORY RESULT (OUTPATIENT)
Age: 21
End: 2021-06-15

## 2021-06-15 ENCOUNTER — APPOINTMENT (OUTPATIENT)
Dept: INFUSION THERAPY | Facility: HOSPITAL | Age: 21
End: 2021-06-15

## 2021-06-25 ENCOUNTER — APPOINTMENT (OUTPATIENT)
Dept: INFUSION THERAPY | Facility: HOSPITAL | Age: 21
End: 2021-06-25

## 2021-06-25 ENCOUNTER — NON-APPOINTMENT (OUTPATIENT)
Age: 21
End: 2021-06-25

## 2021-06-30 ENCOUNTER — LABORATORY RESULT (OUTPATIENT)
Age: 21
End: 2021-06-30

## 2021-06-30 ENCOUNTER — APPOINTMENT (OUTPATIENT)
Dept: INFUSION THERAPY | Facility: HOSPITAL | Age: 21
End: 2021-06-30

## 2021-07-07 ENCOUNTER — OUTPATIENT (OUTPATIENT)
Dept: OUTPATIENT SERVICES | Facility: HOSPITAL | Age: 21
LOS: 1 days | Discharge: HOME | End: 2021-07-07

## 2021-07-07 DIAGNOSIS — C81.98 HODGKIN LYMPHOMA, UNSPECIFIED, LYMPH NODES OF MULTIPLE SITES: ICD-10-CM

## 2021-07-09 ENCOUNTER — RESULT REVIEW (OUTPATIENT)
Age: 21
End: 2021-07-09

## 2021-07-09 ENCOUNTER — LABORATORY RESULT (OUTPATIENT)
Age: 21
End: 2021-07-09

## 2021-07-09 ENCOUNTER — APPOINTMENT (OUTPATIENT)
Dept: INFUSION THERAPY | Facility: HOSPITAL | Age: 21
End: 2021-07-09

## 2021-07-09 DIAGNOSIS — Z51.11 ENCOUNTER FOR ANTINEOPLASTIC CHEMOTHERAPY: ICD-10-CM

## 2021-07-09 LAB
BASOPHILS # BLD AUTO: 0.04 K/UL — SIGNIFICANT CHANGE UP (ref 0–0.2)
BASOPHILS NFR BLD AUTO: 0.5 % — SIGNIFICANT CHANGE UP (ref 0–2)
EOSINOPHIL # BLD AUTO: 0.35 K/UL — SIGNIFICANT CHANGE UP (ref 0–0.5)
EOSINOPHIL NFR BLD AUTO: 4.1 % — SIGNIFICANT CHANGE UP (ref 0–6)
HCT VFR BLD CALC: 41.1 % — SIGNIFICANT CHANGE UP (ref 39–50)
HGB BLD-MCNC: 14 G/DL — SIGNIFICANT CHANGE UP (ref 13–17)
IMM GRANULOCYTES NFR BLD AUTO: 0.2 % — SIGNIFICANT CHANGE UP (ref 0–1.5)
LYMPHOCYTES # BLD AUTO: 2.15 K/UL — SIGNIFICANT CHANGE UP (ref 1–3.3)
LYMPHOCYTES # BLD AUTO: 25.1 % — SIGNIFICANT CHANGE UP (ref 13–44)
MCHC RBC-ENTMCNC: 29.9 PG — SIGNIFICANT CHANGE UP (ref 27–34)
MCHC RBC-ENTMCNC: 34.1 G/DL — SIGNIFICANT CHANGE UP (ref 32–36)
MCV RBC AUTO: 87.8 FL — SIGNIFICANT CHANGE UP (ref 80–100)
MONOCYTES # BLD AUTO: 0.48 K/UL — SIGNIFICANT CHANGE UP (ref 0–0.9)
MONOCYTES NFR BLD AUTO: 5.6 % — SIGNIFICANT CHANGE UP (ref 2–14)
NEUTROPHILS # BLD AUTO: 5.54 K/UL — SIGNIFICANT CHANGE UP (ref 1.8–7.4)
NEUTROPHILS NFR BLD AUTO: 64.5 % — SIGNIFICANT CHANGE UP (ref 43–77)
NRBC # BLD: 0 /100 WBCS — SIGNIFICANT CHANGE UP (ref 0–0)
PLATELET # BLD AUTO: 226 K/UL — SIGNIFICANT CHANGE UP (ref 150–400)
RBC # BLD: 4.68 M/UL — SIGNIFICANT CHANGE UP (ref 4.2–5.8)
RBC # FLD: 13.1 % — SIGNIFICANT CHANGE UP (ref 10.3–14.5)
WBC # BLD: 8.58 K/UL — SIGNIFICANT CHANGE UP (ref 3.8–10.5)
WBC # FLD AUTO: 8.58 K/UL — SIGNIFICANT CHANGE UP (ref 3.8–10.5)

## 2021-07-20 LAB
ALBUMIN SERPL ELPH-MCNC: 3.4 G/DL
ALBUMIN SERPL ELPH-MCNC: 4.2 G/DL
ALBUMIN SERPL ELPH-MCNC: 4.6 G/DL
ALBUMIN SERPL ELPH-MCNC: 4.6 G/DL
ALBUMIN SERPL ELPH-MCNC: 4.8 G/DL
ALP BLD-CCNC: 101 U/L
ALP BLD-CCNC: 105 U/L
ALP BLD-CCNC: 109 U/L
ALP BLD-CCNC: 128 U/L
ALP BLD-CCNC: 97 U/L
ALT SERPL-CCNC: 14 U/L
ALT SERPL-CCNC: 16 U/L
ALT SERPL-CCNC: 17 U/L
ALT SERPL-CCNC: 18 U/L
ALT SERPL-CCNC: 21 U/L
ANION GAP SERPL CALC-SCNC: 10 MMOL/L
ANION GAP SERPL CALC-SCNC: 15 MMOL/L
ANION GAP SERPL CALC-SCNC: 15 MMOL/L
ANION GAP SERPL CALC-SCNC: 9 MMOL/L
ANION GAP SERPL CALC-SCNC: 9 MMOL/L
AST SERPL-CCNC: 14 U/L
AST SERPL-CCNC: 14 U/L
AST SERPL-CCNC: 17 U/L
BILIRUB SERPL-MCNC: 0.3 MG/DL
BILIRUB SERPL-MCNC: 0.5 MG/DL
BILIRUB SERPL-MCNC: 0.5 MG/DL
BUN SERPL-MCNC: 13 MG/DL
BUN SERPL-MCNC: 14 MG/DL
BUN SERPL-MCNC: 17 MG/DL
BUN SERPL-MCNC: 17 MG/DL
BUN SERPL-MCNC: 19 MG/DL
CALCIUM SERPL-MCNC: 9.4 MG/DL
CALCIUM SERPL-MCNC: 9.7 MG/DL
CHLORIDE SERPL-SCNC: 100 MMOL/L
CHLORIDE SERPL-SCNC: 102 MMOL/L
CHLORIDE SERPL-SCNC: 105 MMOL/L
CHLORIDE SERPL-SCNC: 106 MMOL/L
CHLORIDE SERPL-SCNC: 106 MMOL/L
CO2 SERPL-SCNC: 21 MMOL/L
CO2 SERPL-SCNC: 24 MMOL/L
CO2 SERPL-SCNC: 27 MMOL/L
CO2 SERPL-SCNC: 28 MMOL/L
CO2 SERPL-SCNC: 29 MMOL/L
CREAT SERPL-MCNC: 0.56 MG/DL
CREAT SERPL-MCNC: 0.71 MG/DL
CREAT SERPL-MCNC: 0.79 MG/DL
CREAT SERPL-MCNC: 0.88 MG/DL
CREAT SERPL-MCNC: 0.98 MG/DL
GLUCOSE SERPL-MCNC: 102 MG/DL
GLUCOSE SERPL-MCNC: 73 MG/DL
GLUCOSE SERPL-MCNC: 81 MG/DL
GLUCOSE SERPL-MCNC: 84 MG/DL
GLUCOSE SERPL-MCNC: 85 MG/DL
LDH SERPL-CCNC: 130 U/L
LDH SERPL-CCNC: 145 U/L
LDH SERPL-CCNC: 147 U/L
LDH SERPL-CCNC: 150 U/L
LDH SERPL-CCNC: 161 U/L
LDH SERPL-CCNC: 250 U/L
POTASSIUM SERPL-SCNC: 3.9 MMOL/L
POTASSIUM SERPL-SCNC: 4.1 MMOL/L
POTASSIUM SERPL-SCNC: 4.3 MMOL/L
POTASSIUM SERPL-SCNC: 4.4 MMOL/L
POTASSIUM SERPL-SCNC: 4.5 MMOL/L
PROT SERPL-MCNC: 6.7 G/DL
PROT SERPL-MCNC: 7 G/DL
PROT SERPL-MCNC: 7.1 G/DL
PROT SERPL-MCNC: 7.3 G/DL
PROT SERPL-MCNC: 7.9 G/DL
SARS-COV-2 N GENE NPH QL NAA+PROBE: NOT DETECTED
SODIUM SERPL-SCNC: 139 MMOL/L
SODIUM SERPL-SCNC: 140 MMOL/L
SODIUM SERPL-SCNC: 142 MMOL/L
SODIUM SERPL-SCNC: 143 MMOL/L
SODIUM SERPL-SCNC: 144 MMOL/L
TSH SERPL-ACNC: 46 UIU/ML
TSH SERPL-ACNC: 60 UIU/ML
TSH SERPL-ACNC: 78 UIU/ML
URATE SERPL-MCNC: 5.1 MG/DL
URATE SERPL-MCNC: 6.2 MG/DL

## 2021-07-23 ENCOUNTER — NON-APPOINTMENT (OUTPATIENT)
Age: 21
End: 2021-07-23

## 2021-07-23 ENCOUNTER — APPOINTMENT (OUTPATIENT)
Dept: INFUSION THERAPY | Facility: HOSPITAL | Age: 21
End: 2021-07-23

## 2021-08-20 ENCOUNTER — OUTPATIENT (OUTPATIENT)
Dept: OUTPATIENT SERVICES | Facility: HOSPITAL | Age: 21
LOS: 1 days | Discharge: HOME | End: 2021-08-20

## 2021-08-20 DIAGNOSIS — C81.98 HODGKIN LYMPHOMA, UNSPECIFIED, LYMPH NODES OF MULTIPLE SITES: ICD-10-CM

## 2021-08-23 ENCOUNTER — APPOINTMENT (OUTPATIENT)
Dept: HEMATOLOGY ONCOLOGY | Facility: CLINIC | Age: 21
End: 2021-08-23

## 2021-08-25 ENCOUNTER — APPOINTMENT (OUTPATIENT)
Dept: HEMATOLOGY ONCOLOGY | Facility: CLINIC | Age: 21
End: 2021-08-25

## 2021-09-21 NOTE — H&P ADULT - RESPIRATORY
Spoke with patient, advised she may have to travel to get sooner MRI. Pt advised will have to call central scheduling.      detailed exam

## 2021-10-12 NOTE — ED PROVIDER NOTE - CPE EDP CARDIAC NORM
Clinic Administered Medication Documentation          Injectable Medication Documentation    Patient was given tdap. Prior to medication administration, verified patients identity using patient s name and date of birth. Please see MAR and medication order for additional information. Patient instructed to remain in clinic for 15 minutes.      Was entire vial of medication used? Yes  Vial/Syringe: Single dose vial  Expiration Date:  6/30/23  Was this medication supplied by the patient? No    
normal...

## 2021-11-10 NOTE — PHYSICAL THERAPY INITIAL EVALUATION ADULT - BED MOBILITY LIMITATIONS, REHAB EVAL
impaired ability to control trunk for mobility/decreased ability to use legs for bridging/pushing Scientologist

## 2021-12-01 PROCEDURE — G9005: CPT

## 2022-01-11 ENCOUNTER — OUTPATIENT (OUTPATIENT)
Dept: OUTPATIENT SERVICES | Facility: HOSPITAL | Age: 22
LOS: 1 days | Discharge: HOME | End: 2022-01-11

## 2022-01-11 DIAGNOSIS — C81.98 HODGKIN LYMPHOMA, UNSPECIFIED, LYMPH NODES OF MULTIPLE SITES: ICD-10-CM

## 2022-01-12 ENCOUNTER — APPOINTMENT (OUTPATIENT)
Dept: HEMATOLOGY ONCOLOGY | Facility: CLINIC | Age: 22
End: 2022-01-12

## 2022-01-12 NOTE — HISTORY OF PRESENT ILLNESS
[de-identified] : Hodgkins Lymphoma. Patient initially presented to the [facility] ED on 9/4/18 from [facility] with a mediastinal mass. Upon work up he was found to have Stage IVB disease. Ultrasound guided right supraclavicular lymph node Classical Hodgkins Lymphoma- large atypical cells to be CD30, CD15, PAX5+, MUM1+, LCA(-), CD20(-), CD79a(-), CD3(-), ALK(-).\par Bilateral bone marrow biopsy on 9/6/18- Cellular marrow with maturing trilineage hematopoiesis and no morphologic evidence of lymphoma\par PET/CT completed on 9/10/18- Hypermetabolic lymphadenopathy in neck and thorax, multiple hypermetabolic foci in the bilateral lower cervical and supraclavicular regions, measuring 0.9 x 0.7 cm demonstrates SUV 4.3 anterior mediastinal mass measuring 4.2 x 3 cm demonstrating peripheral FDG avidity and central photopenia, SUV: 6.9. Marked mediastinal and bilateral hilar lymphadenopathy demonstrating FDG avidity, right hilar lymph node measures 3.8 x 2.4 cm, SUV: 10.3. 5 mm nodule is again noted in the right middle lobe. Several hypermetabolic foci identified predominantly throughout the axial skeleton with foci noted scattered throughout the upper thoracic spine and a singular focus in the right anterior eighth rib FDG avid focus in the right side of the T7 vertebral body with corresponding lucency on CT measuring 0.9 x 0.9 cm, SUV 14.7. Two hypermetabolic foci in the appendicular skeleton with corresponding underlying lucency on CT. There is an FDG avid focus in the left inferior glenoid and a corresponding lucency on CT measuring 0.9 x 0.7 cm, SUV 12. There is also a 1.1 x 0.7 cm lucency in the right femoral head with corresponding FDG avidity, SUV 7.3. He was placed on/following a protocol- OC 1331, treatment with ABVE-PC (adriamycin, bleomycin, vincristine, etoposide, cytoxan, prednisone, dexrazoxane). He was withdrawn from the study due to his social situation, difficulty with compliance. 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 He was treated with chemotherapy from Sept 2018- Dec 2018. Interim PET/CT completed on 11/3/18- Partially hypermetabolic anterior mediastinal mass is decreased in \par size and metabolism as compared to prior study dated 9/10/2018, compatible with a partial response to interval therapy (Deauville 4). Resolution of additional previously seen hypermetabolic mediastinal, hilar, and cardiophrenic lymph nodes.Marked interval decrease in hypermetabolic foci in the axial and appendicular skeleton with residual hypermetabolic focus in left glenoid which may represent a small focus of residual disease (Deauville 4). He completed 5 cycles of chemotherapy, but missed about 10 appointments, and refused further chemotherapy after cycle 5 due to side effects. He underwent MRI Neck, Abdomen, and Pelvis from 2/2019 showed new lymph node enlargement to the L neck and redemonstrated osseous infiltration involving the bilateral pelvic bones and proximal femurs. CT Chest from 3/2019 showed mediastinal enlarged lymph nodes which were either unchanged or demonstrate interval progression since 11/2018, progressive R hilar lymphadenopathy, and a new cluster of small nodular opacities within the right lower lobe. PET Scan done 3/2019 revealed a new hypermetabolic lymphadenopathy in the left neck measuring approximately 2.4 x 2.1cm in the left level III/V cervical regions. There is also new hypermetabolic 1.2 x 0.8 cm left level VB cervical lymph node and chest new hypermetabolic approximately 1.1 x 0.9 cm right upper paratracheal node. Reference approximately 3.4 x 2.0 cm subcarinal node. Approximately 3.8 x 3.1cm right perihilar mass. as compared to PET/CT from 11/3/2018 suspicious for recurrent lymphoma. A nonspecific new diffuse splenic hypermetabolism. A new hypermetabolic opacity/consolidation in the right lower lung, approximately 2 x 1.3cm opacity/consolidation in the superior segment of the right lower lobe. Ultrasound guided core biopsy of left cervical lymph node done on 4/11/19- confirmed refractory disease. Bone marrow biopsy was negative. He was started on treatment 4/13 with salvage treatment with Gemzar/brentuximab which he received 2 cycles but again had compliance issues along with behavioral issues with the staff.  [de-identified] : The patient is here today for a follow-up.  He last received a dose of nivolumab on 3/25.  It was held for him to address his thyroid dysfunction, but due to multiple missed appointments/COVID infection he has not been treated.  He was scheduled for imaging on 5/14, came 1.5 hours late and was unable to have it completed.  He states it was due to his transportation.  He overall has clinically improved, his appetite/weight are better.  He continues to have chronic low back pain, not requiring increased pain medication but does take oxycodone about 3 times a day.  He is on synthroid 50 mcg a day- he states he is compliant with the treatment.  He has no fever/chills, no night sweats.

## 2022-01-12 NOTE — PHYSICAL EXAM
[Restricted in physically strenuous activity but ambulatory and able to carry out work of a light or sedentary nature] : Status 1- Restricted in physically strenuous activity but ambulatory and able to carry out work of a light or sedentary nature, e.g., light house work, office work [Thin] : thin [Normal] : affect appropriate [de-identified] : bilateral cervical lymph nodes- 1-2 cm in size

## 2022-01-25 ENCOUNTER — NON-APPOINTMENT (OUTPATIENT)
Age: 22
End: 2022-01-25

## 2022-02-10 ENCOUNTER — OUTPATIENT (OUTPATIENT)
Dept: OUTPATIENT SERVICES | Facility: HOSPITAL | Age: 22
LOS: 1 days | Discharge: HOME | End: 2022-02-10

## 2022-02-10 DIAGNOSIS — C81.98 HODGKIN LYMPHOMA, UNSPECIFIED, LYMPH NODES OF MULTIPLE SITES: ICD-10-CM

## 2022-02-14 ENCOUNTER — APPOINTMENT (OUTPATIENT)
Dept: HEMATOLOGY ONCOLOGY | Facility: CLINIC | Age: 22
End: 2022-02-14

## 2022-04-04 ENCOUNTER — APPOINTMENT (OUTPATIENT)
Dept: HEMATOLOGY ONCOLOGY | Facility: CLINIC | Age: 22
End: 2022-04-04

## 2022-05-06 NOTE — DISCHARGE NOTE PROVIDER - NSDCADMDATE_GEN_ALL_CORE_FT
27-Oct-2020 12:07
"I have the flu. last night I started throwing and I cant keep anything down I feel like my throat is swollen and I cant swallow" pt took mucinex cold anf flu today

## 2022-07-07 ENCOUNTER — EMERGENCY (EMERGENCY)
Facility: HOSPITAL | Age: 22
LOS: 1 days | Discharge: LEFT BEFORE DISCHARGE | End: 2022-07-07
Attending: STUDENT IN AN ORGANIZED HEALTH CARE EDUCATION/TRAINING PROGRAM | Admitting: STUDENT IN AN ORGANIZED HEALTH CARE EDUCATION/TRAINING PROGRAM

## 2022-07-07 VITALS
DIASTOLIC BLOOD PRESSURE: 70 MMHG | HEART RATE: 92 BPM | RESPIRATION RATE: 20 BRPM | OXYGEN SATURATION: 99 % | TEMPERATURE: 100 F | HEIGHT: 68 IN | SYSTOLIC BLOOD PRESSURE: 106 MMHG

## 2022-07-07 VITALS
TEMPERATURE: 99 F | HEART RATE: 90 BPM | DIASTOLIC BLOOD PRESSURE: 74 MMHG | OXYGEN SATURATION: 100 % | SYSTOLIC BLOOD PRESSURE: 110 MMHG | RESPIRATION RATE: 18 BRPM

## 2022-07-07 LAB
ALBUMIN SERPL ELPH-MCNC: 3.9 G/DL — SIGNIFICANT CHANGE UP (ref 3.3–5)
ALP SERPL-CCNC: 102 U/L — SIGNIFICANT CHANGE UP (ref 40–120)
ALT FLD-CCNC: 10 U/L — SIGNIFICANT CHANGE UP (ref 4–41)
ANION GAP SERPL CALC-SCNC: 12 MMOL/L — SIGNIFICANT CHANGE UP (ref 7–14)
AST SERPL-CCNC: 11 U/L — SIGNIFICANT CHANGE UP (ref 4–40)
B PERT DNA SPEC QL NAA+PROBE: SIGNIFICANT CHANGE UP
B PERT+PARAPERT DNA PNL SPEC NAA+PROBE: SIGNIFICANT CHANGE UP
BASE EXCESS BLDV CALC-SCNC: 1.2 MMOL/L — SIGNIFICANT CHANGE UP (ref -2–3)
BASOPHILS # BLD AUTO: 0.05 K/UL — SIGNIFICANT CHANGE UP (ref 0–0.2)
BASOPHILS NFR BLD AUTO: 0.2 % — SIGNIFICANT CHANGE UP (ref 0–2)
BILIRUB SERPL-MCNC: 0.2 MG/DL — SIGNIFICANT CHANGE UP (ref 0.2–1.2)
BLOOD GAS VENOUS COMPREHENSIVE RESULT: SIGNIFICANT CHANGE UP
BORDETELLA PARAPERTUSSIS (RAPRVP): SIGNIFICANT CHANGE UP
BUN SERPL-MCNC: 16 MG/DL — SIGNIFICANT CHANGE UP (ref 7–23)
C PNEUM DNA SPEC QL NAA+PROBE: SIGNIFICANT CHANGE UP
CALCIUM SERPL-MCNC: 9.8 MG/DL — SIGNIFICANT CHANGE UP (ref 8.4–10.5)
CHLORIDE BLDV-SCNC: 104 MMOL/L — SIGNIFICANT CHANGE UP (ref 96–108)
CHLORIDE SERPL-SCNC: 100 MMOL/L — SIGNIFICANT CHANGE UP (ref 98–107)
CO2 BLDV-SCNC: 29.1 MMOL/L — HIGH (ref 22–26)
CO2 SERPL-SCNC: 26 MMOL/L — SIGNIFICANT CHANGE UP (ref 22–31)
CREAT SERPL-MCNC: 0.86 MG/DL — SIGNIFICANT CHANGE UP (ref 0.5–1.3)
EGFR: 126 ML/MIN/1.73M2 — SIGNIFICANT CHANGE UP
EOSINOPHIL # BLD AUTO: 1.03 K/UL — HIGH (ref 0–0.5)
EOSINOPHIL NFR BLD AUTO: 4.7 % — SIGNIFICANT CHANGE UP (ref 0–6)
FLUAV SUBTYP SPEC NAA+PROBE: SIGNIFICANT CHANGE UP
FLUBV RNA SPEC QL NAA+PROBE: SIGNIFICANT CHANGE UP
GAS PNL BLDV: 137 MMOL/L — SIGNIFICANT CHANGE UP (ref 136–145)
GLUCOSE BLDV-MCNC: 79 MG/DL — SIGNIFICANT CHANGE UP (ref 70–99)
GLUCOSE SERPL-MCNC: 82 MG/DL — SIGNIFICANT CHANGE UP (ref 70–99)
HADV DNA SPEC QL NAA+PROBE: SIGNIFICANT CHANGE UP
HCO3 BLDV-SCNC: 28 MMOL/L — SIGNIFICANT CHANGE UP (ref 22–29)
HCOV 229E RNA SPEC QL NAA+PROBE: SIGNIFICANT CHANGE UP
HCOV HKU1 RNA SPEC QL NAA+PROBE: SIGNIFICANT CHANGE UP
HCOV NL63 RNA SPEC QL NAA+PROBE: SIGNIFICANT CHANGE UP
HCOV OC43 RNA SPEC QL NAA+PROBE: SIGNIFICANT CHANGE UP
HCT VFR BLD CALC: 33.7 % — LOW (ref 39–50)
HCT VFR BLDA CALC: 30 % — LOW (ref 39–51)
HGB BLD CALC-MCNC: 10.1 G/DL — LOW (ref 13–17)
HGB BLD-MCNC: 10.7 G/DL — LOW (ref 13–17)
HMPV RNA SPEC QL NAA+PROBE: SIGNIFICANT CHANGE UP
HPIV1 RNA SPEC QL NAA+PROBE: SIGNIFICANT CHANGE UP
HPIV2 RNA SPEC QL NAA+PROBE: SIGNIFICANT CHANGE UP
HPIV3 RNA SPEC QL NAA+PROBE: SIGNIFICANT CHANGE UP
HPIV4 RNA SPEC QL NAA+PROBE: SIGNIFICANT CHANGE UP
IANC: 17.99 K/UL — HIGH (ref 1.8–7.4)
IMM GRANULOCYTES NFR BLD AUTO: 0.6 % — SIGNIFICANT CHANGE UP (ref 0–1.5)
LACTATE BLDV-MCNC: 1.1 MMOL/L — SIGNIFICANT CHANGE UP (ref 0.5–2)
LYMPHOCYTES # BLD AUTO: 1.75 K/UL — SIGNIFICANT CHANGE UP (ref 1–3.3)
LYMPHOCYTES # BLD AUTO: 8 % — LOW (ref 13–44)
M PNEUMO DNA SPEC QL NAA+PROBE: SIGNIFICANT CHANGE UP
MAGNESIUM SERPL-MCNC: 2.2 MG/DL — SIGNIFICANT CHANGE UP (ref 1.6–2.6)
MCHC RBC-ENTMCNC: 23.8 PG — LOW (ref 27–34)
MCHC RBC-ENTMCNC: 31.8 GM/DL — LOW (ref 32–36)
MCV RBC AUTO: 74.9 FL — LOW (ref 80–100)
MONOCYTES # BLD AUTO: 0.91 K/UL — HIGH (ref 0–0.9)
MONOCYTES NFR BLD AUTO: 4.2 % — SIGNIFICANT CHANGE UP (ref 2–14)
NEUTROPHILS # BLD AUTO: 17.99 K/UL — HIGH (ref 1.8–7.4)
NEUTROPHILS NFR BLD AUTO: 82.3 % — HIGH (ref 43–77)
NRBC # BLD: 0 /100 WBCS — SIGNIFICANT CHANGE UP
NRBC # FLD: 0 K/UL — SIGNIFICANT CHANGE UP
PCO2 BLDV: 51 MMHG — SIGNIFICANT CHANGE UP (ref 42–55)
PH BLDV: 7.34 — SIGNIFICANT CHANGE UP (ref 7.32–7.43)
PLATELET # BLD AUTO: 358 K/UL — SIGNIFICANT CHANGE UP (ref 150–400)
PO2 BLDV: 20 MMHG — SIGNIFICANT CHANGE UP
POTASSIUM BLDV-SCNC: 4.2 MMOL/L — SIGNIFICANT CHANGE UP (ref 3.5–5.1)
POTASSIUM SERPL-MCNC: 4.2 MMOL/L — SIGNIFICANT CHANGE UP (ref 3.5–5.3)
POTASSIUM SERPL-SCNC: 4.2 MMOL/L — SIGNIFICANT CHANGE UP (ref 3.5–5.3)
PROT SERPL-MCNC: 8.3 G/DL — SIGNIFICANT CHANGE UP (ref 6–8.3)
RAPID RVP RESULT: SIGNIFICANT CHANGE UP
RBC # BLD: 4.5 M/UL — SIGNIFICANT CHANGE UP (ref 4.2–5.8)
RBC # FLD: 16.7 % — HIGH (ref 10.3–14.5)
RSV RNA SPEC QL NAA+PROBE: SIGNIFICANT CHANGE UP
RV+EV RNA SPEC QL NAA+PROBE: SIGNIFICANT CHANGE UP
SAO2 % BLDV: 29.9 % — SIGNIFICANT CHANGE UP
SARS-COV-2 RNA SPEC QL NAA+PROBE: SIGNIFICANT CHANGE UP
SODIUM SERPL-SCNC: 138 MMOL/L — SIGNIFICANT CHANGE UP (ref 135–145)
WBC # BLD: 21.86 K/UL — HIGH (ref 3.8–10.5)
WBC # FLD AUTO: 21.86 K/UL — HIGH (ref 3.8–10.5)

## 2022-07-07 PROCEDURE — 70490 CT SOFT TISSUE NECK W/O DYE: CPT | Mod: 26,MA

## 2022-07-07 PROCEDURE — 99285 EMERGENCY DEPT VISIT HI MDM: CPT

## 2022-07-07 RX ORDER — SODIUM CHLORIDE 9 MG/ML
1000 INJECTION, SOLUTION INTRAVENOUS ONCE
Refills: 0 | Status: COMPLETED | OUTPATIENT
Start: 2022-07-07 | End: 2022-07-07

## 2022-07-07 RX ORDER — ONDANSETRON 8 MG/1
4 TABLET, FILM COATED ORAL ONCE
Refills: 0 | Status: COMPLETED | OUTPATIENT
Start: 2022-07-07 | End: 2022-07-07

## 2022-07-07 RX ADMIN — ONDANSETRON 4 MILLIGRAM(S): 8 TABLET, FILM COATED ORAL at 17:25

## 2022-07-07 RX ADMIN — SODIUM CHLORIDE 1000 MILLILITER(S): 9 INJECTION, SOLUTION INTRAVENOUS at 17:25

## 2022-07-07 NOTE — ED PROVIDER NOTE - NSFOLLOWUPINSTRUCTIONS_ED_ALL_ED_FT
Please follow up with your oncologist ASAP.    Advance activity as tolerated.  Continue all previously prescribed medications as directed unless otherwise instructed.  Follow up with your primary care physician in 48-72 hours- bring copies of your results.  Return to the ER for worsening or persistent symptoms, and/or ANY NEW OR CONCERNING SYMPTOMS. If you have issues obtaining follow up, please call: [phone] to obtain a doctor or specialist who takes your insurance in your area.  You may call [phone] to make an appointment with the internal medicine clinic.

## 2022-07-07 NOTE — ED PROVIDER NOTE - CLINICAL SUMMARY MEDICAL DECISION MAKING FREE TEXT BOX
22 y/o M pmh NHL completed radiation, chemo-last dose ~6/2021 c/o nausea and vomiting x last night. 3 episodes of vomiting. Had halal yesterday. Pt reporting increasing size of L sided neck lymph nodes x 8 months. Pt denies fever, chills, cp, sob, abd pain, diarrhea.  well appearing, abd soft/nt/nd    -labs, ivf, zofran

## 2022-07-07 NOTE — ED ADULT NURSE REASSESSMENT NOTE - NS ED NURSE REASSESS COMMENT FT1
patient at baseline mental status, wishes to AMA. states "my brother is here and has been waiting and I want to go". SERGIO Treviño notified, came to bedside to speak with patient, AMA form provided and signed.

## 2022-07-07 NOTE — ED ADULT NURSE NOTE - OBJECTIVE STATEMENT
Receive pt. in Intake room 8 alert and oriented x 4, presenting to the ER with complaints of nausea and vomiting. Pt. stated " I have throwing up since last night  I feel dehydrated". Medicated as ordered. labs sent. Call bell place within reach. Receive pt. in Intake room 8 alert and oriented x 4, presenting to the ER with complaints of nausea and vomiting. Pt. stated " I have throwing up since last night  I feel dehydrated". No active vomiting at present, medicated as ordered. labs sent. Call bell place within reach.

## 2022-07-07 NOTE — ED ADULT TRIAGE NOTE - CHIEF COMPLAINT QUOTE
Patient brought to ER by EMS for nausea and vomiting since last night. Pt has swollen lymph node to left side of neck. Pt has Hodgkin's Lymphoma.

## 2022-07-07 NOTE — ED PROVIDER NOTE - OBJECTIVE STATEMENT
20 y/o M pmh NHL completed radiation, chemo-last dose ~6/2021 c/o nausea and vomiting x last night. 3 episodes of vomiting. Had halal yesterday. Pt reporting increasing size of L sided neck lymph nodes x 8 months. Pt denies fever, chills, cp, sob, abd pain, diarrhea.

## 2022-07-07 NOTE — ED PROVIDER NOTE - PROGRESS NOTE DETAILS
SERGIO Johnson: The pt is clinically sober, AA&Ox3, free from distracting injury.  Throughout our interactions in the ED today, the pt has demonstrated concrete thinking/reasoning, has maintained an orderly/reasonable conversation, appears to have intact insight/judgment/reason and therefore in our opinion has capacity to make decisions.  Given the pt’s presentation, we communicated our concern for NHL in laymans terms.    The pt verbalized an understanding of our worries. We’ve told the patient that the ED evaluation is incomplete & many troublesome conditions haven’t been r/o. We have discussed the need for further ED w/u so we can get more information about NHL.  We have discussed the range of possible dx, potential testing & tx options.  We’ve made  numerous efforts to prevent the pt from leaving AMA.  Our discussions included the potential outcomes of leaving AMA, including worsening of their condition, becoming permanently disabled/in pain/critically ill, or death.  Despite these efforts, we were unable to convince the pt to stay.  The pt is refusing any  further care and is leaving against medical advice. We have attempted to offer tx/rx/guidance for any dangerous conditions which are most likely and/or dangerous.  We have answered all questions and have implored the pt to return ASAP to complete the w/u.  A staff member witnessed the patient consenting to AMA.

## 2022-07-07 NOTE — ED PROVIDER NOTE - NS ED ATTENDING STATEMENT MOD
This was a shared visit with the KOREY. I reviewed and verified the documentation and independently performed the documented:

## 2022-07-07 NOTE — ED PROVIDER NOTE - PATIENT PORTAL LINK FT
You can access the FollowMyHealth Patient Portal offered by [facility] by registering at the following website: [email or web address]. By joining [facility]’s FollowMyHealth portal, you will also be able to view your health information using other applications (apps) compatible with our system.

## 2022-07-07 NOTE — ED PROVIDER NOTE - ATTENDING APP SHARED VISIT CONTRIBUTION OF CARE
20 yo m NHL (follows at [facility] but nonadherent with tx for approx 1 yr) presents for n/v after eating halal fast food yesterday.  symptoms improving. does report decreased po today because of nausea. denies blood in stools, denies fever chills, cp, sob, abd pain, urinary complaints. does report neck mass increasing in size. reports  needed a break from chemo which is why he has seen heme/onc in months. no voice changes diff eating /drinking , breathing. no bleeding pain drainage from neck mass. exam as above. plan: labs, ct, symptom relief prn reassess.

## 2022-07-11 ENCOUNTER — OUTPATIENT (OUTPATIENT)
Dept: OUTPATIENT SERVICES | Facility: HOSPITAL | Age: 22
LOS: 1 days | Discharge: HOME | End: 2022-07-11

## 2022-07-11 DIAGNOSIS — C81.98 HODGKIN LYMPHOMA, UNSPECIFIED, LYMPH NODES OF MULTIPLE SITES: ICD-10-CM

## 2022-07-12 ENCOUNTER — RESULT REVIEW (OUTPATIENT)
Age: 22
End: 2022-07-12

## 2022-07-12 ENCOUNTER — LABORATORY RESULT (OUTPATIENT)
Age: 22
End: 2022-07-12

## 2022-07-12 ENCOUNTER — APPOINTMENT (OUTPATIENT)
Dept: HEMATOLOGY ONCOLOGY | Facility: CLINIC | Age: 22
End: 2022-07-12

## 2022-07-12 VITALS
DIASTOLIC BLOOD PRESSURE: 60 MMHG | SYSTOLIC BLOOD PRESSURE: 95 MMHG | HEART RATE: 80 BPM | OXYGEN SATURATION: 100 % | TEMPERATURE: 98.2 F | WEIGHT: 94.8 LBS | RESPIRATION RATE: 16 BRPM | BODY MASS INDEX: 16.18 KG/M2 | HEIGHT: 64.17 IN

## 2022-07-12 DIAGNOSIS — R63.4 ABNORMAL WEIGHT LOSS: ICD-10-CM

## 2022-07-12 LAB
BASOPHILS # BLD AUTO: 0.08 K/UL — SIGNIFICANT CHANGE UP (ref 0–0.2)
BASOPHILS NFR BLD AUTO: 0.3 % — SIGNIFICANT CHANGE UP (ref 0–2)
CULTURE RESULTS: SIGNIFICANT CHANGE UP
CULTURE RESULTS: SIGNIFICANT CHANGE UP
EOSINOPHIL # BLD AUTO: 1.11 K/UL — HIGH (ref 0–0.5)
EOSINOPHIL NFR BLD AUTO: 4.5 % — SIGNIFICANT CHANGE UP (ref 0–6)
HCT VFR BLD CALC: 34.1 % — LOW (ref 39–50)
HGB BLD-MCNC: 11 G/DL — LOW (ref 13–17)
IMM GRANULOCYTES NFR BLD AUTO: 0.6 % — SIGNIFICANT CHANGE UP (ref 0–1.5)
LYMPHOCYTES # BLD AUTO: 2.25 K/UL — SIGNIFICANT CHANGE UP (ref 1–3.3)
LYMPHOCYTES # BLD AUTO: 9.1 % — LOW (ref 13–44)
MCHC RBC-ENTMCNC: 24.3 PG — LOW (ref 27–34)
MCHC RBC-ENTMCNC: 32.3 G/DL — SIGNIFICANT CHANGE UP (ref 32–36)
MCV RBC AUTO: 75.3 FL — LOW (ref 80–100)
MONOCYTES # BLD AUTO: 0.86 K/UL — SIGNIFICANT CHANGE UP (ref 0–0.9)
MONOCYTES NFR BLD AUTO: 3.5 % — SIGNIFICANT CHANGE UP (ref 2–14)
NEUTROPHILS # BLD AUTO: 20.31 K/UL — HIGH (ref 1.8–7.4)
NEUTROPHILS NFR BLD AUTO: 82 % — HIGH (ref 43–77)
NRBC # BLD: 0 /100 WBCS — SIGNIFICANT CHANGE UP (ref 0–0)
PLATELET # BLD AUTO: 331 K/UL — SIGNIFICANT CHANGE UP (ref 150–400)
RBC # BLD: 4.53 M/UL — SIGNIFICANT CHANGE UP (ref 4.2–5.8)
RBC # FLD: 16.6 % — HIGH (ref 10.3–14.5)
SPECIMEN SOURCE: SIGNIFICANT CHANGE UP
SPECIMEN SOURCE: SIGNIFICANT CHANGE UP
WBC # BLD: 24.77 K/UL — HIGH (ref 3.8–10.5)
WBC # FLD AUTO: 24.77 K/UL — HIGH (ref 3.8–10.5)

## 2022-07-12 PROCEDURE — 99214 OFFICE O/P EST MOD 30 MIN: CPT

## 2022-07-12 RX ORDER — BENZONATATE 100 MG/1
100 CAPSULE ORAL 3 TIMES DAILY
Qty: 30 | Refills: 0 | Status: COMPLETED | COMMUNITY
Start: 2021-01-07 | End: 2022-07-12

## 2022-07-13 ENCOUNTER — RESULT REVIEW (OUTPATIENT)
Age: 22
End: 2022-07-13

## 2022-07-14 ENCOUNTER — APPOINTMENT (OUTPATIENT)
Dept: CT IMAGING | Facility: IMAGING CENTER | Age: 22
End: 2022-07-14
Payer: MEDICAID

## 2022-07-14 ENCOUNTER — OUTPATIENT (OUTPATIENT)
Dept: OUTPATIENT SERVICES | Facility: HOSPITAL | Age: 22
LOS: 1 days | End: 2022-07-14
Payer: MEDICAID

## 2022-07-14 DIAGNOSIS — Z00.8 ENCOUNTER FOR OTHER GENERAL EXAMINATION: ICD-10-CM

## 2022-07-14 DIAGNOSIS — C81.90 HODGKIN LYMPHOMA, UNSPECIFIED, UNSPECIFIED SITE: ICD-10-CM

## 2022-07-14 LAB
ALBUMIN SERPL ELPH-MCNC: 4 G/DL
ALP BLD-CCNC: 114 U/L
ALT SERPL-CCNC: 12 U/L
ANION GAP SERPL CALC-SCNC: 10 MMOL/L
AST SERPL-CCNC: 13 U/L
BILIRUB SERPL-MCNC: 0.3 MG/DL
BUN SERPL-MCNC: 17 MG/DL
CALCIUM SERPL-MCNC: 9.8 MG/DL
CHLORIDE SERPL-SCNC: 99 MMOL/L
CO2 SERPL-SCNC: 29 MMOL/L
CREAT SERPL-MCNC: 0.86 MG/DL
EGFR: 126 ML/MIN/1.73M2
GLUCOSE SERPL-MCNC: 86 MG/DL
LDH SERPL-CCNC: 194 U/L
POTASSIUM SERPL-SCNC: 4.6 MMOL/L
PROT SERPL-MCNC: 8.1 G/DL
SODIUM SERPL-SCNC: 139 MMOL/L
TSH SERPL-ACNC: 7.63 UIU/ML
URATE SERPL-MCNC: 6.3 MG/DL

## 2022-07-14 PROCEDURE — 71260 CT THORAX DX C+: CPT | Mod: 26

## 2022-07-14 PROCEDURE — 70491 CT SOFT TISSUE NECK W/DYE: CPT

## 2022-07-14 PROCEDURE — 70470 CT HEAD/BRAIN W/O & W/DYE: CPT

## 2022-07-14 PROCEDURE — 70491 CT SOFT TISSUE NECK W/DYE: CPT | Mod: 26

## 2022-07-14 PROCEDURE — 70470 CT HEAD/BRAIN W/O & W/DYE: CPT | Mod: 26

## 2022-07-14 PROCEDURE — 74177 CT ABD & PELVIS W/CONTRAST: CPT | Mod: 26

## 2022-07-14 PROCEDURE — 71260 CT THORAX DX C+: CPT

## 2022-07-14 PROCEDURE — 74177 CT ABD & PELVIS W/CONTRAST: CPT

## 2022-07-15 NOTE — ASSESSMENT
[FreeTextEntry1] : 21 year old male who was diagnosed with a stage IV CHL in 9/2018 who appears to have a relapsed or refractory Hodgkin's lymphoma. He was initially treated following ABVE-PC completed 12/2018 after 5 cycles due to noncompliance and patient refusal, relapsed diagnosed in Feb 2019, started on salvage treatment in April 2019 with gemzar/brentuximab which was also stopped due to the patient's refusal and noncompliance. \par \par He was recommended to have 2 cycles of ICE with intent to pursue an autologous bone marrow transplant. The patient began the cycle and then left AMA while the chemotherapy was running unfortunately. Despite multiple discussions with the patient and his mother, he did not follow up and refused further treatment. Recently with progression of disease/osseous mets which he was recommended to go to the hospital, he again refused and left the office. \par \par He was readmitted in 10/2020 for diffuse vertebral disease/compression fractures. He was treated with radiation, 800 cGy to T1-T5, T8-L5, left hip/acetabulum. He also received nivolumab with improvement in his lymphadenopathy and symptoms. Developed hypothyroidism while receiving nivolumab, improved while on Synthroid. But was noncompliant and lost to follow up.  \par \par \par CBC Today: WBC 24.77 (ANC 20.31), Hgb 11.0, Plts 331\par \par Repeat his labs- CMP, TSH today\par Will need to arrange for biopsy of LN prior to starting Nivolumab.\par Plan to start treatment with nivolumab [id number]\par Port flush to assess accessibility \par Expressed my concern about his disease/compliance. Encouraged to be complaint\par Refer to PCP to establish care\par \par Follow up in 1 month with Dr. aRngel\par Case and management discussed with Dr. Rangel

## 2022-07-15 NOTE — HISTORY OF PRESENT ILLNESS
[de-identified] : 7/12/22: Patient is here today after being lost to follow up since last receiving nivolumab in 7/2021, treatment complicated by thyroid dysfunction. He was recently (7/7/22) evaluated at [facility] ED for nausea and vomiting from eating outside food. Was also found to have cervical lymphadenopathy, states it has been growing over the past 8 months, and was instructed to follow up with his hematologist. Today, he notes to have night sweats, unintentional weight loss (-12 Ibs), and cervical lymphadenopathy. Also, continues to have chronic low back pain due to scoliosis. Patient denies fever, chills, headache, abdominal pain, or chest pain.\par \par  [de-identified] : ABVE-PC in 9/2018, completed 12/2018 after 5 cycles due to noncompliance and patient refusal, \par Gemzar/brentuximab in 4/2019 which was also stopped due to the patient's refusal and noncompliance. \par 2 cycles of ICE in 2/2020 with intent to pursue an autologous bone marrow transplant. The patient began the cycle and then left AMA.\par Radiation, 800 cGy to T1-T5, T8-L5, left hip/acetabulum & Nivolumab in 11/2020 with improvement, but lost to follow up. Treatment was also complicated due to development of hypothyroidism and COVID

## 2022-07-15 NOTE — PHYSICAL EXAM
[Restricted in physically strenuous activity but ambulatory and able to carry out work of a light or sedentary nature] : Status 1- Restricted in physically strenuous activity but ambulatory and able to carry out work of a light or sedentary nature, e.g., light house work, office work [Thin] : thin [Normal] : normoactive bowel sounds, soft and nontender, no hepatosplenomegaly or masses appreciated [de-identified] : Several LNs in L cervical chain: 4x3cm, 2x3cm, 1.5x1cm, 1x1cm, 1x1cm; L submandibular 3.5x2cm; Suboccipital <1x1cm; L axilla <1x1cm; R inguinal 1x1cm, 1x1cm. [de-identified] : Scoliosis

## 2022-07-16 ENCOUNTER — APPOINTMENT (OUTPATIENT)
Dept: INFUSION THERAPY | Facility: HOSPITAL | Age: 22
End: 2022-07-16

## 2022-07-19 ENCOUNTER — APPOINTMENT (OUTPATIENT)
Dept: INFUSION THERAPY | Facility: HOSPITAL | Age: 22
End: 2022-07-19

## 2022-07-21 ENCOUNTER — APPOINTMENT (OUTPATIENT)
Dept: SURGICAL ONCOLOGY | Facility: CLINIC | Age: 22
End: 2022-07-21

## 2022-07-21 NOTE — HISTORY OF PRESENT ILLNESS
[de-identified] : Mr. CHAU ARZOLA is a 21 year old man, referred by Dr. Hosea Rangel, presents today for an initial consultation for enlarged cervical lymph nodes.\par \par Chau's medical history is significant for Hodgkins lymphoma, diagnosed in September of 2018. He was treated with 5 cycles of chemotherapy but was stopped due to noncompliance, and then had a relapse diagnosis in February of 2019. He has a history of being noncompliant with therapy/treatment and leaving AMA. \par \par PET/CT from 3/2019 showed new hypermetabolic lymphadenopathy in the left neck measuring 2.4 x 2.1 cm, left level III/V region.\par \par He had a progression of osseous mets, readmitted 10/2020 with diffuse compression fractures, treated with radiation. \par \par Ultrasound biopsy of left cervical lymph node in 4/2019 confirms refractory disease, bone marrow biopsy negative. \par \par During a recent admission in July of 2022 his cervical lymphadenopathy was noted, and Chau says it has been growing in the past 8 months, he reports night sweats and unintentional weight loss of around 12 lbs. \par Most recent imaging: \par CT Neck/soft tissue 7/2022: Bilateral cervical lymphadenopathy left greater than right with left-sided cervical lymphadenopathy 1, level 2, level 3 and level 5 right level 2 which has increased since 5/19/2021 consistent with relapsed Hodgkin's. Lytic lesion T2 and T4, unchanged.\par \par He started Nivolumab ~2020 which helped lymphadenopathy and accompanying symptoms but developed hypothyroidism and was noncompliant with treatment -- planned to restart in August of 2022. 
negative...

## 2022-07-29 ENCOUNTER — TRANSCRIPTION ENCOUNTER (OUTPATIENT)
Age: 22
End: 2022-07-29

## 2022-07-29 ENCOUNTER — INPATIENT (INPATIENT)
Facility: HOSPITAL | Age: 22
LOS: 0 days | Discharge: LEFT BEFORE DISCHARGE | DRG: 842 | End: 2022-07-29
Attending: STUDENT IN AN ORGANIZED HEALTH CARE EDUCATION/TRAINING PROGRAM | Admitting: STUDENT IN AN ORGANIZED HEALTH CARE EDUCATION/TRAINING PROGRAM
Payer: MEDICAID

## 2022-07-29 ENCOUNTER — APPOINTMENT (OUTPATIENT)
Dept: HEMATOLOGY ONCOLOGY | Facility: CLINIC | Age: 22
End: 2022-07-29

## 2022-07-29 VITALS
HEIGHT: 68 IN | RESPIRATION RATE: 16 BRPM | DIASTOLIC BLOOD PRESSURE: 66 MMHG | WEIGHT: 110.01 LBS | HEART RATE: 90 BPM | OXYGEN SATURATION: 98 % | TEMPERATURE: 98 F | SYSTOLIC BLOOD PRESSURE: 107 MMHG

## 2022-07-29 VITALS
SYSTOLIC BLOOD PRESSURE: 102 MMHG | RESPIRATION RATE: 18 BRPM | TEMPERATURE: 99 F | DIASTOLIC BLOOD PRESSURE: 69 MMHG | OXYGEN SATURATION: 100 % | HEART RATE: 72 BPM

## 2022-07-29 DIAGNOSIS — C81.90 HODGKIN LYMPHOMA, UNSPECIFIED, UNSPECIFIED SITE: ICD-10-CM

## 2022-07-29 DIAGNOSIS — F17.200 NICOTINE DEPENDENCE, UNSPECIFIED, UNCOMPLICATED: ICD-10-CM

## 2022-07-29 DIAGNOSIS — Z29.9 ENCOUNTER FOR PROPHYLACTIC MEASURES, UNSPECIFIED: ICD-10-CM

## 2022-07-29 DIAGNOSIS — D72.829 ELEVATED WHITE BLOOD CELL COUNT, UNSPECIFIED: ICD-10-CM

## 2022-07-29 DIAGNOSIS — D50.9 IRON DEFICIENCY ANEMIA, UNSPECIFIED: ICD-10-CM

## 2022-07-29 LAB
ALBUMIN SERPL ELPH-MCNC: 3.2 G/DL — LOW (ref 3.3–5)
ALP SERPL-CCNC: 114 U/L — SIGNIFICANT CHANGE UP (ref 40–120)
ALT FLD-CCNC: 15 U/L — SIGNIFICANT CHANGE UP (ref 10–45)
ANION GAP SERPL CALC-SCNC: 11 MMOL/L — SIGNIFICANT CHANGE UP (ref 5–17)
ANISOCYTOSIS BLD QL: SLIGHT — SIGNIFICANT CHANGE UP
APTT BLD: 40.6 SEC — HIGH (ref 27.5–35.5)
AST SERPL-CCNC: 34 U/L — SIGNIFICANT CHANGE UP (ref 10–40)
BASE EXCESS BLDV CALC-SCNC: 7 MMOL/L — HIGH (ref -2–2)
BASOPHILS # BLD AUTO: 0 K/UL — SIGNIFICANT CHANGE UP (ref 0–0.2)
BASOPHILS NFR BLD AUTO: 0 % — SIGNIFICANT CHANGE UP (ref 0–2)
BILIRUB SERPL-MCNC: 0.2 MG/DL — SIGNIFICANT CHANGE UP (ref 0.2–1.2)
BUN SERPL-MCNC: 14 MG/DL — SIGNIFICANT CHANGE UP (ref 7–23)
CA-I SERPL-SCNC: 1.29 MMOL/L — SIGNIFICANT CHANGE UP (ref 1.15–1.33)
CALCIUM SERPL-MCNC: 9.1 MG/DL — SIGNIFICANT CHANGE UP (ref 8.4–10.5)
CHLORIDE BLDV-SCNC: 103 MMOL/L — SIGNIFICANT CHANGE UP (ref 96–108)
CHLORIDE SERPL-SCNC: 101 MMOL/L — SIGNIFICANT CHANGE UP (ref 96–108)
CO2 BLDV-SCNC: 36 MMOL/L — HIGH (ref 22–26)
CO2 SERPL-SCNC: 25 MMOL/L — SIGNIFICANT CHANGE UP (ref 22–31)
CREAT SERPL-MCNC: 0.64 MG/DL — SIGNIFICANT CHANGE UP (ref 0.5–1.3)
EGFR: 138 ML/MIN/1.73M2 — SIGNIFICANT CHANGE UP
ELLIPTOCYTES BLD QL SMEAR: SLIGHT — SIGNIFICANT CHANGE UP
EOSINOPHIL # BLD AUTO: 0.43 K/UL — SIGNIFICANT CHANGE UP (ref 0–0.5)
EOSINOPHIL NFR BLD AUTO: 1.7 % — SIGNIFICANT CHANGE UP (ref 0–6)
GAS PNL BLDV: 138 MMOL/L — SIGNIFICANT CHANGE UP (ref 136–145)
GAS PNL BLDV: SIGNIFICANT CHANGE UP
GAS PNL BLDV: SIGNIFICANT CHANGE UP
GLUCOSE BLDV-MCNC: 85 MG/DL — SIGNIFICANT CHANGE UP (ref 70–99)
GLUCOSE SERPL-MCNC: 87 MG/DL — SIGNIFICANT CHANGE UP (ref 70–99)
HCO3 BLDV-SCNC: 34 MMOL/L — HIGH (ref 22–29)
HCT VFR BLD CALC: 33.5 % — LOW (ref 39–50)
HCT VFR BLDA CALC: 33 % — LOW (ref 39–51)
HGB BLD CALC-MCNC: 11 G/DL — LOW (ref 12.6–17.4)
HGB BLD-MCNC: 10.5 G/DL — LOW (ref 13–17)
HYPOCHROMIA BLD QL: SIGNIFICANT CHANGE UP
INR BLD: 1.51 RATIO — HIGH (ref 0.88–1.16)
LACTATE BLDV-MCNC: 1.4 MMOL/L — SIGNIFICANT CHANGE UP (ref 0.7–2)
LDH SERPL L TO P-CCNC: 492 U/L — HIGH (ref 50–242)
LYMPHOCYTES # BLD AUTO: 2.21 K/UL — SIGNIFICANT CHANGE UP (ref 1–3.3)
LYMPHOCYTES # BLD AUTO: 8.7 % — LOW (ref 13–44)
MAGNESIUM SERPL-MCNC: 2 MG/DL — SIGNIFICANT CHANGE UP (ref 1.6–2.6)
MANUAL SMEAR VERIFICATION: SIGNIFICANT CHANGE UP
MCHC RBC-ENTMCNC: 23.7 PG — LOW (ref 27–34)
MCHC RBC-ENTMCNC: 31.3 GM/DL — LOW (ref 32–36)
MCV RBC AUTO: 75.6 FL — LOW (ref 80–100)
MICROCYTES BLD QL: SLIGHT — SIGNIFICANT CHANGE UP
MONOCYTES # BLD AUTO: 1.12 K/UL — HIGH (ref 0–0.9)
MONOCYTES NFR BLD AUTO: 4.4 % — SIGNIFICANT CHANGE UP (ref 2–14)
NEUTROPHILS # BLD AUTO: 21.66 K/UL — HIGH (ref 1.8–7.4)
NEUTROPHILS NFR BLD AUTO: 85.2 % — HIGH (ref 43–77)
PCO2 BLDV: 62 MMHG — HIGH (ref 42–55)
PH BLDV: 7.35 — SIGNIFICANT CHANGE UP (ref 7.32–7.43)
PHOSPHATE SERPL-MCNC: 3.4 MG/DL — SIGNIFICANT CHANGE UP (ref 2.5–4.5)
PLAT MORPH BLD: NORMAL — SIGNIFICANT CHANGE UP
PLATELET # BLD AUTO: 309 K/UL — SIGNIFICANT CHANGE UP (ref 150–400)
PO2 BLDV: 25 MMHG — SIGNIFICANT CHANGE UP (ref 25–45)
POIKILOCYTOSIS BLD QL AUTO: SLIGHT — SIGNIFICANT CHANGE UP
POLYCHROMASIA BLD QL SMEAR: SLIGHT — SIGNIFICANT CHANGE UP
POTASSIUM BLDV-SCNC: 3.9 MMOL/L — SIGNIFICANT CHANGE UP (ref 3.5–5.1)
POTASSIUM SERPL-MCNC: 5.2 MMOL/L — SIGNIFICANT CHANGE UP (ref 3.5–5.3)
POTASSIUM SERPL-SCNC: 5.2 MMOL/L — SIGNIFICANT CHANGE UP (ref 3.5–5.3)
PROT SERPL-MCNC: 7.8 G/DL — SIGNIFICANT CHANGE UP (ref 6–8.3)
PROTHROM AB SERPL-ACNC: 17.6 SEC — HIGH (ref 10.5–13.4)
RBC # BLD: 4.43 M/UL — SIGNIFICANT CHANGE UP (ref 4.2–5.8)
RBC # FLD: 17.2 % — HIGH (ref 10.3–14.5)
RBC BLD AUTO: ABNORMAL
SAO2 % BLDV: 39.1 % — LOW (ref 67–88)
SODIUM SERPL-SCNC: 137 MMOL/L — SIGNIFICANT CHANGE UP (ref 135–145)
TARGETS BLD QL SMEAR: SLIGHT — SIGNIFICANT CHANGE UP
URATE SERPL-MCNC: 6 MG/DL — SIGNIFICANT CHANGE UP (ref 3.4–8.8)
WBC # BLD: 25.42 K/UL — HIGH (ref 3.8–10.5)
WBC # FLD AUTO: 25.42 K/UL — HIGH (ref 3.8–10.5)

## 2022-07-29 PROCEDURE — 70490 CT SOFT TISSUE NECK W/O DYE: CPT | Mod: 26

## 2022-07-29 PROCEDURE — 99223 1ST HOSP IP/OBS HIGH 75: CPT

## 2022-07-29 PROCEDURE — 74176 CT ABD & PELVIS W/O CONTRAST: CPT | Mod: 26

## 2022-07-29 PROCEDURE — 71250 CT THORAX DX C-: CPT | Mod: 26

## 2022-07-29 PROCEDURE — 99223 1ST HOSP IP/OBS HIGH 75: CPT | Mod: GC

## 2022-07-29 PROCEDURE — 93010 ELECTROCARDIOGRAM REPORT: CPT

## 2022-07-29 PROCEDURE — 93306 TTE W/DOPPLER COMPLETE: CPT | Mod: 26

## 2022-07-29 PROCEDURE — 93356 MYOCRD STRAIN IMG SPCKL TRCK: CPT

## 2022-07-29 PROCEDURE — 99285 EMERGENCY DEPT VISIT HI MDM: CPT

## 2022-07-29 RX ORDER — ONDANSETRON 8 MG/1
4 TABLET, FILM COATED ORAL EVERY 8 HOURS
Refills: 0 | Status: DISCONTINUED | OUTPATIENT
Start: 2022-07-29 | End: 2022-07-29

## 2022-07-29 RX ORDER — ENOXAPARIN SODIUM 100 MG/ML
30 INJECTION SUBCUTANEOUS EVERY 24 HOURS
Refills: 0 | Status: DISCONTINUED | OUTPATIENT
Start: 2022-07-29 | End: 2022-07-29

## 2022-07-29 RX ORDER — ACETAMINOPHEN 500 MG
2 TABLET ORAL
Qty: 0 | Refills: 0 | DISCHARGE
Start: 2022-07-29

## 2022-07-29 RX ORDER — OXYCODONE HYDROCHLORIDE 5 MG/1
5 TABLET ORAL EVERY 6 HOURS
Refills: 0 | Status: DISCONTINUED | OUTPATIENT
Start: 2022-07-29 | End: 2022-07-29

## 2022-07-29 RX ORDER — LANOLIN ALCOHOL/MO/W.PET/CERES
3 CREAM (GRAM) TOPICAL AT BEDTIME
Refills: 0 | Status: DISCONTINUED | OUTPATIENT
Start: 2022-07-29 | End: 2022-07-29

## 2022-07-29 RX ORDER — ACETAMINOPHEN 500 MG
650 TABLET ORAL EVERY 6 HOURS
Refills: 0 | Status: DISCONTINUED | OUTPATIENT
Start: 2022-07-29 | End: 2022-07-29

## 2022-07-29 NOTE — ED PROVIDER NOTE - CLINICAL SUMMARY MEDICAL DECISION MAKING FREE TEXT BOX
Patient presenting requesting treatment of his chronic Hodkins lymphoma but currently has outpatient plan.  From my perspective airway patent, vital signs within normal limits, non toxic appearing with no evidence of emergent process however given likely relapse in setting of chronic non compliance will discuss with hematology.

## 2022-07-29 NOTE — PROVIDER CONTACT NOTE (OTHER) - ASSESSMENT
Pt AO4, VSS. Pt went for echo. Pt denies chest pain, sob, or lightheadedness. Pt educated and continues to refuse; states he has a wedding to attend [facility]

## 2022-07-29 NOTE — DISCHARGE NOTE PROVIDER - CARE PROVIDER_API CALL
Susan Neville)  Hematology; Medical Oncology  [address] [place] [address]  Phone: [phone]  Fax: [phone]  Follow Up Time:    Hosea Ragnel (DO)  Internal Medicine; Medical Oncology  [address] [place] [address]  Phone: [phone]  Fax: [phone]  Established Patient  Follow Up Time: 1 week

## 2022-07-29 NOTE — DISCHARGE NOTE PROVIDER - HOSPITAL COURSE
20 yo male with PMH of current smoker, Hodgkin's Lymphoma diagnosed in 2018 s/p 5 cycles of chemotherapy  after which he declined further chemotherapy due to adverse side effects lost to follow-up for approximately 1 year (recently re-established care on 7/15/22) who presents with anterior neck pain. Patient states his cervical lymphadenopathy has progressively worsened over the course of last 8 months associated with intermittent sharp, pulsatile pains predominantly in the largest L anterior neck LN. No fever, chills, dysphagia nor odynophagia. Has occasional nausea/vomiting if eats certain foods (meat ).   He was seen by Hm/Onc and scheduled for LN Bx but pt signed out AMA. He is gonna go for echo anyway after I convinced him, spoke to Attending. 22 yo male with PMH of current smoker, Hodgkin's Lymphoma diagnosed in 2018 s/p 5 cycles of chemotherapy  after which he declined further chemotherapy due to adverse side effects lost to follow-up for approximately 1 year (recently re-established care on 7/15/22) who presents with anterior neck pain. Patient states his cervical lymphadenopathy has progressively worsened over the course of last 8 months associated with intermittent sharp, pulsatile pains predominantly in the largest L anterior neck LN. No fever, chills, dysphagia nor odynophagia. Has occasional nausea/vomiting if eats certain foods (meat ).   He was seen by Hm/Onc and scheduled for LN Bx but pt signed out AMA. He is gonna go for echo anyway after I convinced him, spoke to Attending.

## 2022-07-29 NOTE — CONSULT NOTE ADULT - SUBJECTIVE AND OBJECTIVE BOX
HPI:  20 yo male with PMH of current smoker, Hodgkin's Lymphoma diagnosed in 2018 s/p 5 cycles of chemotherapy  after which he declined further chemotherapy due to adverse side effects lost to follow-up for approximately 1 year (recently re-established care on 7/15/22) who presents with anterior neck pain. Patient states his cervical lymphadenopathy has progressively worsened over the course of last 8 months associated with intermittent sharp, pulsatile pains predominantly in the largest L anterior neck LN. No fever, chills, dysphagia nor odynophagia. Has occasional nausea/vomiting if eats certain foods (meat ).  He recently re-establish care at the [facility] on 7/15/22 with plan for IR guided LN biopsy prior to resuming chemotherapy, but patient states he had difficulty arranging this without a PCP and also was concerned with the ongoing pains in the LN. No recent illness nor travel.    In the ED, vitals unremarkable. Labs notable for leukocytosis to 25K, anemia to Hb 10.5. CT Neck showing re-demonstration of cervical lymphadenopathy - similar to prior CT exam, unchanged lytic focus at T2. Heme/onc consulted, planning for expedited inpatient work-up/evaluation. Admitted to medicine for further management.   (29 Jul 2022 12:44)    Brief Oncologic History:    Pt initially presented to [facility] on 9/4/18 with a mediastinal mass, and was found to have Stage IVB, Classical Hodgkin's Lymphoma; LN biopsy at that time showed large atypical cells to be CD30, CD15, PAX5+, MUM1+, LCA (-), DCD79a(-), ALK(-)  His Bone marrow shows trilineage hematopoiesis and no morphologic evidence of lymphoma.      PAST MEDICAL & SURGICAL HISTORY:  Hodgkins lymphoma in relapse      No significant past surgical history          Review of Systems:  Constitutional: [ ] Fevers [ ] Weight changes  HEENT: [ ] Visual Disturbances [ ] Nasal congestion  CV: [ ] Chest pain [ ] Palpitations  Resp:  [ ] Cough [ ] Shortness of breath  GI:  [ ] Nausea [ ] Vomiting [ ] Diarrhea [ ] Constipation [ ] Abd pain  : [ ] Dysuria [ ] Hematuria  Musculoskeletal: [ ] Myalgias [ ] Weakness  Skin: [ ] Rash [ ] Itch  Neurological:  [ ] Headache [ ] Dizziness [ ] Numbness  Psychiatric: [ ] Anxiety [ ] Depression  Hematologic/Lymphatic: [ ] Bleeding [ ] Enlarged Lymph Nodes  Allergic/Immunologic: [ ] Nasal discharge [ ] Hives  * Note all systems were reviewed as above; those not marked with an "x" are negative    MEDICATIONS  (STANDING):  enoxaparin Injectable 30 milliGRAM(s) SubCutaneous every 24 hours    MEDICATIONS  (PRN):  acetaminophen     Tablet .. 650 milliGRAM(s) Oral every 6 hours PRN Temp greater or equal to 38C (100.4F), Mild Pain (1 - 3)  aluminum hydroxide/magnesium hydroxide/simethicone Suspension 30 milliLiter(s) Oral every 4 hours PRN Dyspepsia  melatonin 3 milliGRAM(s) Oral at bedtime PRN Insomnia  ondansetron Injectable 4 milliGRAM(s) IV Push every 8 hours PRN Nausea and/or Vomiting  oxyCODONE    IR 5 milliGRAM(s) Oral every 6 hours PRN Severe Pain (7 - 10)      Allergies    IV Contrast (Vomiting)  Rocephin (Pruritus)  Squash (Other)    Intolerances        SOCIAL HISTORY:  FAMILY HISTORY:  FH: Hodgkins disease  multiple distant family members    FH: asthma (Mother)        Vital Signs Last 24 Hrs  T(C): 36.9 (29 Jul 2022 09:31), Max: 36.9 (29 Jul 2022 09:31)  T(F): 98.4 (29 Jul 2022 09:31), Max: 98.4 (29 Jul 2022 09:31)  HR: 89 (29 Jul 2022 12:20) (89 - 90)  BP: 98/52 (29 Jul 2022 12:20) (98/52 - 107/66)  BP(mean): --  RR: 16 (29 Jul 2022 12:20) (16 - 16)  SpO2: 100% (29 Jul 2022 12:20) (98% - 100%)    Parameters below as of 29 Jul 2022 12:20  Patient On (Oxygen Delivery Method): room air        PHYSICAL EXAM:  Constitutional: well developed, in no acute distress  Eyes: Anicteric.   ENT: Oropharynx is unremarkable, no petechiae or masses  Neck: No anterior neck masses appreciated.   Pulmonary: Clear to auscultation bilaterally  Cardiac: RRR, no murmurs  Abdomen: Normoactive bowel sounds, soft and nontender, no hepatosplenomegaly or masses appreciated.  Lymphatic: No cervical, supraclavicular or axillary lymphadenopathy appreciated  Skin: normal appearance, no significant bruising or petechiae  Neurology: Grossly intact, AAOx3    LABS:                        10.5   25.42 )-----------( 309      ( 29 Jul 2022 11:18 )             33.5     07-29    137  |  101  |  14  ----------------------------<  87  5.2   |  25  |  0.64    Ca    9.1      29 Jul 2022 11:18  Phos  3.4     07-29  Mg     2.0     07-29    TPro  7.8  /  Alb  3.2<L>  /  TBili  0.2  /  DBili  x   /  AST  34  /  ALT  15  /  AlkPhos  114  07-29    PT/INR - ( 29 Jul 2022 11:18 )   PT: 17.6 sec;   INR: 1.51 ratio         PTT - ( 29 Jul 2022 11:18 )  PTT:40.6 sec        RADIOLOGIC Studies:  Reviewed - Relevant findings addressed in assessment. HPI:  20 yo male with PMH of current smoker, Hodgkin's Lymphoma diagnosed in 2018 s/p 5 cycles of chemotherapy  after which he declined further chemotherapy due to adverse side effects lost to follow-up for approximately 1 year (recently re-established care on 7/15/22) who presents with anterior neck pain. Patient states his cervical lymphadenopathy has progressively worsened over the course of last 8 months associated with intermittent sharp, pulsatile pains predominantly in the largest L anterior neck LN. No fever, chills, dysphagia nor odynophagia. Has occasional nausea/vomiting if eats certain foods (meat ).  He recently re-establish care at the [facility] on 7/15/22 with plan for IR guided LN biopsy prior to resuming chemotherapy, but patient states he had difficulty arranging this without a PCP and also was concerned with the ongoing pains in the LN. No recent illness nor travel.    In the ED, vitals unremarkable. Labs notable for leukocytosis to 25K, anemia to Hb 10.5. CT Neck showing re-demonstration of cervical lymphadenopathy - similar to prior CT exam, unchanged lytic focus at T2. Heme/onc consulted, planning for expedited inpatient work-up/evaluation. Admitted to medicine for further management.   (29 Jul 2022 12:44)    Brief Oncologic History:    Pt initially presented to [facility] on 9/4/18 with a mediastinal mass, and was found to have Stage IVB, Classical Hodgkin's Lymphoma; LN biopsy at that time showed large atypical cells to be CD30, CD15, PAX5+, MUM1+, LCA (-), DCD79a(-), ALK(-)  His Bone marrow shows trilineage hematopoiesis and no morphologic evidence of lymphoma.  Pt is s/p ABVE-PC x5 cycles, with a relapse in 2019. He was placed on salvage tx with gemzar/brentuximab, which had been limited due to medication non-adherence; He was given salvage ICE x2, with plants to bridge him to transplant. He left AMA after conditioning for the prior transplant. Since then, he was started on Nivolumab for June 2019. However, patient was lost to follow up during the COVID-19 pandemic.    PAST MEDICAL & SURGICAL HISTORY:  Hodgkins lymphoma in relapse      No significant past surgical history          Review of Systems:  Constitutional: [ ] Fevers [ ] Weight changes  HEENT: [ ] Visual Disturbances [ ] Nasal congestion  CV: [ ] Chest pain [ ] Palpitations  Resp:  [ ] Cough [ ] Shortness of breath  GI:  [ ] Nausea [ ] Vomiting [ ] Diarrhea [ ] Constipation [ ] Abd pain  : [ ] Dysuria [ ] Hematuria  Musculoskeletal: [ ] Myalgias [ ] Weakness  Skin: [ ] Rash [ ] Itch  Neurological:  [ ] Headache [ ] Dizziness [ ] Numbness  Psychiatric: [ ] Anxiety [ ] Depression  Hematologic/Lymphatic: [ ] Bleeding [ ] Enlarged Lymph Nodes  Allergic/Immunologic: [ ] Nasal discharge [ ] Hives  * Note all systems were reviewed as above; those not marked with an "x" are negative    MEDICATIONS  (STANDING):  enoxaparin Injectable 30 milliGRAM(s) SubCutaneous every 24 hours    MEDICATIONS  (PRN):  acetaminophen     Tablet .. 650 milliGRAM(s) Oral every 6 hours PRN Temp greater or equal to 38C (100.4F), Mild Pain (1 - 3)  aluminum hydroxide/magnesium hydroxide/simethicone Suspension 30 milliLiter(s) Oral every 4 hours PRN Dyspepsia  melatonin 3 milliGRAM(s) Oral at bedtime PRN Insomnia  ondansetron Injectable 4 milliGRAM(s) IV Push every 8 hours PRN Nausea and/or Vomiting  oxyCODONE    IR 5 milliGRAM(s) Oral every 6 hours PRN Severe Pain (7 - 10)      Allergies    IV Contrast (Vomiting)  Rocephin (Pruritus)  Squash (Other)    Intolerances        SOCIAL HISTORY:  FAMILY HISTORY:  FH: Hodgkins disease  multiple distant family members    FH: asthma (Mother)        Vital Signs Last 24 Hrs  T(C): 36.9 (29 Jul 2022 09:31), Max: 36.9 (29 Jul 2022 09:31)  T(F): 98.4 (29 Jul 2022 09:31), Max: 98.4 (29 Jul 2022 09:31)  HR: 89 (29 Jul 2022 12:20) (89 - 90)  BP: 98/52 (29 Jul 2022 12:20) (98/52 - 107/66)  BP(mean): --  RR: 16 (29 Jul 2022 12:20) (16 - 16)  SpO2: 100% (29 Jul 2022 12:20) (98% - 100%)    Parameters below as of 29 Jul 2022 12:20  Patient On (Oxygen Delivery Method): room air        PHYSICAL EXAM:  Constitutional: well developed, in no acute distress  Eyes: Anicteric.   ENT: Swollen LN on left anterior neck.  Neck: No anterior neck masses appreciated.   Pulmonary: Clear to auscultation bilaterally  Cardiac: RRR, no murmurs  Abdomen: Normoactive bowel sounds, soft and nontender, no hepatosplenomegaly or masses appreciated.  Lymphatic: No cervical, supraclavicular or axillary lymphadenopathy appreciated  Skin: normal appearance, no significant bruising or petechiae  Neurology: Grossly intact, AAOx3    LABS:                        10.5   25.42 )-----------( 309      ( 29 Jul 2022 11:18 )             33.5     07-29    137  |  101  |  14  ----------------------------<  87  5.2   |  25  |  0.64    Ca    9.1      29 Jul 2022 11:18  Phos  3.4     07-29  Mg     2.0     07-29    TPro  7.8  /  Alb  3.2<L>  /  TBili  0.2  /  DBili  x   /  AST  34  /  ALT  15  /  AlkPhos  114  07-29    PT/INR - ( 29 Jul 2022 11:18 )   PT: 17.6 sec;   INR: 1.51 ratio         PTT - ( 29 Jul 2022 11:18 )  PTT:40.6 sec        RADIOLOGIC Studies:  Reviewed - Relevant findings addressed in assessment.

## 2022-07-29 NOTE — H&P ADULT - HISTORY OF PRESENT ILLNESS
20 yo male with PMH of current smoker, Hodgkin's Lymphoma diagnosed in 2018 s/p 5 cycles of chemotherapy  after which he declined further chemotherapy due to adverse side effects lost to follow-up for approximately 1 year (recently re-established care on 7/15/22) who presents with anterior neck pain. Patient states his cervical lymphadenopathy has progressively worsened over the course of last 8 months associated with intermittent sharp, pulsatile pains predominantly in the largest L anterior neck LN. No fever, chills, dysphagia nor odynophagia. Has occasional nausea/vomiting if eats certain foods (meat ).  He recently re-establish care at the [facility] on 7/15/22 with plan for IR guided LN biopsy prior to resuming chemotherapy, but patient states he had difficulty arranging this without a PCP and also was concerned with the ongoing pains in the LN. No recent illness nor travel.    In the ED, vitals unremarkable. Labs notable for leukocytosis to 25K, anemia to Hb 10.5. CT Neck showing re-demonstration of cervical lymphadenopathy - similar to prior CT exam, unchanged lytic focus at T2. Heme/onc consulted, planning for expedited inpatient work-up/evaluation. Admitted to medicine for further management.   22 yo male with PMH of current smoker, Hodgkin's Lymphoma diagnosed in 2018 s/p 5 cycles of chemotherapy  after which he declined further chemotherapy due to adverse side effects lost to follow-up for approximately 1 year (recently re-established care on 7/15/22) who presents with anterior neck pain. Patient states his cervical lymphadenopathy has progressively worsened over the course of last 8 months associated with intermittent sharp, pulsatile pains predominantly in the largest L anterior neck LN. No fever, chills, dysphagia nor odynophagia. Has occasional nausea/vomiting if eats certain foods (meat ).  He recently re-establish care at the [facility] on 7/15/22 with plan for IR guided LN biopsy prior to resuming chemotherapy, but patient states he had difficulty arranging this without a PCP and also was concerned with the ongoing pains in the LN. No recent illness nor travel.    In the ED, vitals unremarkable. Labs notable for leukocytosis to 25K, anemia to Hb 10.5. CT Neck showing re-demonstration of cervical lymphadenopathy - similar to prior CT exam, unchanged lytic focus at T2. Heme/onc consulted, planning for expedited inpatient work-up/evaluation. Admitted to medicine for further management given concern for relapse/progression of his lymphoma.

## 2022-07-29 NOTE — DISCHARGE NOTE NURSING/CASE MANAGEMENT/SOCIAL WORK - NSDCPEFALRISK_GEN_ALL_CORE
For information on Fall & Injury Prevention, visit: [email or web address] OR  [email or web address] OR  https://www.cdc.gov/steadi/patient.html

## 2022-07-29 NOTE — DISCHARGE NOTE PROVIDER - NSDCFUADDAPPT_GEN_ALL_CORE_FT
Scheduled for outpatient IR core needle biopsy on Tues 8/3/22  Please call [phone] to confirm appointment and time.

## 2022-07-29 NOTE — H&P ADULT - NSHPPHYSICALEXAM_GEN_ALL_CORE
CONSTITUTIONAL: thin male in NAD  EYES: PERRLA; conjunctiva and sclera clear  ENMT: Moist oral mucosa, no pharyngeal injection or exudates; normal dentition  NECK: +multiple anterior cervical, posterior cervical and submandibular enlarged LN L>R - tender to palpation  RESPIRATORY: Normal respiratory effort; lungs are clear to auscultation bilaterally  CARDIOVASCULAR: Regular rate and rhythm, normal S1 and S2, no murmur/rub/gallop; No lower extremity edema; Peripheral pulses are 2+ bilaterally  ABDOMEN: Soft, Non-distended, Nontender to palpation, normoactive bowel sounds  MUSCULOSKELETAL:  No clubbing or cyanosis of digits; no joint swelling or tenderness to palpation  PSYCH: A+O to person, place, and time; affect appropriate  NEUROLOGY: CN 2-12 are intact and symmetric; no gross sensory deficits   SKIN: No rashes; no palpable lesions

## 2022-07-29 NOTE — H&P ADULT - PROBLEM SELECTOR PLAN 2
likely in setting of known lymphoma  no fever, chills nor signs/symptoms of active infection  - will check procal with AM labs  - if febrile, would send full infectious work-up

## 2022-07-29 NOTE — H&P ADULT - PROBLEM SELECTOR PLAN 1
s/p chemotherapy with relapse and lost to follow-up since 2021. recently re-established care on 7/15/22.  now with concern for relapse/progress of disease awaiting repeat LN biopsy prior to restarting chemotherapy  - Heme/onc consulted, f/u recs  - IR consulted for LN biopsy (cervical neck LNs)  - TTE prior to starting chemotherapy  - check HIV, acute hep panel, with core total, G6PD with AM labs as per heme/onc  - treatment plans pending above work-up s/p chemotherapy with relapse and lost to follow-up since 2021. recently re-established care on 7/15/22.  now with concern for relapse/progress of disease awaiting repeat LN biopsy prior to restarting chemotherapy  - had recent CT C/A/P on 7/14/22 showing decrease lung, mediastinal and R hilar LNA, but marked increase in size + number of low attenuation lesions throughout spleen  - Heme/onc consulted, f/u recs  - IR consulted for LN biopsy (cervical neck LNs)  - TTE prior to starting chemotherapy  - check HIV, acute hep panel, with core total, G6PD with AM labs as per heme/onc  - treatment plans pending above work-up

## 2022-07-29 NOTE — H&P ADULT - NSHPSOCIALHISTORY_GEN_ALL_CORE
current smoker, smokes 1/8 PPD x approx 6 years  social EtOH use  denies IV drug use  currently not employed

## 2022-07-29 NOTE — ED PROVIDER NOTE - OBJECTIVE STATEMENT
Patient with history of Hodkins lymphoma presenting complaining of painful neck lymph nodes chronically.  Reporting has been having pain for some time, lymph nodes seem to be expanding.  Currently not undergoing chemotherapy.  Describes pain as mild and brief/intermittent but increasing in frequency.  Reports seeing his oncologist recently who wants to get a biopsy and then would determine further treatment plan.  Not taking anything at home for pain in general or today prior to coming to the Emergency Department ("Its not that bad of a pain").  Denying fevers, chest pains, shortness of breath, difficulty speaking or swallowing.  When asked what was bringing him specifically to the Emergency Department today stated "I just want this taken care of and I tried calling my doctor and he was on vacation".    Prior records reviewed - follows with [facility] for his Hodkins Lymphoma.  History of AMAing/leaving prior to treatment course completion and being lost to follow up.  [place] [facility] [place] who were planning for biopsy prior to restarting therapy, had labs at that time.

## 2022-07-29 NOTE — ED ADULT NURSE NOTE - OBJECTIVE STATEMENT
Patient with hx: NHL came in c/o neck pain. Reporting has been having pain for some time, lymph nodes seem to be expanding.  Pt in not on any chemo right now. No distress. Breathing easy and non labored. Pt ambulatory. Pt calm and cooperative at this time.

## 2022-07-29 NOTE — DISCHARGE NOTE PROVIDER - PROVIDER TOKENS
PROVIDER:[TOKEN:[[id number]]] PROVIDER:[TOKEN:[[id number]],FOLLOWUP:[1 week],ESTABLISHEDPATIENT:[T]]

## 2022-07-29 NOTE — H&P ADULT - ASSESSMENT
20 yo male with PMH of current smoker, Hodgkin's Lymphoma diagnosed in 2018 s/p 5 cycles of chemotherapy  after which he declined further chemotherapy due to adverse side effects lost to follow-up for approximately 1 year (recently re-established care on 7/15/22) who presents with anterior neck pain with subacute worsening lymphadenopathy admitted with concern for relapse/progress of his lymphoma pending further inpatient work-up +/- initiation of chemotherapy.

## 2022-07-29 NOTE — DISCHARGE NOTE NURSING/CASE MANAGEMENT/SOCIAL WORK - NSDCVIVACCINE_GEN_ALL_CORE_FT
influenza, injectable, quadrivalent, preservative free; 22-Oct-2020 11:58; Marisol Oneil (RN); [employer];  1NA (Exp. Date: 30-Jun-2021); IntraMuscular; Deltoid Left.; 0.5 milliLiter(s); VIS (VIS Published: 15-Aug-2019, VIS Presented: 22-Oct-2020);

## 2022-07-29 NOTE — ED PROVIDER NOTE - PHYSICAL EXAMINATION
Exam:  General: Patient chronically ill appearing, vital signs within normal limits  HEENT: airway patent with moist mucous membranes, palpable L sided posterior cervical lymphadenopathy without discharge/erythema  Cardiac: RRR S1/S2 with strong peripheral pulses  Respiratory: lungs clear without respiratory distress  GI: abdomen soft, non tender, non distended  Neuro: no gross neurologic deficits  Skin: warm, well perfused  Psych: normal mood and affect

## 2022-07-29 NOTE — CONSULT NOTE ADULT - ASSESSMENT
INCOMPLETE NOTE      20 yo male with a hx of Hodgkin's lymphoma who was admitted with concern for lymphoma recurrence. Hematology consulted for continuity of care given pt's hx of Hodgkin's Lymphoma and concern for relapse/progression.    #Hodgkin's Lymphoma  Pt is s/p ABVE-PC x5 cycles, with a relapse in 2019. He was placed on salvage tx with gemzar/brentuximab, which had been limited due to medication non-adherence; He was given salvage ICE x2, with plants to bridge him to transplant. However, patient was lost to follow up during the COVID-19 pandemic. INCOMPLETE NOTE      20 yo male with a hx of Hodgkin's lymphoma who was admitted with concern for lymphoma recurrence. Hematology consulted for continuity of care given pt's hx of Hodgkin's Lymphoma and concern for relapse/progression.    #Hodgkin's Lymphoma  Pt is s/p ABVE-PC x5 cycles, with a relapse in 2019. He was placed on salvage tx with gemzar/brentuximab, which had been limited due to medication non-adherence; He was given salvage ICE x2, with plants to bridge him to transplant. However, patient was lost to follow up during the COVID-19 pandemic.  - Please check an HIV, Hepatitis panel (order as Acute Hepatitis Panel and core antibody total), G6PD.  - Please order an Echo      Attending recommendations to follow.  22 yo male with a hx of Hodgkin's lymphoma who was admitted with concern for lymphoma recurrence. Hematology consulted for continuity of care given pt's hx of Hodgkin's Lymphoma and concern for relapse/progression.    #Hodgkin's Lymphoma  Pt is s/p ABVE-PC x5 cycles, with a relapse in 2019. He was placed on salvage tx with gemzar/brentuximab, which had been limited due to medication non-adherence; He was given salvage ICE x2, with plants to bridge him to transplant. He left AMA after conditioning for the prior transplant. Since then, he was started on Nivolumab for June 2019. However, patient was lost to follow up during the COVID-19 pandemic.  - Please check an HIV, Hepatitis panel (order as Acute Hepatitis Panel and core antibody total), G6PD.  - Please order an Echo  - IR consulted for Core Needle Biopsy.      Case d/w Dr. Neville. Will continue to follow with you. Thank you for the opportunity to participate in the care of this patient.    Alaina Coulter M.D.  Hematology and Medical Oncology Fellow  Pager: [phone]  For weekends and evenings (5 pm - 8 am), please page Heme/Onc fellow on call. 20 yo male with a hx of Hodgkin's lymphoma who was admitted with concern for lymphoma recurrence. Hematology consulted for continuity of care given pt's hx of Hodgkin's Lymphoma and concern for relapse/progression.    #Hodgkin's Lymphoma  Pt is s/p ABVE-PC x5 cycles, with a relapse in 2019. He was placed on salvage tx with gemzar/brentuximab, which had been limited due to medication non-adherence; He was given salvage ICE x2, with plants to bridge him to transplant. He left AMA after conditioning for the prior transplant. Since then, he was started on Nivolumab for June 2019. However, patient was lost to follow up during the COVID-19 pandemic.  - Please check an HIV, Hepatitis panel (order as Acute Hepatitis Panel and core antibody total), G6PD.  - Please order an Echo  - IR consulted for Core Needle Biopsy.      Case d/w Dr. Neville. Will continue to follow with you. Thank you for the opportunity to participate in the care of this patient. During rounds, patient expressed that he wanted to leave the hospital against medical advise and would prefer having the biopsy done outpatient. He has an appointment on Tuesday with IR for the Core Needle Biopsy. We will reach out to [facility] for pt to schedule a follow up appointment with Dr. Rangel.    Alaina Coulter M.D.  Hematology and Medical Oncology Fellow  Pager: [phone]  For weekends and evenings (5 pm - 8 am), please page Heme/Onc fellow on call. 22 yo male with a hx of Hodgkin's lymphoma who was admitted with concern for lymphoma recurrence. Hematology consulted for continuity of care given pt's hx of Hodgkin's Lymphoma and concern for relapse/progression.    #Hodgkin's Lymphoma  Pt is s/p ABVE-PC x5 cycles, with a relapse in 2019. He was placed on salvage tx with gemzar/brentuximab, which had been limited due to medication non-adherence; He was given salvage ICE x2, with plans to bridge him to transplant. He left AMA after conditioning for the prior transplant. Since then, he was started on Nivolumab for June 2019. However, patient was lost to follow up during the COVID-19 pandemic.  - Please check an HIV, Hepatitis panel (order as Acute Hepatitis Panel and core antibody total), G6PD.  - Please order an Echo  - IR consulted for Core Needle Biopsy.      Case d/w Dr. Neville. Will continue to follow with you. Thank you for the opportunity to participate in the care of this patient. During rounds, patient expressed that he wanted to leave the hospital against medical advise and would prefer having the biopsy done outpatient. He has an appointment on Tuesday with IR for the Core Needle Biopsy. We will reach out to [facility] for pt to schedule a follow up appointment with Dr. Rangel.    Alaina Coulter M.D.  Hematology and Medical Oncology Fellow  Pager: [phone]  For weekends and evenings (5 pm - 8 am), please page Heme/Onc fellow on call.

## 2022-07-29 NOTE — DISCHARGE NOTE PROVIDER - NSDCFUSCHEDAPPT_GEN_ALL_CORE_FT
[facility]  [facility]  Scheduled Appointment: 08/02/2022    Cesia Andersen  [facility]  [facility] [address]  Scheduled Appointment: 08/11/2022    Hosea Rangel  [facility]  [facility]  Scheduled Appointment: 08/15/2022    [facility]  [facility]  Scheduled Appointment: 08/16/2022    [facility]  [facility]  Scheduled Appointment: 08/30/2022    [facility]  [facility]  Scheduled Appointment: 09/13/2022    [facility]  [facility]  Scheduled Appointment: 09/27/2022

## 2022-07-29 NOTE — DISCHARGE NOTE NURSING/CASE MANAGEMENT/SOCIAL WORK - PATIENT PORTAL LINK FT
You can access the FollowMyHealth Patient Portal offered by [facility] by registering at the following website: [email or web address]. By joining [facility]’s FollowMyHealth portal, you will also be able to view your health information using other applications (apps) compatible with our system.

## 2022-07-29 NOTE — H&P ADULT - NSHPLABSRESULTS_GEN_ALL_CORE
Labs reviewed:                         10.5   25.42 )-----------( 309      ( 29 Jul 2022 11:18 )             33.5     07-29    137  |  101  |  14  ----------------------------<  87  5.2   |  25  |  0.64    Ca    9.1      29 Jul 2022 11:18  Phos  3.4     07-29  Mg     2.0     07-29    TPro  7.8  /  Alb  3.2<L>  /  TBili  0.2  /  DBili  x   /  AST  34  /  ALT  15  /  AlkPhos  114  07-29    PT/INR - ( 29 Jul 2022 11:18 )   PT: 17.6 sec;   INR: 1.51 ratio         PTT - ( 29 Jul 2022 11:18 )  PTT:40.6 sec      Imaging personally reviewed:  7/29/22 CT Neck:  FINDINGS: Evaluation is limited secondary to exclusion of IV contrast.    Review of the cervical lymph node stations again reveals multiple   enlarged cervical lymph nodes of multiple dulce stations.    A previously referenced left-sided level Ib lymph node on the left side   currently measures 3.0 x 2.3 cm (AP by transverse) (series 3, image 44),   previously measuring 3.0 x 2.3 cm.  A previously referenced left-sided level II lymph node currently measures   1.5 x 1.2 cm (series 3, image 33), previous seen measuring 1.5 x 1.2 cm.  A previously referenced left-sided level III lymph node currently   measures 1.7 x 1.6 cm (series 3, image 54), previously measuring 1.7 x   1.6 cm.  A previously referenced left-sided level V  lymph node currently measures   3.1 x 2.7 cm (series 3, image 52), previously measuring 3.1 x 2.7 cm.    No new or enlarging cervical lymph nodes are seen.    The aerodigestive tract is an unremarkable noncontrast appearance.    The bilateral salivary glands appear unremarkable.    The thyroid gland appears within normal limits.    Evaluation of the neck vessels is limited secondary to exclusion of IV   contrast. There is a left Mediport with its tip at the SVC.    The imaged intracranial and orbital structures appear unremarkable.    The paranasal sinuses and mastoid air cells are clear.    The maxilla and mandible appear intact. The TMJ spaces appear   unremarkable.    The cervical spine appears within normal limits. And lytic focus at L2   with mild compression appears unchanged.    The imaged portions of the upper lobes are clear.    IMPRESSION: Limited study secondary to exclusion of IV contrast.    Redemonstration of cervical lymphadenopathy which appears similar when   compared with the most recent prior neck CT exam.    Unchanged lytic focus at T2. Previously imaged lytic T4 lesion is not   included on field-of-view on today's study.    ECG reviewed and interpreted: Labs reviewed:                         10.5   25.42 )-----------( 309      ( 29 Jul 2022 11:18 )             33.5     07-29    137  |  101  |  14  ----------------------------<  87  5.2   |  25  |  0.64    Ca    9.1      29 Jul 2022 11:18  Phos  3.4     07-29  Mg     2.0     07-29    TPro  7.8  /  Alb  3.2<L>  /  TBili  0.2  /  DBili  x   /  AST  34  /  ALT  15  /  AlkPhos  114  07-29    PT/INR - ( 29 Jul 2022 11:18 )   PT: 17.6 sec;   INR: 1.51 ratio         PTT - ( 29 Jul 2022 11:18 )  PTT:40.6 sec      Imaging personally reviewed:  7/14/22 CT Chest/Abd/Pelv:  IMPRESSION:  While lung nodules and mediastinal and right hilar lymphadenopathy have   decreased in size, there is marked increase in size and number of low   attenuation lesions throughout the spleen.    7/29/22 CT Neck:  FINDINGS: Evaluation is limited secondary to exclusion of IV contrast.    Review of the cervical lymph node stations again reveals multiple   enlarged cervical lymph nodes of multiple dulce stations.    A previously referenced left-sided level Ib lymph node on the left side   currently measures 3.0 x 2.3 cm (AP by transverse) (series 3, image 44),   previously measuring 3.0 x 2.3 cm.  A previously referenced left-sided level II lymph node currently measures   1.5 x 1.2 cm (series 3, image 33), previous seen measuring 1.5 x 1.2 cm.  A previously referenced left-sided level III lymph node currently   measures 1.7 x 1.6 cm (series 3, image 54), previously measuring 1.7 x   1.6 cm.  A previously referenced left-sided level V  lymph node currently measures   3.1 x 2.7 cm (series 3, image 52), previously measuring 3.1 x 2.7 cm.    No new or enlarging cervical lymph nodes are seen.    The aerodigestive tract is an unremarkable noncontrast appearance.    The bilateral salivary glands appear unremarkable.    The thyroid gland appears within normal limits.    Evaluation of the neck vessels is limited secondary to exclusion of IV   contrast. There is a left Mediport with its tip at the SVC.    The imaged intracranial and orbital structures appear unremarkable.    The paranasal sinuses and mastoid air cells are clear.    The maxilla and mandible appear intact. The TMJ spaces appear   unremarkable.    The cervical spine appears within normal limits. And lytic focus at L2   with mild compression appears unchanged.    The imaged portions of the upper lobes are clear.    IMPRESSION: Limited study secondary to exclusion of IV contrast.    Redemonstration of cervical lymphadenopathy which appears similar when   compared with the most recent prior neck CT exam.    Unchanged lytic focus at T2. Previously imaged lytic T4 lesion is not   included on field-of-view on today's study.    ECG reviewed and interpreted:

## 2022-07-29 NOTE — ED PROVIDER NOTE - PROGRESS NOTE DETAILS
Spoke with hematology, requesting labs and TLS labs, CT neck CAP and admission for restarting treatment given his relapse and non compliance.  Nba Fishman M.D.

## 2022-07-29 NOTE — DISCHARGE NOTE PROVIDER - NSDCCPCAREPLAN_GEN_ALL_CORE_FT
PRINCIPAL DISCHARGE DIAGNOSIS  Diagnosis: Hodgkins lymphoma  Assessment and Plan of Treatment: pt signed AMA      SECONDARY DISCHARGE DIAGNOSES  Diagnosis: Leukocytosis  Assessment and Plan of Treatment: pt signed AMA

## 2022-07-29 NOTE — H&P ADULT - PROBLEM SELECTOR PLAN 5
DVT ppx: lovenox    Patient has no PCP at this time. He will need referral to PCP to establish care on discharge.

## 2022-07-29 NOTE — H&P ADULT - NSHPREVIEWOFSYSTEMS_GEN_ALL_CORE
CONSTITUTIONAL: No fever, weight loss, or fatigue  EYES: No eye pain, visual disturbances, or discharge  ENMT:  No difficulty hearing, tinnitus, vertigo; no odynophagia, no dysphagia  NECK: +lymphadenopathy associated with LN pain  RESPIRATORY: No cough, wheezing, chills or hemoptysis; No shortness of breath  CARDIOVASCULAR: No chest pain, palpitations, dizziness, or leg swelling  GASTROINTESTINAL: No abdominal or epigastric pain. No nausea, vomiting, or hematemesis; No diarrhea or constipation. No melena or hematochezia.  GENITOURINARY: No dysuria, frequency, hematuria, or incontinence  NEUROLOGICAL: No headaches, memory loss, loss of strength, numbness, or tremors  SKIN: No itching, burning, rashes, or lesions   LYMPH NODES: No enlarged glands  ENDOCRINE: No heat or cold intolerance; No hair loss  MUSCULOSKELETAL: No joint pain or swelling; No muscle, back, or extremity pain  PSYCHIATRIC: No depression, anxiety, mood swings, or difficulty sleeping  HEME/LYMPH: No easy bruising, or bleeding gums

## 2022-07-29 NOTE — H&P ADULT - NSHPADDITIONALINFOADULT_GEN_ALL_CORE
.  Audra Neville MD  Division of Hospital Medicine  [facility]   Available on Microsoft Teams - messages preferred prior to calls.    Plan discussed with patient, heme/onc fellow Alaina, and medicine SERGIO Simon.

## 2022-07-29 NOTE — H&P ADULT - NSICDXFAMILYHX_GEN_ALL_CORE_FT
FAMILY HISTORY:  FH: Hodgkins disease, multiple distant family members    Mother  Still living? Unknown  FH: asthma, Age at diagnosis: Age Unknown

## 2022-07-29 NOTE — H&P ADULT - PROBLEM SELECTOR PLAN 3
baseline Hb previously appeared to have been 13-14 range  - Hb 10.5 on admission  - check iron studies, b12 folate

## 2022-08-02 ENCOUNTER — OUTPATIENT (OUTPATIENT)
Dept: OUTPATIENT SERVICES | Facility: HOSPITAL | Age: 22
LOS: 1 days | End: 2022-08-02
Payer: MEDICAID

## 2022-08-02 ENCOUNTER — RESULT REVIEW (OUTPATIENT)
Age: 22
End: 2022-08-02

## 2022-08-02 VITALS
SYSTOLIC BLOOD PRESSURE: 94 MMHG | HEART RATE: 69 BPM | TEMPERATURE: 98 F | OXYGEN SATURATION: 100 % | RESPIRATION RATE: 17 BRPM | HEIGHT: 67 IN | WEIGHT: 110.01 LBS | DIASTOLIC BLOOD PRESSURE: 61 MMHG

## 2022-08-02 DIAGNOSIS — C81.90 HODGKIN LYMPHOMA, UNSPECIFIED, UNSPECIFIED SITE: ICD-10-CM

## 2022-08-02 DIAGNOSIS — R93.5 ABNORMAL FINDINGS ON DIAGNOSTIC IMAGING OF OTHER ABDOMINAL REGIONS, INCLUDING RETROPERITONEUM: ICD-10-CM

## 2022-08-02 LAB — SARS-COV-2 RNA SPEC QL NAA+PROBE: SIGNIFICANT CHANGE UP

## 2022-08-02 PROCEDURE — 88365 INSITU HYBRIDIZATION (FISH): CPT

## 2022-08-02 PROCEDURE — 76942 ECHO GUIDE FOR BIOPSY: CPT

## 2022-08-02 PROCEDURE — U0003: CPT

## 2022-08-02 PROCEDURE — 38505 NEEDLE BIOPSY LYMPH NODES: CPT

## 2022-08-02 PROCEDURE — 88342 IMHCHEM/IMCYTCHM 1ST ANTB: CPT | Mod: 26

## 2022-08-02 PROCEDURE — 88341 IMHCHEM/IMCYTCHM EA ADD ANTB: CPT | Mod: 26

## 2022-08-02 PROCEDURE — 88365 INSITU HYBRIDIZATION (FISH): CPT | Mod: 26

## 2022-08-02 PROCEDURE — 88341 IMHCHEM/IMCYTCHM EA ADD ANTB: CPT

## 2022-08-02 PROCEDURE — 88173 CYTOPATH EVAL FNA REPORT: CPT | Mod: 26

## 2022-08-02 PROCEDURE — 88173 CYTOPATH EVAL FNA REPORT: CPT

## 2022-08-02 PROCEDURE — 76942 ECHO GUIDE FOR BIOPSY: CPT | Mod: 26

## 2022-08-02 PROCEDURE — 88172 CYTP DX EVAL FNA 1ST EA SITE: CPT

## 2022-08-02 PROCEDURE — 88305 TISSUE EXAM BY PATHOLOGIST: CPT

## 2022-08-02 PROCEDURE — 88305 TISSUE EXAM BY PATHOLOGIST: CPT | Mod: 26

## 2022-08-02 NOTE — ASU PATIENT PROFILE, ADULT - AS SC BRADEN SENSORY
[FreeTextEntry1] : RODY BELL is a 73 year male who presents for initial evaluation of right ring finger deformity x 2-3 weeks. He was climbing up the attic stairs and he put his weight on his finger tips. He felt a pop in the finger and then noticed that his DIP was in a flexed position. He states that it is not painful. He was placed about 3 weeks ago in a static splint to maintain extension of the ring finger DIP. He is in office today to have the splint changed.\par \par \par 
(4) no impairment

## 2022-08-02 NOTE — PROCEDURE NOTE - PROCEDURE FINDINGS AND DETAILS
Left neck LAURA performed successfully using 18 g core needle and 20 g FNA.  Specimen deemed adequate by cytopathologist.  Full report to follow.

## 2022-08-02 NOTE — PRE PROCEDURE NOTE - PRE PROCEDURE EVALUATION
Interventional Radiology    HPI: 21y Male with refractory or relapsed Hodgkin's lymphoma presents for left cervical lymph node biopsy.        Allergies: IV Contrast (Vomiting)  Rocephin (Pruritus)    Medications (Abx/Cardiac/Anticoagulation/Blood Products)      Data:  170.2  49.9  T(C): 36.9  HR: 69  BP: 94/61  RR: 17  SpO2: 100%    Exam  General: No acute distress  Chest: Non labored breathing  Abdomen: Non-distended  Extremities: No swelling, warm    -WBC 25.42 / HgB 10.5 / Hct 33.5 / Plt 309  -Na 137 / Cl 101 / BUN 14 / Glucose 87  -K 5.2 / CO2 25 / Cr 0.64  -ALT 15 / Alk Phos 114 / T.Bili 0.2  -INR1.51    Imaging:     Plan:     -- Relevant imaging and labs were reviewed.   -- No additional antibiotics are indicated for this procedure.   -- Risks, benefits, and alternatives were explained to the patient and informed consent was obtained.

## 2022-08-02 NOTE — ASU PATIENT PROFILE, ADULT - FALL HARM RISK - UNIVERSAL INTERVENTIONS
Bed in lowest position, wheels locked, appropriate side rails in place/Call bell, personal items and telephone in reach/Instruct patient to call for assistance before getting out of bed or chair/Non-slip footwear when patient is out of bed/Rebecca to call system/Physically safe environment - no spills, clutter or unnecessary equipment/Purposeful Proactive Rounding/Room/bathroom lighting operational, light cord in reach

## 2022-08-02 NOTE — ASU DISCHARGE PLAN (ADULT/PEDIATRIC) - NS MD DC FALL RISK RISK
For information on Fall & Injury Prevention, visit: [email or web address] OR  [email or web address] OR  https://www.cdc.gov/steadi/patient.html

## 2022-08-02 NOTE — ASU DISCHARGE PLAN (ADULT/PEDIATRIC) - ASU DC SPECIAL INSTRUCTIONSFT
Biopsy Discharge    Discharge Instructions  - You have had a biopsy of left neck.   - You may shower in 24 hours. No soaking or swimming until the site is completely healed.  - Keep the area covered and dry for the next 24 hours.  - Do not perform any heavy lifting for the next few days or until the site is healed.  - You may resume your normal diet.  - You may resume your normal medications however you should wait 48 hours before restarting aspirin, plavix, or blood thinners.  - It is normal to experience some pain over the site for the next few days. You may take apply ice to the area (20 minutes on, 20 minutes off) and take Tylenol for that pain. Do not take more frequently than every 6 hours and do not exceed more than 3000mg of Tylenol in a 24 hour period.      Notify your primary physician and/or Interventional Radiology IMMEDIATELY if you experience any of the following       - Fever of 101F or 38C       - Chills or Rigors/ Shakes       - Swelling and/or Redness in the area around the biopsy site       - Worsening Pain       - Blood soaked bandages or worsening bleeding       - Lightheadedness and/or dizziness upon standing       - Chest Pain/ Tightness       - Shortness of Breath       - Difficulty walking    If you have a problem that you believe requires IMMEDIATE attention, please go to your NEAREST Emergency Room. If you believe your problem can safely wait until you speak to a physician, please call Interventional Radiology for any concerns.    During Normal Weekday Business Hours- You can contact the Interventional Radiology department during normal business hours via telephone.  During Evenings and Weekends- If you need to contact Interventional Radiology during off hours, do so by calling the hospital and requesting to be connected to the Interventional Radiologist on call.

## 2022-08-08 LAB — NON-GYNECOLOGICAL CYTOLOGY STUDY: SIGNIFICANT CHANGE UP

## 2022-08-08 NOTE — PROGRESS NOTE ADULT - NUTRITIONAL ASSESSMENT
This patient has been assessed with a concern for Malnutrition and has been determined to have a diagnosis/diagnoses of Severe protein-calorie malnutrition and Underweight/BMI < 19.    This patient is being managed with:   Diet Regular-  Entered: Oct 17 2020  6:58PM     No

## 2022-08-10 ENCOUNTER — APPOINTMENT (OUTPATIENT)
Dept: INFUSION THERAPY | Facility: HOSPITAL | Age: 22
End: 2022-08-10

## 2022-08-11 ENCOUNTER — APPOINTMENT (OUTPATIENT)
Dept: INTERNAL MEDICINE | Facility: CLINIC | Age: 22
End: 2022-08-11

## 2022-08-11 PROCEDURE — 82947 ASSAY GLUCOSE BLOOD QUANT: CPT

## 2022-08-11 PROCEDURE — 80053 COMPREHEN METABOLIC PANEL: CPT

## 2022-08-11 PROCEDURE — 85014 HEMATOCRIT: CPT

## 2022-08-11 PROCEDURE — 83735 ASSAY OF MAGNESIUM: CPT

## 2022-08-11 PROCEDURE — 71250 CT THORAX DX C-: CPT | Mod: MA

## 2022-08-11 PROCEDURE — 82435 ASSAY OF BLOOD CHLORIDE: CPT

## 2022-08-11 PROCEDURE — 85385 FIBRINOGEN ANTIGEN: CPT

## 2022-08-11 PROCEDURE — 84100 ASSAY OF PHOSPHORUS: CPT

## 2022-08-11 PROCEDURE — 36415 COLL VENOUS BLD VENIPUNCTURE: CPT

## 2022-08-11 PROCEDURE — 93306 TTE W/DOPPLER COMPLETE: CPT

## 2022-08-11 PROCEDURE — 74176 CT ABD & PELVIS W/O CONTRAST: CPT | Mod: MA

## 2022-08-11 PROCEDURE — 84132 ASSAY OF SERUM POTASSIUM: CPT

## 2022-08-11 PROCEDURE — 85730 THROMBOPLASTIN TIME PARTIAL: CPT

## 2022-08-11 PROCEDURE — 84550 ASSAY OF BLOOD/URIC ACID: CPT

## 2022-08-11 PROCEDURE — 82330 ASSAY OF CALCIUM: CPT

## 2022-08-11 PROCEDURE — 85018 HEMOGLOBIN: CPT

## 2022-08-11 PROCEDURE — 83605 ASSAY OF LACTIC ACID: CPT

## 2022-08-11 PROCEDURE — 82803 BLOOD GASES ANY COMBINATION: CPT

## 2022-08-11 PROCEDURE — 85025 COMPLETE CBC W/AUTO DIFF WBC: CPT

## 2022-08-11 PROCEDURE — 70490 CT SOFT TISSUE NECK W/O DYE: CPT | Mod: MA

## 2022-08-11 PROCEDURE — 85610 PROTHROMBIN TIME: CPT

## 2022-08-11 PROCEDURE — 93356 MYOCRD STRAIN IMG SPCKL TRCK: CPT

## 2022-08-11 PROCEDURE — 83615 LACTATE (LD) (LDH) ENZYME: CPT

## 2022-08-11 PROCEDURE — 99285 EMERGENCY DEPT VISIT HI MDM: CPT | Mod: 25

## 2022-08-11 PROCEDURE — 84295 ASSAY OF SERUM SODIUM: CPT

## 2022-08-12 DIAGNOSIS — R59.0 LOCALIZED ENLARGED LYMPH NODES: ICD-10-CM

## 2022-08-12 DIAGNOSIS — Z85.71 PERSONAL HISTORY OF HODGKIN LYMPHOMA: ICD-10-CM

## 2022-08-15 ENCOUNTER — APPOINTMENT (OUTPATIENT)
Dept: HEMATOLOGY ONCOLOGY | Facility: CLINIC | Age: 22
End: 2022-08-15

## 2022-08-16 ENCOUNTER — APPOINTMENT (OUTPATIENT)
Dept: INFUSION THERAPY | Facility: HOSPITAL | Age: 22
End: 2022-08-16

## 2022-08-22 ENCOUNTER — RESULT REVIEW (OUTPATIENT)
Age: 22
End: 2022-08-22

## 2022-08-22 ENCOUNTER — APPOINTMENT (OUTPATIENT)
Dept: INFUSION THERAPY | Facility: HOSPITAL | Age: 22
End: 2022-08-22

## 2022-08-22 ENCOUNTER — APPOINTMENT (OUTPATIENT)
Dept: HEMATOLOGY ONCOLOGY | Facility: CLINIC | Age: 22
End: 2022-08-22

## 2022-08-22 ENCOUNTER — NON-APPOINTMENT (OUTPATIENT)
Age: 22
End: 2022-08-22

## 2022-08-22 DIAGNOSIS — Z51.11 ENCOUNTER FOR ANTINEOPLASTIC CHEMOTHERAPY: ICD-10-CM

## 2022-08-22 LAB
ALBUMIN SERPL ELPH-MCNC: 3.7 G/DL — SIGNIFICANT CHANGE UP (ref 3.3–5)
ALP SERPL-CCNC: 142 U/L — HIGH (ref 40–120)
ALT FLD-CCNC: 15 U/L — SIGNIFICANT CHANGE UP (ref 10–45)
ANION GAP SERPL CALC-SCNC: 18 MMOL/L — HIGH (ref 5–17)
AST SERPL-CCNC: 19 U/L — SIGNIFICANT CHANGE UP (ref 10–40)
B2 MICROGLOB SERPL-MCNC: 2.4 MG/L — HIGH (ref 0.8–2.2)
BASOPHILS # BLD AUTO: 0.07 K/UL — SIGNIFICANT CHANGE UP (ref 0–0.2)
BASOPHILS # BLD AUTO: 0.08 K/UL — SIGNIFICANT CHANGE UP (ref 0–0.2)
BASOPHILS NFR BLD AUTO: 0.3 % — SIGNIFICANT CHANGE UP (ref 0–2)
BASOPHILS NFR BLD AUTO: 0.3 % — SIGNIFICANT CHANGE UP (ref 0–2)
BILIRUB SERPL-MCNC: 0.2 MG/DL — SIGNIFICANT CHANGE UP (ref 0.2–1.2)
BUN SERPL-MCNC: 15 MG/DL — SIGNIFICANT CHANGE UP (ref 7–23)
CALCIUM SERPL-MCNC: 9.7 MG/DL — SIGNIFICANT CHANGE UP (ref 8.4–10.5)
CHLORIDE SERPL-SCNC: 101 MMOL/L — SIGNIFICANT CHANGE UP (ref 96–108)
CHOLEST SERPL-MCNC: 144 MG/DL — SIGNIFICANT CHANGE UP
CO2 SERPL-SCNC: 22 MMOL/L — SIGNIFICANT CHANGE UP (ref 22–31)
CREAT SERPL-MCNC: 0.9 MG/DL — SIGNIFICANT CHANGE UP (ref 0.5–1.3)
EGFR: 125 ML/MIN/1.73M2 — SIGNIFICANT CHANGE UP
EOSINOPHIL # BLD AUTO: 1.54 K/UL — HIGH (ref 0–0.5)
EOSINOPHIL # BLD AUTO: 1.64 K/UL — HIGH (ref 0–0.5)
EOSINOPHIL NFR BLD AUTO: 6.9 % — HIGH (ref 0–6)
EOSINOPHIL NFR BLD AUTO: 7 % — HIGH (ref 0–6)
GLUCOSE SERPL-MCNC: 70 MG/DL — SIGNIFICANT CHANGE UP (ref 70–99)
HCT VFR BLD CALC: 34.9 % — LOW (ref 39–50)
HCT VFR BLD CALC: 35 % — LOW (ref 39–50)
HDLC SERPL-MCNC: 29 MG/DL — LOW
HGB BLD-MCNC: 10.6 G/DL — LOW (ref 13–17)
HGB BLD-MCNC: 10.9 G/DL — LOW (ref 13–17)
IMM GRANULOCYTES NFR BLD AUTO: 0.6 % — SIGNIFICANT CHANGE UP (ref 0–1.5)
IMM GRANULOCYTES NFR BLD AUTO: 0.8 % — SIGNIFICANT CHANGE UP (ref 0–1.5)
LDH SERPL L TO P-CCNC: 298 U/L — HIGH (ref 50–242)
LIPID PNL WITH DIRECT LDL SERPL: 99 MG/DL — SIGNIFICANT CHANGE UP
LYMPHOCYTES # BLD AUTO: 1.97 K/UL — SIGNIFICANT CHANGE UP (ref 1–3.3)
LYMPHOCYTES # BLD AUTO: 2.2 K/UL — SIGNIFICANT CHANGE UP (ref 1–3.3)
LYMPHOCYTES # BLD AUTO: 8.8 % — LOW (ref 13–44)
LYMPHOCYTES # BLD AUTO: 9.4 % — LOW (ref 13–44)
MCHC RBC-ENTMCNC: 23.2 PG — LOW (ref 27–34)
MCHC RBC-ENTMCNC: 23.7 PG — LOW (ref 27–34)
MCHC RBC-ENTMCNC: 30.4 G/DL — LOW (ref 32–36)
MCHC RBC-ENTMCNC: 31.1 G/DL — LOW (ref 32–36)
MCV RBC AUTO: 76.3 FL — LOW (ref 80–100)
MCV RBC AUTO: 76.5 FL — LOW (ref 80–100)
MONOCYTES # BLD AUTO: 1.02 K/UL — HIGH (ref 0–0.9)
MONOCYTES # BLD AUTO: 1.29 K/UL — HIGH (ref 0–0.9)
MONOCYTES NFR BLD AUTO: 4.6 % — SIGNIFICANT CHANGE UP (ref 2–14)
MONOCYTES NFR BLD AUTO: 5.5 % — SIGNIFICANT CHANGE UP (ref 2–14)
NEUTROPHILS # BLD AUTO: 17.67 K/UL — HIGH (ref 1.8–7.4)
NEUTROPHILS # BLD AUTO: 18.01 K/UL — HIGH (ref 1.8–7.4)
NEUTROPHILS NFR BLD AUTO: 77 % — SIGNIFICANT CHANGE UP (ref 43–77)
NEUTROPHILS NFR BLD AUTO: 78.8 % — HIGH (ref 43–77)
NON HDL CHOLESTEROL: 115 MG/DL — SIGNIFICANT CHANGE UP
NRBC # BLD: 0 /100 WBCS — SIGNIFICANT CHANGE UP (ref 0–0)
NRBC # BLD: 0 /100 WBCS — SIGNIFICANT CHANGE UP (ref 0–0)
PLATELET # BLD AUTO: 347 K/UL — SIGNIFICANT CHANGE UP (ref 150–400)
PLATELET # BLD AUTO: 402 K/UL — HIGH (ref 150–400)
POTASSIUM SERPL-MCNC: 4.8 MMOL/L — SIGNIFICANT CHANGE UP (ref 3.5–5.3)
POTASSIUM SERPL-SCNC: 4.8 MMOL/L — SIGNIFICANT CHANGE UP (ref 3.5–5.3)
PROT SERPL-MCNC: 7.7 G/DL — SIGNIFICANT CHANGE UP (ref 6–8.3)
RBC # BLD: 4.56 M/UL — SIGNIFICANT CHANGE UP (ref 4.2–5.8)
RBC # BLD: 4.59 M/UL — SIGNIFICANT CHANGE UP (ref 4.2–5.8)
RBC # FLD: 17.2 % — HIGH (ref 10.3–14.5)
RBC # FLD: 17.3 % — HIGH (ref 10.3–14.5)
SODIUM SERPL-SCNC: 140 MMOL/L — SIGNIFICANT CHANGE UP (ref 135–145)
T4 FREE SERPL-MCNC: 1.1 NG/DL — SIGNIFICANT CHANGE UP (ref 0.9–1.8)
T4 FREE+ TSH PNL SERPL: 10.1 UIU/ML — HIGH (ref 0.27–4.2)
TRIGL SERPL-MCNC: 77 MG/DL — SIGNIFICANT CHANGE UP
URATE SERPL-MCNC: 6.2 MG/DL — SIGNIFICANT CHANGE UP (ref 3.4–8.8)
WBC # BLD: 22.41 K/UL — HIGH (ref 3.8–10.5)
WBC # BLD: 23.4 K/UL — HIGH (ref 3.8–10.5)
WBC # FLD AUTO: 22.41 K/UL — HIGH (ref 3.8–10.5)
WBC # FLD AUTO: 23.4 K/UL — HIGH (ref 3.8–10.5)

## 2022-08-22 PROCEDURE — 99215 OFFICE O/P EST HI 40 MIN: CPT

## 2022-08-23 LAB
ALBUMIN SERPL ELPH-MCNC: 3.7 G/DL
ALP BLD-CCNC: 139 U/L
ALT SERPL-CCNC: 14 U/L
ANION GAP SERPL CALC-SCNC: 10 MMOL/L
AST SERPL-CCNC: 15 U/L
BILIRUB SERPL-MCNC: 0.2 MG/DL
BUN SERPL-MCNC: 15 MG/DL
CALCIUM SERPL-MCNC: 9.4 MG/DL
CHLORIDE SERPL-SCNC: 103 MMOL/L
CO2 SERPL-SCNC: 28 MMOL/L
CREAT SERPL-MCNC: 0.94 MG/DL
EGFR: 118 ML/MIN/1.73M2
GLUCOSE SERPL-MCNC: 83 MG/DL
IGA FLD-MCNC: 401 MG/DL — SIGNIFICANT CHANGE UP (ref 84–499)
IGG FLD-MCNC: 1703 MG/DL — HIGH (ref 610–1660)
IGM SERPL-MCNC: 417 MG/DL — HIGH (ref 35–242)
KAPPA LC SER QL IFE: 3.52 MG/DL — HIGH (ref 0.33–1.94)
KAPPA/LAMBDA FREE LIGHT CHAIN RATIO, SERUM: 1.08 RATIO — SIGNIFICANT CHANGE UP (ref 0.26–1.65)
LAMBDA LC SER QL IFE: 3.25 MG/DL — HIGH (ref 0.57–2.63)
LDH SERPL-CCNC: 237 U/L
POTASSIUM SERPL-SCNC: 4.3 MMOL/L
PROT SERPL-MCNC: 7.5 G/DL
SODIUM SERPL-SCNC: 141 MMOL/L
TSH SERPL-ACNC: 8.1 UIU/ML
URATE SERPL-MCNC: 6.4 MG/DL

## 2022-08-23 NOTE — ASSESSMENT
[Curative] : Goals of care discussed with patient: Curative [FreeTextEntry1] : 21 year old male who was diagnosed with a stage IV CHL in 9/2018 with relapsed/ refractory Classical Hodgkin's lymphoma. He was initially treated following ABVE-PC completed 12/2018 after 5 cycles due to noncompliance and patient refusal, relapsed diagnosed in Feb 2019, started on salvage treatment in April 2019 with gemzar/ brentuximab which was also stopped due to the patient's refusal and noncompliance. \par \par He was recommended to have 2 cycles of ICE with intent to pursue an autologous bone marrow transplant. The patient began the cycle and then left AMA while the chemotherapy was running unfortunately. Despite multiple discussions with the patient and his mother, he did not follow up and refused further treatment. Recently with progression of disease/ osseous mets which he was recommended to go to the hospital, he again refused and left the office. \par \par He was readmitted in 10/2020 for diffuse vertebral disease/compression fractures. He was treated with radiation, 800 cGy to T1-T5, T8-L5, left hip/acetabulum. He also received nivolumab with improvement in his lymphadenopathy and symptoms. Developed hypothyroidism while receiving nivolumab, improved while on Synthroid. But was noncompliant and again lost to follow up.  \par \par He presented again in June/July 2022 with worsening WBC and lymphadenopathy and more recently in Aug 2022 with night sweats. He underwent biopsy of one of the lymph nodes which came back showing recurrent Classical Hodgkins Lymphoma. Retreatment restarted 8/22/22 with Nivolumab immunotherapy. \par \par CBC Today: WBC 22.41, Hb 10.9, Plt 347\par \par - Repeat his labs- CMP, TSH today\par - Plan to restart treatment with nivolumab q 2 weeks on 28 day cycles, Cycle 1 day 1 today 8/22/22\par - Port flush to assess accessibility / remove if not plausible, has been > 8 months since a flush\par - Discussed his disease, limited options at this time and need for better compliance. Strongly encouraged him to be complaint if he will be a candidate for more aggressive therapies in the future should this regimen fail.\par - PCP for scoliosis related back pain\par -  Follow up in 2-4 weeks\par \par \par ____\par I personally have spent a total of 45 minutes of time on the date of this encounter reviewing test results, documenting findings, providing education, coordinating care and directly consulting with the patient and/or designated family member.

## 2022-08-23 NOTE — HISTORY OF PRESENT ILLNESS
[de-identified] : Hodgkins Lymphoma. Patient initially presented to the [facility] ED on 9/4/18 from [facility] with a mediastinal mass. Upon work up he was found to have Stage IVB disease. Ultrasound guided right supraclavicular lymph node Classical Hodgkins Lymphoma- large atypical cells to be CD30, CD15, PAX5+, MUM1+, LCA(-), CD20(-), CD79a(-), CD3(-), ALK(-).\par Bilateral bone marrow biopsy on 9/6/18- Cellular marrow with maturing trilineage hematopoiesis and no morphologic evidence of lymphoma\par PET/CT completed on 9/10/18- Hypermetabolic lymphadenopathy in neck and thorax, multiple hypermetabolic foci in the bilateral lower cervical and supraclavicular regions, measuring 0.9 x 0.7 cm demonstrates SUV 4.3 anterior mediastinal mass measuring 4.2 x 3 cm demonstrating peripheral FDG avidity and central photopenia, SUV: 6.9. Marked mediastinal and bilateral hilar lymphadenopathy demonstrating FDG avidity, right hilar lymph node measures 3.8 x 2.4 cm, SUV: 10.3. 5 mm nodule is again noted in the right middle lobe. Several hypermetabolic foci identified predominantly throughout the axial skeleton with foci noted scattered throughout the upper thoracic spine and a singular focus in the right anterior eighth rib FDG avid focus in the right side of the T7 vertebral body with corresponding lucency on CT measuring 0.9 x 0.9 cm, SUV 14.7. Two hypermetabolic foci in the appendicular skeleton with corresponding underlying lucency on CT. There is an FDG avid focus in the left inferior glenoid and a corresponding lucency on CT measuring 0.9 x 0.7 cm, SUV 12. There is also a 1.1 x 0.7 cm lucency in the right femoral head with corresponding FDG avidity, SUV 7.3. He was placed on/following a protocol- OC 1331, treatment with ABVE-PC (adriamycin, bleomycin, vincristine, etoposide, cytoxan, prednisone, dexrazoxane). He was withdrawn from the study due to his social situation, difficulty with compliance. 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 He was treated with chemotherapy from Sept 2018- Dec 2018. Interim PET/CT completed on 11/3/18- Partially hypermetabolic anterior mediastinal mass is decreased in \par size and metabolism as compared to prior study dated 9/10/2018, compatible with a partial response to interval therapy (Deauville 4). Resolution of additional previously seen hypermetabolic mediastinal, hilar, and cardiophrenic lymph nodes.Marked interval decrease in hypermetabolic foci in the axial and appendicular skeleton with residual hypermetabolic focus in left glenoid which may represent a small focus of residual disease (Deauville 4). He completed 5 cycles of chemotherapy, but missed about 10 appointments, and refused further chemotherapy after cycle 5 due to side effects. He underwent MRI Neck, Abdomen, and Pelvis from 2/2019 showed new lymph node enlargement to the L neck and redemonstrated osseous infiltration involving the bilateral pelvic bones and proximal femurs. CT Chest from 3/2019 showed mediastinal enlarged lymph nodes which were either unchanged or demonstrate interval progression since 11/2018, progressive R hilar lymphadenopathy, and a new cluster of small nodular opacities within the right lower lobe. PET Scan done 3/2019 revealed a new hypermetabolic lymphadenopathy in the left neck measuring approximately 2.4 x 2.1cm in the left level III/V cervical regions. There is also new hypermetabolic 1.2 x 0.8 cm left level VB cervical lymph node and chest new hypermetabolic approximately 1.1 x 0.9 cm right upper paratracheal node. Reference approximately 3.4 x 2.0 cm subcarinal node. Approximately 3.8 x 3.1cm right perihilar mass. as compared to PET/CT from 11/3/2018 suspicious for recurrent lymphoma. A nonspecific new diffuse splenic hypermetabolism. A new hypermetabolic opacity/consolidation in the right lower lung, approximately 2 x 1.3cm opacity/consolidation in the superior segment of the right lower lobe. Ultrasound guided core biopsy of left cervical lymph node done on 4/11/19- confirmed refractory disease. Bone marrow biopsy was negative. He was started on treatment 4/13 with salvage treatment with Gemzar/brentuximab which he received 2 cycles but again had compliance issues along with behavioral issues with the staff.  [de-identified] : 7/12/22: Patient is here today after being lost to follow up since last receiving nivolumab in 7/2021, treatment complicated by thyroid dysfunction. He was recently (7/7/22) evaluated at [facility] ED for nausea and vomiting from eating outside food. Was also found to have cervical lymphadenopathy, states it has been growing over the past 8 months, and was instructed to follow up with his hematologist. Today, he notes to have night sweats, unintentional weight loss (-12 Ibs), and cervical lymphadenopathy. Also, continues to have chronic low back pain due to scoliosis. Patient denies fever, chills, headache, abdominal pain, or chest pain.\par \par 8/22/22: Transfer of care from Dr Andressa Clinton. He has been experiencing severe night sweats the past 2 weeks. In addition he has been having back / neck pain related to both chronic scoliosis and left posterior cervical large adenopathy. He denies recent infections, fevers, chills, unexpected weight loss. 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 All pertinent positives are included in the H&P under interval history above and the remaining review of systems listed are negative. \par \par =================================================================\par \par ABVE-PC in 9/2018, completed 12/2018 after 5 cycles due to noncompliance and patient refusal, \par Gemzar/brentuximab in 4/2019 which was also stopped due to the patient's refusal and noncompliance. \par 2 cycles of ICE in 2/2020 with intent to pursue an autologous bone marrow transplant. The patient began the cycle and then left AMA.\par Radiation, 800 cGy to T1-T5, T8-L5, left hip/acetabulum & Nivolumab in 11/2020 with improvement, but lost to follow up. Treatment was also complicated due to development of hypothyroidism and COVID19\par \par =================================================================\par

## 2022-08-23 NOTE — PHYSICAL EXAM
[Fully active, able to carry on all pre-disease performance without restriction] : Status 0 - Fully active, able to carry on all pre-disease performance without restriction [Thin] : thin [Normal] : grossly intact [de-identified] : Several LNs in L cervical chain: 4x3cm, 2x3cm, 1.5x1cm, 1x1cm, 1x1cm; L submandibular 3.5x2cm; Suboccipital <1x1cm; L axilla <1x1cm; R inguinal 1x1cm, 1x1cm. [de-identified] : Scoliosis

## 2022-08-26 LAB
% ALBUMIN: 38.6 % — SIGNIFICANT CHANGE UP
% ALPHA 1: 8.1 % — SIGNIFICANT CHANGE UP
% ALPHA 2: 15.3 % — SIGNIFICANT CHANGE UP
% BETA: 14.6 % — SIGNIFICANT CHANGE UP
% GAMMA: 23.4 % — SIGNIFICANT CHANGE UP
% M SPIKE: 4.9 % — SIGNIFICANT CHANGE UP
ALBUMIN SERPL ELPH-MCNC: 3 G/DL — LOW (ref 3.6–5.5)
ALBUMIN/GLOB SERPL ELPH: 0.6 RATIO — SIGNIFICANT CHANGE UP
ALPHA1 GLOB SERPL ELPH-MCNC: 0.6 G/DL — HIGH (ref 0.1–0.4)
ALPHA2 GLOB SERPL ELPH-MCNC: 1.2 G/DL — HIGH (ref 0.5–1)
B-GLOBULIN SERPL ELPH-MCNC: 1.1 G/DL — HIGH (ref 0.5–1)
GAMMA GLOBULIN: 1.8 G/DL — HIGH (ref 0.6–1.6)
INTERPRETATION SERPL IFE-IMP: SIGNIFICANT CHANGE UP
M-SPIKE: 0.4 G/DL — HIGH (ref 0–0)
PROT PATTERN SERPL ELPH-IMP: SIGNIFICANT CHANGE UP

## 2022-08-30 ENCOUNTER — APPOINTMENT (OUTPATIENT)
Dept: INFUSION THERAPY | Facility: HOSPITAL | Age: 22
End: 2022-08-30

## 2022-09-09 ENCOUNTER — OUTPATIENT (OUTPATIENT)
Dept: OUTPATIENT SERVICES | Facility: HOSPITAL | Age: 22
LOS: 1 days | Discharge: HOME | End: 2022-09-09

## 2022-09-09 DIAGNOSIS — C81.98 HODGKIN LYMPHOMA, UNSPECIFIED, LYMPH NODES OF MULTIPLE SITES: ICD-10-CM

## 2022-09-10 ENCOUNTER — APPOINTMENT (OUTPATIENT)
Dept: INFUSION THERAPY | Facility: HOSPITAL | Age: 22
End: 2022-09-10

## 2022-09-13 ENCOUNTER — APPOINTMENT (OUTPATIENT)
Dept: INFUSION THERAPY | Facility: HOSPITAL | Age: 22
End: 2022-09-13

## 2022-09-19 ENCOUNTER — APPOINTMENT (OUTPATIENT)
Dept: HEMATOLOGY ONCOLOGY | Facility: CLINIC | Age: 22
End: 2022-09-19

## 2022-09-26 ENCOUNTER — RESULT REVIEW (OUTPATIENT)
Age: 22
End: 2022-09-26

## 2022-09-26 ENCOUNTER — LABORATORY RESULT (OUTPATIENT)
Age: 22
End: 2022-09-26

## 2022-09-26 ENCOUNTER — APPOINTMENT (OUTPATIENT)
Dept: HEMATOLOGY ONCOLOGY | Facility: CLINIC | Age: 22
End: 2022-09-26

## 2022-09-26 VITALS
OXYGEN SATURATION: 99 % | DIASTOLIC BLOOD PRESSURE: 61 MMHG | HEART RATE: 68 BPM | RESPIRATION RATE: 16 BRPM | SYSTOLIC BLOOD PRESSURE: 93 MMHG | TEMPERATURE: 97.7 F | WEIGHT: 103.62 LBS

## 2022-09-26 LAB
BASOPHILS # BLD AUTO: 0.05 K/UL — SIGNIFICANT CHANGE UP (ref 0–0.2)
BASOPHILS NFR BLD AUTO: 0.6 % — SIGNIFICANT CHANGE UP (ref 0–2)
EOSINOPHIL # BLD AUTO: 0.68 K/UL — HIGH (ref 0–0.5)
EOSINOPHIL NFR BLD AUTO: 8.2 % — HIGH (ref 0–6)
HCT VFR BLD CALC: 40.8 % — SIGNIFICANT CHANGE UP (ref 39–50)
HGB BLD-MCNC: 12.9 G/DL — LOW (ref 13–17)
IMM GRANULOCYTES NFR BLD AUTO: 0.2 % — SIGNIFICANT CHANGE UP (ref 0–0.9)
LYMPHOCYTES # BLD AUTO: 2.32 K/UL — SIGNIFICANT CHANGE UP (ref 1–3.3)
LYMPHOCYTES # BLD AUTO: 28.1 % — SIGNIFICANT CHANGE UP (ref 13–44)
MCHC RBC-ENTMCNC: 25.6 PG — LOW (ref 27–34)
MCHC RBC-ENTMCNC: 31.6 G/DL — LOW (ref 32–36)
MCV RBC AUTO: 81.1 FL — SIGNIFICANT CHANGE UP (ref 80–100)
MONOCYTES # BLD AUTO: 0.42 K/UL — SIGNIFICANT CHANGE UP (ref 0–0.9)
MONOCYTES NFR BLD AUTO: 5.1 % — SIGNIFICANT CHANGE UP (ref 2–14)
NEUTROPHILS # BLD AUTO: 4.78 K/UL — SIGNIFICANT CHANGE UP (ref 1.8–7.4)
NEUTROPHILS NFR BLD AUTO: 57.8 % — SIGNIFICANT CHANGE UP (ref 43–77)
NRBC # BLD: 0 /100 WBCS — SIGNIFICANT CHANGE UP (ref 0–0)
PLATELET # BLD AUTO: 234 K/UL — SIGNIFICANT CHANGE UP (ref 150–400)
RBC # BLD: 5.03 M/UL — SIGNIFICANT CHANGE UP (ref 4.2–5.8)
RBC # FLD: 22 % — HIGH (ref 10.3–14.5)
WBC # BLD: 8.27 K/UL — SIGNIFICANT CHANGE UP (ref 3.8–10.5)
WBC # FLD AUTO: 8.27 K/UL — SIGNIFICANT CHANGE UP (ref 3.8–10.5)

## 2022-09-26 PROCEDURE — 99213 OFFICE O/P EST LOW 20 MIN: CPT

## 2022-09-26 RX ORDER — LIDOCAINE AND PRILOCAINE 25; 25 MG/G; MG/G
2.5-2.5 CREAM TOPICAL
Qty: 1 | Refills: 0 | Status: DISCONTINUED | COMMUNITY
Start: 2020-01-28 | End: 2022-09-26

## 2022-09-26 RX ORDER — OXYCODONE 5 MG/1
5 TABLET ORAL
Qty: 60 | Refills: 0 | Status: DISCONTINUED | COMMUNITY
Start: 2020-11-05 | End: 2022-09-26

## 2022-09-26 RX ORDER — OXYCODONE 5 MG/1
5 TABLET ORAL
Qty: 90 | Refills: 0 | Status: DISCONTINUED | COMMUNITY
Start: 2021-05-05 | End: 2022-09-26

## 2022-09-27 ENCOUNTER — APPOINTMENT (OUTPATIENT)
Dept: INFUSION THERAPY | Facility: HOSPITAL | Age: 22
End: 2022-09-27

## 2022-09-27 LAB
ALBUMIN SERPL ELPH-MCNC: 4.5 G/DL
ALP BLD-CCNC: 68 U/L
ALT SERPL-CCNC: 13 U/L
ANION GAP SERPL CALC-SCNC: 13 MMOL/L
AST SERPL-CCNC: 15 U/L
B2 MICROGLOB SERPL-MCNC: 1.9 MG/L
BILIRUB SERPL-MCNC: 0.6 MG/DL
BUN SERPL-MCNC: 15 MG/DL
CALCIUM SERPL-MCNC: 9.8 MG/DL
CHLORIDE SERPL-SCNC: 103 MMOL/L
CHOLEST SERPL-MCNC: 201 MG/DL
CO2 SERPL-SCNC: 24 MMOL/L
CREAT SERPL-MCNC: 0.87 MG/DL
EGFR: 126 ML/MIN/1.73M2
GLUCOSE SERPL-MCNC: 76 MG/DL
HDLC SERPL-MCNC: 51 MG/DL
LDH SERPL-CCNC: 166 U/L
LDLC SERPL CALC-MCNC: 138 MG/DL
NONHDLC SERPL-MCNC: 150 MG/DL
POTASSIUM SERPL-SCNC: 4.5 MMOL/L
PROT SERPL-MCNC: 7.4 G/DL
SODIUM SERPL-SCNC: 141 MMOL/L
TRIGL SERPL-MCNC: 63 MG/DL
TSH SERPL-ACNC: 13.5 UIU/ML
URATE SERPL-MCNC: 7.1 MG/DL

## 2022-09-27 NOTE — PHYSICAL EXAM
[Fully active, able to carry on all pre-disease performance without restriction] : Status 0 - Fully active, able to carry on all pre-disease performance without restriction [Thin] : thin [Normal] : grossly intact [de-identified] : Several LNs in L cervical chain: 4x3cm, 2x3cm, 1.5x1cm, 1x1cm, 1x1cm; L submandibular 3.5x2cm; Suboccipital <1x1cm; L axilla <1x1cm; R inguinal 1x1cm, 1x1cm. [de-identified] : Scoliosis

## 2022-09-27 NOTE — HISTORY OF PRESENT ILLNESS
[de-identified] : Hodgkins Lymphoma. Patient initially presented to the [facility] ED on 9/4/18 from [facility] with a mediastinal mass. Upon work up he was found to have Stage IVB disease. Ultrasound guided right supraclavicular lymph node Classical Hodgkins Lymphoma- large atypical cells to be CD30, CD15, PAX5+, MUM1+, LCA(-), CD20(-), CD79a(-), CD3(-), ALK(-).\par Bilateral bone marrow biopsy on 9/6/18- Cellular marrow with maturing trilineage hematopoiesis and no morphologic evidence of lymphoma\par PET/CT completed on 9/10/18- Hypermetabolic lymphadenopathy in neck and thorax, multiple hypermetabolic foci in the bilateral lower cervical and supraclavicular regions, measuring 0.9 x 0.7 cm demonstrates SUV 4.3 anterior mediastinal mass measuring 4.2 x 3 cm demonstrating peripheral FDG avidity and central photopenia, SUV: 6.9. Marked mediastinal and bilateral hilar lymphadenopathy demonstrating FDG avidity, right hilar lymph node measures 3.8 x 2.4 cm, SUV: 10.3. 5 mm nodule is again noted in the right middle lobe. Several hypermetabolic foci identified predominantly throughout the axial skeleton with foci noted scattered throughout the upper thoracic spine and a singular focus in the right anterior eighth rib FDG avid focus in the right side of the T7 vertebral body with corresponding lucency on CT measuring 0.9 x 0.9 cm, SUV 14.7. Two hypermetabolic foci in the appendicular skeleton with corresponding underlying lucency on CT. There is an FDG avid focus in the left inferior glenoid and a corresponding lucency on CT measuring 0.9 x 0.7 cm, SUV 12. There is also a 1.1 x 0.7 cm lucency in the right femoral head with corresponding FDG avidity, SUV 7.3. He was placed on/following a protocol- OC 1331, treatment with ABVE-PC (adriamycin, bleomycin, vincristine, etoposide, cytoxan, prednisone, dexrazoxane). He was withdrawn from the study due to his social situation, difficulty with compliance. 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 He was treated with chemotherapy from Sept 2018- Dec 2018. Interim PET/CT completed on 11/3/18- Partially hypermetabolic anterior mediastinal mass is decreased in \par size and metabolism as compared to prior study dated 9/10/2018, compatible with a partial response to interval therapy (Deauville 4). Resolution of additional previously seen hypermetabolic mediastinal, hilar, and cardiophrenic lymph nodes.Marked interval decrease in hypermetabolic foci in the axial and appendicular skeleton with residual hypermetabolic focus in left glenoid which may represent a small focus of residual disease (Deauville 4). He completed 5 cycles of chemotherapy, but missed about 10 appointments, and refused further chemotherapy after cycle 5 due to side effects. He underwent MRI Neck, Abdomen, and Pelvis from 2/2019 showed new lymph node enlargement to the L neck and redemonstrated osseous infiltration involving the bilateral pelvic bones and proximal femurs. CT Chest from 3/2019 showed mediastinal enlarged lymph nodes which were either unchanged or demonstrate interval progression since 11/2018, progressive R hilar lymphadenopathy, and a new cluster of small nodular opacities within the right lower lobe. PET Scan done 3/2019 revealed a new hypermetabolic lymphadenopathy in the left neck measuring approximately 2.4 x 2.1cm in the left level III/V cervical regions. There is also new hypermetabolic 1.2 x 0.8 cm left level VB cervical lymph node and chest new hypermetabolic approximately 1.1 x 0.9 cm right upper paratracheal node. Reference approximately 3.4 x 2.0 cm subcarinal node. Approximately 3.8 x 3.1cm right perihilar mass. as compared to PET/CT from 11/3/2018 suspicious for recurrent lymphoma. A nonspecific new diffuse splenic hypermetabolism. A new hypermetabolic opacity/consolidation in the right lower lung, approximately 2 x 1.3cm opacity/consolidation in the superior segment of the right lower lobe. Ultrasound guided core biopsy of left cervical lymph node done on 4/11/19- confirmed refractory disease. Bone marrow biopsy was negative. He was started on treatment 4/13 with salvage treatment with Gemzar/brentuximab which he received 2 cycles but again had compliance issues along with behavioral issues with the staff.  [de-identified] : 7/12/22: Patient is here today after being lost to follow up since last receiving nivolumab in 7/2021, treatment complicated by thyroid dysfunction. He was recently (7/7/22) evaluated at [facility] ED for nausea and vomiting from eating outside food. Was also found to have cervical lymphadenopathy, states it has been growing over the past 8 months, and was instructed to follow up with his hematologist. Today, he notes to have night sweats, unintentional weight loss (-12 Ibs), and cervical lymphadenopathy. Also, continues to have chronic low back pain due to scoliosis. Patient denies fever, chills, headache, abdominal pain, or chest pain.\par \par 8/22/22: Transfer of care from Dr Andressa Clinton. He has been experiencing severe night sweats the past 2 weeks. In addition he has been having back / neck pain related to both chronic scoliosis and left posterior cervical large adenopathy. He denies recent infections, fevers, chills, unexpected weight loss. 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 Follow up. He is doing well. Denies any fever/chills, night sweats, unintentional weight loss, abd pain, n/v, bowel/bladder complaints. Continues to report chronic back pain, which he states he did not f.u with a PMD for. \par \par \par ____\par A comprehensive review of systems was performed including constitutional, eyes, ENT, cardiovascular, respiratory, gastrointestinal, genitourinary, musculoskeletal, integumentary, neurological, psychiatric and hematologic / lymphatic. All pertinent positives are included in the H&P under interval history above and the remaining review of systems listed are negative. \par \par =================================================================\par \par ABVE-PC in 9/2018, completed 12/2018 after 5 cycles due to noncompliance and patient refusal, \par Gemzar/brentuximab in 4/2019 which was also stopped due to the patient's refusal and noncompliance. \par 2 cycles of ICE in 2/2020 with intent to pursue an autologous bone marrow transplant. The patient began the cycle and then left AMA.\par Radiation, 800 cGy to T1-T5, T8-L5, left hip/acetabulum & Nivolumab in 11/2020 with improvement, but lost to follow up. Treatment was also complicated due to development of hypothyroidism and COVID19\par \par =================================================================\par

## 2022-09-27 NOTE — ADDENDUM
[FreeTextEntry1] : As per [facility], PMD appt was made with Dr. Cesia Andersen 08/11/2022 but patient did not show up to appt. Patient called and advised to call PMD's office to reschedule. Contact number was given. \par Auth team notified for mediport removal.

## 2022-09-27 NOTE — ASSESSMENT
[Curative] : Goals of care discussed with patient: Curative [FreeTextEntry1] : 21 year old male who was diagnosed with a stage IV CHL in 9/2018 with relapsed/ refractory Classical Hodgkin's lymphoma. He was initially treated following ABVE-PC completed 12/2018 after 5 cycles due to noncompliance and patient refusal, relapsed diagnosed in Feb 2019, started on salvage treatment in April 2019 with gemzar/ brentuximab which was also stopped due to the patient's refusal and noncompliance. \par \par He was recommended to have 2 cycles of ICE with intent to pursue an autologous bone marrow transplant. The patient began the cycle and then left AMA while the chemotherapy was running unfortunately. Despite multiple discussions with the patient and his mother, he did not follow up and refused further treatment. Recently with progression of disease/ osseous mets which he was recommended to go to the hospital, he again refused and left the office. \par \par He was readmitted in 10/2020 for diffuse vertebral disease/compression fractures. He was treated with radiation, 800 cGy to T1-T5, T8-L5, left hip/acetabulum. He also received nivolumab with improvement in his lymphadenopathy and symptoms. Developed hypothyroidism while receiving nivolumab, improved while on Synthroid. But was noncompliant and again lost to follow up.  \par \par He presented again in June/July 2022 with worsening WBC and lymphadenopathy and more recently in Aug 2022 with night sweats. He underwent biopsy of one of the lymph nodes which came back showing recurrent Classical Hodgkins Lymphoma. Retreatment restarted 8/22/22 with Nivolumab immunotherapy. \par \par -CBC today: WBC 8.27 K/uL,  HGB 12.9 g/dL,  HCT 40.8 %,  PLTS 234 K/uL. Results reviewed with patient\par - Repeat labs- B2M, CMP, Immunoelectrophoresis panel, LDH, Uric acid, Thyroid labs pending\par -Patient did not show up to Cycle 2 tx appt on 9/10 and to f/u appts. States he lost his phone and was busy. Patient was educated on importance of adhering to tx schedule and follow ups. Advised patient to notify team if he is unable to show up to appts. \par - Plan to continue treatment with nivolumab q 2 weeks on 28 day cycles, cycle 2 scheduled for 9/28/22\par -Patient states he does not want Port flushed to assess accessibility and is requesting it to be removed. \par - Discussed his disease, limited options at this time and need for better compliance. Strongly encouraged him to be complaint if he will be a candidate for more aggressive therapies in the future should this regimen fail.\par - PCP for scoliosis related back pain. Patient is requesting for a new PCP within [facility]. Referral made for PCP. \par -  Follow up in 2-4 weeks\par \par Case and management discussed with MD Rangel. \par \par ____\par I personally have spent a total of 25 minutes of time on the date of this encounter reviewing test results, documenting findings, providing education, coordinating care and directly consulting with the patient and/or designated family member.

## 2022-09-27 NOTE — PHYSICAL EXAM
[Fully active, able to carry on all pre-disease performance without restriction] : Status 0 - Fully active, able to carry on all pre-disease performance without restriction [Thin] : thin [Normal] : grossly intact [de-identified] : Several LNs in L cervical chain: 4x3cm, 2x3cm, 1.5x1cm, 1x1cm, 1x1cm; L submandibular 3.5x2cm; Suboccipital <1x1cm; L axilla <1x1cm; R inguinal 1x1cm, 1x1cm. [de-identified] : Scoliosis

## 2022-09-27 NOTE — ADDENDUM
[FreeTextEntry1] : As per [facility], PMD appt was made with Dr. Cesia Andersen 08/11/2022 but patient did not show up to appt. Patient called and advised to call PMD's office to reschedule. Contact number was given. \par Auth team notified for mediport removal.

## 2022-09-27 NOTE — REVIEW OF SYSTEMS
[Patient Intake Form Reviewed] : Patient intake form was reviewed [Negative] : Heme/Lymph [FreeTextEntry9] : chronic  back pain, shoulder pain

## 2022-09-27 NOTE — HISTORY OF PRESENT ILLNESS
[de-identified] : Hodgkins Lymphoma. [place] [facility] [place] from [facility] with a mediastinal mass. Upon work up he was found to have Stage IVB disease. Ultrasound guided right supraclavicular lymph node Classical Hodgkins Lymphoma- large atypical cells to be CD30, CD15, PAX5+, MUM1+, LCA(-), CD20(-), CD79a(-), CD3(-), ALK(-).\par Bilateral bone marrow biopsy on 9/6/18- Cellular marrow with maturing trilineage hematopoiesis and no morphologic evidence of lymphoma\par PET/CT completed on 9/10/18- Hypermetabolic lymphadenopathy in neck and thorax, multiple hypermetabolic foci in the bilateral lower cervical and supraclavicular regions, measuring 0.9 x 0.7 cm demonstrates SUV 4.3 anterior mediastinal mass measuring 4.2 x 3 cm demonstrating peripheral FDG avidity and central photopenia, SUV: 6.9. Marked mediastinal and bilateral hilar lymphadenopathy demonstrating FDG avidity, right hilar lymph node measures 3.8 x 2.4 cm, SUV: 10.3. 5 mm nodule is again noted in the right middle lobe. Several hypermetabolic foci identified predominantly throughout the axial skeleton with foci noted scattered throughout the upper thoracic spine and a singular focus in the right anterior eighth rib FDG avid focus in the right side of the T7 vertebral body with corresponding lucency on CT measuring 0.9 x 0.9 cm, SUV 14.7. Two hypermetabolic foci in the appendicular skeleton with corresponding underlying lucency on CT. There is an FDG avid focus in the left inferior glenoid and a corresponding lucency on CT measuring 0.9 x 0.7 cm, SUV 12. There is also a 1.1 x 0.7 cm lucency in the right femoral head with corresponding FDG avidity, SUV 7.3. He was placed on/following a protocol- OC 1331, treatment with ABVE-PC (adriamycin, bleomycin, vincristine, etoposide, cytoxan, prednisone, dexrazoxane). He was withdrawn from the study due to his social situation, difficulty with compliance. 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 He was treated with chemotherapy from Sept 2018- Dec 2018. Interim PET/CT completed on 11/3/18- Partially hypermetabolic anterior mediastinal mass is decreased in \par size and metabolism as compared to prior study dated 9/10/2018, compatible with a partial response to interval therapy (Deauville 4). Resolution of additional previously seen hypermetabolic mediastinal, hilar, and cardiophrenic lymph nodes.Marked interval decrease in hypermetabolic foci in the axial and appendicular skeleton with residual hypermetabolic focus in left glenoid which may represent a small focus of residual disease (Deauville 4). He completed 5 cycles of chemotherapy, but missed about 10 appointments, and refused further chemotherapy after cycle 5 due to side effects. He underwent MRI Neck, Abdomen, and Pelvis from 2/2019 showed new lymph node enlargement to the L neck and redemonstrated osseous infiltration involving the bilateral pelvic bones and proximal femurs. CT Chest from 3/2019 showed mediastinal enlarged lymph nodes which were either unchanged or demonstrate interval progression since 11/2018, progressive R hilar lymphadenopathy, and a new cluster of small nodular opacities within the right lower lobe. PET Scan done 3/2019 revealed a new hypermetabolic lymphadenopathy in the left neck measuring approximately 2.4 x 2.1cm in the left level III/V cervical regions. There is also new hypermetabolic 1.2 x 0.8 cm left level VB cervical lymph node and chest new hypermetabolic approximately 1.1 x 0.9 cm right upper paratracheal node. Reference approximately 3.4 x 2.0 cm subcarinal node. Approximately 3.8 x 3.1cm right perihilar mass. as compared to PET/CT from 11/3/2018 suspicious for recurrent lymphoma. A nonspecific new diffuse splenic hypermetabolism. A new hypermetabolic opacity/consolidation in the right lower lung, approximately 2 x 1.3cm opacity/consolidation in the superior segment of the right lower lobe. Ultrasound guided core biopsy of left cervical lymph node done on 4/11/19- confirmed refractory disease. Bone marrow biopsy was negative. He was started on treatment 4/13 with salvage treatment with Gemzar/brentuximab which he received 2 cycles but again had compliance issues along with behavioral issues with the staff.  [de-identified] : 7/12/22: Patient is here today after being lost to follow up since last receiving nivolumab in 7/2021, treatment complicated by thyroid dysfunction. He was recently (7/7/22) evaluated at [facility] ED for nausea and vomiting from eating outside food. Was also found to have cervical lymphadenopathy, states it has been growing over the past 8 months, and was instructed to follow up with his hematologist. Today, he notes to have night sweats, unintentional weight loss (-12 Ibs), and cervical lymphadenopathy. Also, continues to have chronic low back pain due to scoliosis. Patient denies fever, chills, headache, abdominal pain, or chest pain.\par \par 8/22/22: Transfer of care from Dr Andressa Clinton. He has been experiencing severe night sweats the past 2 weeks. In addition he has been having back / neck pain related to both chronic scoliosis and left posterior cervical large adenopathy. He denies recent infections, fevers, chills, unexpected weight loss. 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 Follow up. He is doing well. Denies any fever/chills, night sweats, unintentional weight loss, abd pain, n/v, bowel/bladder complaints. Continues to report chronic back pain, which he states he did not f.u with a PMD for. \par \par \par ____\par A comprehensive review of systems was performed including constitutional, eyes, ENT, cardiovascular, respiratory, gastrointestinal, genitourinary, musculoskeletal, integumentary, neurological, psychiatric and hematologic / lymphatic. All pertinent positives are included in the H&P under interval history above and the remaining review of systems listed are negative. \par \par =================================================================\par \par ABVE-PC in 9/2018, completed 12/2018 after 5 cycles due to noncompliance and patient refusal, \par Gemzar/brentuximab in 4/2019 which was also stopped due to the patient's refusal and noncompliance. \par 2 cycles of ICE in 2/2020 with intent to pursue an autologous bone marrow transplant. The patient began the cycle and then left AMA.\par Radiation, 800 cGy to T1-T5, T8-L5, left hip/acetabulum & Nivolumab in 11/2020 with improvement, but lost to follow up. Treatment was also complicated due to development of hypothyroidism and COVID19\par \par =================================================================\par

## 2022-09-27 NOTE — ASSESSMENT
[Curative] : Goals of care discussed with patient: Curative [FreeTextEntry1] : 21 year old male who was diagnosed with a stage IV CHL in 9/2018 with relapsed/ refractory Classical Hodgkin's lymphoma. He was initially treated following ABVE-PC completed 12/2018 after 5 cycles due to noncompliance and patient refusal, relapsed diagnosed in Feb 2019, started on salvage treatment in April 2019 with gemzar/ brentuximab which was also stopped due to the patient's refusal and noncompliance. \par \par He was recommended to have 2 cycles of ICE with intent to pursue an autologous bone marrow transplant. The patient began the cycle and then left AMA while the chemotherapy was running unfortunately. Despite multiple discussions with the patient and his mother, he did not follow up and refused further treatment. Recently with progression of disease/ osseous mets which he was recommended to go to the hospital, he again refused and left the office. \par \par He was readmitted in 10/2020 for diffuse vertebral disease/compression fractures. He was treated with radiation, 800 cGy to T1-T5, T8-L5, left hip/acetabulum. He also received nivolumab with improvement in his lymphadenopathy and symptoms. Developed hypothyroidism while receiving nivolumab, improved while on Synthroid. But was noncompliant and again lost to follow up.  \par \par He presented again in June/July 2022 with worsening WBC and lymphadenopathy and more recently in Aug 2022 with night sweats. He underwent biopsy of one of the lymph nodes which came back showing recurrent Classical Hodgkins Lymphoma. Retreatment restarted 8/22/22 with Nivolumab immunotherapy. \par \par -CBC today: WBC 8.27 K/uL,  HGB 12.9 g/dL,  HCT 40.8 %,  PLTS 234 K/uL. Results reviewed with patient\par - Repeat labs- B2M, CMP, Immunoelectrophoresis panel, LDH, Uric acid, Thyroid labs pending\par -Patient did not show up to Cycle 2 tx appt on 9/10 and to f/u appts. States he lost his phone and was busy. Patient was educated on importance of adhering to tx schedule and follow ups. Advised patient to notify team if he is unable to show up to appts. \par - Plan to continue treatment with nivolumab q 2 weeks on 28 day cycles, cycle 2 scheduled for 9/28/22\par -Patient states he does not want Port flushed to assess accessibility and is requesting it to be removed. \par - Discussed his disease, limited options at this time and need for better compliance. Strongly encouraged him to be complaint if he will be a candidate for more aggressive therapies in the future should this regimen fail.\par - PCP for scoliosis related back pain. Patient is requesting for a new PCP within [facility]. Referral made for PCP. \par -  Follow up in 2-4 weeks\par \par Case and management discussed with MD Rangel. \par \par ____\par I personally have spent a total of 25 minutes of time on the date of this encounter reviewing test results, documenting findings, providing education, coordinating care and directly consulting with the patient and/or designated family member.

## 2022-09-28 ENCOUNTER — RESULT REVIEW (OUTPATIENT)
Age: 22
End: 2022-09-28

## 2022-09-28 ENCOUNTER — APPOINTMENT (OUTPATIENT)
Dept: INFUSION THERAPY | Facility: HOSPITAL | Age: 22
End: 2022-09-28

## 2022-09-28 DIAGNOSIS — Z51.11 ENCOUNTER FOR ANTINEOPLASTIC CHEMOTHERAPY: ICD-10-CM

## 2022-09-28 LAB
ALBUMIN SERPL ELPH-MCNC: 5 G/DL — SIGNIFICANT CHANGE UP (ref 3.3–5)
ALP SERPL-CCNC: 71 U/L — SIGNIFICANT CHANGE UP (ref 40–120)
ALT FLD-CCNC: 15 U/L — SIGNIFICANT CHANGE UP (ref 10–45)
ANION GAP SERPL CALC-SCNC: 14 MMOL/L — SIGNIFICANT CHANGE UP (ref 5–17)
AST SERPL-CCNC: 19 U/L — SIGNIFICANT CHANGE UP (ref 10–40)
B2 MICROGLOB SERPL-MCNC: 1.8 MG/L — SIGNIFICANT CHANGE UP (ref 0.8–2.2)
BASOPHILS # BLD AUTO: 0.07 K/UL — SIGNIFICANT CHANGE UP (ref 0–0.2)
BASOPHILS # BLD AUTO: 0.07 K/UL — SIGNIFICANT CHANGE UP (ref 0–0.2)
BASOPHILS NFR BLD AUTO: 0.8 % — SIGNIFICANT CHANGE UP (ref 0–2)
BASOPHILS NFR BLD AUTO: 0.8 % — SIGNIFICANT CHANGE UP (ref 0–2)
BILIRUB SERPL-MCNC: 0.5 MG/DL — SIGNIFICANT CHANGE UP (ref 0.2–1.2)
BUN SERPL-MCNC: 12 MG/DL — SIGNIFICANT CHANGE UP (ref 7–23)
CALCIUM SERPL-MCNC: 10.1 MG/DL — SIGNIFICANT CHANGE UP (ref 8.4–10.5)
CHLORIDE SERPL-SCNC: 101 MMOL/L — SIGNIFICANT CHANGE UP (ref 96–108)
CHOLEST SERPL-MCNC: 214 MG/DL — HIGH
CO2 SERPL-SCNC: 25 MMOL/L — SIGNIFICANT CHANGE UP (ref 22–31)
CREAT SERPL-MCNC: 0.79 MG/DL — SIGNIFICANT CHANGE UP (ref 0.5–1.3)
EGFR: 130 ML/MIN/1.73M2 — SIGNIFICANT CHANGE UP
EOSINOPHIL # BLD AUTO: 0.2 K/UL — SIGNIFICANT CHANGE UP (ref 0–0.5)
EOSINOPHIL # BLD AUTO: 0.25 K/UL — SIGNIFICANT CHANGE UP (ref 0–0.5)
EOSINOPHIL NFR BLD AUTO: 2.2 % — SIGNIFICANT CHANGE UP (ref 0–6)
EOSINOPHIL NFR BLD AUTO: 3 % — SIGNIFICANT CHANGE UP (ref 0–6)
GLUCOSE SERPL-MCNC: 78 MG/DL — SIGNIFICANT CHANGE UP (ref 70–99)
HCT VFR BLD CALC: 40.3 % — SIGNIFICANT CHANGE UP (ref 39–50)
HCT VFR BLD CALC: 41.6 % — SIGNIFICANT CHANGE UP (ref 39–50)
HDLC SERPL-MCNC: 56 MG/DL — SIGNIFICANT CHANGE UP
HGB BLD-MCNC: 13.1 G/DL — SIGNIFICANT CHANGE UP (ref 13–17)
HGB BLD-MCNC: 13.4 G/DL — SIGNIFICANT CHANGE UP (ref 13–17)
IMM GRANULOCYTES NFR BLD AUTO: 0.2 % — SIGNIFICANT CHANGE UP (ref 0–0.9)
IMM GRANULOCYTES NFR BLD AUTO: 0.2 % — SIGNIFICANT CHANGE UP (ref 0–0.9)
LDH SERPL L TO P-CCNC: 216 U/L — SIGNIFICANT CHANGE UP (ref 50–242)
LIPID PNL WITH DIRECT LDL SERPL: 146 MG/DL — HIGH
LYMPHOCYTES # BLD AUTO: 1.97 K/UL — SIGNIFICANT CHANGE UP (ref 1–3.3)
LYMPHOCYTES # BLD AUTO: 2.3 K/UL — SIGNIFICANT CHANGE UP (ref 1–3.3)
LYMPHOCYTES # BLD AUTO: 22.1 % — SIGNIFICANT CHANGE UP (ref 13–44)
LYMPHOCYTES # BLD AUTO: 27.9 % — SIGNIFICANT CHANGE UP (ref 13–44)
MCHC RBC-ENTMCNC: 25.7 PG — LOW (ref 27–34)
MCHC RBC-ENTMCNC: 25.9 PG — LOW (ref 27–34)
MCHC RBC-ENTMCNC: 32.2 G/DL — SIGNIFICANT CHANGE UP (ref 32–36)
MCHC RBC-ENTMCNC: 32.5 G/DL — SIGNIFICANT CHANGE UP (ref 32–36)
MCV RBC AUTO: 79.7 FL — LOW (ref 80–100)
MCV RBC AUTO: 79.8 FL — LOW (ref 80–100)
MONOCYTES # BLD AUTO: 0.35 K/UL — SIGNIFICANT CHANGE UP (ref 0–0.9)
MONOCYTES # BLD AUTO: 0.5 K/UL — SIGNIFICANT CHANGE UP (ref 0–0.9)
MONOCYTES NFR BLD AUTO: 4.2 % — SIGNIFICANT CHANGE UP (ref 2–14)
MONOCYTES NFR BLD AUTO: 5.6 % — SIGNIFICANT CHANGE UP (ref 2–14)
NEUTROPHILS # BLD AUTO: 5.25 K/UL — SIGNIFICANT CHANGE UP (ref 1.8–7.4)
NEUTROPHILS # BLD AUTO: 6.17 K/UL — SIGNIFICANT CHANGE UP (ref 1.8–7.4)
NEUTROPHILS NFR BLD AUTO: 63.9 % — SIGNIFICANT CHANGE UP (ref 43–77)
NEUTROPHILS NFR BLD AUTO: 69.1 % — SIGNIFICANT CHANGE UP (ref 43–77)
NON HDL CHOLESTEROL: 158 MG/DL — HIGH
NRBC # BLD: 0 /100 WBCS — SIGNIFICANT CHANGE UP (ref 0–0)
NRBC # BLD: 0 /100 WBCS — SIGNIFICANT CHANGE UP (ref 0–0)
PLATELET # BLD AUTO: 206 K/UL — SIGNIFICANT CHANGE UP (ref 150–400)
PLATELET # BLD AUTO: 210 K/UL — SIGNIFICANT CHANGE UP (ref 150–400)
POTASSIUM SERPL-MCNC: 4.5 MMOL/L — SIGNIFICANT CHANGE UP (ref 3.5–5.3)
POTASSIUM SERPL-SCNC: 4.5 MMOL/L — SIGNIFICANT CHANGE UP (ref 3.5–5.3)
PROT SERPL-MCNC: 8.1 G/DL — SIGNIFICANT CHANGE UP (ref 6–8.3)
RBC # BLD: 5.05 M/UL — SIGNIFICANT CHANGE UP (ref 4.2–5.8)
RBC # BLD: 5.22 M/UL — SIGNIFICANT CHANGE UP (ref 4.2–5.8)
RBC # FLD: 21.5 % — HIGH (ref 10.3–14.5)
RBC # FLD: 21.5 % — HIGH (ref 10.3–14.5)
SODIUM SERPL-SCNC: 141 MMOL/L — SIGNIFICANT CHANGE UP (ref 135–145)
T3 SERPL-MCNC: 119 NG/DL — SIGNIFICANT CHANGE UP (ref 80–200)
T4 FREE SERPL-MCNC: 1.1 NG/DL — SIGNIFICANT CHANGE UP (ref 0.9–1.8)
TRIGL SERPL-MCNC: 64 MG/DL — SIGNIFICANT CHANGE UP
TSH SERPL-MCNC: 20.5 UIU/ML — HIGH (ref 0.27–4.2)
URATE SERPL-MCNC: 5.9 MG/DL — SIGNIFICANT CHANGE UP (ref 3.4–8.8)
WBC # BLD: 8.24 K/UL — SIGNIFICANT CHANGE UP (ref 3.8–10.5)
WBC # BLD: 8.93 K/UL — SIGNIFICANT CHANGE UP (ref 3.8–10.5)
WBC # FLD AUTO: 8.24 K/UL — SIGNIFICANT CHANGE UP (ref 3.8–10.5)
WBC # FLD AUTO: 8.93 K/UL — SIGNIFICANT CHANGE UP (ref 3.8–10.5)

## 2022-10-12 ENCOUNTER — APPOINTMENT (OUTPATIENT)
Dept: INFUSION THERAPY | Facility: HOSPITAL | Age: 22
End: 2022-10-12

## 2022-10-12 LAB
ALBUMIN MFR SERPL ELPH: 58.2 %
ALBUMIN SERPL-MCNC: 4.3 G/DL
ALBUMIN/GLOB SERPL: 1.4 RATIO
ALPHA1 GLOB MFR SERPL ELPH: 3.3 %
ALPHA1 GLOB SERPL ELPH-MCNC: 0.2 G/DL
ALPHA2 GLOB MFR SERPL ELPH: 9.8 %
ALPHA2 GLOB SERPL ELPH-MCNC: 0.7 G/DL
B-GLOBULIN MFR SERPL ELPH: 11.1 %
B-GLOBULIN SERPL ELPH-MCNC: 0.8 G/DL
DEPRECATED KAPPA LC FREE/LAMBDA SER: 1.01 RATIO
GAMMA GLOB FLD ELPH-MCNC: 1.3 G/DL
GAMMA GLOB MFR SERPL ELPH: 17.6 %
IGA SER QL IEP: 262 MG/DL
IGG SER QL IEP: 1416 MG/DL
IGM SER QL IEP: 144 MG/DL
INTERPRETATION SERPL IEP-IMP: NORMAL
KAPPA LC CSF-MCNC: 1.57 MG/DL
KAPPA LC SERPL-MCNC: 1.59 MG/DL
M PROTEIN MFR SERPL ELPH: NORMAL
M PROTEIN SPEC IFE-MCNC: NORMAL
MONOCLON BAND OBS SERPL: NORMAL
PROT SERPL-MCNC: 7.4 G/DL
PROT SERPL-MCNC: 7.4 G/DL

## 2022-10-12 NOTE — ED PROVIDER NOTE - NORMAL STATEMENT, MLM
Name: Etienne Garcia  [id number]  Age: 71 y.o. YOB: 1952  Sex: female    CHIEF COMPLAINT:    Chief Complaint   Patient presents with    Blood Pressure Check     Pt is concerned about her BP    Flu Vaccine    Blood Sugar Problem     Pt has concerns of Blood sugar- took sugar per pt request       HISTORY OF PRESENT ILLNESS:     This is a pleasant  71 y.o. female  is seen today for management of chronic medical problems and medications refills. Previous records reviewed . Patient complains of leg cramps, severe mostly at night. Unable to sleep well because of pain. She quit taking Zetia and Lopid but did not help her much. She is also taking Requip and Tylenol and Aleve without much improvement. THAIS was essentially normal year ago on June 4, 2021. She could not tolerate Neurontin. Doing OK otherwise. Denies CP or SOB. No fever , sore throat or cough or congestion. Denies any abdominal pain. Appetite OK. Bowels moving [id number] No urinary symptoms now. Still C/O  low back pain on the right side and radiates into right hip and right leg mostly but back pain is better recently. Hearing is ok. Vision Ok with glasses. Denies  any significant skin lesions. Denies any significant depression or anxiety. No other new complaints. She sees Dr. Jessica Arredondo periodically for her gynecological checkup. She had a colonoscopy by Dr. Jessica Connell in the past on February 15, 2016. She was referred to have a colonoscopy again but she claimed that she is going to see another GI doctor in [facility] for her colonoscopy soon but has not made an appointment yet. She claims she will make an appointment soon. She has been vaccinated for COVID-19 x2 but did not get the booster dose.   She wants a flu shot given today    Past Medical History:    Patient Active Problem List   Diagnosis    Ovarian cancer-S/P STEPH & BSO    Myalgia    Restless leg syndrome    Gastroesophageal reflux disease without esophagitis    Vitamin D deficiency    Mixed hyperlipidemia    Irritable bowel syndrome    History of colon polyps    Dorsalgia    Right hip pain    Closed nondisplaced fracture of second metatarsal bone of left foot        Past Surgical History:        Procedure Laterality Date    CHOLECYSTECTOMY      COLONOSCOPY  2/15/16    Dr. Kristan Little MD    HYSTERECTOMY (CERVIX STATUS UNKNOWN)      STEPH AND BSO (CERVIX REMOVED)         Social History:   Social History     Tobacco Use    Smoking status: Never    Smokeless tobacco: Never   Substance Use Topics    Alcohol use: No     Comment: caffeine 3 cups of coffee a day       Family History:       Problem Relation Age of Onset    Diabetes Mother     Heart Disease Mother     Heart Disease Father     High Blood Pressure Sister     No Known Problems Brother     Colon Cancer Brother     Other Brother         car accident    No Known Problems Brother     High Blood Pressure Sister        Allergies:  Effexor [venlafaxine hydrochloride], Erythromycin, Neurontin [gabapentin], Relafen [nabumetone], and Demerol hcl [meperidine]    Current Medications :      Prior to Admission medications    Medication Sig Start Date End Date Taking?  Authorizing Provider   DULoxetine (CYMBALTA) 30 MG extended release capsule Take 1 capsule by mouth daily 10/12/22  Yes Rena Ayala MD   tiZANidine (ZANAFLEX) 4 MG tablet Take 1 tablet by mouth at bedtime 10/12/22  Yes Rena Ayala MD   omeprazole (PRILOSEC) 20 MG delayed release capsule TAKE 1 CAPSULE DAILY 8/29/22  Yes Rena Ayala MD   rOPINIRole (REQUIP) 1 MG tablet TAKE 2 TABLETS NIGHTLY FOR RESTLESS LEGS 7/5/22  Yes Rena Ayala MD   Multiple Vitamins-Minerals (THERAPEUTIC MULTIVITAMIN-MINERALS) tablet Take 1 tablet by mouth daily   Yes Historical Provider, MD   Naproxen Sodium 220 MG CAPS Take 1 tablet by mouth as needed for Pain   Yes Historical Provider, MD   gemfibrozil (LOPID) 600 MG tablet Take 1 tablet by mouth 2 times daily  Patient not taking: Reported on 10/12/2022 4/19/22   Krystin Gutierres MD   ezetimibe (ZETIA) 10 MG tablet Take 1 tablet by mouth daily  Patient not taking: No sig reported 3/7/22   Gomez Stephens MD       LAB DATA: Reviewed. REVIEW OF SYSTEMS:   see HPI/ Comprehensive review of systems negative except for the ones mentioned in HPI. PHYSICAL EXAMINATION:   /78 (Site: Left Upper Arm, Position: Sitting, Cuff Size: Medium Adult)   Pulse 79   Ht 5' 6\" (1.676 m)   Wt 167 lb (75.8 kg)   SpO2 97%   BMI 26.95 kg/m²      GENERAL APPEARANCE:    Alert, oriented x 3, well developed, cooperative, not in any distress, appears stated age. HEAD:   Normocephalic, atraumatic   EYES:   PERRLA, EOMI, lids normal, conjuctivea clear, sclera anicteric. NECK:    Supple, symmetrical,  trachea midline, no thyromegaly, no JVD, no lymphadenopathy. LUNGS:    Clear to auscultation bilaterally, respirations unlabored, accessory muscles are not used. HEART:     Regular rate and rhythm, S1 and S2 normal, no murmur, rub or gallop. PMI in MCL. ABDOMEN:    Soft, non-tender, bowel sounds are normoactive, no masses, no hepatospleenomegaly. EXTREMITY:   no bipedal edema  NEURO:  Alert, oriented to person, place and time. Grossly intact. Musculoskeletal:         No kyphosis or scoliosis, mild generalized tenderness in multiple joints and muscles. Skin:                            Warm and dry. No rash or obvious suspicious lesions. PSYCH:  Mood euthymic, insight and judgement good. ASSESSMENT/PLAN:    1. Mixed hyperlipidemia  Unable to take statins. Also could not tolerate Lopid and Zetia. Neymar Makenzie may be an option but patient not interested at this time. She claimed that she will give another try to Zetia later. - Comprehensive Metabolic Panel  - Lipid, Fasting    2. Gastroesophageal reflux disease without esophagitis  Continue Prilosec    3. Irritable bowel syndrome, unspecified type  Not bothering her much at this time.   Not on any medication at this time.    4. Restless leg syndrome  Continue Requip    5. Vitamin D deficiency  Continue vitamin D3    6. Dorsalgia  Continue Tylenol and Aleve  We will try Cymbalta. - DULoxetine (CYMBALTA) 30 MG extended release capsule; Take 1 capsule by mouth daily  Dispense: 30 capsule; Refill: 3    7. Right hip pain  Continue Tylenol and Aleve as needed. We will add Cymbalta    8. Myalgia  Continue Tylenol and Aleve as needed and also will add Cymbalta and Zanaflex. - Rheumatoid Factor; Future  - DAXA; Future  - DULoxetine (CYMBALTA) 30 MG extended release capsule; Take 1 capsule by mouth daily  Dispense: 30 capsule; Refill: 3  - tiZANidine (ZANAFLEX) 4 MG tablet; Take 1 tablet by mouth at bedtime  Dispense: 30 tablet; Refill: 3  - Sedimentation Rate; Future  Consider referral to rheumatologist if symptoms do not improve    9. Need for influenza vaccination  Flu shot was given today. - Influenza, FLUAD, (age 72 y+), IM, Preservative Free, 0.5 mL    Advised to continue to follow COVID-19 precautions    Care discussed with patient. Questions answered and patient verbalizes understanding and agrees with plan. Medications reviewed and reconciled. Continue current medications. Appropriate prescriptions are ordered. Risks and benefits of meds are discussed. After visit summary provided. Advised to call for any problems, questions, or concerns. If symptoms worsen or don't improve as expected, to call us or go to ER. Follow up as directed, sooner if needed. Return in about 6 weeks (around 11/23/2022). This dictation was performed with a verbal recognition program and it was checked for errors. It is possible that there are still dictated errors within this office note. Any errors should be brought immediately to my attention for correction. All efforts were made to ensure that this office note is accurate.      Candido Appiah MD MD Airway patent, TM normal bilaterally, normal appearing mouth, nose, throat, neck supple with full range of motion, no cervical adenopathy.

## 2022-10-17 ENCOUNTER — APPOINTMENT (OUTPATIENT)
Dept: HEMATOLOGY ONCOLOGY | Facility: CLINIC | Age: 22
End: 2022-10-17

## 2022-10-26 ENCOUNTER — APPOINTMENT (OUTPATIENT)
Dept: HEMATOLOGY ONCOLOGY | Facility: CLINIC | Age: 22
End: 2022-10-26

## 2022-10-29 ENCOUNTER — APPOINTMENT (OUTPATIENT)
Dept: INFUSION THERAPY | Facility: HOSPITAL | Age: 22
End: 2022-10-29

## 2022-11-01 ENCOUNTER — OUTPATIENT (OUTPATIENT)
Dept: OUTPATIENT SERVICES | Facility: HOSPITAL | Age: 22
LOS: 1 days | Discharge: HOME | End: 2022-11-01

## 2022-11-01 DIAGNOSIS — C81.98 HODGKIN LYMPHOMA, UNSPECIFIED, LYMPH NODES OF MULTIPLE SITES: ICD-10-CM

## 2022-11-12 ENCOUNTER — RESULT REVIEW (OUTPATIENT)
Age: 22
End: 2022-11-12

## 2022-11-12 ENCOUNTER — APPOINTMENT (OUTPATIENT)
Dept: INFUSION THERAPY | Facility: HOSPITAL | Age: 22
End: 2022-11-12

## 2022-11-12 LAB
ALBUMIN SERPL ELPH-MCNC: 4.9 G/DL — SIGNIFICANT CHANGE UP (ref 3.3–5)
ALP SERPL-CCNC: 64 U/L — SIGNIFICANT CHANGE UP (ref 40–120)
ALT FLD-CCNC: 12 U/L — SIGNIFICANT CHANGE UP (ref 10–45)
ANION GAP SERPL CALC-SCNC: 11 MMOL/L — SIGNIFICANT CHANGE UP (ref 5–17)
AST SERPL-CCNC: 15 U/L — SIGNIFICANT CHANGE UP (ref 10–40)
B2 MICROGLOB SERPL-MCNC: 1.6 MG/L — SIGNIFICANT CHANGE UP (ref 0.8–2.2)
BASOPHILS # BLD AUTO: 0.05 K/UL — SIGNIFICANT CHANGE UP (ref 0–0.2)
BASOPHILS NFR BLD AUTO: 0.6 % — SIGNIFICANT CHANGE UP (ref 0–2)
BILIRUB SERPL-MCNC: 0.4 MG/DL — SIGNIFICANT CHANGE UP (ref 0.2–1.2)
BUN SERPL-MCNC: 17 MG/DL — SIGNIFICANT CHANGE UP (ref 7–23)
CALCIUM SERPL-MCNC: 9.8 MG/DL — SIGNIFICANT CHANGE UP (ref 8.4–10.5)
CHLORIDE SERPL-SCNC: 103 MMOL/L — SIGNIFICANT CHANGE UP (ref 96–108)
CO2 SERPL-SCNC: 27 MMOL/L — SIGNIFICANT CHANGE UP (ref 22–31)
CREAT SERPL-MCNC: 0.84 MG/DL — SIGNIFICANT CHANGE UP (ref 0.5–1.3)
EGFR: 126 ML/MIN/1.73M2 — SIGNIFICANT CHANGE UP
EOSINOPHIL # BLD AUTO: 0.31 K/UL — SIGNIFICANT CHANGE UP (ref 0–0.5)
EOSINOPHIL NFR BLD AUTO: 4 % — SIGNIFICANT CHANGE UP (ref 0–6)
GLUCOSE SERPL-MCNC: 74 MG/DL — SIGNIFICANT CHANGE UP (ref 70–99)
HCT VFR BLD CALC: 45.4 % — SIGNIFICANT CHANGE UP (ref 39–50)
HGB BLD-MCNC: 15.1 G/DL — SIGNIFICANT CHANGE UP (ref 13–17)
IMM GRANULOCYTES NFR BLD AUTO: 1 % — HIGH (ref 0–0.9)
LDH SERPL L TO P-CCNC: 170 U/L — SIGNIFICANT CHANGE UP (ref 50–242)
LYMPHOCYTES # BLD AUTO: 2.1 K/UL — SIGNIFICANT CHANGE UP (ref 1–3.3)
LYMPHOCYTES # BLD AUTO: 27.1 % — SIGNIFICANT CHANGE UP (ref 13–44)
MAGNESIUM SERPL-MCNC: 1.9 MG/DL — SIGNIFICANT CHANGE UP (ref 1.6–2.6)
MCHC RBC-ENTMCNC: 27.7 PG — SIGNIFICANT CHANGE UP (ref 27–34)
MCHC RBC-ENTMCNC: 33.3 G/DL — SIGNIFICANT CHANGE UP (ref 32–36)
MCV RBC AUTO: 83.3 FL — SIGNIFICANT CHANGE UP (ref 80–100)
MONOCYTES # BLD AUTO: 0.34 K/UL — SIGNIFICANT CHANGE UP (ref 0–0.9)
MONOCYTES NFR BLD AUTO: 4.4 % — SIGNIFICANT CHANGE UP (ref 2–14)
NEUTROPHILS # BLD AUTO: 4.86 K/UL — SIGNIFICANT CHANGE UP (ref 1.8–7.4)
NEUTROPHILS NFR BLD AUTO: 62.9 % — SIGNIFICANT CHANGE UP (ref 43–77)
NRBC # BLD: 0 /100 WBCS — SIGNIFICANT CHANGE UP (ref 0–0)
PHOSPHATE SERPL-MCNC: 2.7 MG/DL — SIGNIFICANT CHANGE UP (ref 2.5–4.5)
PLATELET # BLD AUTO: 214 K/UL — SIGNIFICANT CHANGE UP (ref 150–400)
POTASSIUM SERPL-MCNC: 4.9 MMOL/L — SIGNIFICANT CHANGE UP (ref 3.5–5.3)
POTASSIUM SERPL-SCNC: 4.9 MMOL/L — SIGNIFICANT CHANGE UP (ref 3.5–5.3)
PROT SERPL-MCNC: 7.5 G/DL — SIGNIFICANT CHANGE UP (ref 6–8.3)
RBC # BLD: 5.45 M/UL — SIGNIFICANT CHANGE UP (ref 4.2–5.8)
RBC # FLD: 17.2 % — HIGH (ref 10.3–14.5)
SODIUM SERPL-SCNC: 141 MMOL/L — SIGNIFICANT CHANGE UP (ref 135–145)
T4 FREE SERPL-MCNC: 0.6 NG/DL — LOW (ref 0.9–1.8)
T4 FREE+ TSH PNL SERPL: 63.9 UIU/ML — HIGH (ref 0.27–4.2)
URATE SERPL-MCNC: 5.6 MG/DL — SIGNIFICANT CHANGE UP (ref 3.4–8.8)
WBC # BLD: 7.74 K/UL — SIGNIFICANT CHANGE UP (ref 3.8–10.5)
WBC # FLD AUTO: 7.74 K/UL — SIGNIFICANT CHANGE UP (ref 3.8–10.5)

## 2022-11-14 DIAGNOSIS — Z51.89 ENCOUNTER FOR OTHER SPECIFIED AFTERCARE: ICD-10-CM

## 2022-11-14 NOTE — ED ADULT TRIAGE NOTE - BSA (M2)
Duration Of Freeze Thaw-Cycle (Seconds): 5 Post-Care Instructions: I reviewed with the patient in detail post-care instructions. Patient is to wear sunprotection, and avoid picking at any of the treated lesions. Pt may apply Vaseline to crusted or scabbing areas. Render Note In Bullet Format When Appropriate: No Detail Level: Detailed Consent: The patient's consent was obtained including but not limited to risks of crusting, scabbing, blistering, scarring, darker or lighter pigmentary change, recurrence, incomplete removal and infection. Show Aperture Variable?: Yes Number Of Freeze-Thaw Cycles: 1 freeze-thaw cycle Application Tool (Optional): Liquid Nitrogen Sprayer 1.59

## 2022-11-17 DIAGNOSIS — E06.3 AUTOIMMUNE THYROIDITIS: ICD-10-CM

## 2022-11-17 DIAGNOSIS — Z51.11 ENCOUNTER FOR ANTINEOPLASTIC CHEMOTHERAPY: ICD-10-CM

## 2022-11-23 ENCOUNTER — APPOINTMENT (OUTPATIENT)
Dept: HEMATOLOGY ONCOLOGY | Facility: CLINIC | Age: 22
End: 2022-11-23

## 2022-11-23 ENCOUNTER — APPOINTMENT (OUTPATIENT)
Dept: INFUSION THERAPY | Facility: HOSPITAL | Age: 22
End: 2022-11-23

## 2022-11-26 ENCOUNTER — APPOINTMENT (OUTPATIENT)
Dept: INFUSION THERAPY | Facility: HOSPITAL | Age: 22
End: 2022-11-26

## 2022-12-05 ENCOUNTER — APPOINTMENT (OUTPATIENT)
Dept: HEMATOLOGY ONCOLOGY | Facility: CLINIC | Age: 22
End: 2022-12-05

## 2022-12-10 ENCOUNTER — APPOINTMENT (OUTPATIENT)
Dept: INFUSION THERAPY | Facility: HOSPITAL | Age: 22
End: 2022-12-10

## 2022-12-23 ENCOUNTER — NON-APPOINTMENT (OUTPATIENT)
Age: 22
End: 2022-12-23

## 2022-12-24 ENCOUNTER — RESULT REVIEW (OUTPATIENT)
Age: 22
End: 2022-12-24

## 2022-12-24 ENCOUNTER — APPOINTMENT (OUTPATIENT)
Dept: INFUSION THERAPY | Facility: HOSPITAL | Age: 22
End: 2022-12-24

## 2022-12-24 LAB
ALBUMIN SERPL ELPH-MCNC: 4.9 G/DL — SIGNIFICANT CHANGE UP (ref 3.3–5)
ALP SERPL-CCNC: 76 U/L — SIGNIFICANT CHANGE UP (ref 40–120)
ALT FLD-CCNC: 15 U/L — SIGNIFICANT CHANGE UP (ref 10–45)
ANION GAP SERPL CALC-SCNC: 14 MMOL/L — SIGNIFICANT CHANGE UP (ref 5–17)
AST SERPL-CCNC: 14 U/L — SIGNIFICANT CHANGE UP (ref 10–40)
B2 MICROGLOB SERPL-MCNC: 1.8 MG/L — SIGNIFICANT CHANGE UP (ref 0.8–2.2)
BILIRUB SERPL-MCNC: 0.5 MG/DL — SIGNIFICANT CHANGE UP (ref 0.2–1.2)
BUN SERPL-MCNC: 16 MG/DL — SIGNIFICANT CHANGE UP (ref 7–23)
CALCIUM SERPL-MCNC: 9.8 MG/DL — SIGNIFICANT CHANGE UP (ref 8.4–10.5)
CHLORIDE SERPL-SCNC: 98 MMOL/L — SIGNIFICANT CHANGE UP (ref 96–108)
CO2 SERPL-SCNC: 27 MMOL/L — SIGNIFICANT CHANGE UP (ref 22–31)
CREAT SERPL-MCNC: 0.85 MG/DL — SIGNIFICANT CHANGE UP (ref 0.5–1.3)
EGFR: 126 ML/MIN/1.73M2 — SIGNIFICANT CHANGE UP
GLUCOSE SERPL-MCNC: 75 MG/DL — SIGNIFICANT CHANGE UP (ref 70–99)
LDH SERPL L TO P-CCNC: 204 U/L — SIGNIFICANT CHANGE UP (ref 50–242)
POTASSIUM SERPL-MCNC: 4.9 MMOL/L — SIGNIFICANT CHANGE UP (ref 3.5–5.3)
POTASSIUM SERPL-SCNC: 4.9 MMOL/L — SIGNIFICANT CHANGE UP (ref 3.5–5.3)
PROT SERPL-MCNC: 7.5 G/DL — SIGNIFICANT CHANGE UP (ref 6–8.3)
SODIUM SERPL-SCNC: 139 MMOL/L — SIGNIFICANT CHANGE UP (ref 135–145)
TSH SERPL-MCNC: 37.5 UIU/ML — HIGH (ref 0.27–4.2)

## 2022-12-29 NOTE — DISCHARGE NOTE PROVIDER - NSDCQMSTAIRS_GEN_ALL_CORE
Detail Level: Detailed
Quality 130: Documentation Of Current Medications In The Medical Record: Current Medications Documented
Quality 431: Preventive Care And Screening: Unhealthy Alcohol Use - Screening: Patient not identified as an unhealthy alcohol user when screened for unhealthy alcohol use using a systematic screening method
Quality 226: Preventive Care And Screening: Tobacco Use: Screening And Cessation Intervention: Patient screened for tobacco use and is an ex/non-smoker
No

## 2023-01-06 ENCOUNTER — OUTPATIENT (OUTPATIENT)
Dept: OUTPATIENT SERVICES | Facility: HOSPITAL | Age: 23
LOS: 1 days | Discharge: HOME | End: 2023-01-06

## 2023-01-06 DIAGNOSIS — C81.98 HODGKIN LYMPHOMA, UNSPECIFIED, LYMPH NODES OF MULTIPLE SITES: ICD-10-CM

## 2023-01-07 ENCOUNTER — RESULT REVIEW (OUTPATIENT)
Age: 23
End: 2023-01-07

## 2023-01-07 ENCOUNTER — APPOINTMENT (OUTPATIENT)
Dept: INFUSION THERAPY | Facility: HOSPITAL | Age: 23
End: 2023-01-07

## 2023-01-07 LAB
ALBUMIN SERPL ELPH-MCNC: 4.9 G/DL — SIGNIFICANT CHANGE UP (ref 3.3–5)
ALP SERPL-CCNC: 71 U/L — SIGNIFICANT CHANGE UP (ref 40–120)
ALT FLD-CCNC: 20 U/L — SIGNIFICANT CHANGE UP (ref 10–45)
ANION GAP SERPL CALC-SCNC: 14 MMOL/L — SIGNIFICANT CHANGE UP (ref 5–17)
AST SERPL-CCNC: 18 U/L — SIGNIFICANT CHANGE UP (ref 10–40)
B2 MICROGLOB SERPL-MCNC: 1.6 MG/L — SIGNIFICANT CHANGE UP (ref 0.8–2.2)
BILIRUB SERPL-MCNC: 0.5 MG/DL — SIGNIFICANT CHANGE UP (ref 0.2–1.2)
BUN SERPL-MCNC: 19 MG/DL — SIGNIFICANT CHANGE UP (ref 7–23)
CALCIUM SERPL-MCNC: 9.4 MG/DL — SIGNIFICANT CHANGE UP (ref 8.4–10.5)
CHLORIDE SERPL-SCNC: 103 MMOL/L — SIGNIFICANT CHANGE UP (ref 96–108)
CO2 SERPL-SCNC: 24 MMOL/L — SIGNIFICANT CHANGE UP (ref 22–31)
CREAT SERPL-MCNC: 0.94 MG/DL — SIGNIFICANT CHANGE UP (ref 0.5–1.3)
EGFR: 118 ML/MIN/1.73M2 — SIGNIFICANT CHANGE UP
GLUCOSE SERPL-MCNC: 77 MG/DL — SIGNIFICANT CHANGE UP (ref 70–99)
LDH SERPL L TO P-CCNC: 181 U/L — SIGNIFICANT CHANGE UP (ref 50–242)
POTASSIUM SERPL-MCNC: 4.7 MMOL/L — SIGNIFICANT CHANGE UP (ref 3.5–5.3)
POTASSIUM SERPL-SCNC: 4.7 MMOL/L — SIGNIFICANT CHANGE UP (ref 3.5–5.3)
PROT SERPL-MCNC: 7.6 G/DL — SIGNIFICANT CHANGE UP (ref 6–8.3)
SODIUM SERPL-SCNC: 140 MMOL/L — SIGNIFICANT CHANGE UP (ref 135–145)

## 2023-01-09 DIAGNOSIS — Z51.11 ENCOUNTER FOR ANTINEOPLASTIC CHEMOTHERAPY: ICD-10-CM

## 2023-01-24 ENCOUNTER — APPOINTMENT (OUTPATIENT)
Dept: ORTHOPEDIC SURGERY | Facility: CLINIC | Age: 23
End: 2023-01-24

## 2023-01-26 ENCOUNTER — RESULT REVIEW (OUTPATIENT)
Age: 23
End: 2023-01-26

## 2023-01-26 ENCOUNTER — APPOINTMENT (OUTPATIENT)
Dept: INFUSION THERAPY | Facility: HOSPITAL | Age: 23
End: 2023-01-26

## 2023-01-26 LAB
ALBUMIN SERPL ELPH-MCNC: 4.8 G/DL — SIGNIFICANT CHANGE UP (ref 3.3–5)
ALP SERPL-CCNC: 66 U/L — SIGNIFICANT CHANGE UP (ref 40–120)
ALT FLD-CCNC: 25 U/L — SIGNIFICANT CHANGE UP (ref 10–45)
ANION GAP SERPL CALC-SCNC: 10 MMOL/L — SIGNIFICANT CHANGE UP (ref 5–17)
AST SERPL-CCNC: 17 U/L — SIGNIFICANT CHANGE UP (ref 10–40)
B2 MICROGLOB SERPL-MCNC: 1.7 MG/L — SIGNIFICANT CHANGE UP (ref 0.8–2.2)
BILIRUB SERPL-MCNC: 0.6 MG/DL — SIGNIFICANT CHANGE UP (ref 0.2–1.2)
BUN SERPL-MCNC: 20 MG/DL — SIGNIFICANT CHANGE UP (ref 7–23)
CALCIUM SERPL-MCNC: 9.5 MG/DL — SIGNIFICANT CHANGE UP (ref 8.4–10.5)
CHLORIDE SERPL-SCNC: 104 MMOL/L — SIGNIFICANT CHANGE UP (ref 96–108)
CO2 SERPL-SCNC: 29 MMOL/L — SIGNIFICANT CHANGE UP (ref 22–31)
CREAT SERPL-MCNC: 1.06 MG/DL — SIGNIFICANT CHANGE UP (ref 0.5–1.3)
EGFR: 102 ML/MIN/1.73M2 — SIGNIFICANT CHANGE UP
GLUCOSE SERPL-MCNC: 75 MG/DL — SIGNIFICANT CHANGE UP (ref 70–99)
LDH SERPL L TO P-CCNC: 157 U/L — SIGNIFICANT CHANGE UP (ref 50–242)
POTASSIUM SERPL-MCNC: 4.2 MMOL/L — SIGNIFICANT CHANGE UP (ref 3.5–5.3)
POTASSIUM SERPL-SCNC: 4.2 MMOL/L — SIGNIFICANT CHANGE UP (ref 3.5–5.3)
PROT SERPL-MCNC: 7.3 G/DL — SIGNIFICANT CHANGE UP (ref 6–8.3)
SODIUM SERPL-SCNC: 143 MMOL/L — SIGNIFICANT CHANGE UP (ref 135–145)

## 2023-01-30 ENCOUNTER — APPOINTMENT (OUTPATIENT)
Dept: HEMATOLOGY ONCOLOGY | Facility: CLINIC | Age: 23
End: 2023-01-30

## 2023-02-03 ENCOUNTER — APPOINTMENT (OUTPATIENT)
Dept: INTERNAL MEDICINE | Facility: CLINIC | Age: 23
End: 2023-02-03

## 2023-02-04 ENCOUNTER — APPOINTMENT (OUTPATIENT)
Dept: INFUSION THERAPY | Facility: HOSPITAL | Age: 23
End: 2023-02-04

## 2023-02-09 ENCOUNTER — APPOINTMENT (OUTPATIENT)
Dept: INFUSION THERAPY | Facility: HOSPITAL | Age: 23
End: 2023-02-09

## 2023-02-13 ENCOUNTER — APPOINTMENT (OUTPATIENT)
Dept: HEMATOLOGY ONCOLOGY | Facility: CLINIC | Age: 23
End: 2023-02-13

## 2023-02-15 NOTE — ED PROVIDER NOTE - SKIN, MLM
no edema, no murmurs, regular rate and rhythm Skin normal color for race, warm, dry and intact. No evidence of rash.

## 2023-02-23 ENCOUNTER — APPOINTMENT (OUTPATIENT)
Dept: INFUSION THERAPY | Facility: HOSPITAL | Age: 23
End: 2023-02-23

## 2023-02-23 ENCOUNTER — APPOINTMENT (OUTPATIENT)
Dept: HEMATOLOGY ONCOLOGY | Facility: CLINIC | Age: 23
End: 2023-02-23

## 2023-02-27 ENCOUNTER — NON-APPOINTMENT (OUTPATIENT)
Age: 23
End: 2023-02-27

## 2023-03-01 ENCOUNTER — OUTPATIENT (OUTPATIENT)
Dept: OUTPATIENT SERVICES | Facility: HOSPITAL | Age: 23
LOS: 1 days | Discharge: HOME | End: 2023-03-01

## 2023-03-01 DIAGNOSIS — C81.98 HODGKIN LYMPHOMA, UNSPECIFIED, LYMPH NODES OF MULTIPLE SITES: ICD-10-CM

## 2023-03-08 ENCOUNTER — APPOINTMENT (OUTPATIENT)
Dept: HEMATOLOGY ONCOLOGY | Facility: CLINIC | Age: 23
End: 2023-03-08
Payer: MEDICAID

## 2023-03-08 ENCOUNTER — LABORATORY RESULT (OUTPATIENT)
Age: 23
End: 2023-03-08

## 2023-03-08 ENCOUNTER — RESULT REVIEW (OUTPATIENT)
Age: 23
End: 2023-03-08

## 2023-03-08 ENCOUNTER — APPOINTMENT (OUTPATIENT)
Dept: HEMATOLOGY ONCOLOGY | Facility: CLINIC | Age: 23
End: 2023-03-08

## 2023-03-08 VITALS
DIASTOLIC BLOOD PRESSURE: 66 MMHG | RESPIRATION RATE: 16 BRPM | WEIGHT: 104.94 LBS | BODY MASS INDEX: 17.27 KG/M2 | HEIGHT: 65.35 IN | TEMPERATURE: 98.1 F | OXYGEN SATURATION: 99 % | HEART RATE: 84 BPM | SYSTOLIC BLOOD PRESSURE: 108 MMHG

## 2023-03-08 LAB
BASOPHILS # BLD AUTO: 0.04 K/UL — SIGNIFICANT CHANGE UP (ref 0–0.2)
BASOPHILS NFR BLD AUTO: 0.4 % — SIGNIFICANT CHANGE UP (ref 0–2)
EOSINOPHIL # BLD AUTO: 0.61 K/UL — HIGH (ref 0–0.5)
EOSINOPHIL NFR BLD AUTO: 6.8 % — HIGH (ref 0–6)
HCT VFR BLD CALC: 46.3 % — SIGNIFICANT CHANGE UP (ref 39–50)
HGB BLD-MCNC: 15.5 G/DL — SIGNIFICANT CHANGE UP (ref 13–17)
IMM GRANULOCYTES NFR BLD AUTO: 0.2 % — SIGNIFICANT CHANGE UP (ref 0–0.9)
LYMPHOCYTES # BLD AUTO: 2.15 K/UL — SIGNIFICANT CHANGE UP (ref 1–3.3)
LYMPHOCYTES # BLD AUTO: 24.1 % — SIGNIFICANT CHANGE UP (ref 13–44)
MCHC RBC-ENTMCNC: 29 PG — SIGNIFICANT CHANGE UP (ref 27–34)
MCHC RBC-ENTMCNC: 33.5 G/DL — SIGNIFICANT CHANGE UP (ref 32–36)
MCV RBC AUTO: 86.5 FL — SIGNIFICANT CHANGE UP (ref 80–100)
MONOCYTES # BLD AUTO: 0.41 K/UL — SIGNIFICANT CHANGE UP (ref 0–0.9)
MONOCYTES NFR BLD AUTO: 4.6 % — SIGNIFICANT CHANGE UP (ref 2–14)
NEUTROPHILS # BLD AUTO: 5.68 K/UL — SIGNIFICANT CHANGE UP (ref 1.8–7.4)
NEUTROPHILS NFR BLD AUTO: 63.9 % — SIGNIFICANT CHANGE UP (ref 43–77)
NRBC # BLD: 0 /100 WBCS — SIGNIFICANT CHANGE UP (ref 0–0)
PLATELET # BLD AUTO: 197 K/UL — SIGNIFICANT CHANGE UP (ref 150–400)
RBC # BLD: 5.35 M/UL — SIGNIFICANT CHANGE UP (ref 4.2–5.8)
RBC # FLD: 14.9 % — HIGH (ref 10.3–14.5)
WBC # BLD: 8.91 K/UL — SIGNIFICANT CHANGE UP (ref 3.8–10.5)
WBC # FLD AUTO: 8.91 K/UL — SIGNIFICANT CHANGE UP (ref 3.8–10.5)

## 2023-03-08 PROCEDURE — 99215 OFFICE O/P EST HI 40 MIN: CPT

## 2023-03-08 NOTE — PHYSICAL EXAM
[de-identified] : Lumbar Scoliosis [de-identified] : Several LNs in L cervical chain:inferior posterior cervical chain with LNs up to 2 cm, superior left upper posterior lymph node ~2 cm

## 2023-03-08 NOTE — HISTORY OF PRESENT ILLNESS
[de-identified] : Initial Hx:\par \par Hodgkins Lymphoma. Patient initially presented to the [facility] [place] [facility] ED on 9/4/18 from [facility] with a mediastinal mass. Upon work up he was found to have Stage IVB disease. Ultrasound guided right supraclavicular lymph node Classical Hodgkins Lymphoma- large atypical cells to be CD30, CD15, PAX5+, MUM1+, LCA(-), CD20(-), CD79a(-), CD3(-), ALK(-).\par \par Bilateral bone marrow biopsy on 9/6/18- Cellular marrow with maturing trilineage hematopoiesis and no morphologic evidence of lymphoma\par PET/CT completed on 9/10/18- Hypermetabolic lymphadenopathy in neck and thorax, multiple hypermetabolic foci in the bilateral lower cervical and supraclavicular regions, measuring 0.9 x 0.7 cm demonstrates SUV 4.3 anterior mediastinal mass measuring 4.2 x 3 cm demonstrating peripheral FDG avidity and central photopenia, SUV: 6.9. Marked mediastinal and bilateral hilar lymphadenopathy demonstrating FDG avidity, right hilar lymph node measures 3.8 x 2.4 cm, SUV: 10.3. 5 mm nodule is again noted in the right middle lobe. Several hypermetabolic foci identified predominantly throughout the axial skeleton with foci noted scattered throughout the upper thoracic spine and a singular focus in the right anterior eighth rib FDG avid focus in the right side of the T7 vertebral body with corresponding lucency on CT measuring 0.9 x 0.9 cm, SUV 14.7. Two hypermetabolic foci in the appendicular skeleton with corresponding underlying lucency on CT. There is an FDG avid focus in the left inferior glenoid and a corresponding lucency on CT measuring 0.9 x 0.7 cm, SUV 12. There is also a 1.1 x 0.7 cm lucency in the right femoral head with corresponding FDG avidity, SUV 7.3. He was placed on/following a protocol- AHOC 1331, treatment with ABVE-PC (adriamycin, bleomycin, vincristine, etoposide, cytoxan, prednisone, dexrazoxane). He was withdrawn from the study due to his social situation, difficulty with compliance. 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 He was treated with chemotherapy from Sept 2018- Dec 2018. Interim PET/CT completed on 11/3/18- Partially hypermetabolic anterior mediastinal mass is decreased in \par size and metabolism as compared to prior study dated 9/10/2018, compatible with a partial response to interval therapy (Deauville 4). Resolution of additional previously seen hypermetabolic mediastinal, hilar, and cardiophrenic lymph nodes.Marked interval decrease in hypermetabolic foci in the axial and appendicular skeleton with residual hypermetabolic focus in left glenoid which may represent a small focus of residual disease (Deauville 4). He completed 5 cycles of chemotherapy, but missed about 10 appointments, and refused further chemotherapy after cycle 5 due to side effects. He underwent MRI Neck, Abdomen, and Pelvis from 2/2019 showed new lymph node enlargement to the L neck and redemonstrated osseous infiltration involving the bilateral pelvic bones and proximal femurs. CT Chest from 3/2019 showed mediastinal enlarged lymph nodes which were either unchanged or demonstrate interval progression since 11/2018, progressive R hilar lymphadenopathy, and a new cluster of small nodular opacities within the right lower lobe. PET Scan done 3/2019 revealed a new hypermetabolic lymphadenopathy in the left neck measuring approximately 2.4 x 2.1cm in the left level III/V cervical regions. There is also new hypermetabolic 1.2 x 0.8 cm left level VB cervical lymph node and chest new hypermetabolic approximately 1.1 x 0.9 cm right upper paratracheal node. Reference approximately 3.4 x 2.0 cm subcarinal node. Approximately 3.8 x 3.1cm right perihilar mass. as compared to PET/CT from 11/3/2018 suspicious for recurrent lymphoma. A nonspecific new diffuse splenic hypermetabolism. A new hypermetabolic opacity/consolidation in the right lower lung, approximately 2 x 1.3cm opacity/consolidation in the superior segment of the right lower lobe. Ultrasound guided core biopsy of left cervical lymph node done on 4/11/19- confirmed refractory disease. Bone marrow biopsy was negative. He was started on treatment 4/13 with salvage treatment with Gemzar/brentuximab which he received 2 cycles but again had compliance issues along with behavioral issues with the staff.  [de-identified] : 7/12/22: Patient is here today after being lost to follow up since last receiving nivolumab in 7/2021, treatment complicated by thyroid dysfunction. He was recently (7/7/22) evaluated at [facility] ED for nausea and vomiting from eating outside food. Was also found to have cervical lymphadenopathy, states it has been growing over the past 8 months, and was instructed to follow up with his hematologist. Today, he notes to have night sweats, unintentional weight loss (-12 Ibs), and cervical lymphadenopathy. Also, continues to have chronic low back pain due to scoliosis. Patient denies fever, chills, headache, abdominal pain, or chest pain.\par \par 8/22/22: Transfer of care from Dr Andressa Clinton. He has been experiencing severe night sweats the past 2 weeks. In addition he has been having back / neck pain related to both chronic scoliosis and left posterior cervical large adenopathy. He denies recent infections, fevers, chills, unexpected weight loss. 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 Mr Lai presents today for follow-up after a long period of no shows. He has missed multiple appointments for treatment, which the last treatment appt he was able to make it to was in January 2023. He reports that he has been having family and personal issues including death in the family that were related to him missing appts. He overall reports feeling well. He continues to have low back pain related to scoliosis. He has not been able to get the pain addressed with his PMD, and is now requesting a pain management referral. He previously used oxycodone which helped, but has not been receiving this for some time now (> 1 year). He reports that he has been taking his levothyroxine daily without missed doses and believes he has 5-10 pills left. He continues to have left neck lymphadenopathy, but he reports that this has decreased in size. He has been able to gain some weight back, but feels it is difficult. He has a good appetite. He does not have any constitutional complaints. His next treatment is planned for 3/9/23.\par \par A comprehensive review of systems was performed including constitutional, eyes, ENT, cardiovascular, respiratory, gastrointestinal, genitourinary, musculoskeletal, integumentary, neurological, psychiatric and hematologic / lymphatic. All pertinent positives are included in the H&P under interval history above and the remaining review of systems listed are negative. \par \par =================================================================\par \par ABVE-PC in 9/2018, completed 12/2018 after 5 cycles due to noncompliance and patient refusal, \par Gemzar/brentuximab in 4/2019 which was also stopped due to the patient's refusal and noncompliance. \par 2 cycles of ICE in 2/2020 with intent to pursue an autologous bone marrow transplant. The patient began the cycle and then left AMA.\par Radiation, 800 cGy to T1-T5, T8-L5, left hip/acetabulum & Nivolumab in 11/2020 with improvement, but lost to follow up. Treatment was also complicated due to development of hypothyroidism and COVID19\par \par =================================================================\par

## 2023-03-08 NOTE — ASSESSMENT
[FreeTextEntry1] : 22 year old male who was diagnosed with a stage IV CHL in 9/2018 with relapsed/ refractory Classical Hodgkin's lymphoma. He was initially treated following ABVE-PC completed 12/2018 after 5 cycles due to noncompliance and patient refusal, relapsed diagnosed in Feb 2019, started on salvage treatment in April 2019 with gemzar/ brentuximab which was also stopped due to the patient's refusal and noncompliance. \par \par He was recommended to have 2 cycles of ICE with intent to pursue an autologous bone marrow transplant. The patient began the cycle and then left AMA while the chemotherapy was running unfortunately. He reported that the chemotherapy was making him feel bad and that people were not listening. Despite multiple discussions with the patient and his mother, he did not follow up and refused further treatment. Recently with progression of disease/ osseous mets which he was recommended to go to the hospital, he again refused and left the office. \par \par He was readmitted in 10/2020 for diffuse vertebral disease/compression fractures. He was treated with radiation, 800 cGy to T1-T5, T8-L5, left hip/acetabulum. He also received nivolumab with improvement in his lymphadenopathy and symptoms. Developed hypothyroidism while receiving nivolumab, improved while on Synthroid. But was noncompliant and again lost to follow up.  \par \par He presented again in June/July 2022 with worsening WBC and lymphadenopathy and more recently in Aug 2022 with night sweats. He underwent biopsy of one of the lymph nodes which came back showing recurrent Classical Hodgkins Lymphoma. Retreatment restarted with cycle 1 day 1 on 8/22/22 with Nivolumab immunotherapy. \par \par He has missed multiple treatment appointments due to issues within his personal life. He hopes that things will be better going forward. He also plans to apply for college in the near future. Plan to continue Nivolumab therapy.\par \par - Repeat his labs- CMP, TSH today\par - Continue nivolumab q 2 weeks on 28 day cycles, placed orders for tomorrow\par - Awaiting TSH / blood work from today, will resend adjusted dose of levothyroxine if needed\par - Port removal to be set up\par - Discussed his disease, limited options at this time and need for better compliance. Strongly encouraged him to be complaint if he will be a candidate for more aggressive therapies in the future should this regimen fail.\par - Pain management for scoliosis related back pain, will provide referral one more time, if he is a no show, he will need further referrals through PMD\par - Follow up in 2-4 weeks\par \par ____\par I personally have spent a total of 40 minutes of time on the date of this encounter reviewing test results, documenting findings, providing education, coordinating care and directly consulting with the patient and/or designated family member.

## 2023-03-09 ENCOUNTER — RESULT REVIEW (OUTPATIENT)
Age: 23
End: 2023-03-09

## 2023-03-09 ENCOUNTER — APPOINTMENT (OUTPATIENT)
Dept: INFUSION THERAPY | Facility: HOSPITAL | Age: 23
End: 2023-03-09

## 2023-03-09 DIAGNOSIS — Z51.11 ENCOUNTER FOR ANTINEOPLASTIC CHEMOTHERAPY: ICD-10-CM

## 2023-03-09 LAB
ALBUMIN SERPL ELPH-MCNC: 4.8 G/DL
ALBUMIN SERPL ELPH-MCNC: 5.1 G/DL — HIGH (ref 3.3–5)
ALP BLD-CCNC: 68 U/L
ALP SERPL-CCNC: 66 U/L — SIGNIFICANT CHANGE UP (ref 40–120)
ALT FLD-CCNC: 16 U/L — SIGNIFICANT CHANGE UP (ref 10–45)
ALT SERPL-CCNC: 18 U/L
ANION GAP SERPL CALC-SCNC: 12 MMOL/L
ANION GAP SERPL CALC-SCNC: 15 MMOL/L — SIGNIFICANT CHANGE UP (ref 5–17)
APTT BLD: 42 SEC — HIGH (ref 27.5–35.5)
AST SERPL-CCNC: 13 U/L — SIGNIFICANT CHANGE UP (ref 10–40)
AST SERPL-CCNC: 14 U/L
B2 MICROGLOB SERPL-MCNC: 1.6 MG/L
B2 MICROGLOB SERPL-MCNC: 1.6 MG/L — SIGNIFICANT CHANGE UP (ref 0.8–2.2)
BILIRUB SERPL-MCNC: 0.5 MG/DL
BILIRUB SERPL-MCNC: 0.6 MG/DL — SIGNIFICANT CHANGE UP (ref 0.2–1.2)
BUN SERPL-MCNC: 15 MG/DL — SIGNIFICANT CHANGE UP (ref 7–23)
BUN SERPL-MCNC: 17 MG/DL
CALCIUM SERPL-MCNC: 10 MG/DL — SIGNIFICANT CHANGE UP (ref 8.4–10.5)
CALCIUM SERPL-MCNC: 9.8 MG/DL
CHLORIDE SERPL-SCNC: 101 MMOL/L — SIGNIFICANT CHANGE UP (ref 96–108)
CHLORIDE SERPL-SCNC: 105 MMOL/L
CO2 SERPL-SCNC: 24 MMOL/L — SIGNIFICANT CHANGE UP (ref 22–31)
CO2 SERPL-SCNC: 27 MMOL/L
CREAT SERPL-MCNC: 0.83 MG/DL — SIGNIFICANT CHANGE UP (ref 0.5–1.3)
CREAT SERPL-MCNC: 0.88 MG/DL
EGFR: 125 ML/MIN/1.73M2
EGFR: 127 ML/MIN/1.73M2 — SIGNIFICANT CHANGE UP
GLUCOSE SERPL-MCNC: 65 MG/DL — LOW (ref 70–99)
GLUCOSE SERPL-MCNC: 90 MG/DL
INR BLD: 1.13 RATIO — SIGNIFICANT CHANGE UP (ref 0.88–1.16)
LDH SERPL L TO P-CCNC: 165 U/L — SIGNIFICANT CHANGE UP (ref 50–242)
LDH SERPL-CCNC: 161 U/L
POTASSIUM SERPL-MCNC: 4.5 MMOL/L — SIGNIFICANT CHANGE UP (ref 3.5–5.3)
POTASSIUM SERPL-SCNC: 4.2 MMOL/L
POTASSIUM SERPL-SCNC: 4.5 MMOL/L — SIGNIFICANT CHANGE UP (ref 3.5–5.3)
PROT SERPL-MCNC: 7.3 G/DL
PROT SERPL-MCNC: 7.6 G/DL — SIGNIFICANT CHANGE UP (ref 6–8.3)
PROTHROM AB SERPL-ACNC: 13.1 SEC — SIGNIFICANT CHANGE UP (ref 10.5–13.4)
SODIUM SERPL-SCNC: 141 MMOL/L — SIGNIFICANT CHANGE UP (ref 135–145)
SODIUM SERPL-SCNC: 144 MMOL/L
TSH SERPL-ACNC: 14.1 UIU/ML
TSH SERPL-MCNC: 8.66 UIU/ML — HIGH (ref 0.27–4.2)
URATE SERPL-MCNC: 6.1 MG/DL

## 2023-03-09 RX ORDER — LEVOTHYROXINE SODIUM 0.05 MG/1
50 TABLET ORAL DAILY
Qty: 30 | Refills: 2 | Status: ACTIVE | COMMUNITY
Start: 2022-11-17 | End: 1900-01-01

## 2023-03-13 ENCOUNTER — NON-APPOINTMENT (OUTPATIENT)
Age: 23
End: 2023-03-13

## 2023-03-14 ENCOUNTER — APPOINTMENT (OUTPATIENT)
Dept: PAIN MANAGEMENT | Facility: CLINIC | Age: 23
End: 2023-03-14
Payer: MEDICAID

## 2023-03-14 DIAGNOSIS — C79.49 SECONDARY MALIGNANT NEOPLASM OF OTHER PARTS OF NERVOUS SYSTEM: ICD-10-CM

## 2023-03-14 DIAGNOSIS — M48.061 SPINAL STENOSIS, LUMBAR REGION WITHOUT NEUROGENIC CLAUDICATION: ICD-10-CM

## 2023-03-14 DIAGNOSIS — G89.4 CHRONIC PAIN SYNDROME: ICD-10-CM

## 2023-03-14 DIAGNOSIS — M48.50XA COLLAPSED VERTEBRA, NOT ELSEWHERE CLASSIFIED, SITE UNSPECIFIED, INITIAL ENCOUNTER FOR FRACTURE: ICD-10-CM

## 2023-03-14 DIAGNOSIS — M79.18 MYALGIA, OTHER SITE: ICD-10-CM

## 2023-03-14 DIAGNOSIS — F11.90 OPIOID USE, UNSPECIFIED, UNCOMPLICATED: ICD-10-CM

## 2023-03-14 PROCEDURE — 99204 OFFICE O/P NEW MOD 45 MIN: CPT | Mod: 95

## 2023-03-14 RX ORDER — GABAPENTIN 300 MG/1
300 CAPSULE ORAL
Qty: 90 | Refills: 1 | Status: ACTIVE | COMMUNITY
Start: 2023-03-14 | End: 1900-01-01

## 2023-03-14 NOTE — ASSESSMENT
[FreeTextEntry1] : >> Imaging and Other Studies\par \par I personally reviewed the relevant imaging.  Discussed and explained to patient the likely source of pathology and pain.  Questions answered. MRI \par \par back likely secondary to discogenic pain, spinal mets  refractory to conservative treatments including 6 consecutive weeks of home exercises/PT, will obtain MR TSI LS w wo IVC to evaluate for pathology\par \par may consider PT vs intervention pending eval\par \par >> Therapy and Other Modalities\par \par na\par \par >> Medications\par  \par trial gabapentin uptitrate to TID\par cautioned change in mood.  Encouraged to call with any worsening mood or depression/suicidal ideations\par \par Regarding opiate medication to manage pain. I had a detailed discussion with the patient regarding the risks of long-term opioid use, including the potential for medication side effects, hyperaglesia, endocrine dysfunction,  \par \par acetaminophen 650mg q8h prn pain (caution <3g daily)\par \par \par >> Interventions\par \par na\par \par >> Consults\par \par continue care with Dr. Rangel\par \par >> Discussion of Risks/Benefits/Alternatives\par \par 	>Regarding any scheduled procedures:\par \par I have discussed in detail with the patient that any interventional pain procedure is associated with potential risks.  The procedure may include an injection of steroids and potentially other medications (local anesthetic and normal saline) into the epidural space or surrounding tissue of the spine.  There are significant risks of this procedure which include and are not limited to infection, bleeding, worsening pain, dural puncture leading to postdural puncture headache, nerve damage, spinal cord injury, paralysis, stroke, and death.  \par \par There is a chance that the procedure does not improve their pain.  \par \par There are risks associated with the steroid being absorbed into the body systemically.  These include dysphoria, difficulty sleeping, mood swings and personality changes.  Premenopausal women may notice an irregularity in her menstrual cycle for 2-3 months following the injection.  Steroids can specifically affect patients with hypertension, diabetes, and peptic ulcers.  The procedure may cause a temporary increase in blood pressure and blood pressure, and may adversely affect a peptic ulcer.  Other, more rare complications, include avascular necrosis of joints, glaucoma and worsening of osteoporosis. \par \par I have discussed the risks of the procedure at length with the patient, and the potential benefits of pain relief.  I have offered alternatives to the procedure.  All questions were answered.  \par \par The patient expressed understanding and wishes to proceed with the procedure.\par \par 	>Regarding COVID19 Pandemic: \par \par Any planned interventional pain procedure are scheduled because further delay may cause harm or negative outcome to patient.  The goal in performing this procedure is to avoid deterioration of function, emergency room visits (which increases exposure) and reliance on opioids.  \par \par r/b/a discussed with patient, lack of evidence to conclusively determine whether pain management procedures have any positive or negative impact on the possibility of nissa the virus and/or development of any sequelae. \par \par Patient counselled regarding timing steroid based intervention 2 weeks before or after COVID-19 vaccine administration to avoid any interaction or affect on efficacy of vaccination\par \par Patient demonstrates understanding\par \par Informed patient that risks associated with the COVID-19 infection.  Informed patient steps taken to limit the risks.  We are implementing safety precautions and following protocols consistent with the CDC and state recommendations. All patients and staff will be checked for fever or signs of illness upon entry to the facility. We will limit our steroid dose to the lowest effective therapeutic dose or in some cases steroids will not be injected at all. \par \par Patient agrees to proceed\par \par >> Conclusion\par \par The above diagnosis and treatment plan is medically reasonable and necessary based on the patient encounter \par There were no barriers to communication.\par Informed patient that I would be available for any additional questions.\par Patient was instructed to call with any worsening symptoms including severe pain, new numbness/weakness, or changes in the bowel/bladder function. \par Discussed role of nsaids in pain management and all relevant risks, if patient is continuing to require after 4 weeks the patient should f/u for alternative treatment. \par Instructed patient to maintain pain diary to monitor pain level, mobility, and function.\par \par The referring provider was informed of the above diagnosis and treatment plan.\par \par I explained to patient benefits and limitation of TeleMedicine visits\par \par Patient understands that limitations include inability to perform comprehensive physical exam, which may lead to potential diagnostic inconsistencies.  \par \par Any scheduled procedures are based on history, imaging and limited physical exam performed on TeleHealth visit.  If necessary, additional focal physical exam will be performed on date of procedure\par \par Patient understands that diagnosis and treatment may be limited by these inconsistencies and patient agrees to proceed with care plan\par \par \par \par

## 2023-03-14 NOTE — CONSULT LETTER
[Dear  ___] : Dear  [unfilled], [Consult Letter:] : I had the pleasure of evaluating your patient, [unfilled]. [Please see my note below.] : Please see my note below. [Consult Closing:] : Thank you very much for allowing me to participate in the care of this patient.  If you have any questions, please do not hesitate to contact me. [Sincerely,] : Sincerely, [FreeTextEntry3] : \par David Fernandez DO, MBA\par Director, Pain Management Center\par  of Anesthesiology\par [facility] [employer] [facility]\par \par \par

## 2023-03-14 NOTE — HISTORY OF PRESENT ILLNESS
[Home] : at home, [unfilled] , at the time of the visit. [Medical Office: ([facility])___] : at the medical office located in  [Verbal consent obtained from patient] : the patient, [unfilled] [Back Pain] : back pain [8] : an average pain level of 8/10 [10] : a maximum pain level of 10/10 [FreeTextEntry1] : HPI\par \par Mr. 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  Pain is so bad that patient finds it difficult to perform adls and ambulate.  denies any worsening numbness, weakness, bowel/bladder dysfunction. \par \par \par Previous and current pain medications/doses/effects:\par \par oxycodone \par tylenol\par \par Previous Pain Treatments:\par \par na\par Previous Pain Injections:\par \par na\par \par Previous Diagnostic Studies/Images:\par \par MRI CS 10/2020\par \par Images are degraded by motion artifact. There is straightening of the usual cervical lordosis. The vertebral body heights and alignment are maintained. There is no focal STIR hyperintense marrow replacing lesion throughout the cervical spine. There is no significant disc bulge or herniation. There is no significant cervical spinal canal stenosis. There is no significant cervical spinal cord signal abnormality. There is extensive left cervical chain lymphadenopathy which appears to have moderately increased since 2/20/2020.\par \par Thoracic Spine:\par There are multiple STIR hyperintense marrow replacing lesions throughout the thoracic spine.\par \par At T2 there is an extensive marrow replacing lesion involving both the anterior and posterior elements. There is left ventrolateral epidural tumor involvement which indents the left ventrolateral aspect of the cord and contributes to moderate central canal stenosis. There is no underlying cord signal abnormality. There is tumor involvement of the left T2-T3 neural foramen contributing to severe stenosis. There is partial extra osseous extension into the left ventrolateral paraspinal soft tissues.\par \par There is an extensive marrow replacing lesion involving the vertebral body at T4. There is no definite epidural tumor involvement. There is mild loss of vertebral body height which may suggest underlying mild pathologic compression fracture.\par \par There is an extensive marrow replacing lesion at T9 involving the anterior and posterior elements. There is associated severe loss of vertebral body height consistent with pathologic compression fracture.. There is ventral epidural tumor involvement which indents the ventral cord and contributes to mild central canal stenosis. There is associated underlying cord signal abnormality centrally within the cord extending from T8 to T11. There is neural foraminal tumor involvement bilaterally at T9-T10 which contributes to severe bilateral neural foraminal stenosis. There is prominent tumor involvement of the ventral paraspinal soft tissues.\par \par Normal thoracic kyphosis is maintained. The vertebral body heights is maintained throughout the remaining thoracic spine.\par \par Multiple bilateral lung masses are seen right greater than left.\par \par Lumbar Spine:\par There are multiple marrow replacing lesions throughout the lumbar spine and sacrum.\par \par There is an extensive marrow replacing lesion involving the anterior and posterior elements at L1. There is associated moderate severe loss of vertebral body height consistent with a pathologic compression fracture. There is ventral epidural tumor involvement which contributes to moderate central canal stenosis and crowding of the nerve roots. There is neural foraminal tumor involvement at L1-L2 which contributes to moderate bilateral neural foraminal stenosis. There is partial tumor involvement of the ventral paraspinal soft tissues.\par \par There is an extensive marrow replacing lesion involving the anterior and posterior elements at L4. There is associated right-sided severe loss of vertebral body height consistent with pathologic compression fracture. There is associated levocurvature of the lumbar spine. There is no definite epidural tumor involvement however it cannot entirely be excluded. There is large right ventrolateral paraspinal soft tissue tumor involvement which ventrally displaces the inferior vena cava. There is partial tumor involvement of the right neural foramina at L3-L4 and L4-L5 which are moderately stenosed.\par \par There is straightening of the usual lumbar lordosis. The vertebral body heights and alignment are maintained throughout the remaining lumbar spine. There is no significant disc bulge or herniation. A normal-appearing conus medullaris terminates at T12-L1.\par \par There is retroperitoneal lymphadenopathy.\par \par \par IMPRESSION:\par MR Cervical spine: There are no marrow replacing lesions throughout the cervical spine. There is no high-grade central canal stenosis. There is extensive left cervical chain lymphadenopathy which appears to have increased since 2/20/2020.\par \par MR thoracic spine: There are multiple marrow replacing lesions throughout the thoracic spine. At T2 there is epidural tumor involvement which contributes to moderate central canal stenosis without underlying cord signal abnormality. At T4 there is a possible mild pathologic compression fracture. At T9 there is a severe pathologic compression fracture. At T9 there is ventral epidural tumor involvement which contributes to mild central canal stenosis. There is associated cord signal abnormality extending from T8-T11.\par \par MR lumbar spine: There are multiple marrow replacing lesions throughout the lumbar spine and sacrum as described. At L1 there is associated moderate pathologic compression fracture. At L1 there is ventral epidural tumor involvement which contributes to moderate central canal stenosis. At L4 there is severe pathologic compression fracture with associated levocurvature of the lumbar spine.\par \par \par

## 2023-03-14 NOTE — PHYSICAL EXAM
[de-identified] : \par Constitutional: Normal, well developed, no acute distress on audio/video examination\par Eyes: Symmetric, External structures on video examination\par ENT: Lips, mucosa and tongue normal on video examination\par Oropharynx: Lips normal, symmetric, no external lesions appreciated appreciated on video examination\par Respiratory: Non-labored breathing, no audible wheezes appreciated on audio/video examination\par Vascular: No cyanosis appreciated or edema appreciated on video examination\par GI:  no jaundice appreciated on video examination\par Neurovascular: CN grossly intact on video/audio examination, alert\par MSK: Normal muscle bulk on video examination\par

## 2023-03-14 NOTE — REASON FOR VISIT
[Initial Consultation] : an initial pain management consultation [FreeTextEntry2] : referred by Dr. Rangel for evaluation of back pain

## 2023-03-20 ENCOUNTER — RESULT REVIEW (OUTPATIENT)
Age: 23
End: 2023-03-20

## 2023-03-20 ENCOUNTER — TRANSCRIPTION ENCOUNTER (OUTPATIENT)
Age: 23
End: 2023-03-20

## 2023-03-20 ENCOUNTER — OUTPATIENT (OUTPATIENT)
Dept: OUTPATIENT SERVICES | Facility: HOSPITAL | Age: 23
LOS: 1 days | End: 2023-03-20
Payer: MEDICAID

## 2023-03-20 VITALS
TEMPERATURE: 98 F | DIASTOLIC BLOOD PRESSURE: 80 MMHG | SYSTOLIC BLOOD PRESSURE: 106 MMHG | RESPIRATION RATE: 16 BRPM | OXYGEN SATURATION: 100 % | HEART RATE: 65 BPM

## 2023-03-20 VITALS
HEIGHT: 65 IN | OXYGEN SATURATION: 98 % | HEART RATE: 66 BPM | RESPIRATION RATE: 16 BRPM | TEMPERATURE: 98 F | DIASTOLIC BLOOD PRESSURE: 67 MMHG | SYSTOLIC BLOOD PRESSURE: 102 MMHG | WEIGHT: 104.06 LBS

## 2023-03-20 DIAGNOSIS — C81.90 HODGKIN LYMPHOMA, UNSPECIFIED, UNSPECIFIED SITE: ICD-10-CM

## 2023-03-20 PROCEDURE — 36590 REMOVAL TUNNELED CV CATH: CPT

## 2023-03-20 PROCEDURE — 77001 FLUOROGUIDE FOR VEIN DEVICE: CPT

## 2023-03-20 PROCEDURE — 77001 FLUOROGUIDE FOR VEIN DEVICE: CPT | Mod: 26

## 2023-03-20 NOTE — ASU DISCHARGE PLAN (ADULT/PEDIATRIC) - NS MD DC FALL RISK RISK
For information on Fall & Injury Prevention, visit: [email or web address] OR  [email or web address] OR  https://www.cdc.gov/steadi/patient.html

## 2023-03-20 NOTE — PROCEDURE NOTE - PROCEDURE FINDINGS AND DETAILS
left chest port removed in it's entirety. incision closed with sutures and dermabond. post procedure CXR reveals no complications.  full report to follow.

## 2023-03-20 NOTE — PRE PROCEDURE NOTE - PRE PROCEDURE EVALUATION
Interventional Radiology Pre-Procedure Note      Diagnosis/Indication: Patient is a 22y old  Male with lymphoma who presents for left chest port removal for completion of chemotherapy.    PAST MEDICAL & SURGICAL HISTORY:  Hodgkins lymphoma in relapse      No significant past surgical history           Allergies: IV Contrast (Vomiting)  Rocephin (Pruritus)  Squash (Other)      LABS:          Procedure/ risks/ benefits were explained, informed consent obtained from patient, verbalizes understanding.

## 2023-03-20 NOTE — ASU DISCHARGE PLAN (ADULT/PEDIATRIC) - ASU DC SPECIAL INSTRUCTIONSFT
Chest Port Removal Discharge Instructions    - You have had a port removed from your chest.   - There is surgical glue overlying the chest incision. Do not peel off. Allow to fall off naturally like a scab.  - You may shower in 24 hours. No soaking or swimming for 2 weeks or until the site is completely healed.  - Do not perform any heavy lifting or put tension on the area for the next week or until the site is healed.  - You may resume your normal diet.    - You were given conscious sedation which may make you drowsy, therefore you need someone to stay with you until the morning following the procedure.  - Do not drive, engage in heavy lifting or strenuous activity, or drink any alcoholic beverages for the next 24 hours.     - It is normal to experience some pain over the site for the next few days. You may take apply ice to the area (20 minutes on, 20 minutes off) and take Tylenol for that pain. Do not take more frequently than every 6 hours and do not exceed more than 3000mg of Tylenol in a 24 hour period.    Notify your primary physician and/or Interventional Radiology IMMEDIATELY if you experience any of the following       - Fever of 100.5F       - Chills or Rigors/ Shakes       - Swelling and/or Redness in the area around the port removal site       - Worsening Pain       - Blood soaked bandages or worsening bleeding       - Lightheadedness and/or dizziness upon standing       - Chest Pain/ Tightness       - Shortness of Breath       - Difficulty walking    If you have a problem that you believe requires IMMEDIATE attention, please go to your NEAREST Emergency Room. If you believe your problem can safely wait until you speak to a physician, please call Interventional Radiology for any concerns.    During Normal Weekday Business Hours- You can contact the Interventional Radiology department during normal business hours via telephone.    During Evenings and Weekends- If you need to contact Interventional Radiology during off hours, do so by calling the hospital and requesting to be connected to the Interventional Radiologist on call. Chest Port Removal Discharge Instructions    - You have had a port removed from your chest.   - There is surgical glue overlying the chest incision. Do not peel off. Allow to fall off naturally like a scab.  - You may shower in 24 hours. No soaking or swimming for 2 weeks or until the site is completely healed.  - Do not perform any heavy lifting or put tension on the area for the next week or until the site is healed.  - You may resume your normal diet.    - You were given conscious sedation which may make you drowsy, therefore you need someone to stay with you until the morning following the procedure.  - Do not drive, engage in heavy lifting or strenuous activity, or drink any alcoholic beverages for the next 24 hours.     - It is normal to experience some pain over the site for the next few days. You may take apply ice to the area (20 minutes on, 20 minutes off) and take Tylenol for that pain. Do not take more frequently than every 6 hours and do not exceed more than 3000mg of Tylenol in a 24 hour period.    Notify your primary physician and/or Interventional Radiology IMMEDIATELY if you experience any of the following       - Fever of 100.5F       - Chills or Rigors/ Shakes       - Swelling and/or Redness in the area around the port removal site       - Worsening Pain       - Blood soaked bandages or worsening bleeding       - Lightheadedness and/or dizziness upon standing       - Chest Pain/ Tightness       - Shortness of Breath       - Difficulty walking    If you have a problem that you believe requires IMMEDIATE attention, please go to your NEAREST Emergency Room. If you believe your problem can safely wait until you speak to a physician, please call Interventional Radiology for any concerns.    During Normal Weekday Business Hours- You can contact the Interventional Radiology department during normal business hours via telephone.    During Evenings and Weekends- If you need to contact Interventional Radiology during off hours, do so by calling the hospital and requesting to be connected to the Interventional Radiologist on call.    Please feel free to contact us at [phone] if any problems arise. After 6PM, Monday through Friday, on weekends and on holidays, please call [phone] and ask for the radiology resident on call to be paged.

## 2023-03-20 NOTE — PRE-ANESTHESIA EVALUATION ADULT - ANESTHESIA, PREVIOUS REACTION, PROFILE
none

## 2023-03-20 NOTE — ASU PATIENT PROFILE, ADULT - FALL HARM RISK - UNIVERSAL INTERVENTIONS
Bed in lowest position, wheels locked, appropriate side rails in place/Call bell, personal items and telephone in reach/Instruct patient to call for assistance before getting out of bed or chair/Non-slip footwear when patient is out of bed/[place] to call system/Physically safe environment - no spills, clutter or unnecessary equipment/Purposeful Proactive Rounding/Room/bathroom lighting operational, light cord in reach

## 2023-03-23 ENCOUNTER — APPOINTMENT (OUTPATIENT)
Dept: INFUSION THERAPY | Facility: HOSPITAL | Age: 23
End: 2023-03-23

## 2023-03-27 DIAGNOSIS — Z45.2 ENCOUNTER FOR ADJUSTMENT AND MANAGEMENT OF VASCULAR ACCESS DEVICE: ICD-10-CM

## 2023-04-06 ENCOUNTER — APPOINTMENT (OUTPATIENT)
Dept: INFUSION THERAPY | Facility: HOSPITAL | Age: 23
End: 2023-04-06

## 2023-04-06 NOTE — ED ADULT NURSE NOTE - NS ED NURSE DISCH DISPOSITION
Dear Adilson Lopez,     It was our pleasure to care for you here at [facility]  It is our hope that we were always able to exceed the expected standards for your care during your stay  You were hospitalized due to sinus tachycardia and chest pain  You were cared for on the 3rd floor by Caio Miller DO under the service of Sharif Holley MD with the [facility] who covers for your primary care physician (PCP), Jenelle Núñez MD, while you were hospitalized  If you have any questions or concerns related to this hospitalization, you may contact us at [phone]  For follow up as well as any medication refills, we recommend that you follow up with your primary care physician  A registered nurse will reach out to you by phone within a few days after your discharge to answer any additional questions that you may have after going home  However, at this time we provide for you here, the most important instructions / recommendations at discharge:     Notable Medication Adjustments -   Start diltiazem 180 mg daily  Stop taking amlodipine 5 mg  Testing Required after Discharge -   None  Important follow up information -   PCP  Cardiology   Other Instructions -   Cardiac diet  Please review this entire after visit summary as additional general instructions including medication list, appointments, activity, diet, any pertinent wound care, and other additional recommendations from your care team that may be provided for you        Sincerely,     Caio Miller DO AMA (saw a physician/midlevel provider and clinician was able to provide reasons for staying for treatment & form is signed)

## 2023-04-20 ENCOUNTER — APPOINTMENT (OUTPATIENT)
Dept: INFUSION THERAPY | Facility: HOSPITAL | Age: 23
End: 2023-04-20

## 2023-04-20 ENCOUNTER — RESULT REVIEW (OUTPATIENT)
Age: 23
End: 2023-04-20

## 2023-04-20 LAB
ALBUMIN SERPL ELPH-MCNC: 4.6 G/DL — SIGNIFICANT CHANGE UP (ref 3.3–5)
ALP SERPL-CCNC: 60 U/L — SIGNIFICANT CHANGE UP (ref 40–120)
ALT FLD-CCNC: 13 U/L — SIGNIFICANT CHANGE UP (ref 10–45)
ANION GAP SERPL CALC-SCNC: 12 MMOL/L — SIGNIFICANT CHANGE UP (ref 5–17)
AST SERPL-CCNC: 14 U/L — SIGNIFICANT CHANGE UP (ref 10–40)
BILIRUB SERPL-MCNC: 0.6 MG/DL — SIGNIFICANT CHANGE UP (ref 0.2–1.2)
BUN SERPL-MCNC: 13 MG/DL — SIGNIFICANT CHANGE UP (ref 7–23)
CALCIUM SERPL-MCNC: 9.5 MG/DL — SIGNIFICANT CHANGE UP (ref 8.4–10.5)
CHLORIDE SERPL-SCNC: 105 MMOL/L — SIGNIFICANT CHANGE UP (ref 96–108)
CO2 SERPL-SCNC: 27 MMOL/L — SIGNIFICANT CHANGE UP (ref 22–31)
CREAT SERPL-MCNC: 0.9 MG/DL — SIGNIFICANT CHANGE UP (ref 0.5–1.3)
EGFR: 124 ML/MIN/1.73M2 — SIGNIFICANT CHANGE UP
GLUCOSE SERPL-MCNC: 85 MG/DL — SIGNIFICANT CHANGE UP (ref 70–99)
POTASSIUM SERPL-MCNC: 4 MMOL/L — SIGNIFICANT CHANGE UP (ref 3.5–5.3)
POTASSIUM SERPL-SCNC: 4 MMOL/L — SIGNIFICANT CHANGE UP (ref 3.5–5.3)
PROT SERPL-MCNC: 6.6 G/DL — SIGNIFICANT CHANGE UP (ref 6–8.3)
SODIUM SERPL-SCNC: 145 MMOL/L — SIGNIFICANT CHANGE UP (ref 135–145)

## 2023-04-26 ENCOUNTER — OUTPATIENT (OUTPATIENT)
Dept: OUTPATIENT SERVICES | Facility: HOSPITAL | Age: 23
LOS: 1 days | Discharge: HOME | End: 2023-04-26

## 2023-04-26 DIAGNOSIS — C81.98 HODGKIN LYMPHOMA, UNSPECIFIED, LYMPH NODES OF MULTIPLE SITES: ICD-10-CM

## 2023-05-03 ENCOUNTER — NON-APPOINTMENT (OUTPATIENT)
Age: 23
End: 2023-05-03

## 2023-05-04 ENCOUNTER — APPOINTMENT (OUTPATIENT)
Dept: INFUSION THERAPY | Facility: HOSPITAL | Age: 23
End: 2023-05-04

## 2023-05-31 ENCOUNTER — NON-APPOINTMENT (OUTPATIENT)
Age: 23
End: 2023-05-31

## 2023-06-01 NOTE — DISCHARGE NOTE PEDIATRIC - MEDICATION SUMMARY - MEDICATIONS TO TAKE
RN
I will START or STAY ON the medications listed below when I get home from the hospital:    enoxaparin 40 mg/0.4 mL injectable solution  -- 40 milligram(s) subcutaneously once a day   -- It is very important that you take or use this exactly as directed.  Do not skip doses or discontinue unless directed by your doctor.    -- Indication: For Hodgkin lymphoma

## 2023-06-21 ENCOUNTER — OUTPATIENT (OUTPATIENT)
Dept: OUTPATIENT SERVICES | Facility: HOSPITAL | Age: 23
LOS: 1 days | Discharge: HOME | End: 2023-06-21

## 2023-06-21 DIAGNOSIS — C81.98 HODGKIN LYMPHOMA, UNSPECIFIED, LYMPH NODES OF MULTIPLE SITES: ICD-10-CM

## 2023-07-03 ENCOUNTER — LABORATORY RESULT (OUTPATIENT)
Age: 23
End: 2023-07-03

## 2023-07-03 ENCOUNTER — RESULT REVIEW (OUTPATIENT)
Age: 23
End: 2023-07-03

## 2023-07-03 ENCOUNTER — APPOINTMENT (OUTPATIENT)
Dept: HEMATOLOGY ONCOLOGY | Facility: CLINIC | Age: 23
End: 2023-07-03

## 2023-07-03 ENCOUNTER — APPOINTMENT (OUTPATIENT)
Dept: HEMATOLOGY ONCOLOGY | Facility: CLINIC | Age: 23
End: 2023-07-03
Payer: MEDICAID

## 2023-07-03 VITALS
DIASTOLIC BLOOD PRESSURE: 69 MMHG | TEMPERATURE: 97.8 F | BODY MASS INDEX: 16.69 KG/M2 | HEIGHT: 65.35 IN | SYSTOLIC BLOOD PRESSURE: 110 MMHG | WEIGHT: 101.41 LBS | RESPIRATION RATE: 16 BRPM | HEART RATE: 72 BPM | OXYGEN SATURATION: 100 %

## 2023-07-03 LAB
BASOPHILS # BLD AUTO: 0.05 K/UL — SIGNIFICANT CHANGE UP (ref 0–0.2)
BASOPHILS NFR BLD AUTO: 0.4 % — SIGNIFICANT CHANGE UP (ref 0–2)
EOSINOPHIL # BLD AUTO: 0.46 K/UL — SIGNIFICANT CHANGE UP (ref 0–0.5)
EOSINOPHIL NFR BLD AUTO: 4.1 % — SIGNIFICANT CHANGE UP (ref 0–6)
HCT VFR BLD CALC: 44.6 % — SIGNIFICANT CHANGE UP (ref 39–50)
HGB BLD-MCNC: 15.1 G/DL — SIGNIFICANT CHANGE UP (ref 13–17)
IMM GRANULOCYTES NFR BLD AUTO: 0.3 % — SIGNIFICANT CHANGE UP (ref 0–0.9)
LYMPHOCYTES # BLD AUTO: 1.57 K/UL — SIGNIFICANT CHANGE UP (ref 1–3.3)
LYMPHOCYTES # BLD AUTO: 13.9 % — SIGNIFICANT CHANGE UP (ref 13–44)
MCHC RBC-ENTMCNC: 28.8 PG — SIGNIFICANT CHANGE UP (ref 27–34)
MCHC RBC-ENTMCNC: 33.9 G/DL — SIGNIFICANT CHANGE UP (ref 32–36)
MCV RBC AUTO: 85.1 FL — SIGNIFICANT CHANGE UP (ref 80–100)
MONOCYTES # BLD AUTO: 0.51 K/UL — SIGNIFICANT CHANGE UP (ref 0–0.9)
MONOCYTES NFR BLD AUTO: 4.5 % — SIGNIFICANT CHANGE UP (ref 2–14)
NEUTROPHILS # BLD AUTO: 8.65 K/UL — HIGH (ref 1.8–7.4)
NEUTROPHILS NFR BLD AUTO: 76.8 % — SIGNIFICANT CHANGE UP (ref 43–77)
NRBC # BLD: 0 /100 WBCS — SIGNIFICANT CHANGE UP (ref 0–0)
PLATELET # BLD AUTO: 222 K/UL — SIGNIFICANT CHANGE UP (ref 150–400)
RBC # BLD: 5.24 M/UL — SIGNIFICANT CHANGE UP (ref 4.2–5.8)
RBC # FLD: 14.8 % — HIGH (ref 10.3–14.5)
WBC # BLD: 11.27 K/UL — HIGH (ref 3.8–10.5)
WBC # FLD AUTO: 11.27 K/UL — HIGH (ref 3.8–10.5)

## 2023-07-03 PROCEDURE — 99214 OFFICE O/P EST MOD 30 MIN: CPT

## 2023-07-03 NOTE — PHYSICAL EXAM
[Fully active, able to carry on all pre-disease performance without restriction] : Status 0 - Fully active, able to carry on all pre-disease performance without restriction [Thin] : thin [Normal] : grossly intact [de-identified] : Lumbar Scoliosis [de-identified] : Several LNs in L cervical chain:inferior posterior cervical chain with LNs up to 2.5 cm, superior left upper posterior lymph node ~2.5 cm

## 2023-07-03 NOTE — ASSESSMENT
[Curative] : Goals of care discussed with patient: Curative [FreeTextEntry1] : 22 year old male who was diagnosed with a stage IV CHL in 9/2018 with relapsed/ refractory Classical Hodgkin's lymphoma. He was initially treated following ABVE-PC completed 12/2018 after 5 cycles due to noncompliance and patient refusal, relapsed diagnosed in Feb 2019, started on salvage treatment in April 2019 with gemzar/ brentuximab which was also stopped due to the patient's refusal and noncompliance. \par \par He was recommended to have 2 cycles of ICE with intent to pursue an autologous bone marrow transplant. The patient began the cycle and then left AMA while the chemotherapy was running unfortunately. He reported that the chemotherapy was making him feel bad and that people were not listening. Despite multiple discussions with the patient and his mother, he did not follow up and refused further treatment. Recently with progression of disease/ osseous mets which he was recommended to go to the hospital, he again refused and left the office. \par \par He was readmitted in 10/2020 for diffuse vertebral disease/compression fractures. He was treated with radiation, 800 cGy to T1-T5, T8-L5, left hip/acetabulum. He also received nivolumab with improvement in his lymphadenopathy and symptoms. Developed hypothyroidism while receiving nivolumab, improved while on Synthroid. But was noncompliant and again lost to follow up.  \par \par He presented again in June/July 2022 with worsening WBC and lymphadenopathy and more recently in Aug 2022 with night sweats. He underwent biopsy of one of the lymph nodes which came back showing recurrent Classical Hodgkins Lymphoma. Retreatment restarted with cycle 1 day 1 on 8/22/22 with Nivolumab immunotherapy. \par \par He has missed multiple treatment appointments due to issues within his personal life. He hopes that things will be better going forward. He also plans to apply for college in the near future. Plan to continue Nivolumab therapy.\par \par - Repeat his labs- CMP, TSH today\par - Continue nivolumab q 2 weeks on 28 day cycles; he is unable to get treatment today due to time constraints, he says he will reschedule to next week and show up to the appointment.\par - Awaiting TSH / blood work from today, will resend adjusted dose of levothyroxine if needed\par - Discussed his disease, limited options at this time and need for better compliance. Strongly encouraged him to be complaint if he will be a candidate for more aggressive therapies in the future should this regimen fail.\par - Pain management for scoliosis related back pain, will provide referral one more time, if he is a no show, he will need further referrals through PMD\par - Follow up in 2-4 weeks\par \par Case and management discussed with Dr. Rangel\par ____\par I personally have spent a total of 40 minutes of time on the date of this encounter reviewing test results, documenting findings, providing education, coordinating care and directly consulting with the patient and/or designated family member.

## 2023-07-03 NOTE — HISTORY OF PRESENT ILLNESS
[de-identified] : Initial Hx:\par \par Hodgkins Lymphoma. Patient initially presented to the [facility] ED on 9/4/18 from [facility] with a mediastinal mass. Upon work up he was found to have Stage IVB disease. Ultrasound guided right supraclavicular lymph node Classical Hodgkins Lymphoma- large atypical cells to be CD30, CD15, PAX5+, MUM1+, LCA(-), CD20(-), CD79a(-), CD3(-), ALK(-).\par \par Bilateral bone marrow biopsy on 9/6/18- Cellular marrow with maturing trilineage hematopoiesis and no morphologic evidence of lymphoma\par PET/CT completed on 9/10/18- Hypermetabolic lymphadenopathy in neck and thorax, multiple hypermetabolic foci in the bilateral lower cervical and supraclavicular regions, measuring 0.9 x 0.7 cm demonstrates SUV 4.3 anterior mediastinal mass measuring 4.2 x 3 cm demonstrating peripheral FDG avidity and central photopenia, SUV: 6.9. Marked mediastinal and bilateral hilar lymphadenopathy demonstrating FDG avidity, right hilar lymph node measures 3.8 x 2.4 cm, SUV: 10.3. 5 mm nodule is again noted in the right middle lobe. Several hypermetabolic foci identified predominantly throughout the axial skeleton with foci noted scattered throughout the upper thoracic spine and a singular focus in the right anterior eighth rib FDG avid focus in the right side of the T7 vertebral body with corresponding lucency on CT measuring 0.9 x 0.9 cm, SUV 14.7. Two hypermetabolic foci in the appendicular skeleton with corresponding underlying lucency on CT. There is an FDG avid focus in the left inferior glenoid and a corresponding lucency on CT measuring 0.9 x 0.7 cm, SUV 12. There is also a 1.1 x 0.7 cm lucency in the right femoral head with corresponding FDG avidity, SUV 7.3. He was placed on/following a protocol- AHOC 1331, treatment with ABVE-PC (adriamycin, bleomycin, vincristine, etoposide, cytoxan, prednisone, dexrazoxane). He was withdrawn from the study due to his social situation, difficulty with compliance. 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 He was treated with chemotherapy from Sept 2018- Dec 2018. Interim PET/CT completed on 11/3/18- Partially hypermetabolic anterior mediastinal mass is decreased in \par size and metabolism as compared to prior study dated 9/10/2018, compatible with a partial response to interval therapy (Deauville 4). Resolution of additional previously seen hypermetabolic mediastinal, hilar, and cardiophrenic lymph nodes.Marked interval decrease in hypermetabolic foci in the axial and appendicular skeleton with residual hypermetabolic focus in left glenoid which may represent a small focus of residual disease (Deauville 4). He completed 5 cycles of chemotherapy, but missed about 10 appointments, and refused further chemotherapy after cycle 5 due to side effects. He underwent MRI Neck, Abdomen, and Pelvis from 2/2019 showed new lymph node enlargement to the L neck and redemonstrated osseous infiltration involving the bilateral pelvic bones and proximal femurs. CT Chest from 3/2019 showed mediastinal enlarged lymph nodes which were either unchanged or demonstrate interval progression since 11/2018, progressive R hilar lymphadenopathy, and a new cluster of small nodular opacities within the right lower lobe. PET Scan done 3/2019 revealed a new hypermetabolic lymphadenopathy in the left neck measuring approximately 2.4 x 2.1cm in the left level III/V cervical regions. There is also new hypermetabolic 1.2 x 0.8 cm left level VB cervical lymph node and chest new hypermetabolic approximately 1.1 x 0.9 cm right upper paratracheal node. Reference approximately 3.4 x 2.0 cm subcarinal node. Approximately 3.8 x 3.1cm right perihilar mass. as compared to PET/CT from 11/3/2018 suspicious for recurrent lymphoma. A nonspecific new diffuse splenic hypermetabolism. A new hypermetabolic opacity/consolidation in the right lower lung, approximately 2 x 1.3cm opacity/consolidation in the superior segment of the right lower lobe. Ultrasound guided core biopsy of left cervical lymph node done on 4/11/19- confirmed refractory disease. Bone marrow biopsy was negative. He was started on treatment 4/13 with salvage treatment with Gemzar/brentuximab which he received 2 cycles but again had compliance issues along with behavioral issues with the staff.  [de-identified] : 7/12/22: Patient is here today after being lost to follow up since last receiving nivolumab in 7/2021, treatment complicated by thyroid dysfunction. He was recently (7/7/22) evaluated at [facility] ED for nausea and vomiting from eating outside food. Was also found to have cervical lymphadenopathy, states it has been growing over the past 8 months, and was instructed to follow up with his hematologist. Today, he notes to have night sweats, unintentional weight loss (-12 Ibs), and cervical lymphadenopathy. Also, continues to have chronic low back pain due to scoliosis. Patient denies fever, chills, headache, abdominal pain, or chest pain.\par \par 8/22/22: Transfer of care from Dr Andressa Clinton. He has been experiencing severe night sweats the past 2 weeks. In addition he has been having back / neck pain related to both chronic scoliosis and left posterior cervical large adenopathy. He denies recent infections, fevers, chills, unexpected weight loss. 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 Mr Lai presents today for follow-up after a long period of no shows. He has missed multiple appointments for treatment, which the last treatment appt he was able to make it to was in January 2023. He reports that he has been having family and personal issues including death in the family that were related to him missing appts. He overall reports feeling well. He continues to have low back pain related to scoliosis. He has not been able to get the pain addressed with his PMD, and is now requesting a pain management referral. He previously used oxycodone which helped, but has not been receiving this for some time now (> 1 year). He reports that he has been taking his levothyroxine daily without missed doses and believes he has 5-10 pills left. He continues to have left neck lymphadenopathy, but he reports that this has decreased in size. He has been able to gain some weight back, but feels it is difficult. He has a good appetite. He does not have any constitutional complaints. His next treatment is planned for 3/9/23.\par \par 7/3/23: Follow-up. Mr Lai presents today for follow-up after a long period of no shows. He has missed multiple appointments for treatment, which the last treatment appt he was able to make it to was in April 2023. Says he now wants to turn things around with his treatment and will be compliant moving forward. Was able to get his mediport removed. Continues to have low back pain related to scoliosis for which he sees pain management. He reports that he has been taking his levothyroxine as prescribed. He continues to have left neck lymphadenopathy, which has increased in size slightly due to many missed treatments. Good appetite, stable weight, but feels it is difficult to maintain. He does not have any constitutional complaints. \par \par A comprehensive review of systems was performed including constitutional, eyes, ENT, cardiovascular, respiratory, gastrointestinal, genitourinary, musculoskeletal, integumentary, neurological, psychiatric and hematologic / lymphatic. All pertinent positives are included in the H&P under interval history above and the remaining review of systems listed are negative. \par \par =================================================================\par \par ABVE-PC in 9/2018, completed 12/2018 after 5 cycles due to noncompliance and patient refusal, \par Gemzar/brentuximab in 4/2019 which was also stopped due to the patient's refusal and noncompliance. \par 2 cycles of ICE in 2/2020 with intent to pursue an autologous bone marrow transplant. The patient began the cycle and then left AMA.\par Radiation, 800 cGy to T1-T5, T8-L5, left hip/acetabulum & Nivolumab in 11/2020 with improvement, but lost to follow up. Treatment was also complicated due to development of hypothyroidism and COVID19\par \par =================================================================\par

## 2023-07-10 ENCOUNTER — RESULT REVIEW (OUTPATIENT)
Age: 23
End: 2023-07-10

## 2023-07-10 ENCOUNTER — APPOINTMENT (OUTPATIENT)
Dept: INFUSION THERAPY | Facility: HOSPITAL | Age: 23
End: 2023-07-10

## 2023-07-11 DIAGNOSIS — Z51.11 ENCOUNTER FOR ANTINEOPLASTIC CHEMOTHERAPY: ICD-10-CM

## 2023-07-11 LAB
ALBUMIN SERPL ELPH-MCNC: 4.7 G/DL — SIGNIFICANT CHANGE UP (ref 3.3–5)
ALP SERPL-CCNC: 68 U/L — SIGNIFICANT CHANGE UP (ref 40–120)
ALT FLD-CCNC: 16 U/L — SIGNIFICANT CHANGE UP (ref 10–45)
ANION GAP SERPL CALC-SCNC: 14 MMOL/L — SIGNIFICANT CHANGE UP (ref 5–17)
AST SERPL-CCNC: 18 U/L — SIGNIFICANT CHANGE UP (ref 10–40)
BILIRUB SERPL-MCNC: 0.4 MG/DL — SIGNIFICANT CHANGE UP (ref 0.2–1.2)
BUN SERPL-MCNC: 12 MG/DL — SIGNIFICANT CHANGE UP (ref 7–23)
CALCIUM SERPL-MCNC: 9.7 MG/DL — SIGNIFICANT CHANGE UP (ref 8.4–10.5)
CHLORIDE SERPL-SCNC: 102 MMOL/L — SIGNIFICANT CHANGE UP (ref 96–108)
CO2 SERPL-SCNC: 27 MMOL/L — SIGNIFICANT CHANGE UP (ref 22–31)
CREAT SERPL-MCNC: 0.95 MG/DL — SIGNIFICANT CHANGE UP (ref 0.5–1.3)
EGFR: 116 ML/MIN/1.73M2 — SIGNIFICANT CHANGE UP
GLUCOSE SERPL-MCNC: 76 MG/DL — SIGNIFICANT CHANGE UP (ref 70–99)
POTASSIUM SERPL-MCNC: 4.6 MMOL/L — SIGNIFICANT CHANGE UP (ref 3.5–5.3)
POTASSIUM SERPL-SCNC: 4.6 MMOL/L — SIGNIFICANT CHANGE UP (ref 3.5–5.3)
PROT SERPL-MCNC: 7.1 G/DL — SIGNIFICANT CHANGE UP (ref 6–8.3)
SODIUM SERPL-SCNC: 143 MMOL/L — SIGNIFICANT CHANGE UP (ref 135–145)

## 2023-07-12 LAB
ALBUMIN SERPL ELPH-MCNC: 5 G/DL
ALP BLD-CCNC: 69 U/L
ALT SERPL-CCNC: 18 U/L
ANION GAP SERPL CALC-SCNC: 16 MMOL/L
APTT BLD: 36.1 SEC
AST SERPL-CCNC: 24 U/L
B2 MICROGLOB SERPL-MCNC: 1.8 MG/L
BILIRUB SERPL-MCNC: 0.8 MG/DL
BUN SERPL-MCNC: 18 MG/DL
CALCIUM SERPL-MCNC: 10.3 MG/DL
CHLORIDE SERPL-SCNC: 102 MMOL/L
CO2 SERPL-SCNC: 24 MMOL/L
CREAT SERPL-MCNC: 0.93 MG/DL
EGFR: 119 ML/MIN/1.73M2
GLUCOSE SERPL-MCNC: 96 MG/DL
INR PPP: 1.07 RATIO
LDH SERPL-CCNC: 162 U/L
POTASSIUM SERPL-SCNC: 4.4 MMOL/L
PROT SERPL-MCNC: 7.4 G/DL
PT BLD: 12.5 SEC
SODIUM SERPL-SCNC: 143 MMOL/L
TSH SERPL-ACNC: 5.27 UIU/ML
URATE SERPL-MCNC: 7.5 MG/DL

## 2023-07-24 ENCOUNTER — APPOINTMENT (OUTPATIENT)
Dept: INFUSION THERAPY | Facility: HOSPITAL | Age: 23
End: 2023-07-24

## 2023-07-24 ENCOUNTER — RESULT REVIEW (OUTPATIENT)
Age: 23
End: 2023-07-24

## 2023-07-24 LAB
ALBUMIN SERPL ELPH-MCNC: 4.4 G/DL — SIGNIFICANT CHANGE UP (ref 3.3–5)
ALP SERPL-CCNC: 68 U/L — SIGNIFICANT CHANGE UP (ref 40–120)
ALT FLD-CCNC: 13 U/L — SIGNIFICANT CHANGE UP (ref 10–45)
ANION GAP SERPL CALC-SCNC: 11 MMOL/L — SIGNIFICANT CHANGE UP (ref 5–17)
AST SERPL-CCNC: 33 U/L — SIGNIFICANT CHANGE UP (ref 10–40)
BILIRUB SERPL-MCNC: 0.3 MG/DL — SIGNIFICANT CHANGE UP (ref 0.2–1.2)
BUN SERPL-MCNC: 15 MG/DL — SIGNIFICANT CHANGE UP (ref 7–23)
CALCIUM SERPL-MCNC: 9.8 MG/DL — SIGNIFICANT CHANGE UP (ref 8.4–10.5)
CHLORIDE SERPL-SCNC: 99 MMOL/L — SIGNIFICANT CHANGE UP (ref 96–108)
CO2 SERPL-SCNC: 28 MMOL/L — SIGNIFICANT CHANGE UP (ref 22–31)
CREAT SERPL-MCNC: 0.87 MG/DL — SIGNIFICANT CHANGE UP (ref 0.5–1.3)
EGFR: 125 ML/MIN/1.73M2 — SIGNIFICANT CHANGE UP
GLUCOSE SERPL-MCNC: 80 MG/DL — SIGNIFICANT CHANGE UP (ref 70–99)
POTASSIUM SERPL-MCNC: 4.6 MMOL/L — SIGNIFICANT CHANGE UP (ref 3.5–5.3)
POTASSIUM SERPL-SCNC: 4.6 MMOL/L — SIGNIFICANT CHANGE UP (ref 3.5–5.3)
PROT SERPL-MCNC: 7.3 G/DL — SIGNIFICANT CHANGE UP (ref 6–8.3)
SODIUM SERPL-SCNC: 138 MMOL/L — SIGNIFICANT CHANGE UP (ref 135–145)

## 2023-08-07 ENCOUNTER — APPOINTMENT (OUTPATIENT)
Dept: HEMATOLOGY ONCOLOGY | Facility: CLINIC | Age: 23
End: 2023-08-07

## 2023-08-07 ENCOUNTER — APPOINTMENT (OUTPATIENT)
Dept: INFUSION THERAPY | Facility: HOSPITAL | Age: 23
End: 2023-08-07

## 2023-08-10 ENCOUNTER — APPOINTMENT (OUTPATIENT)
Dept: HEMATOLOGY ONCOLOGY | Facility: CLINIC | Age: 23
End: 2023-08-10

## 2023-08-17 ENCOUNTER — APPOINTMENT (OUTPATIENT)
Dept: HEMATOLOGY ONCOLOGY | Facility: CLINIC | Age: 23
End: 2023-08-17

## 2023-08-18 ENCOUNTER — OUTPATIENT (OUTPATIENT)
Dept: OUTPATIENT SERVICES | Facility: HOSPITAL | Age: 23
LOS: 1 days | Discharge: HOME | End: 2023-08-18

## 2023-08-18 DIAGNOSIS — C81.98 HODGKIN LYMPHOMA, UNSPECIFIED, LYMPH NODES OF MULTIPLE SITES: ICD-10-CM

## 2023-08-21 ENCOUNTER — RESULT REVIEW (OUTPATIENT)
Age: 23
End: 2023-08-21

## 2023-08-21 ENCOUNTER — APPOINTMENT (OUTPATIENT)
Dept: HEMATOLOGY ONCOLOGY | Facility: CLINIC | Age: 23
End: 2023-08-21

## 2023-08-21 VITALS
SYSTOLIC BLOOD PRESSURE: 107 MMHG | TEMPERATURE: 98.1 F | OXYGEN SATURATION: 98 % | HEART RATE: 74 BPM | DIASTOLIC BLOOD PRESSURE: 68 MMHG | RESPIRATION RATE: 16 BRPM | WEIGHT: 106.25 LBS

## 2023-08-21 LAB
ALBUMIN SERPL ELPH-MCNC: 4.6 G/DL
ALP BLD-CCNC: 69 U/L
ALT SERPL-CCNC: 14 U/L
ANION GAP SERPL CALC-SCNC: 12 MMOL/L
AST SERPL-CCNC: 17 U/L
B2 MICROGLOB SERPL-MCNC: 2 MG/L
BASOPHILS # BLD AUTO: 0.05 K/UL — SIGNIFICANT CHANGE UP (ref 0–0.2)
BASOPHILS NFR BLD AUTO: 0.4 % — SIGNIFICANT CHANGE UP (ref 0–2)
BILIRUB SERPL-MCNC: 0.5 MG/DL
BUN SERPL-MCNC: 14 MG/DL
CALCIUM SERPL-MCNC: 10.3 MG/DL
CHLORIDE SERPL-SCNC: 102 MMOL/L
CO2 SERPL-SCNC: 27 MMOL/L
CREAT SERPL-MCNC: 0.92 MG/DL
EGFR: 121 ML/MIN/1.73M2
EOSINOPHIL # BLD AUTO: 0.39 K/UL — SIGNIFICANT CHANGE UP (ref 0–0.5)
EOSINOPHIL NFR BLD AUTO: 3 % — SIGNIFICANT CHANGE UP (ref 0–6)
GLUCOSE SERPL-MCNC: 97 MG/DL
HCT VFR BLD CALC: 46.3 % — SIGNIFICANT CHANGE UP (ref 39–50)
HGB BLD-MCNC: 15.4 G/DL — SIGNIFICANT CHANGE UP (ref 13–17)
IMM GRANULOCYTES NFR BLD AUTO: 1.8 % — HIGH (ref 0–0.9)
LDH SERPL-CCNC: 182 U/L
LYMPHOCYTES # BLD AUTO: 1.38 K/UL — SIGNIFICANT CHANGE UP (ref 1–3.3)
LYMPHOCYTES # BLD AUTO: 10.6 % — LOW (ref 13–44)
MCHC RBC-ENTMCNC: 28.3 PG — SIGNIFICANT CHANGE UP (ref 27–34)
MCHC RBC-ENTMCNC: 33.3 G/DL — SIGNIFICANT CHANGE UP (ref 32–36)
MCV RBC AUTO: 85.1 FL — SIGNIFICANT CHANGE UP (ref 80–100)
MONOCYTES # BLD AUTO: 0.6 K/UL — SIGNIFICANT CHANGE UP (ref 0–0.9)
MONOCYTES NFR BLD AUTO: 4.6 % — SIGNIFICANT CHANGE UP (ref 2–14)
NEUTROPHILS # BLD AUTO: 10.36 K/UL — HIGH (ref 1.8–7.4)
NEUTROPHILS NFR BLD AUTO: 79.6 % — HIGH (ref 43–77)
NRBC # BLD: 0 /100 WBCS — SIGNIFICANT CHANGE UP (ref 0–0)
PLATELET # BLD AUTO: 223 K/UL — SIGNIFICANT CHANGE UP (ref 150–400)
POTASSIUM SERPL-SCNC: 4.6 MMOL/L
PROT SERPL-MCNC: 7.5 G/DL
RBC # BLD: 5.44 M/UL — SIGNIFICANT CHANGE UP (ref 4.2–5.8)
RBC # FLD: 14.6 % — HIGH (ref 10.3–14.5)
SODIUM SERPL-SCNC: 141 MMOL/L
TSH SERPL-ACNC: 3.51 UIU/ML
WBC # BLD: 13.02 K/UL — HIGH (ref 3.8–10.5)
WBC # FLD AUTO: 13.02 K/UL — HIGH (ref 3.8–10.5)

## 2023-08-23 NOTE — PHYSICAL EXAM
[Fully active, able to carry on all pre-disease performance without restriction] : Status 0 - Fully active, able to carry on all pre-disease performance without restriction [Thin] : thin [Normal] : grossly intact [de-identified] : Several LNs in L cervical chain:inferior posterior cervical chain with LNs up to 2.5 cm, superior left upper posterior lymph node ~2.5 cm [de-identified] : Lumbar Scoliosis

## 2023-08-23 NOTE — ASSESSMENT
[Curative] : Goals of care discussed with patient: Curative [FreeTextEntry1] : 22 year old male who was diagnosed with a stage IV CHL in 9/2018 with relapsed/ refractory Classical Hodgkin's lymphoma. He was initially treated following ABVE-PC completed 12/2018 after 5 cycles due to noncompliance and patient refusal, relapsed diagnosed in Feb 2019, started on salvage treatment in April 2019 with gemzar/ brentuximab which was also stopped due to the patient's refusal and noncompliance.   He was recommended to have 2 cycles of ICE with intent to pursue an autologous bone marrow transplant. The patient began the cycle and then left AMA while the chemotherapy was running unfortunately. He reported that the chemotherapy was making him feel bad and that people were not listening. Despite multiple discussions with the patient and his mother, he did not follow up and refused further treatment. Recently with progression of disease/ osseous mets which he was recommended to go to the hospital, he again refused and left the office.   He was readmitted in 10/2020 for diffuse vertebral disease/compression fractures. He was treated with radiation, 800 cGy to T1-T5, T8-L5, left hip/acetabulum. He also received nivolumab with improvement in his lymphadenopathy and symptoms. Developed hypothyroidism while receiving nivolumab, improved while on Synthroid. But was noncompliant and again lost to follow up.    He presented again in June/July 2022 with worsening WBC and lymphadenopathy and more recently in Aug 2022 with night sweats. He underwent biopsy of one of the lymph nodes which came back showing recurrent Classical Hodgkins Lymphoma. Retreatment restarted with cycle 1 day 1 on 8/22/22 with Nivolumab immunotherapy.   He has missed multiple treatment appointments due to issues within his personal life. He hopes that things will be better going forward. He also plans to apply for college in the near future. Plan to continue Nivolumab therapy.  - Repeat his labs- CMP, TSH today - Continue nivolumab q 2 weeks on 28 day cycles; he is unable to get treatment today due to time constraints, he says he will reschedule to next week and show up to the appointment. - Awaiting TSH / blood work from today, will resend adjusted dose of levothyroxine if needed - Discussed his disease, limited options at this time and need for better compliance. Strongly encouraged him to be complaint if he will be a candidate for more aggressive therapies in the future should this regimen fail. - Pain management for scoliosis related back pain, will provide referral one more time, if he is a no show, he will need further referrals through PMD - S/p mediport removal - Follow up in 2-4 weeks  Case and management discussed with Dr. Rangel ____ I personally have spent a total of 40 minutes of time on the date of this encounter reviewing test results, documenting findings, providing education, coordinating care and directly consulting with the patient and/or designated family member.

## 2023-08-23 NOTE — HISTORY OF PRESENT ILLNESS
[de-identified] : Initial Hx:\par  \par  Hodgkins Lymphoma. Patient initially presented to the [facility] ED on 9/4/18 from [facility] with a mediastinal mass. Upon work up he was found to have Stage IVB disease. Ultrasound guided right supraclavicular lymph node Classical Hodgkins Lymphoma- large atypical cells to be CD30, CD15, PAX5+, MUM1+, LCA(-), CD20(-), CD79a(-), CD3(-), ALK(-).\par  \par  Bilateral bone marrow biopsy on 9/6/18- Cellular marrow with maturing trilineage hematopoiesis and no morphologic evidence of lymphoma\par  PET/CT completed on 9/10/18- Hypermetabolic lymphadenopathy in neck and thorax, multiple hypermetabolic foci in the bilateral lower cervical and supraclavicular regions, measuring 0.9 x 0.7 cm demonstrates SUV 4.3 anterior mediastinal mass measuring 4.2 x 3 cm demonstrating peripheral FDG avidity and central photopenia, SUV: 6.9. Marked mediastinal and bilateral hilar lymphadenopathy demonstrating FDG avidity, right hilar lymph node measures 3.8 x 2.4 cm, SUV: 10.3. 5 mm nodule is again noted in the right middle lobe. Several hypermetabolic foci identified predominantly throughout the axial skeleton with foci noted scattered throughout the upper thoracic spine and a singular focus in the right anterior eighth rib FDG avid focus in the right side of the T7 vertebral body with corresponding lucency on CT measuring 0.9 x 0.9 cm, SUV 14.7. Two hypermetabolic foci in the appendicular skeleton with corresponding underlying lucency on CT. There is an FDG avid focus in the left inferior glenoid and a corresponding lucency on CT measuring 0.9 x 0.7 cm, SUV 12. There is also a 1.1 x 0.7 cm lucency in the right femoral head with corresponding FDG avidity, SUV 7.3. He was placed on/following a protocol- AHOC 1331, treatment with ABVE-PC (adriamycin, bleomycin, vincristine, etoposide, cytoxan, prednisone, dexrazoxane). He was withdrawn from the study due to his social situation, difficulty with compliance. 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 He was treated with chemotherapy from Sept 2018- Dec 2018. Interim PET/CT completed on 11/3/18- Partially hypermetabolic anterior mediastinal mass is decreased in \par  size and metabolism as compared to prior study dated 9/10/2018, compatible with a partial response to interval therapy (Deauville 4). Resolution of additional previously seen hypermetabolic mediastinal, hilar, and cardiophrenic lymph nodes.Marked interval decrease in hypermetabolic foci in the axial and appendicular skeleton with residual hypermetabolic focus in left glenoid which may represent a small focus of residual disease (Deauville 4). He completed 5 cycles of chemotherapy, but missed about 10 appointments, and refused further chemotherapy after cycle 5 due to side effects. He underwent MRI Neck, Abdomen, and Pelvis from 2/2019 showed new lymph node enlargement to the L neck and redemonstrated osseous infiltration involving the bilateral pelvic bones and proximal femurs. CT Chest from 3/2019 showed mediastinal enlarged lymph nodes which were either unchanged or demonstrate interval progression since 11/2018, progressive R hilar lymphadenopathy, and a new cluster of small nodular opacities within the right lower lobe. PET Scan done 3/2019 revealed a new hypermetabolic lymphadenopathy in the left neck measuring approximately 2.4 x 2.1cm in the left level III/V cervical regions. There is also new hypermetabolic 1.2 x 0.8 cm left level VB cervical lymph node and chest new hypermetabolic approximately 1.1 x 0.9 cm right upper paratracheal node. Reference approximately 3.4 x 2.0 cm subcarinal node. Approximately 3.8 x 3.1cm right perihilar mass. as compared to PET/CT from 11/3/2018 suspicious for recurrent lymphoma. A nonspecific new diffuse splenic hypermetabolism. A new hypermetabolic opacity/consolidation in the right lower lung, approximately 2 x 1.3cm opacity/consolidation in the superior segment of the right lower lobe. Ultrasound guided core biopsy of left cervical lymph node done on 4/11/19- confirmed refractory disease. Bone marrow biopsy was negative. He was started on treatment 4/13 with salvage treatment with Gemzar/brentuximab which he received 2 cycles but again had compliance issues along with behavioral issues with the staff.  [de-identified] : 7/12/22: Patient is here today after being lost to follow up since last receiving nivolumab in 7/2021, treatment complicated by thyroid dysfunction. He was recently (7/7/22) evaluated at [facility] ED for nausea and vomiting from eating outside food. Was also found to have cervical lymphadenopathy, states it has been growing over the past 8 months, and was instructed to follow up with his hematologist. Today, he notes to have night sweats, unintentional weight loss (-12 Ibs), and cervical lymphadenopathy. Also, continues to have chronic low back pain due to scoliosis. Patient denies fever, chills, headache, abdominal pain, or chest pain.  8/22/22: Transfer of care from Dr Andressa Clinton. He has been experiencing severe night sweats the past 2 weeks. In addition he has been having back / neck pain related to both chronic scoliosis and left posterior cervical large adenopathy. He denies recent infections, fevers, chills, unexpected weight loss. 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  3/8/23: Follow-up. Mr Lai presents today for follow-up after a long period of no shows. He has missed multiple appointments for treatment, which the last treatment appt he was able to make it to was in January 2023. He reports that he has been having family and personal issues including death in the family that were related to him missing appts. He overall reports feeling well. He continues to have low back pain related to scoliosis. He has not been able to get the pain addressed with his PMD, and is now requesting a pain management referral. He previously used oxycodone which helped, but has not been receiving this for some time now (> 1 year). He reports that he has been taking his levothyroxine daily without missed doses and believes he has 5-10 pills left. He continues to have left neck lymphadenopathy, but he reports that this has decreased in size. He has been able to gain some weight back, but feels it is difficult. He has a good appetite. He does not have any constitutional complaints. His next treatment is planned for 3/9/23.  7/3/23: Follow-up. Mr Lai presents today for follow-up after a long period of no shows. He has missed multiple appointments for treatment, which the last treatment appt he was able to make it to was in April 2023. Says he now wants to turn things around with his treatment and will be compliant moving forward. Was able to get his mediport removed. Continues to have low back pain related to scoliosis for which he sees pain management. He reports that he has been taking his levothyroxine as prescribed. He continues to have left neck lymphadenopathy, which has increased in size slightly due to many missed treatments. Good appetite, stable weight, but feels it is difficult to maintain. He does not have any constitutional complaints.   8/21/23: Follow-up. Mr Lai has missed the most recent treatment appointments, is awaiting for the treatment to be rescheduled. Last received treatment in April 2023. He insists that he is trying to turn things around with his treatment and will be compliant moving forward. Today he notes low back pain related to scoliosis (sees pain management). He reports that he has been taking his levothyroxine as prescribed. He continues to have left neck lymphadenopathy, which has increased in size slightly due to many missed treatments. Good appetite, stable weight, but feels it is difficult to maintain.   A comprehensive review of systems was performed including constitutional, eyes, ENT, cardiovascular, respiratory, gastrointestinal, genitourinary, musculoskeletal, integumentary, neurological, psychiatric and hematologic / lymphatic. All pertinent positives are included in the H&P under interval history above and the remaining review of systems listed are negative.   =================================================================  ABVE-PC in 9/2018, completed 12/2018 after 5 cycles due to noncompliance and patient refusal,  Gemzar/brentuximab in 4/2019 which was also stopped due to the patient's refusal and noncompliance.  2 cycles of ICE in 2/2020 with intent to pursue an autologous bone marrow transplant. The patient began the cycle and then left AMA. Radiation, 800 cGy to T1-T5, T8-L5, left hip/acetabulum & Nivolumab in 11/2020 with improvement, but lost to follow up. Treatment was also complicated due to development of hypothyroidism and COVID19  =================================================================

## 2023-08-24 ENCOUNTER — APPOINTMENT (OUTPATIENT)
Dept: HEMATOLOGY ONCOLOGY | Facility: CLINIC | Age: 23
End: 2023-08-24
Payer: MEDICAID

## 2023-08-24 ENCOUNTER — RESULT REVIEW (OUTPATIENT)
Age: 23
End: 2023-08-24

## 2023-08-24 ENCOUNTER — APPOINTMENT (OUTPATIENT)
Dept: INFUSION THERAPY | Facility: HOSPITAL | Age: 23
End: 2023-08-24

## 2023-08-24 DIAGNOSIS — Z51.11 ENCOUNTER FOR ANTINEOPLASTIC CHEMOTHERAPY: ICD-10-CM

## 2023-08-24 LAB
ALBUMIN SERPL ELPH-MCNC: 4.5 G/DL — SIGNIFICANT CHANGE UP (ref 3.3–5)
ALP SERPL-CCNC: 58 U/L — SIGNIFICANT CHANGE UP (ref 40–120)
ALT FLD-CCNC: 7 U/L — LOW (ref 10–45)
ANION GAP SERPL CALC-SCNC: 10 MMOL/L — SIGNIFICANT CHANGE UP (ref 5–17)
AST SERPL-CCNC: 22 U/L — SIGNIFICANT CHANGE UP (ref 10–40)
BILIRUB SERPL-MCNC: 0.6 MG/DL — SIGNIFICANT CHANGE UP (ref 0.2–1.2)
BUN SERPL-MCNC: 15 MG/DL — SIGNIFICANT CHANGE UP (ref 7–23)
CALCIUM SERPL-MCNC: 9.2 MG/DL — SIGNIFICANT CHANGE UP (ref 8.4–10.5)
CHLORIDE SERPL-SCNC: 100 MMOL/L — SIGNIFICANT CHANGE UP (ref 96–108)
CO2 SERPL-SCNC: 29 MMOL/L — SIGNIFICANT CHANGE UP (ref 22–31)
CREAT SERPL-MCNC: 0.93 MG/DL — SIGNIFICANT CHANGE UP (ref 0.5–1.3)
EGFR: 119 ML/MIN/1.73M2 — SIGNIFICANT CHANGE UP
GLUCOSE SERPL-MCNC: 69 MG/DL — LOW (ref 70–99)
POTASSIUM SERPL-MCNC: 3.8 MMOL/L — SIGNIFICANT CHANGE UP (ref 3.5–5.3)
POTASSIUM SERPL-SCNC: 3.8 MMOL/L — SIGNIFICANT CHANGE UP (ref 3.5–5.3)
PROT SERPL-MCNC: 7 G/DL — SIGNIFICANT CHANGE UP (ref 6–8.3)
SODIUM SERPL-SCNC: 139 MMOL/L — SIGNIFICANT CHANGE UP (ref 135–145)

## 2023-08-24 PROCEDURE — 99214 OFFICE O/P EST MOD 30 MIN: CPT

## 2023-08-24 NOTE — ASSESSMENT
[Curative] : Goals of care discussed with patient: Curative [FreeTextEntry1] : 22 year old male who was diagnosed with a stage IV CHL in 9/2018 with relapsed/ refractory Classical Hodgkin's lymphoma. He was initially treated following ABVE-PC completed 12/2018 after 5 cycles due to noncompliance and patient refusal, relapsed diagnosed in Feb 2019, started on salvage treatment in April 2019 with gemzar/ brentuximab which was also stopped due to the patient's refusal and noncompliance.   He was recommended to have 2 cycles of ICE with intent to pursue an autologous bone marrow transplant. The patient began the cycle and then left AMA while the chemotherapy was running unfortunately. He reported that the chemotherapy was making him feel bad and that people were not listening. Despite multiple discussions with the patient and his mother, he did not follow up and refused further treatment. Recently with progression of disease/ osseous mets which he was recommended to go to the hospital, he again refused and left the office.   He was readmitted in 10/2020 for diffuse vertebral disease/compression fractures. He was treated with radiation, 800 cGy to T1-T5, T8-L5, left hip/acetabulum. He also received nivolumab with improvement in his lymphadenopathy and symptoms. Developed hypothyroidism while receiving nivolumab, improved while on Synthroid. But was noncompliant and again lost to follow up.    He presented again in June/July 2022 with worsening WBC and lymphadenopathy and more recently in Aug 2022 with night sweats. He underwent biopsy of one of the lymph nodes which came back showing recurrent Classical Hodgkins Lymphoma. Retreatment restarted with cycle 1 day 1 on 8/22/22 with Nivolumab immunotherapy.   He has missed multiple treatment appointments due to issues within his personal life. He hopes that things will be better going forward. He also plans to apply for college in the near future. Plan to continue Nivolumab therapy.  - Repeat his labs- CMP, TSH (pre-V) - Continue nivolumab q 2 weeks on 28 day cycles; he is unable to get treatment today due to time constraints, he says he will reschedule to next week and show up to the appointment. - Discussed his disease, limited options at this time and need for better compliance. Strongly encouraged him to be complaint if he will be a candidate for more aggressive therapies in the future should this regimen fail. - Pain management for scoliosis related back pain, will provide referral one more time, if he is a no show, he will need further referrals through PMD - S/p mediport removal - Follow up in 2-4 weeks  Case and management discussed with Dr. Rangel ____ I personally have spent a total of 30 minutes of time on the date of this encounter reviewing test results, documenting findings, providing education, coordinating care and directly consulting with the patient and/or designated family member.

## 2023-08-24 NOTE — HISTORY OF PRESENT ILLNESS
[de-identified] : Initial Hx:\par  \par  Hodgkins Lymphoma. Patient initially presented to the [facility] ED on 9/4/18 from [facility] with a mediastinal mass. Upon work up he was found to have Stage IVB disease. Ultrasound guided right supraclavicular lymph node Classical Hodgkins Lymphoma- large atypical cells to be CD30, CD15, PAX5+, MUM1+, LCA(-), CD20(-), CD79a(-), CD3(-), ALK(-).\par  \par  Bilateral bone marrow biopsy on 9/6/18- Cellular marrow with maturing trilineage hematopoiesis and no morphologic evidence of lymphoma\par  PET/CT completed on 9/10/18- Hypermetabolic lymphadenopathy in neck and thorax, multiple hypermetabolic foci in the bilateral lower cervical and supraclavicular regions, measuring 0.9 x 0.7 cm demonstrates SUV 4.3 anterior mediastinal mass measuring 4.2 x 3 cm demonstrating peripheral FDG avidity and central photopenia, SUV: 6.9. Marked mediastinal and bilateral hilar lymphadenopathy demonstrating FDG avidity, right hilar lymph node measures 3.8 x 2.4 cm, SUV: 10.3. 5 mm nodule is again noted in the right middle lobe. Several hypermetabolic foci identified predominantly throughout the axial skeleton with foci noted scattered throughout the upper thoracic spine and a singular focus in the right anterior eighth rib FDG avid focus in the right side of the T7 vertebral body with corresponding lucency on CT measuring 0.9 x 0.9 cm, SUV 14.7. Two hypermetabolic foci in the appendicular skeleton with corresponding underlying lucency on CT. There is an FDG avid focus in the left inferior glenoid and a corresponding lucency on CT measuring 0.9 x 0.7 cm, SUV 12. There is also a 1.1 x 0.7 cm lucency in the right femoral head with corresponding FDG avidity, SUV 7.3. He was placed on/following a protocol- AHOC 1331, treatment with ABVE-PC (adriamycin, bleomycin, vincristine, etoposide, cytoxan, prednisone, dexrazoxane). He was withdrawn from the study due to his social situation, difficulty with compliance. 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 He was treated with chemotherapy from Sept 2018- Dec 2018. Interim PET/CT completed on 11/3/18- Partially hypermetabolic anterior mediastinal mass is decreased in \par  size and metabolism as compared to prior study dated 9/10/2018, compatible with a partial response to interval therapy (Deauville 4). Resolution of additional previously seen hypermetabolic mediastinal, hilar, and cardiophrenic lymph nodes.Marked interval decrease in hypermetabolic foci in the axial and appendicular skeleton with residual hypermetabolic focus in left glenoid which may represent a small focus of residual disease (Deauville 4). He completed 5 cycles of chemotherapy, but missed about 10 appointments, and refused further chemotherapy after cycle 5 due to side effects. He underwent MRI Neck, Abdomen, and Pelvis from 2/2019 showed new lymph node enlargement to the L neck and redemonstrated osseous infiltration involving the bilateral pelvic bones and proximal femurs. CT Chest from 3/2019 showed mediastinal enlarged lymph nodes which were either unchanged or demonstrate interval progression since 11/2018, progressive R hilar lymphadenopathy, and a new cluster of small nodular opacities within the right lower lobe. PET Scan done 3/2019 revealed a new hypermetabolic lymphadenopathy in the left neck measuring approximately 2.4 x 2.1cm in the left level III/V cervical regions. There is also new hypermetabolic 1.2 x 0.8 cm left level VB cervical lymph node and chest new hypermetabolic approximately 1.1 x 0.9 cm right upper paratracheal node. Reference approximately 3.4 x 2.0 cm subcarinal node. Approximately 3.8 x 3.1cm right perihilar mass. as compared to PET/CT from 11/3/2018 suspicious for recurrent lymphoma. A nonspecific new diffuse splenic hypermetabolism. A new hypermetabolic opacity/consolidation in the right lower lung, approximately 2 x 1.3cm opacity/consolidation in the superior segment of the right lower lobe. Ultrasound guided core biopsy of left cervical lymph node done on 4/11/19- confirmed refractory disease. Bone marrow biopsy was negative. He was started on treatment 4/13 with salvage treatment with Gemzar/brentuximab which he received 2 cycles but again had compliance issues along with behavioral issues with the staff.  [de-identified] : 7/12/22: Patient is here today after being lost to follow up since last receiving nivolumab in 7/2021, treatment complicated by thyroid dysfunction. He was recently (7/7/22) evaluated at [facility] ED for nausea and vomiting from eating outside food. Was also found to have cervical lymphadenopathy, states it has been growing over the past 8 months, and was instructed to follow up with his hematologist. Today, he notes to have night sweats, unintentional weight loss (-12 Ibs), and cervical lymphadenopathy. Also, continues to have chronic low back pain due to scoliosis. Patient denies fever, chills, headache, abdominal pain, or chest pain.  8/22/22: Transfer of care from Dr Andressa Clinton. He has been experiencing severe night sweats the past 2 weeks. In addition he has been having back / neck pain related to both chronic scoliosis and left posterior cervical large adenopathy. He denies recent infections, fevers, chills, unexpected weight loss. 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  3/8/23: Follow-up. Mr Lai presents today for follow-up after a long period of no shows. He has missed multiple appointments for treatment, which the last treatment appt he was able to make it to was in January 2023. He reports that he has been having family and personal issues including death in the family that were related to him missing appts. He overall reports feeling well. He continues to have low back pain related to scoliosis. He has not been able to get the pain addressed with his PMD, and is now requesting a pain management referral. He previously used oxycodone which helped, but has not been receiving this for some time now (> 1 year). He reports that he has been taking his levothyroxine daily without missed doses and believes he has 5-10 pills left. He continues to have left neck lymphadenopathy, but he reports that this has decreased in size. He has been able to gain some weight back, but feels it is difficult. He has a good appetite. He does not have any constitutional complaints. His next treatment is planned for 3/9/23.  7/3/23: Follow-up. Mr Lai presents today for follow-up after a long period of no shows. He has missed multiple appointments for treatment, which the last treatment appt he was able to make it to was in April 2023. Says he now wants to turn things around with his treatment and will be compliant moving forward. Was able to get his mediport removed. Continues to have low back pain related to scoliosis for which he sees pain management. He reports that he has been taking his levothyroxine as prescribed. He continues to have left neck lymphadenopathy, which has increased in size slightly due to many missed treatments. Good appetite, stable weight, but feels it is difficult to maintain. He does not have any constitutional complaints.   8/24/23: Follow-up. Mr Joelle has missed the most recent treatment appointments, rescheduled too today. He insists that he is trying to be more compliant with his treatment. Notes low back pain related to scoliosis (has not seen pain management recently). Has been taking levothyroxine as prescribed. He continues to have left neck lymphadenopathy, size is not improving due to many missed treatments. Good appetite, stable weight, but feels it is difficult to maintain.   A comprehensive review of systems was performed including constitutional, eyes, ENT, cardiovascular, respiratory, gastrointestinal, genitourinary, musculoskeletal, integumentary, neurological, psychiatric and hematologic / lymphatic. All pertinent positives are included in the H&P under interval history above and the remaining review of systems listed are negative.   =================================================================  ABVE-PC in 9/2018, completed 12/2018 after 5 cycles due to noncompliance and patient refusal,  Gemzar/brentuximab in 4/2019 which was also stopped due to the patient's refusal and noncompliance.  2 cycles of ICE in 2/2020 with intent to pursue an autologous bone marrow transplant. The patient began the cycle and then left AMA. Radiation, 800 cGy to T1-T5, T8-L5, left hip/acetabulum & Nivolumab in 11/2020 with improvement, but lost to follow up. Treatment was also complicated due to development of hypothyroidism and COVID19  =================================================================

## 2023-08-24 NOTE — PHYSICAL EXAM
[Fully active, able to carry on all pre-disease performance without restriction] : Status 0 - Fully active, able to carry on all pre-disease performance without restriction [Thin] : thin [Normal] : grossly intact [de-identified] : Last office visit: Several LNs in L cervical chain:inferior posterior cervical chain with LNs up to 2.5 cm, superior left upper posterior lymph node ~2.5 cm [de-identified] : Lumbar Scoliosis

## 2023-09-07 NOTE — DIETITIAN INITIAL EVALUATION ADULT. - PROBLEM SELECTOR PLAN 6
Detail Level: Detailed
DVT ppx - Lovenox, dose adjusted 2/2 low weight/BMI  Activity - Toe touch weight bearing with walker as per ortho recs at [facility], PT eval  Diet - regular diet, ensure shake supplementation

## 2023-09-18 ENCOUNTER — APPOINTMENT (OUTPATIENT)
Dept: HEMATOLOGY ONCOLOGY | Facility: CLINIC | Age: 23
End: 2023-09-18
Payer: MEDICAID

## 2023-09-18 ENCOUNTER — RESULT REVIEW (OUTPATIENT)
Age: 23
End: 2023-09-18

## 2023-09-18 ENCOUNTER — APPOINTMENT (OUTPATIENT)
Dept: INFUSION THERAPY | Facility: HOSPITAL | Age: 23
End: 2023-09-18

## 2023-09-18 ENCOUNTER — APPOINTMENT (OUTPATIENT)
Dept: HEMATOLOGY ONCOLOGY | Facility: CLINIC | Age: 23
End: 2023-09-18

## 2023-09-18 DIAGNOSIS — C81.70 OTHER HODGKIN LYMPHOMA, UNSPECIFIED SITE: ICD-10-CM

## 2023-09-18 LAB
ALBUMIN SERPL ELPH-MCNC: 4.9 G/DL — SIGNIFICANT CHANGE UP (ref 3.3–5)
ALP SERPL-CCNC: 65 U/L — SIGNIFICANT CHANGE UP (ref 40–120)
ALT FLD-CCNC: 9 U/L — LOW (ref 10–45)
ANION GAP SERPL CALC-SCNC: 12 MMOL/L — SIGNIFICANT CHANGE UP (ref 5–17)
AST SERPL-CCNC: 27 U/L — SIGNIFICANT CHANGE UP (ref 10–40)
BASOPHILS # BLD AUTO: 0.05 K/UL — SIGNIFICANT CHANGE UP (ref 0–0.2)
BASOPHILS NFR BLD AUTO: 0.6 % — SIGNIFICANT CHANGE UP (ref 0–2)
BILIRUB SERPL-MCNC: 0.6 MG/DL — SIGNIFICANT CHANGE UP (ref 0.2–1.2)
BUN SERPL-MCNC: 16 MG/DL — SIGNIFICANT CHANGE UP (ref 7–23)
CALCIUM SERPL-MCNC: 9.8 MG/DL — SIGNIFICANT CHANGE UP (ref 8.4–10.5)
CHLORIDE SERPL-SCNC: 102 MMOL/L — SIGNIFICANT CHANGE UP (ref 96–108)
CO2 SERPL-SCNC: 28 MMOL/L — SIGNIFICANT CHANGE UP (ref 22–31)
CREAT SERPL-MCNC: 0.87 MG/DL — SIGNIFICANT CHANGE UP (ref 0.5–1.3)
EGFR: 125 ML/MIN/1.73M2 — SIGNIFICANT CHANGE UP
EOSINOPHIL # BLD AUTO: 0.49 K/UL — SIGNIFICANT CHANGE UP (ref 0–0.5)
EOSINOPHIL NFR BLD AUTO: 5.4 % — SIGNIFICANT CHANGE UP (ref 0–6)
GLUCOSE SERPL-MCNC: 70 MG/DL — SIGNIFICANT CHANGE UP (ref 70–99)
HCT VFR BLD CALC: 46 % — SIGNIFICANT CHANGE UP (ref 39–50)
HGB BLD-MCNC: 15.6 G/DL — SIGNIFICANT CHANGE UP (ref 13–17)
IMM GRANULOCYTES NFR BLD AUTO: 0.4 % — SIGNIFICANT CHANGE UP (ref 0–0.9)
LYMPHOCYTES # BLD AUTO: 1.45 K/UL — SIGNIFICANT CHANGE UP (ref 1–3.3)
LYMPHOCYTES # BLD AUTO: 16 % — SIGNIFICANT CHANGE UP (ref 13–44)
MCHC RBC-ENTMCNC: 28.5 PG — SIGNIFICANT CHANGE UP (ref 27–34)
MCHC RBC-ENTMCNC: 33.9 G/DL — SIGNIFICANT CHANGE UP (ref 32–36)
MCV RBC AUTO: 83.9 FL — SIGNIFICANT CHANGE UP (ref 80–100)
MONOCYTES # BLD AUTO: 0.45 K/UL — SIGNIFICANT CHANGE UP (ref 0–0.9)
MONOCYTES NFR BLD AUTO: 5 % — SIGNIFICANT CHANGE UP (ref 2–14)
NEUTROPHILS # BLD AUTO: 6.57 K/UL — SIGNIFICANT CHANGE UP (ref 1.8–7.4)
NEUTROPHILS NFR BLD AUTO: 72.6 % — SIGNIFICANT CHANGE UP (ref 43–77)
NRBC # BLD: 0 /100 WBCS — SIGNIFICANT CHANGE UP (ref 0–0)
PLATELET # BLD AUTO: 212 K/UL — SIGNIFICANT CHANGE UP (ref 150–400)
POTASSIUM SERPL-MCNC: 4.6 MMOL/L — SIGNIFICANT CHANGE UP (ref 3.5–5.3)
POTASSIUM SERPL-SCNC: 4.6 MMOL/L — SIGNIFICANT CHANGE UP (ref 3.5–5.3)
PROT SERPL-MCNC: 7.7 G/DL — SIGNIFICANT CHANGE UP (ref 6–8.3)
RBC # BLD: 5.48 M/UL — SIGNIFICANT CHANGE UP (ref 4.2–5.8)
RBC # FLD: 14.6 % — HIGH (ref 10.3–14.5)
SODIUM SERPL-SCNC: 142 MMOL/L — SIGNIFICANT CHANGE UP (ref 135–145)
WBC # BLD: 9.05 K/UL — SIGNIFICANT CHANGE UP (ref 3.8–10.5)
WBC # FLD AUTO: 9.05 K/UL — SIGNIFICANT CHANGE UP (ref 3.8–10.5)

## 2023-09-18 PROCEDURE — 99214 OFFICE O/P EST MOD 30 MIN: CPT

## 2023-09-19 LAB
ALBUMIN SERPL ELPH-MCNC: 4.8 G/DL
ALP BLD-CCNC: 68 U/L
ALT SERPL-CCNC: 19 U/L
ANION GAP SERPL CALC-SCNC: 12 MMOL/L
AST SERPL-CCNC: 17 U/L
B2 MICROGLOB SERPL-MCNC: 1.8 MG/L
BILIRUB SERPL-MCNC: 0.6 MG/DL
BUN SERPL-MCNC: 16 MG/DL
CALCIUM SERPL-MCNC: 9.8 MG/DL
CHLORIDE SERPL-SCNC: 105 MMOL/L
CO2 SERPL-SCNC: 26 MMOL/L
CREAT SERPL-MCNC: 0.92 MG/DL
EGFR: 121 ML/MIN/1.73M2
GLUCOSE SERPL-MCNC: 81 MG/DL
LDH SERPL-CCNC: 159 U/L
POTASSIUM SERPL-SCNC: 4.8 MMOL/L
PROT SERPL-MCNC: 7.4 G/DL
SODIUM SERPL-SCNC: 143 MMOL/L
TSH SERPL-ACNC: 11.1 UIU/ML

## 2023-09-22 PROBLEM — C81.70 CLASSICAL HODGKIN LYMPHOMA: Status: ACTIVE | Noted: 2018-09-11

## 2023-10-02 ENCOUNTER — APPOINTMENT (OUTPATIENT)
Dept: INFUSION THERAPY | Facility: HOSPITAL | Age: 23
End: 2023-10-02

## 2023-10-14 ENCOUNTER — APPOINTMENT (OUTPATIENT)
Dept: INFUSION THERAPY | Facility: HOSPITAL | Age: 23
End: 2023-10-14

## 2023-10-27 ENCOUNTER — OUTPATIENT (OUTPATIENT)
Dept: OUTPATIENT SERVICES | Facility: HOSPITAL | Age: 23
LOS: 1 days | Discharge: HOME | End: 2023-10-27

## 2023-10-27 DIAGNOSIS — C81.98 HODGKIN LYMPHOMA, UNSPECIFIED, LYMPH NODES OF MULTIPLE SITES: ICD-10-CM

## 2023-10-27 NOTE — ED PROVIDER NOTE - TOBACCO USE
Unknown if ever smoked Muscle Hinge Flap Text: The defect edges were debeveled with a #15 scalpel blade.  Given the size, depth and location of the defect and the proximity to free margins a muscle hinge flap was deemed most appropriate.  Using a sterile surgical marker, an appropriate hinge flap was drawn incorporating the defect. The area thus outlined was incised with a #15 scalpel blade.  The skin margins were undermined to an appropriate distance in all directions utilizing iris scissors.

## 2023-10-31 ENCOUNTER — APPOINTMENT (OUTPATIENT)
Dept: INFUSION THERAPY | Facility: HOSPITAL | Age: 23
End: 2023-10-31

## 2023-10-31 ENCOUNTER — RESULT REVIEW (OUTPATIENT)
Age: 23
End: 2023-10-31

## 2023-10-31 ENCOUNTER — APPOINTMENT (OUTPATIENT)
Dept: HEMATOLOGY ONCOLOGY | Facility: CLINIC | Age: 23
End: 2023-10-31

## 2023-10-31 DIAGNOSIS — Z51.11 ENCOUNTER FOR ANTINEOPLASTIC CHEMOTHERAPY: ICD-10-CM

## 2023-10-31 LAB
ALBUMIN SERPL ELPH-MCNC: 4.6 G/DL — SIGNIFICANT CHANGE UP (ref 3.3–5)
ALBUMIN SERPL ELPH-MCNC: 4.6 G/DL — SIGNIFICANT CHANGE UP (ref 3.3–5)
ALP SERPL-CCNC: 69 U/L — SIGNIFICANT CHANGE UP (ref 40–120)
ALP SERPL-CCNC: 69 U/L — SIGNIFICANT CHANGE UP (ref 40–120)
ALT FLD-CCNC: 12 U/L — SIGNIFICANT CHANGE UP (ref 10–45)
ALT FLD-CCNC: 12 U/L — SIGNIFICANT CHANGE UP (ref 10–45)
ANION GAP SERPL CALC-SCNC: 11 MMOL/L — SIGNIFICANT CHANGE UP (ref 5–17)
ANION GAP SERPL CALC-SCNC: 11 MMOL/L — SIGNIFICANT CHANGE UP (ref 5–17)
AST SERPL-CCNC: 31 U/L — SIGNIFICANT CHANGE UP (ref 10–40)
AST SERPL-CCNC: 31 U/L — SIGNIFICANT CHANGE UP (ref 10–40)
BASOPHILS # BLD AUTO: 0.07 K/UL — SIGNIFICANT CHANGE UP (ref 0–0.2)
BASOPHILS # BLD AUTO: 0.07 K/UL — SIGNIFICANT CHANGE UP (ref 0–0.2)
BASOPHILS NFR BLD AUTO: 0.7 % — SIGNIFICANT CHANGE UP (ref 0–2)
BASOPHILS NFR BLD AUTO: 0.7 % — SIGNIFICANT CHANGE UP (ref 0–2)
BILIRUB SERPL-MCNC: 0.3 MG/DL — SIGNIFICANT CHANGE UP (ref 0.2–1.2)
BILIRUB SERPL-MCNC: 0.3 MG/DL — SIGNIFICANT CHANGE UP (ref 0.2–1.2)
BUN SERPL-MCNC: 18 MG/DL — SIGNIFICANT CHANGE UP (ref 7–23)
BUN SERPL-MCNC: 18 MG/DL — SIGNIFICANT CHANGE UP (ref 7–23)
CALCIUM SERPL-MCNC: 10.2 MG/DL — SIGNIFICANT CHANGE UP (ref 8.4–10.5)
CALCIUM SERPL-MCNC: 10.2 MG/DL — SIGNIFICANT CHANGE UP (ref 8.4–10.5)
CHLORIDE SERPL-SCNC: 105 MMOL/L — SIGNIFICANT CHANGE UP (ref 96–108)
CHLORIDE SERPL-SCNC: 105 MMOL/L — SIGNIFICANT CHANGE UP (ref 96–108)
CO2 SERPL-SCNC: 26 MMOL/L — SIGNIFICANT CHANGE UP (ref 22–31)
CO2 SERPL-SCNC: 26 MMOL/L — SIGNIFICANT CHANGE UP (ref 22–31)
CREAT SERPL-MCNC: 0.89 MG/DL — SIGNIFICANT CHANGE UP (ref 0.5–1.3)
CREAT SERPL-MCNC: 0.89 MG/DL — SIGNIFICANT CHANGE UP (ref 0.5–1.3)
EGFR: 124 ML/MIN/1.73M2 — SIGNIFICANT CHANGE UP
EGFR: 124 ML/MIN/1.73M2 — SIGNIFICANT CHANGE UP
EOSINOPHIL # BLD AUTO: 0.92 K/UL — HIGH (ref 0–0.5)
EOSINOPHIL # BLD AUTO: 0.92 K/UL — HIGH (ref 0–0.5)
EOSINOPHIL NFR BLD AUTO: 9.7 % — HIGH (ref 0–6)
EOSINOPHIL NFR BLD AUTO: 9.7 % — HIGH (ref 0–6)
GLUCOSE SERPL-MCNC: 83 MG/DL — SIGNIFICANT CHANGE UP (ref 70–99)
GLUCOSE SERPL-MCNC: 83 MG/DL — SIGNIFICANT CHANGE UP (ref 70–99)
HCT VFR BLD CALC: 42.7 % — SIGNIFICANT CHANGE UP (ref 39–50)
HCT VFR BLD CALC: 42.7 % — SIGNIFICANT CHANGE UP (ref 39–50)
HGB BLD-MCNC: 15 G/DL — SIGNIFICANT CHANGE UP (ref 13–17)
HGB BLD-MCNC: 15 G/DL — SIGNIFICANT CHANGE UP (ref 13–17)
IMM GRANULOCYTES NFR BLD AUTO: 0.4 % — SIGNIFICANT CHANGE UP (ref 0–0.9)
IMM GRANULOCYTES NFR BLD AUTO: 0.4 % — SIGNIFICANT CHANGE UP (ref 0–0.9)
LYMPHOCYTES # BLD AUTO: 1.28 K/UL — SIGNIFICANT CHANGE UP (ref 1–3.3)
LYMPHOCYTES # BLD AUTO: 1.28 K/UL — SIGNIFICANT CHANGE UP (ref 1–3.3)
LYMPHOCYTES # BLD AUTO: 13.5 % — SIGNIFICANT CHANGE UP (ref 13–44)
LYMPHOCYTES # BLD AUTO: 13.5 % — SIGNIFICANT CHANGE UP (ref 13–44)
MCHC RBC-ENTMCNC: 29.1 PG — SIGNIFICANT CHANGE UP (ref 27–34)
MCHC RBC-ENTMCNC: 29.1 PG — SIGNIFICANT CHANGE UP (ref 27–34)
MCHC RBC-ENTMCNC: 35.1 G/DL — SIGNIFICANT CHANGE UP (ref 32–36)
MCHC RBC-ENTMCNC: 35.1 G/DL — SIGNIFICANT CHANGE UP (ref 32–36)
MCV RBC AUTO: 82.9 FL — SIGNIFICANT CHANGE UP (ref 80–100)
MCV RBC AUTO: 82.9 FL — SIGNIFICANT CHANGE UP (ref 80–100)
MONOCYTES # BLD AUTO: 0.47 K/UL — SIGNIFICANT CHANGE UP (ref 0–0.9)
MONOCYTES # BLD AUTO: 0.47 K/UL — SIGNIFICANT CHANGE UP (ref 0–0.9)
MONOCYTES NFR BLD AUTO: 5 % — SIGNIFICANT CHANGE UP (ref 2–14)
MONOCYTES NFR BLD AUTO: 5 % — SIGNIFICANT CHANGE UP (ref 2–14)
NEUTROPHILS # BLD AUTO: 6.67 K/UL — SIGNIFICANT CHANGE UP (ref 1.8–7.4)
NEUTROPHILS # BLD AUTO: 6.67 K/UL — SIGNIFICANT CHANGE UP (ref 1.8–7.4)
NEUTROPHILS NFR BLD AUTO: 70.7 % — SIGNIFICANT CHANGE UP (ref 43–77)
NEUTROPHILS NFR BLD AUTO: 70.7 % — SIGNIFICANT CHANGE UP (ref 43–77)
NRBC # BLD: 0 /100 WBCS — SIGNIFICANT CHANGE UP (ref 0–0)
NRBC # BLD: 0 /100 WBCS — SIGNIFICANT CHANGE UP (ref 0–0)
PLATELET # BLD AUTO: 216 K/UL — SIGNIFICANT CHANGE UP (ref 150–400)
PLATELET # BLD AUTO: 216 K/UL — SIGNIFICANT CHANGE UP (ref 150–400)
POTASSIUM SERPL-MCNC: 4.8 MMOL/L — SIGNIFICANT CHANGE UP (ref 3.5–5.3)
POTASSIUM SERPL-MCNC: 4.8 MMOL/L — SIGNIFICANT CHANGE UP (ref 3.5–5.3)
POTASSIUM SERPL-SCNC: 4.8 MMOL/L — SIGNIFICANT CHANGE UP (ref 3.5–5.3)
POTASSIUM SERPL-SCNC: 4.8 MMOL/L — SIGNIFICANT CHANGE UP (ref 3.5–5.3)
PROT SERPL-MCNC: 7.5 G/DL — SIGNIFICANT CHANGE UP (ref 6–8.3)
PROT SERPL-MCNC: 7.5 G/DL — SIGNIFICANT CHANGE UP (ref 6–8.3)
RBC # BLD: 5.15 M/UL — SIGNIFICANT CHANGE UP (ref 4.2–5.8)
RBC # BLD: 5.15 M/UL — SIGNIFICANT CHANGE UP (ref 4.2–5.8)
RBC # FLD: 14.8 % — HIGH (ref 10.3–14.5)
RBC # FLD: 14.8 % — HIGH (ref 10.3–14.5)
SODIUM SERPL-SCNC: 143 MMOL/L — SIGNIFICANT CHANGE UP (ref 135–145)
SODIUM SERPL-SCNC: 143 MMOL/L — SIGNIFICANT CHANGE UP (ref 135–145)
WBC # BLD: 9.45 K/UL — SIGNIFICANT CHANGE UP (ref 3.8–10.5)
WBC # BLD: 9.45 K/UL — SIGNIFICANT CHANGE UP (ref 3.8–10.5)
WBC # FLD AUTO: 9.45 K/UL — SIGNIFICANT CHANGE UP (ref 3.8–10.5)
WBC # FLD AUTO: 9.45 K/UL — SIGNIFICANT CHANGE UP (ref 3.8–10.5)

## 2023-11-14 ENCOUNTER — RESULT REVIEW (OUTPATIENT)
Age: 23
End: 2023-11-14

## 2023-11-14 ENCOUNTER — APPOINTMENT (OUTPATIENT)
Dept: INFUSION THERAPY | Facility: HOSPITAL | Age: 23
End: 2023-11-14

## 2023-11-14 LAB
ALBUMIN SERPL ELPH-MCNC: 4.6 G/DL — SIGNIFICANT CHANGE UP (ref 3.3–5)
ALBUMIN SERPL ELPH-MCNC: 4.6 G/DL — SIGNIFICANT CHANGE UP (ref 3.3–5)
ALP SERPL-CCNC: 62 U/L — SIGNIFICANT CHANGE UP (ref 40–120)
ALP SERPL-CCNC: 62 U/L — SIGNIFICANT CHANGE UP (ref 40–120)
ALT FLD-CCNC: 6 U/L — LOW (ref 10–45)
ALT FLD-CCNC: 6 U/L — LOW (ref 10–45)
ANION GAP SERPL CALC-SCNC: 9 MMOL/L — SIGNIFICANT CHANGE UP (ref 5–17)
ANION GAP SERPL CALC-SCNC: 9 MMOL/L — SIGNIFICANT CHANGE UP (ref 5–17)
AST SERPL-CCNC: 24 U/L — SIGNIFICANT CHANGE UP (ref 10–40)
AST SERPL-CCNC: 24 U/L — SIGNIFICANT CHANGE UP (ref 10–40)
BILIRUB SERPL-MCNC: 0.7 MG/DL — SIGNIFICANT CHANGE UP (ref 0.2–1.2)
BILIRUB SERPL-MCNC: 0.7 MG/DL — SIGNIFICANT CHANGE UP (ref 0.2–1.2)
BUN SERPL-MCNC: 17 MG/DL — SIGNIFICANT CHANGE UP (ref 7–23)
BUN SERPL-MCNC: 17 MG/DL — SIGNIFICANT CHANGE UP (ref 7–23)
CALCIUM SERPL-MCNC: 9.4 MG/DL — SIGNIFICANT CHANGE UP (ref 8.4–10.5)
CALCIUM SERPL-MCNC: 9.4 MG/DL — SIGNIFICANT CHANGE UP (ref 8.4–10.5)
CHLORIDE SERPL-SCNC: 106 MMOL/L — SIGNIFICANT CHANGE UP (ref 96–108)
CHLORIDE SERPL-SCNC: 106 MMOL/L — SIGNIFICANT CHANGE UP (ref 96–108)
CO2 SERPL-SCNC: 27 MMOL/L — SIGNIFICANT CHANGE UP (ref 22–31)
CO2 SERPL-SCNC: 27 MMOL/L — SIGNIFICANT CHANGE UP (ref 22–31)
CREAT SERPL-MCNC: 0.93 MG/DL — SIGNIFICANT CHANGE UP (ref 0.5–1.3)
CREAT SERPL-MCNC: 0.93 MG/DL — SIGNIFICANT CHANGE UP (ref 0.5–1.3)
EGFR: 118 ML/MIN/1.73M2 — SIGNIFICANT CHANGE UP
EGFR: 118 ML/MIN/1.73M2 — SIGNIFICANT CHANGE UP
GLUCOSE SERPL-MCNC: 91 MG/DL — SIGNIFICANT CHANGE UP (ref 70–99)
GLUCOSE SERPL-MCNC: 91 MG/DL — SIGNIFICANT CHANGE UP (ref 70–99)
POTASSIUM SERPL-MCNC: 4.5 MMOL/L — SIGNIFICANT CHANGE UP (ref 3.5–5.3)
POTASSIUM SERPL-MCNC: 4.5 MMOL/L — SIGNIFICANT CHANGE UP (ref 3.5–5.3)
POTASSIUM SERPL-SCNC: 4.5 MMOL/L — SIGNIFICANT CHANGE UP (ref 3.5–5.3)
POTASSIUM SERPL-SCNC: 4.5 MMOL/L — SIGNIFICANT CHANGE UP (ref 3.5–5.3)
PROT SERPL-MCNC: 7.4 G/DL — SIGNIFICANT CHANGE UP (ref 6–8.3)
PROT SERPL-MCNC: 7.4 G/DL — SIGNIFICANT CHANGE UP (ref 6–8.3)
SODIUM SERPL-SCNC: 142 MMOL/L — SIGNIFICANT CHANGE UP (ref 135–145)
SODIUM SERPL-SCNC: 142 MMOL/L — SIGNIFICANT CHANGE UP (ref 135–145)

## 2023-12-11 ENCOUNTER — APPOINTMENT (OUTPATIENT)
Dept: INFUSION THERAPY | Facility: HOSPITAL | Age: 23
End: 2023-12-11

## 2023-12-22 ENCOUNTER — APPOINTMENT (OUTPATIENT)
Dept: HEMATOLOGY ONCOLOGY | Facility: CLINIC | Age: 23
End: 2023-12-22

## 2024-01-11 NOTE — H&P ADULT - NSHPPHYSICALEXAM_GEN_ALL_CORE
Okay to order   Vital Signs Last 24 Hrs  T(C): 36.9 (27 Oct 2020 12:10), Max: 36.9 (27 Oct 2020 12:10)  T(F): 98.5 (27 Oct 2020 12:10), Max: 98.5 (27 Oct 2020 12:10)  HR: 96 (27 Oct 2020 12:10) (96 - 115)  BP: 122/73 (27 Oct 2020 12:10) (114/63 - 122/73)  BP(mean): --  RR: 18 (27 Oct 2020 12:10) (18 - 18)  SpO2: 99% (27 Oct 2020 12:10) (99% - 99%)    PHYSICAL EXAM:  GENERAL:  Well appearing, in NAD, very thin, pleasant and cooperative  HEAD:  NCAT  EYES: PERRLA, conjunctiva clear  NECK: Supple, No JVD  CHEST/LUNG: CTA B/L. No w/r/r.  HEART: Reg rate. Normal S1, S2. No m/r/g.   ABDOMEN: SNTND. Bowel sounds present  EXTREMITIES:  2+ Peripheral Pulses, No clubbing, cyanosis, edema.  PSYCH: AAOx3, appropriate affect  NEUROLOGY: grossly non-focal  SKIN: b/l LE dry

## 2024-02-07 NOTE — PROGRESS NOTE PEDS - SUBJECTIVE AND OBJECTIVE BOX
Problem Dx:  Back pain  Hodgkin lymphoma    Protocol: AHOD 1331  Cycle: Induction   Day: 1  Interval History: Pt scheduled to start chemotherapy today. He continues to complain of back pain.     Change from previous past medical, family or social history:	[x] No	[] Yes:    REVIEW OF SYSTEMS  All review of systems negative, except for those marked:  General:		[] Abnormal:  Pulmonary:		[] Abnormal:  Cardiac:		[] Abnormal:  Gastrointestinal:	            [] Abnormal:  ENT:			[] Abnormal:  Renal/Urologic:		[] Abnormal:  Musculoskeletal		[] Abnormal:  Endocrine:		[] Abnormal:  Hematologic:		[] Abnormal:  Neurologic:		[] Abnormal:  Skin:			[] Abnormal:  Allergy/Immune		[] Abnormal:  Psychiatric:		[] Abnormal:      Allergies    No Known Allergies    Intolerances      acetaminophen   Oral Tab/Cap - Peds. 650 milliGRAM(s) Oral every 6 hours PRN  ALBUTerol  Intermittent Nebulization - Peds 5 milliGRAM(s) Nebulizer every 20 minutes PRN  bleomycin Injectable 1 Unit(s) SubCutaneous once  bleomycin IVPB 6.2 Unit(s) IV Intermittent once  chlorhexidine 0.12% Oral Liquid - Peds 15 milliLiter(s) Swish and Spit three times a day  clotrimazole  Oral Lozenge - Peds 1 Lozenge Oral two times a day  cyclophosphamide IVPB 865 milliGRAM(s) IV Intermittent daily  dexrazoxane (ZINECARD) IVPB (Chemo) 360 milliGRAM(s) IV Intermittent daily  diphenhydrAMINE IV Intermittent - Peds 50 milliGRAM(s) IV Intermittent once PRN  DOXOrubicin IVPB 36 milliGRAM(s) IV Intermittent daily  dronabinol Oral Tab/Cap - Peds 2.5 milliGRAM(s) Oral every 6 hours  enoxaparin SubCutaneous Injection - Peds 40 milliGRAM(s) SubCutaneous daily  EPINEPHrine   IntraMuscular Injection - Peds 0.5 milliGRAM(s) IntraMuscular once PRN  etoposide IVPB 180 milliGRAM(s) IV Intermittent daily  famotidine IV Intermittent - Peds 10 milliGRAM(s) IV Intermittent every 12 hours  hydrOXYzine IV Intermittent - Peds. 20 milliGRAM(s) IV Intermittent every 6 hours PRN  methylPREDNISolone sodium succinate Injectable (Chemo) 23 milliGRAM(s) IV Push two times a day PRN  methylPREDNISolone sodium succinate IV Intermittent - Peds 87.5 milliGRAM(s) IV Intermittent once PRN  morphine  IV Intermittent - Peds 4 milliGRAM(s) IV Intermittent every 4 hours  OLANZapine  Oral Tab/Cap - Peds 5 milliGRAM(s) Oral at bedtime  ondansetron IV Intermittent - Peds 6.5 milliGRAM(s) IV Intermittent every 8 hours  polyethylene glycol 3350 Oral Powder - Peds 17 Gram(s) Oral daily  predniSONE   Tablet (Chemo) 30 milliGRAM(s) Oral two times a day  prochlorperazine IV Intermittent - Peds 4.5 milliGRAM(s) IV Intermittent every 6 hours PRN  sodium chloride 0.9% - Pediatric 1000 milliLiter(s) IV Continuous <Continuous>  sodium chloride 0.9% IV Intermittent (Bolus) - Peds 450 milliLiter(s) IV Bolus once PRN  sodium chloride 0.9% IV Intermittent (Bolus) - Peds 1000 milliLiter(s) IV Bolus once  sodium chloride 0.9% IV Intermittent (Bolus) - Peds 1000 milliLiter(s) IV Bolus once  sodium chloride 0.9% IV Intermittent (Bolus) - Peds 880 milliLiter(s) IV Bolus once PRN  sodium chloride 0.9%. - Pediatric 1000 milliLiter(s) IV Continuous <Continuous>  sodium chloride 0.9%. - Pediatric 1000 milliLiter(s) IV Continuous <Continuous>  sodium chloride 0.9%. - Pediatric 1000 milliLiter(s) IV Continuous <Continuous>  trimethoprim  80 mG/sulfamethoxazole 400 mG Oral Tab/Cap - Peds 1.5 Tablet(s) Oral <User Schedule>  vinCRIStine IVPB - Pediatric 2 milliGRAM(s) IV Intermittent every 7 days      DIET:  Pediatric Regular    Vital Signs Last 24 Hrs  T(C): 37.3 (19 Sep 2018 13:24), Max: 37.8 (18 Sep 2018 18:14)  T(F): 99.1 (19 Sep 2018 13:24), Max: 100 (18 Sep 2018 18:14)  HR: 81 (19 Sep 2018 13:24) (62 - 98)  BP: 110/59 (19 Sep 2018 13:24) (94/61 - 111/50)  BP(mean): --  RR: 18 (19 Sep 2018 13:24) (16 - 24)  SpO2: 100% (19 Sep 2018 13:24) (98% - 100%)  Daily     Daily   I&O's Summary    18 Sep 2018 07:  -  19 Sep 2018 07:00  --------------------------------------------------------  IN: 2630 mL / OUT: 1675 mL / NET: 955 mL    19 Sep 2018 07:  -  19 Sep 2018 16:49  --------------------------------------------------------  IN: 2907 mL / OUT: 2475 mL / NET: 432 mL      Pain Score (0-10):	5	Lansky/Karnofsky Score: 80    PATIENT CARE ACCESS  [] Peripheral IV  [] Central Venous Line	[] R	[] L	[] IJ	[] Fem	[] SC			[] Placed:  [] PICC:				[] Broviac		[x] Mediport  [] Urinary Catheter, Date Placed:  [] Necessity of urinary, arterial, and venous catheters discussed    PHYSICAL EXAM  All physical exam findings normal, except those marked:  Constitutional:	Normal: well appearing, in no apparent distress  .		[] Abnormal:  Eyes		Normal: no conjunctival injection, symmetric gaze  .		[] Abnormal:  ENT:		Normal: mucus membranes moist, no mouth sores or mucosal bleeding, normal .  .		dentition, symmetric facies.  .		[] Abnormal:               Mucositis NCI grading scale                [x] Grade 0: None                [] Grade 1: (mild) Painless ulcers, erythema, or mild soreness in the absence of lesions                [] Grade 2: (moderate) Painful erythema, oedema, or ulcers but eating or swallowing possible                [] Grade 3: (severe) Painful erythema, odema or ulcers requiring IV hydration                [] Grade 4: (life-threatening) Severe ulceration or requiring parenteral or enteral nutritional support   Neck		Normal: no thyromegaly or masses appreciated  .		[] Abnormal:  Cardiovascular	Normal: regular rate, normal S1, S2, no murmurs, rubs or gallops  .		[] Abnormal:  Respiratory	Normal: clear to auscultation bilaterally, no wheezing  .		[] Abnormal:  Abdominal	Normal: normoactive bowel sounds, soft, NT, no hepatosplenomegaly, no   .		masses  .		[] Abnormal:  		Normal normal genitalia, testes descended  .		[] Abnormal: [x] not done  Lymphatic	Normal: no adenopathy appreciated  .		[x] Abnormal: cervical adenopathy   Extremities	Normal: FROM x4, no cyanosis or edema, symmetric pulses  .		[] Abnormal:  Skin		Normal: normal appearance, no rash, nodules, vesicles, ulcers or erythema  .		[] Abnormal:  Neurologic	Normal: no focal deficits, gait normal and normal motor exam.  .		[] Abnormal:  Psychiatric	Normal: affect appropriate  		[] Abnormal:  Musculoskeletal		Normal: full range of motion and no deformities appreciated, no masses   .			and normal strength in all extremities.  .			[] Abnormal:    Lab Results:  CBC  CBC Full  -  ( 18 Sep 2018 20:50 )  WBC Count : 19.53 K/uL  Hemoglobin : 10.3 g/dL  Hematocrit : 32.8 %  Platelet Count - Automated : 506 K/uL  Mean Cell Volume : 75.8 fL  Mean Cell Hemoglobin : 23.8 pg  Mean Cell Hemoglobin Concentration : 31.4 %  Auto Neutrophil # : 15.31 K/uL  Auto Lymphocyte # : 2.22 K/uL  Auto Monocyte # : 1.41 K/uL  Auto Eosinophil # : 0.40 K/uL  Auto Basophil # : 0.07 K/uL  Auto Neutrophil % : 78.4 %  Auto Lymphocyte % : 11.4 %  Auto Monocyte % : 7.2 %  Auto Eosinophil % : 2.0 %  Auto Basophil % : 0.4 %    .		Differential:	[x] Automated		[] Manual  Chemistry      133<L>  |  95<L>  |  12  ----------------------------<  75  4.9   |  24  |  0.59    Ca    8.8      18 Sep 2018 20:50  Phos  4.2       Mg     1.9         TPro  7.6  /  Alb  3.0<L>  /  TBili  0.4  /  DBili  x   /  AST  29  /  ALT  37  /  AlkPhos  92      LIVER FUNCTIONS - ( 18 Sep 2018 20:50 )  Alb: 3.0 g/dL / Pro: 7.6 g/dL / ALK PHOS: 92 u/L / ALT: 37 u/L / AST: 29 u/L / GGT: x             Urinalysis Basic - ( 19 Sep 2018 14:40 )    Color: LIGHT YELLOW / Appearance: CLEAR / S.006 / pH: 6.0  Gluc: NEGATIVE / Ketone: NEGATIVE  / Bili: NEGATIVE / Urobili: NORMAL   Blood: NEGATIVE / Protein: NEGATIVE / Nitrite: NEGATIVE   Leuk Esterase: NEGATIVE / RBC: x / WBC x   Sq Epi: x / Non Sq Epi: x / Bacteria: x        MICROBIOLOGY/CULTURES:    RADIOLOGY RESULTS:    Toxicities (with grade)  1.  2.  3.  4. using RUE/independent

## 2024-02-22 ENCOUNTER — OUTPATIENT (OUTPATIENT)
Dept: OUTPATIENT SERVICES | Facility: HOSPITAL | Age: 24
LOS: 1 days | Discharge: HOME | End: 2024-02-22

## 2024-02-22 DIAGNOSIS — C81.98 HODGKIN LYMPHOMA, UNSPECIFIED, LYMPH NODES OF MULTIPLE SITES: ICD-10-CM

## 2024-02-28 ENCOUNTER — APPOINTMENT (OUTPATIENT)
Dept: HEMATOLOGY ONCOLOGY | Facility: CLINIC | Age: 24
End: 2024-02-28

## 2024-03-06 ENCOUNTER — APPOINTMENT (OUTPATIENT)
Dept: HEMATOLOGY ONCOLOGY | Facility: CLINIC | Age: 24
End: 2024-03-06

## 2024-03-12 ENCOUNTER — APPOINTMENT (OUTPATIENT)
Dept: HEMATOLOGY ONCOLOGY | Facility: CLINIC | Age: 24
End: 2024-03-12

## 2024-03-13 ENCOUNTER — APPOINTMENT (OUTPATIENT)
Dept: HEMATOLOGY ONCOLOGY | Facility: CLINIC | Age: 24
End: 2024-03-13
Payer: MEDICAID

## 2024-03-13 ENCOUNTER — NON-APPOINTMENT (OUTPATIENT)
Age: 24
End: 2024-03-13

## 2024-03-13 ENCOUNTER — RESULT REVIEW (OUTPATIENT)
Age: 24
End: 2024-03-13

## 2024-03-13 ENCOUNTER — LABORATORY RESULT (OUTPATIENT)
Age: 24
End: 2024-03-13

## 2024-03-13 VITALS
HEART RATE: 77 BPM | TEMPERATURE: 98.5 F | BODY MASS INDEX: 16.94 KG/M2 | HEIGHT: 66.34 IN | DIASTOLIC BLOOD PRESSURE: 68 MMHG | SYSTOLIC BLOOD PRESSURE: 110 MMHG | WEIGHT: 106.68 LBS | OXYGEN SATURATION: 99 % | RESPIRATION RATE: 16 BRPM

## 2024-03-13 LAB
BASOPHILS # BLD AUTO: 0.07 K/UL — SIGNIFICANT CHANGE UP (ref 0–0.2)
BASOPHILS NFR BLD AUTO: 0.7 % — SIGNIFICANT CHANGE UP (ref 0–2)
EOSINOPHIL # BLD AUTO: 0.74 K/UL — HIGH (ref 0–0.5)
EOSINOPHIL NFR BLD AUTO: 7.5 % — HIGH (ref 0–6)
HCT VFR BLD CALC: 45.5 % — SIGNIFICANT CHANGE UP (ref 39–50)
HGB BLD-MCNC: 15.2 G/DL — SIGNIFICANT CHANGE UP (ref 13–17)
IMM GRANULOCYTES NFR BLD AUTO: 0.2 % — SIGNIFICANT CHANGE UP (ref 0–0.9)
LYMPHOCYTES # BLD AUTO: 1.75 K/UL — SIGNIFICANT CHANGE UP (ref 1–3.3)
LYMPHOCYTES # BLD AUTO: 17.6 % — SIGNIFICANT CHANGE UP (ref 13–44)
MCHC RBC-ENTMCNC: 28.8 PG — SIGNIFICANT CHANGE UP (ref 27–34)
MCHC RBC-ENTMCNC: 33.4 G/DL — SIGNIFICANT CHANGE UP (ref 32–36)
MCV RBC AUTO: 86.3 FL — SIGNIFICANT CHANGE UP (ref 80–100)
MONOCYTES # BLD AUTO: 0.51 K/UL — SIGNIFICANT CHANGE UP (ref 0–0.9)
MONOCYTES NFR BLD AUTO: 5.1 % — SIGNIFICANT CHANGE UP (ref 2–14)
NEUTROPHILS # BLD AUTO: 6.84 K/UL — SIGNIFICANT CHANGE UP (ref 1.8–7.4)
NEUTROPHILS NFR BLD AUTO: 68.9 % — SIGNIFICANT CHANGE UP (ref 43–77)
NRBC # BLD: 0 /100 WBCS — SIGNIFICANT CHANGE UP (ref 0–0)
PLATELET # BLD AUTO: 231 K/UL — SIGNIFICANT CHANGE UP (ref 150–400)
RBC # BLD: 5.27 M/UL — SIGNIFICANT CHANGE UP (ref 4.2–5.8)
RBC # FLD: 14.7 % — HIGH (ref 10.3–14.5)
WBC # BLD: 9.93 K/UL — SIGNIFICANT CHANGE UP (ref 3.8–10.5)
WBC # FLD AUTO: 9.93 K/UL — SIGNIFICANT CHANGE UP (ref 3.8–10.5)

## 2024-03-13 PROCEDURE — G2211 COMPLEX E/M VISIT ADD ON: CPT | Mod: NC,1L

## 2024-03-13 PROCEDURE — 99214 OFFICE O/P EST MOD 30 MIN: CPT

## 2024-03-15 ENCOUNTER — APPOINTMENT (OUTPATIENT)
Dept: INFUSION THERAPY | Facility: HOSPITAL | Age: 24
End: 2024-03-15

## 2024-03-15 LAB
ALBUMIN SERPL ELPH-MCNC: 4.7 G/DL
ALP BLD-CCNC: 68 U/L
ALT SERPL-CCNC: 26 U/L
ANION GAP SERPL CALC-SCNC: 14 MMOL/L
AST SERPL-CCNC: 19 U/L
B2 MICROGLOB SERPL-MCNC: 1.8 MG/L
BILIRUB SERPL-MCNC: 0.3 MG/DL
BUN SERPL-MCNC: 17 MG/DL
CALCIUM SERPL-MCNC: 9.8 MG/DL
CHLORIDE SERPL-SCNC: 103 MMOL/L
CO2 SERPL-SCNC: 25 MMOL/L
CREAT SERPL-MCNC: 0.99 MG/DL
EGFR: 110 ML/MIN/1.73M2
GLUCOSE SERPL-MCNC: 91 MG/DL
LDH SERPL-CCNC: 193 U/L
POTASSIUM SERPL-SCNC: 4.4 MMOL/L
PROT SERPL-MCNC: 7.6 G/DL
SODIUM SERPL-SCNC: 142 MMOL/L
TSH SERPL-ACNC: 10.3 UIU/ML

## 2024-03-15 NOTE — ASSESSMENT
[Curative] : Goals of care discussed with patient: Curative [FreeTextEntry1] : 23 year old male who was diagnosed with a stage IV CHL in 9/2018 with relapsed/ refractory Classical Hodgkin's lymphoma. He was initially treated following ABVE-PC completed 12/2018 after 5 cycles due to noncompliance and patient refusal, relapsed diagnosed in Feb 2019, started on salvage treatment in April 2019 with gemzar/ brentuximab which was also stopped due to the patient's refusal and noncompliance.   He was recommended to have 2 cycles of ICE with intent to pursue an autologous bone marrow transplant. The patient began the cycle and then left AMA while the chemotherapy was running unfortunately. He reported that the chemotherapy was making him feel bad and that people were not listening. Despite multiple discussions with the patient and his mother, he did not follow up and refused further treatment. Recently with progression of disease/ osseous mets which he was recommended to go to the hospital, he again refused and left the office.   He was readmitted in 10/2020 for diffuse vertebral disease/compression fractures. He was treated with radiation, 800 cGy to T1-T5, T8-L5, left hip/acetabulum. He also received nivolumab with improvement in his lymphadenopathy and symptoms. Developed hypothyroidism while receiving nivolumab, improved while on Synthroid. But was noncompliant and again lost to follow up.    He presented again in June/July 2022 with worsening WBC and lymphadenopathy and more recently in Aug 2022 with night sweats. He underwent biopsy of one of the lymph nodes which came back showing recurrent Classical Hodgkins Lymphoma. Retreatment restarted with cycle 1 day 1 on 8/22/22 with Nivolumab immunotherapy.   He has missed multiple treatment appointments due to issues within his personal life. He hopes that things will be better going forward. He also plans to apply for college in the near future. Plan to continue Nivolumab therapy and discussed importance of compliance with treatments given the significant risk for death associated with treatment failure.  Plan: - Repeat his labs- CMP, TSH (pre-V) - Continue nivolumab q 2 weeks on 28 day cycles. Compliance issues discussed. - Discussed his disease, limited options at this time and need for better compliance. Strongly encouraged him to be complaint if he will be a candidate for more aggressive therapies in the future should this regimen fail. - Pain management for scoliosis related back pain, previously referred multiple times - Follow up every 4 weeks   ____ I personally have spent a total of 30 minutes of time on the date of this encounter reviewing test results, documenting findings, providing education, coordinating care and directly consulting with the patient and/or designated family member.

## 2024-03-15 NOTE — PHYSICAL EXAM
[Fully active, able to carry on all pre-disease performance without restriction] : Status 0 - Fully active, able to carry on all pre-disease performance without restriction [Thin] : thin [Normal] : grossly intact [de-identified] : Lumbar Scoliosis [de-identified] : Last office visit: Several LNs in L cervical chain:inferior posterior cervical chain with LNs up to 2.5 cm, superior left upper posterior cervical lymph node ~2.5 cm (unchanged to last exam) with bruising. Left submandibular / submental LN ~1-1.5 cm. left suboccipital 1 cm.

## 2024-03-15 NOTE — HISTORY OF PRESENT ILLNESS
[de-identified] : Initial Hx:\par  \par  Hodgkins Lymphoma. Patient initially presented to the [facility] ED on 9/4/18 from [facility] with a mediastinal mass. Upon work up he was found to have Stage IVB disease. Ultrasound guided right supraclavicular lymph node Classical Hodgkins Lymphoma- large atypical cells to be CD30, CD15, PAX5+, MUM1+, LCA(-), CD20(-), CD79a(-), CD3(-), ALK(-).\par  \par  Bilateral bone marrow biopsy on 9/6/18- Cellular marrow with maturing trilineage hematopoiesis and no morphologic evidence of lymphoma\par  PET/CT completed on 9/10/18- Hypermetabolic lymphadenopathy in neck and thorax, multiple hypermetabolic foci in the bilateral lower cervical and supraclavicular regions, measuring 0.9 x 0.7 cm demonstrates SUV 4.3 anterior mediastinal mass measuring 4.2 x 3 cm demonstrating peripheral FDG avidity and central photopenia, SUV: 6.9. Marked mediastinal and bilateral hilar lymphadenopathy demonstrating FDG avidity, right hilar lymph node measures 3.8 x 2.4 cm, SUV: 10.3. 5 mm nodule is again noted in the right middle lobe. Several hypermetabolic foci identified predominantly throughout the axial skeleton with foci noted scattered throughout the upper thoracic spine and a singular focus in the right anterior eighth rib FDG avid focus in the right side of the T7 vertebral body with corresponding lucency on CT measuring 0.9 x 0.9 cm, SUV 14.7. Two hypermetabolic foci in the appendicular skeleton with corresponding underlying lucency on CT. There is an FDG avid focus in the left inferior glenoid and a corresponding lucency on CT measuring 0.9 x 0.7 cm, SUV 12. There is also a 1.1 x 0.7 cm lucency in the right femoral head with corresponding FDG avidity, SUV 7.3. He was placed on/following a protocol- AHOC 1331, treatment with ABVE-PC (adriamycin, bleomycin, vincristine, etoposide, cytoxan, prednisone, dexrazoxane). He was withdrawn from the study due to his social situation, difficulty with compliance. 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 He was treated with chemotherapy from Sept 2018- Dec 2018. Interim PET/CT completed on 11/3/18- Partially hypermetabolic anterior mediastinal mass is decreased in \par  size and metabolism as compared to prior study dated 9/10/2018, compatible with a partial response to interval therapy (Deauville 4). Resolution of additional previously seen hypermetabolic mediastinal, hilar, and cardiophrenic lymph nodes.Marked interval decrease in hypermetabolic foci in the axial and appendicular skeleton with residual hypermetabolic focus in left glenoid which may represent a small focus of residual disease (Deauville 4). He completed 5 cycles of chemotherapy, but missed about 10 appointments, and refused further chemotherapy after cycle 5 due to side effects. He underwent MRI Neck, Abdomen, and Pelvis from 2/2019 showed new lymph node enlargement to the L neck and redemonstrated osseous infiltration involving the bilateral pelvic bones and proximal femurs. CT Chest from 3/2019 showed mediastinal enlarged lymph nodes which were either unchanged or demonstrate interval progression since 11/2018, progressive R hilar lymphadenopathy, and a new cluster of small nodular opacities within the right lower lobe. PET Scan done 3/2019 revealed a new hypermetabolic lymphadenopathy in the left neck measuring approximately 2.4 x 2.1cm in the left level III/V cervical regions. There is also new hypermetabolic 1.2 x 0.8 cm left level VB cervical lymph node and chest new hypermetabolic approximately 1.1 x 0.9 cm right upper paratracheal node. Reference approximately 3.4 x 2.0 cm subcarinal node. Approximately 3.8 x 3.1cm right perihilar mass. as compared to PET/CT from 11/3/2018 suspicious for recurrent lymphoma. A nonspecific new diffuse splenic hypermetabolism. A new hypermetabolic opacity/consolidation in the right lower lung, approximately 2 x 1.3cm opacity/consolidation in the superior segment of the right lower lobe. Ultrasound guided core biopsy of left cervical lymph node done on 4/11/19- confirmed refractory disease. Bone marrow biopsy was negative. He was started on treatment 4/13 with salvage treatment with Gemzar/brentuximab which he received 2 cycles but again had compliance issues along with behavioral issues with the staff.  [de-identified] : 7/12/22: Patient is here today after being lost to follow up since last receiving nivolumab in 7/2021, treatment complicated by thyroid dysfunction. He was recently (7/7/22) evaluated at [facility] ED for nausea and vomiting from eating outside food. Was also found to have cervical lymphadenopathy, states it has been growing over the past 8 months, and was instructed to follow up with his hematologist. Today, he notes to have night sweats, unintentional weight loss (-12 Ibs), and cervical lymphadenopathy. Also, continues to have chronic low back pain due to scoliosis. Patient denies fever, chills, headache, abdominal pain, or chest pain.  8/22/22: Transfer of care from Dr Andressa Clinton. He has been experiencing severe night sweats the past 2 weeks. In addition he has been having back / neck pain related to both chronic scoliosis and left posterior cervical large adenopathy. He denies recent infections, fevers, chills, unexpected weight loss. 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  3/8/23: Follow-up. Mr Lai presents today for follow-up after a long period of no shows. He has missed multiple appointments for treatment, which the last treatment appt he was able to make it to was in January 2023. He reports that he has been having family and personal issues including death in the family that were related to him missing appts. He overall reports feeling well. He continues to have low back pain related to scoliosis. He has not been able to get the pain addressed with his PMD, and is now requesting a pain management referral. He previously used oxycodone which helped, but has not been receiving this for some time now (> 1 year). He reports that he has been taking his levothyroxine daily without missed doses and believes he has 5-10 pills left. He continues to have left neck lymphadenopathy, but he reports that this has decreased in size. He has been able to gain some weight back, but feels it is difficult. He has a good appetite. He does not have any constitutional complaints. His next treatment is planned for 3/9/23.  7/3/23: Follow-up. Mr Lai presents today for follow-up after a long period of no shows. He has missed multiple appointments for treatment, which the last treatment appt he was able to make it to was in April 2023. Says he now wants to turn things around with his treatment and will be compliant moving forward. Was able to get his mediport removed. Continues to have low back pain related to scoliosis for which he sees pain management. He reports that he has been taking his levothyroxine as prescribed. He continues to have left neck lymphadenopathy, which has increased in size slightly due to many missed treatments. Good appetite, stable weight, but feels it is difficult to maintain. He does not have any constitutional complaints.   8/24/23: Follow-up. Mr Joelle has missed the most recent treatment appointments, rescheduled too today. He insists that he is trying to be more compliant with his treatment. Notes low back pain related to scoliosis (has not seen pain management recently). Has been taking levothyroxine as prescribed. He continues to have left neck lymphadenopathy, size is not improving due to many missed treatments. Good appetite, stable weight, but feels it is difficult to maintain.   9/18/23: Follow-up. He reports left neck lymph node has increased in size, but with compliant appointments of nivolumab it does go down. He has missed multiple appointments in the past few months and we discussed he is getting suboptimal therapy because of this. He had some bruising in the dermis around the enlarged lymph node, however no bleeding or open wound is present. He has not noticed any masses elsewhere. No significant complaints including constitutional issues.   3/13/24: Follow-up. He has had increased size of left cervical chain LNs, has new submental LNs, all < 3 cm. He has missed multiple planned treatments and reports again today he will try to be more compliant with therapies. He recently returned from a trip to [facility]. No significant constitutional issues. Treatment for 3/15/24.  A comprehensive review of systems was performed including constitutional, eyes, ENT, cardiovascular, respiratory, gastrointestinal, genitourinary, musculoskeletal, integumentary, neurological, psychiatric and hematologic / lymphatic. All pertinent positives are included in the H&P under interval history above and the remaining review of systems listed are negative.   ================================================================= Treatment Hx:  ABVE-PC in 9/2018, completed 12/2018 after 5 cycles due to noncompliance and patient refusal,  Gemzar/brentuximab in 4/2019 which was also stopped due to the patient's refusal and noncompliance.  2 cycles of ICE in 2/2020 with intent to pursue an autologous bone marrow transplant. The patient began the cycle and then left AMA. Radiation, 800 cGy to T1-T5, T8-L5, left hip/acetabulum & Nivolumab in 11/2020 with improvement, but lost to follow up. Treatment was also complicated due to development of hypothyroidism and COVID19.  He remains on Nivolumab with poor compliance to scheduling resulting in suboptimal responses. No indication to change therapy at present, given history of compliance issues, chemotherapy or intensive regimens are not advised at present. Restart Nivo again 3/15/24.  =================================================================

## 2024-03-21 NOTE — ASSESSMENT
CC:   Chief Complaint   Patient presents with    Ear Pain     Pt presents with ear pain that began 3/20 and fever that \"doesn't seem to go away\", cough, and sore throat; pt was seen 3/19 and positive for strep; given tylenol this morning at 0800         HPI:  Rola is a 7 year old year old female who complains of a cough which has been productive. Symptoms began 4 days ago..The cough is worse at night. Associated symptoms include:  Cough and congestion diagnosed with viral pneumonia started on antibiotic amoxicillin for strep few days ago.  Patient mother reports with patient that fever continues despite taking antibiotic.  Patient continues to take Tylenol.  Patient does not have a history of asthma.         ROS:  Constitutional: fever and chills.  Skin: Negative for rash.  HEENT: Negative for eye drainage, mild rhinorrhea, bilateral ear pain and mild sore throat.  Respiratory:  Occasional cough, no wheezing or shortness of breath.  Cardiovascular: Negative for chest pain, chest pressure, palpitations or diaphoresis.  Gastrointestinal: Negative for nausea, vomiting, diarrhea or abdominal pain.  Genitourinary: Negative for dysuria, urgency, frequency, hematuria or flank pain.  Extremities:  Negative for joint swelling or joint pain.  Neurologic:  Negative for change in sensory or motor function.  Negative for headache.      EXAM:  Vitals:    03/21/24 1214   Pulse: (!) 127   Resp: (!) 30   Temp: (!) 100.4 °F (38 °C)     GENERAL: alert, active, afebrile, and in NAD  EYE: conjunctiva and sclera are not inflamed  EAR: Bilateral - TM clear fluid w/ attenuated light reflex and poor mobility  NOSE: clear rhinorrhea  THROAT: erythema, no exudates seen, and no palate petechia  NECK: supple and small, benign anterior cervical nodes bilaterally  LUNGS: bronchial breath sounds    A/P:  1. Cough due to bronchospasm  Patient to use albuterol inhaler every 4-6 hours p.r.n.  Patient use over-the-counter cough syrup as directed on  [FreeTextEntry1] : 20 year old male who was diagnosed with a stage IV CHL in 9/2018 who appears to have a relapsed or refractory Hodgkin's lymphoma. He was initially treated following ABVE-PC completed 12/2018 after 5 cycles due to noncompliance and patient refusal, relapsed diagnosed in Feb 2019, started on salvage treatment in April 2019 with gemzar/brentuximab which was also stopped due to the patient's refusal and noncompliance. \par \par He was recommended to have 2 cycles of ICE with intent to pursue an autologous bone marrow transplant. The patient began the cycle and then left AMA while the chemotherapy was running unfortunately. Despite multiple discussions with the patient and his mother, he did not follow up and refused further treatment. Recently with progression of disease/osseous mets which he was recommended to go to the hospital, he again refused and left the office. \par \par He was now readmitted for diffuse vertebral disease/compression fractures. He was treated with radiation, 800 cGy to T1-T5, T8-L5, left hip/acetabulum. He also received nivolumab with improvement in his lymphadenopathy and symptoms. \par He has now received 8 doses of nivolumab with improvement, but has been with some noncompliance with patient visits, rescheduling treatments.  Treatment was delayed due to development of hypothyroidism and COVID.  He appears improved, has been on the synthroid with improvement.  \par Plan to give nivolumab today.  \par Imaging to be rescheduled this week. \par Expressed my concern about his disease/compliance.  Explained that he will not be a candidate for an autologous bone marrow transplant if he is unable to be compliant with his visits/treatments.  \par Repeat his labs- CMP, TSH today.  \par BMT follow up.  \par Follow up in 1 month.  \par  package labeling  - albuterol 108 (90 Base) MCG/ACT inhaler; Inhale 2 puffs into the lungs every 4 hours as needed for Wheezing.  Dispense: 8.5 g; Refill: 1    2. Community acquired pneumonia, unspecified laterality    - albuterol 108 (90 Base) MCG/ACT inhaler; Inhale 2 puffs into the lungs every 4 hours as needed for Wheezing.  Dispense: 8.5 g; Refill: 1    3. Viral URI with cough    - albuterol 108 (90 Base) MCG/ACT inhaler; Inhale 2 puffs into the lungs every 4 hours as needed for Wheezing.  Dispense: 8.5 g; Refill: 1    Patient to continue amoxicillin b.i.d. times 10 days and finished prescription for previously diagnosed strep pharyngitis      Symptomatic treatment discussed.  Tylenol may be used for fever as needed.  Symptoms should resolve in 3 to 5 days. If symptoms persist longer than 7 days or if patient has worsening cough, respiratory distress or if the fever is persistent, etc to call or return    I spent a total of 10 minutes on the day of the visit.This includes chart review and documenting.      ANY [place]

## 2024-03-29 ENCOUNTER — APPOINTMENT (OUTPATIENT)
Dept: INFUSION THERAPY | Facility: HOSPITAL | Age: 24
End: 2024-03-29

## 2024-03-29 ENCOUNTER — RESULT REVIEW (OUTPATIENT)
Age: 24
End: 2024-03-29

## 2024-03-29 ENCOUNTER — NON-APPOINTMENT (OUTPATIENT)
Age: 24
End: 2024-03-29

## 2024-03-29 LAB
ALBUMIN SERPL ELPH-MCNC: 4.6 G/DL — SIGNIFICANT CHANGE UP (ref 3.3–5)
ALP SERPL-CCNC: 76 U/L — SIGNIFICANT CHANGE UP (ref 40–120)
ALT FLD-CCNC: 9 U/L — LOW (ref 10–45)
ANION GAP SERPL CALC-SCNC: 11 MMOL/L — SIGNIFICANT CHANGE UP (ref 5–17)
AST SERPL-CCNC: 20 U/L — SIGNIFICANT CHANGE UP (ref 10–40)
BILIRUB SERPL-MCNC: 0.6 MG/DL — SIGNIFICANT CHANGE UP (ref 0.2–1.2)
BUN SERPL-MCNC: 13 MG/DL — SIGNIFICANT CHANGE UP (ref 7–23)
CALCIUM SERPL-MCNC: 9.8 MG/DL — SIGNIFICANT CHANGE UP (ref 8.4–10.5)
CHLORIDE SERPL-SCNC: 103 MMOL/L — SIGNIFICANT CHANGE UP (ref 96–108)
CO2 SERPL-SCNC: 27 MMOL/L — SIGNIFICANT CHANGE UP (ref 22–31)
CREAT SERPL-MCNC: 0.84 MG/DL — SIGNIFICANT CHANGE UP (ref 0.5–1.3)
EGFR: 126 ML/MIN/1.73M2 — SIGNIFICANT CHANGE UP
GLUCOSE SERPL-MCNC: 97 MG/DL — SIGNIFICANT CHANGE UP (ref 70–99)
POTASSIUM SERPL-MCNC: 4.5 MMOL/L — SIGNIFICANT CHANGE UP (ref 3.5–5.3)
POTASSIUM SERPL-SCNC: 4.5 MMOL/L — SIGNIFICANT CHANGE UP (ref 3.5–5.3)
PROT SERPL-MCNC: 7.6 G/DL — SIGNIFICANT CHANGE UP (ref 6–8.3)
SODIUM SERPL-SCNC: 141 MMOL/L — SIGNIFICANT CHANGE UP (ref 135–145)

## 2024-04-01 DIAGNOSIS — Z51.11 ENCOUNTER FOR ANTINEOPLASTIC CHEMOTHERAPY: ICD-10-CM

## 2024-04-12 ENCOUNTER — APPOINTMENT (OUTPATIENT)
Dept: INFUSION THERAPY | Facility: HOSPITAL | Age: 24
End: 2024-04-12

## 2024-04-12 ENCOUNTER — RESULT REVIEW (OUTPATIENT)
Age: 24
End: 2024-04-12

## 2024-04-12 LAB
ALBUMIN SERPL ELPH-MCNC: 4.5 G/DL — SIGNIFICANT CHANGE UP (ref 3.3–5)
ALP SERPL-CCNC: 69 U/L — SIGNIFICANT CHANGE UP (ref 40–120)
ALT FLD-CCNC: 10 U/L — SIGNIFICANT CHANGE UP (ref 10–45)
ANION GAP SERPL CALC-SCNC: 8 MMOL/L — SIGNIFICANT CHANGE UP (ref 5–17)
AST SERPL-CCNC: 17 U/L — SIGNIFICANT CHANGE UP (ref 10–40)
BILIRUB SERPL-MCNC: 0.4 MG/DL — SIGNIFICANT CHANGE UP (ref 0.2–1.2)
BUN SERPL-MCNC: 13 MG/DL — SIGNIFICANT CHANGE UP (ref 7–23)
CALCIUM SERPL-MCNC: 9.9 MG/DL — SIGNIFICANT CHANGE UP (ref 8.4–10.5)
CHLORIDE SERPL-SCNC: 106 MMOL/L — SIGNIFICANT CHANGE UP (ref 96–108)
CO2 SERPL-SCNC: 29 MMOL/L — SIGNIFICANT CHANGE UP (ref 22–31)
CREAT SERPL-MCNC: 0.82 MG/DL — SIGNIFICANT CHANGE UP (ref 0.5–1.3)
EGFR: 127 ML/MIN/1.73M2 — SIGNIFICANT CHANGE UP
GLUCOSE SERPL-MCNC: 88 MG/DL — SIGNIFICANT CHANGE UP (ref 70–99)
POTASSIUM SERPL-MCNC: 4.9 MMOL/L — SIGNIFICANT CHANGE UP (ref 3.5–5.3)
POTASSIUM SERPL-SCNC: 4.9 MMOL/L — SIGNIFICANT CHANGE UP (ref 3.5–5.3)
PROT SERPL-MCNC: 7.4 G/DL — SIGNIFICANT CHANGE UP (ref 6–8.3)
SODIUM SERPL-SCNC: 143 MMOL/L — SIGNIFICANT CHANGE UP (ref 135–145)

## 2024-04-19 ENCOUNTER — OUTPATIENT (OUTPATIENT)
Dept: OUTPATIENT SERVICES | Facility: HOSPITAL | Age: 24
LOS: 1 days | Discharge: HOME | End: 2024-04-19

## 2024-04-19 DIAGNOSIS — C81.90 HODGKIN LYMPHOMA, UNSPECIFIED, UNSPECIFIED SITE: ICD-10-CM

## 2024-04-23 ENCOUNTER — EMERGENCY (EMERGENCY)
Facility: HOSPITAL | Age: 24
LOS: 1 days | Discharge: LEFT BEFORE DISCHARGE | End: 2024-04-23
Attending: EMERGENCY MEDICINE | Admitting: EMERGENCY MEDICINE
Payer: MEDICAID

## 2024-04-23 VITALS
RESPIRATION RATE: 18 BRPM | TEMPERATURE: 98 F | OXYGEN SATURATION: 100 % | DIASTOLIC BLOOD PRESSURE: 63 MMHG | SYSTOLIC BLOOD PRESSURE: 95 MMHG | HEIGHT: 66.34 IN | HEART RATE: 86 BPM

## 2024-04-23 VITALS
HEART RATE: 73 BPM | RESPIRATION RATE: 16 BRPM | DIASTOLIC BLOOD PRESSURE: 65 MMHG | OXYGEN SATURATION: 100 % | SYSTOLIC BLOOD PRESSURE: 110 MMHG | TEMPERATURE: 99 F

## 2024-04-23 LAB
ALBUMIN SERPL ELPH-MCNC: 4.5 G/DL — SIGNIFICANT CHANGE UP (ref 3.3–5)
ALP SERPL-CCNC: 76 U/L — SIGNIFICANT CHANGE UP (ref 40–120)
ALT FLD-CCNC: 13 U/L — SIGNIFICANT CHANGE UP (ref 4–41)
ANION GAP SERPL CALC-SCNC: 12 MMOL/L — SIGNIFICANT CHANGE UP (ref 7–14)
APTT BLD: 39.4 SEC — HIGH (ref 24.5–35.6)
AST SERPL-CCNC: 13 U/L — SIGNIFICANT CHANGE UP (ref 4–40)
BASE EXCESS BLDV CALC-SCNC: 3.2 MMOL/L — HIGH (ref -2–3)
BASOPHILS # BLD AUTO: 0.05 K/UL — SIGNIFICANT CHANGE UP (ref 0–0.2)
BASOPHILS NFR BLD AUTO: 0.4 % — SIGNIFICANT CHANGE UP (ref 0–2)
BILIRUB SERPL-MCNC: 0.8 MG/DL — SIGNIFICANT CHANGE UP (ref 0.2–1.2)
BLOOD GAS VENOUS COMPREHENSIVE RESULT: SIGNIFICANT CHANGE UP
BUN SERPL-MCNC: 18 MG/DL — SIGNIFICANT CHANGE UP (ref 7–23)
CALCIUM SERPL-MCNC: 9.5 MG/DL — SIGNIFICANT CHANGE UP (ref 8.4–10.5)
CHLORIDE BLDV-SCNC: 102 MMOL/L — SIGNIFICANT CHANGE UP (ref 96–108)
CHLORIDE SERPL-SCNC: 102 MMOL/L — SIGNIFICANT CHANGE UP (ref 98–107)
CO2 BLDV-SCNC: 30.6 MMOL/L — HIGH (ref 22–26)
CO2 SERPL-SCNC: 27 MMOL/L — SIGNIFICANT CHANGE UP (ref 22–31)
CREAT SERPL-MCNC: 0.94 MG/DL — SIGNIFICANT CHANGE UP (ref 0.5–1.3)
EGFR: 117 ML/MIN/1.73M2 — SIGNIFICANT CHANGE UP
EOSINOPHIL # BLD AUTO: 0.87 K/UL — HIGH (ref 0–0.5)
EOSINOPHIL NFR BLD AUTO: 7.2 % — HIGH (ref 0–6)
GAS PNL BLDV: 135 MMOL/L — LOW (ref 136–145)
GLUCOSE BLDV-MCNC: 80 MG/DL — SIGNIFICANT CHANGE UP (ref 70–99)
GLUCOSE SERPL-MCNC: 93 MG/DL — SIGNIFICANT CHANGE UP (ref 70–99)
HCO3 BLDV-SCNC: 29 MMOL/L — SIGNIFICANT CHANGE UP (ref 22–29)
HCT VFR BLD CALC: 44.4 % — SIGNIFICANT CHANGE UP (ref 39–50)
HCT VFR BLDA CALC: 44 % — SIGNIFICANT CHANGE UP (ref 39–51)
HGB BLD CALC-MCNC: 14.6 G/DL — SIGNIFICANT CHANGE UP (ref 12.6–17.4)
HGB BLD-MCNC: 14.7 G/DL — SIGNIFICANT CHANGE UP (ref 13–17)
IANC: 8.58 K/UL — HIGH (ref 1.8–7.4)
IMM GRANULOCYTES NFR BLD AUTO: 0.3 % — SIGNIFICANT CHANGE UP (ref 0–0.9)
INR BLD: 1.17 RATIO — SIGNIFICANT CHANGE UP (ref 0.85–1.18)
LACTATE BLDV-MCNC: 1 MMOL/L — SIGNIFICANT CHANGE UP (ref 0.5–2)
LYMPHOCYTES # BLD AUTO: 1.72 K/UL — SIGNIFICANT CHANGE UP (ref 1–3.3)
LYMPHOCYTES # BLD AUTO: 14.3 % — SIGNIFICANT CHANGE UP (ref 13–44)
MCHC RBC-ENTMCNC: 28.6 PG — SIGNIFICANT CHANGE UP (ref 27–34)
MCHC RBC-ENTMCNC: 33.1 GM/DL — SIGNIFICANT CHANGE UP (ref 32–36)
MCV RBC AUTO: 86.4 FL — SIGNIFICANT CHANGE UP (ref 80–100)
MONOCYTES # BLD AUTO: 0.78 K/UL — SIGNIFICANT CHANGE UP (ref 0–0.9)
MONOCYTES NFR BLD AUTO: 6.5 % — SIGNIFICANT CHANGE UP (ref 2–14)
NEUTROPHILS # BLD AUTO: 8.58 K/UL — HIGH (ref 1.8–7.4)
NEUTROPHILS NFR BLD AUTO: 71.3 % — SIGNIFICANT CHANGE UP (ref 43–77)
NRBC # BLD: 0 /100 WBCS — SIGNIFICANT CHANGE UP (ref 0–0)
NRBC # FLD: 0 K/UL — SIGNIFICANT CHANGE UP (ref 0–0)
PCO2 BLDV: 48 MMHG — SIGNIFICANT CHANGE UP (ref 42–55)
PH BLDV: 7.39 — SIGNIFICANT CHANGE UP (ref 7.32–7.43)
PLATELET # BLD AUTO: 219 K/UL — SIGNIFICANT CHANGE UP (ref 150–400)
PO2 BLDV: 52 MMHG — HIGH (ref 25–45)
POTASSIUM BLDV-SCNC: 4 MMOL/L — SIGNIFICANT CHANGE UP (ref 3.5–5.1)
POTASSIUM SERPL-MCNC: 4.4 MMOL/L — SIGNIFICANT CHANGE UP (ref 3.5–5.3)
POTASSIUM SERPL-SCNC: 4.4 MMOL/L — SIGNIFICANT CHANGE UP (ref 3.5–5.3)
PROT SERPL-MCNC: 7.5 G/DL — SIGNIFICANT CHANGE UP (ref 6–8.3)
PROTHROM AB SERPL-ACNC: 13 SEC — SIGNIFICANT CHANGE UP (ref 9.5–13)
RBC # BLD: 5.14 M/UL — SIGNIFICANT CHANGE UP (ref 4.2–5.8)
RBC # FLD: 14.2 % — SIGNIFICANT CHANGE UP (ref 10.3–14.5)
SAO2 % BLDV: 87.3 % — SIGNIFICANT CHANGE UP (ref 67–88)
SODIUM SERPL-SCNC: 141 MMOL/L — SIGNIFICANT CHANGE UP (ref 135–145)
WBC # BLD: 12.04 K/UL — HIGH (ref 3.8–10.5)
WBC # FLD AUTO: 12.04 K/UL — HIGH (ref 3.8–10.5)

## 2024-04-23 PROCEDURE — 93010 ELECTROCARDIOGRAM REPORT: CPT

## 2024-04-23 PROCEDURE — 99285 EMERGENCY DEPT VISIT HI MDM: CPT

## 2024-04-23 RX ORDER — MORPHINE SULFATE 50 MG/1
4 CAPSULE, EXTENDED RELEASE ORAL ONCE
Refills: 0 | Status: DISCONTINUED | OUTPATIENT
Start: 2024-04-23 | End: 2024-04-23

## 2024-04-23 RX ORDER — SODIUM CHLORIDE 9 MG/ML
1000 INJECTION INTRAMUSCULAR; INTRAVENOUS; SUBCUTANEOUS ONCE
Refills: 0 | Status: COMPLETED | OUTPATIENT
Start: 2024-04-23 | End: 2024-04-23

## 2024-04-23 RX ORDER — FENTANYL CITRATE 50 UG/ML
25 INJECTION INTRAVENOUS ONCE
Refills: 0 | Status: DISCONTINUED | OUTPATIENT
Start: 2024-04-23 | End: 2024-04-23

## 2024-04-23 RX ORDER — ACETAMINOPHEN 500 MG
975 TABLET ORAL ONCE
Refills: 0 | Status: COMPLETED | OUTPATIENT
Start: 2024-04-23 | End: 2024-04-23

## 2024-04-23 RX ADMIN — SODIUM CHLORIDE 1000 MILLILITER(S): 9 INJECTION INTRAMUSCULAR; INTRAVENOUS; SUBCUTANEOUS at 16:41

## 2024-04-23 RX ADMIN — FENTANYL CITRATE 25 MICROGRAM(S): 50 INJECTION INTRAVENOUS at 16:12

## 2024-04-23 RX ADMIN — Medication 975 MILLIGRAM(S): at 16:14

## 2024-04-23 RX ADMIN — Medication 60 MILLIGRAM(S): at 16:39

## 2024-04-23 NOTE — ED PROVIDER NOTE - PROGRESS NOTE DETAILS
EVERARDO Petit - 23-year-old male with PMH non-Hodgkin's lymphoma, chronic cervical lymphadenopathy, presenting due to sudden worsening of a lymph node on his left neck, swollen, painful, has not had this before.  No recent dental procedures, no changes with swallowing or speaking.  Patient does have a history of IV contrast allergy, will get a CT without contrast for further evaluation of this lymph node versus underlying abscess versus hematoma.  Basic labs, reassess disposition post workup complete. MD Fiorella (PGY-2)Patient is declining medications and bloodwork and further workup including imaging. Patient expresses desire to leave emergency department against medical advice, prior to completion of evaluation and treatment plan.  Patient's mother at bedside initially, but left prior to patient's decision of leaving AMA.  Patient is alert and oriented to self//location/month/year/date/day of week. Patient demonstrates clear reasoning capabilities and capacity to make this decision. Patient is able to communicate clearly his own medical conditions. Patient understands the explained risks involved with this decision including worsening of medical condition, missed and/or delayed diagnosis, permanent disability and death. All alternative options given to patient and still desires discharge against medical advice from ED and will follow up as an outpatient. Patient given the option to return to ED at any time to have further evaluation and treatment. Patient sitting at the end of the breath on the phone fully dressed wants to leave the hospital now.  States "I have to go from home now" with no specific reason why.  Patient ANO x 3.  Verbalized understanding that he is leaving AGAINST MEDICAL ADVICE.  Patient to sign himself out straight return precautions given.  Patient left prior to CT being done.  Airway compromise respiratory distress

## 2024-04-23 NOTE — ED PROVIDER NOTE - PATIENT PORTAL LINK FT
You can access the FollowMyHealth Patient Portal offered by [facility] by registering at the following website: [email or web address]. By joining [facility]’s FollowMyHealth portal, you will also be able to view your health information using other applications (apps) compatible with our system.

## 2024-04-23 NOTE — ED ADULT NURSE NOTE - NSFALLUNIVINTERV_ED_ALL_ED
Bed/Stretcher in lowest position, wheels locked, appropriate side rails in place/Call bell, personal items and telephone in reach/Instruct patient to call for assistance before getting out of bed/chair/stretcher/Non-slip footwear applied when patient is off stretcher/[place] to call system/Physically safe environment - no spills, clutter or unnecessary equipment/Purposeful proactive rounding/Room/bathroom lighting operational, light cord in reach

## 2024-04-23 NOTE — ED PROVIDER NOTE - CLINICAL SUMMARY MEDICAL DECISION MAKING FREE TEXT BOX
23-year-old male with past medical history of non-Hodgkin lymphoma, chronic cervical lymphadenopathy, presenting the ED with worsening of lymph node in the left side of his neck, described as swollen and painful.  Hemodynamically stable.  Patient noted with a blood pressure of 91/73, which is patient's baseline.  Physical exam with multiple lymph nodes in the cervical region.  Mid left sided lymph node on the neck, tender to palpation.  No erythema or warmth overlying lymph node.  Concern for lower lymphadenitis, abscess, hematoma.  Will get labs including CBC, CMP, CT neck.

## 2024-04-23 NOTE — ED PROVIDER NOTE - ATTENDING CONTRIBUTION TO CARE
Attending Statement: I have personally seen and examined this patient. I have fully participated in the care of this patient. I have reviewed all pertinent clinical information, including history physical exam, plan and the Resident's note and agree except as noted  23-year-old male with chronic cervical lymphadenopathy, non-Hodgkin's lymphoma, chief complaint of pain to the left side of the neck since yesterday.  Endorsing painful swollen left side lymph nodes.  Patient states he always had some lymph nodes but they are never this swollen or painful.  Endorsing night sweats yesterday and chills.  But did not take a temperature.  Denies any chest pain no shortness of breath no difficulty speaking or swallowing.  No change in voice.  No falls no trauma no recent antibiotic use no abdominal pain.  Vital signs noted patient not tachycardic not tachypneic not hypoxic 90% on room air sitting up using the phone talking in full clear sentences.  ANO x 3.  Supple neck.  Noticeable swelling to the left lateral neck just below the angle of the mandible tender swollen warm to touch.  No palpable fluctuance or crepitus.  Images below without another nontender swollen lymph node.  No swelling along clavicle.  No respiratory distress.  Abdomen is benign.  Plan: Labs, CT IV contrast ro abscess w premdicaiton, dw pt

## 2024-04-23 NOTE — ED PROVIDER NOTE - OBJECTIVE STATEMENT
23-year-old male with past medical history of non-Hodgkin lymphoma, chronic cervical lymphadenopathy, presenting the ED with worsening of lymph node in the left side of his neck, described as swollen and painful.  Patient states that he noticed the symptoms yesterday.  Denies any recent dental procedures.  Denies any difficulty swallowing, voice changes, difficulty breathing.  Denies any fevers, reports some chills.  Denies nausea vomiting.  Denies sore throat.

## 2024-04-23 NOTE — ED PROVIDER NOTE - PHYSICAL EXAMINATION
Const: not in acute distress  Eyes: no conjunctival injection  HEENT: Head NCAT, cervical lymphadenopathy with mid-Left sided tender lymph node with tenderness to palpation. No overlying erythema, warmth. Submandibular and supraclavicular lymph nodes also palpated without tenderness. Moist MM.  Neck: Trachea midline.   CVS: +S1/S2, Peripheral pulses 2+ and equal in all extremities.  RESP: Unlabored respiratory effort. Clear to auscultation bilaterally.  GI: Nontender/Nondistended, No CVA tenderness b/l.   MSK: Normocephalic/Atraumatic, No Lower Extremities edema b/l.   Skin: Intact.   Neuro: Motor & Sensation grossly intact.  Psych: Awake, Alert, & Cooperative

## 2024-04-23 NOTE — ED ADULT NURSE NOTE - OBJECTIVE STATEMENT
Pt arrives to room 22. Pt is A and OX4 and ambulatory. HX: Non-Hodgkin's lymphoma. Pt arrives to the ED complaining of a left neck mass. Pt states he noticed it yesterday and it has grown. Pt states that he has trouble moving his neck due to the pain. Pt denies trouble breathing and difficulty swallowing. Airway is patent, respirations are even and unlabored. Pt 100% on room air. Pt denies chest pain, shortness of breath, numbness and tingling, n/v/d, fever and chills, headaches, dizziness. ED resident at bedside evaluating pt. Pt states he is on immunotherapy. Pt arrives to room 22. Pt is A and OX4 and ambulatory. HX: Non-Hodgkin's lymphoma. Pt arrives to the ED complaining of a left neck mass. Pt states he noticed it yesterday and it has grown. Pt states that he has trouble moving his neck due to the pain. Pt denies trouble breathing and difficulty swallowing. Airway is patent, respirations are even and unlabored. Pt 100% on room air. Pt denies chest pain, shortness of breath, numbness and tingling, n/v/d, fever and chills, headaches, dizziness. ED resident at bedside evaluating pt. Pt states he is receiving immunotherapy. Plan of care ongoing, safety maintained.

## 2024-04-23 NOTE — ED ADULT TRIAGE NOTE - CHIEF COMPLAINT QUOTE
Pt with non hodgkin's lymphoma receiving immuno therapy. pt with mass to left side of neck states he noticed it yesterday and it is very painful.

## 2024-04-26 ENCOUNTER — RESULT REVIEW (OUTPATIENT)
Age: 24
End: 2024-04-26

## 2024-04-26 ENCOUNTER — APPOINTMENT (OUTPATIENT)
Dept: INFUSION THERAPY | Facility: HOSPITAL | Age: 24
End: 2024-04-26

## 2024-04-26 LAB
ALBUMIN SERPL ELPH-MCNC: 4.6 G/DL — SIGNIFICANT CHANGE UP (ref 3.3–5)
ALP SERPL-CCNC: 74 U/L — SIGNIFICANT CHANGE UP (ref 40–120)
ALT FLD-CCNC: 9 U/L — LOW (ref 10–45)
ANION GAP SERPL CALC-SCNC: 11 MMOL/L — SIGNIFICANT CHANGE UP (ref 5–17)
AST SERPL-CCNC: 16 U/L — SIGNIFICANT CHANGE UP (ref 10–40)
BILIRUB SERPL-MCNC: 0.5 MG/DL — SIGNIFICANT CHANGE UP (ref 0.2–1.2)
BUN SERPL-MCNC: 17 MG/DL — SIGNIFICANT CHANGE UP (ref 7–23)
CALCIUM SERPL-MCNC: 9.9 MG/DL — SIGNIFICANT CHANGE UP (ref 8.4–10.5)
CHLORIDE SERPL-SCNC: 103 MMOL/L — SIGNIFICANT CHANGE UP (ref 96–108)
CO2 SERPL-SCNC: 29 MMOL/L — SIGNIFICANT CHANGE UP (ref 22–31)
CREAT SERPL-MCNC: 0.79 MG/DL — SIGNIFICANT CHANGE UP (ref 0.5–1.3)
EGFR: 128 ML/MIN/1.73M2 — SIGNIFICANT CHANGE UP
GLUCOSE SERPL-MCNC: 126 MG/DL — HIGH (ref 70–99)
POTASSIUM SERPL-MCNC: 4.6 MMOL/L — SIGNIFICANT CHANGE UP (ref 3.5–5.3)
POTASSIUM SERPL-SCNC: 4.6 MMOL/L — SIGNIFICANT CHANGE UP (ref 3.5–5.3)
PROT SERPL-MCNC: 7.5 G/DL — SIGNIFICANT CHANGE UP (ref 6–8.3)
SODIUM SERPL-SCNC: 142 MMOL/L — SIGNIFICANT CHANGE UP (ref 135–145)

## 2024-04-27 DIAGNOSIS — Z51.11 ENCOUNTER FOR ANTINEOPLASTIC CHEMOTHERAPY: ICD-10-CM

## 2024-05-10 ENCOUNTER — RESULT REVIEW (OUTPATIENT)
Age: 24
End: 2024-05-10

## 2024-05-10 ENCOUNTER — APPOINTMENT (OUTPATIENT)
Dept: INFUSION THERAPY | Facility: HOSPITAL | Age: 24
End: 2024-05-10

## 2024-05-10 LAB
BASOPHILS # BLD AUTO: 0.04 K/UL — SIGNIFICANT CHANGE UP (ref 0–0.2)
BASOPHILS NFR BLD AUTO: 0.5 % — SIGNIFICANT CHANGE UP (ref 0–2)
EOSINOPHIL # BLD AUTO: 0.81 K/UL — HIGH (ref 0–0.5)
EOSINOPHIL NFR BLD AUTO: 9.2 % — HIGH (ref 0–6)
HCT VFR BLD CALC: 41.2 % — SIGNIFICANT CHANGE UP (ref 39–50)
HGB BLD-MCNC: 14.3 G/DL — SIGNIFICANT CHANGE UP (ref 13–17)
IMM GRANULOCYTES NFR BLD AUTO: 0.3 % — SIGNIFICANT CHANGE UP (ref 0–0.9)
LYMPHOCYTES # BLD AUTO: 1.42 K/UL — SIGNIFICANT CHANGE UP (ref 1–3.3)
LYMPHOCYTES # BLD AUTO: 16.2 % — SIGNIFICANT CHANGE UP (ref 13–44)
MCHC RBC-ENTMCNC: 28.9 PG — SIGNIFICANT CHANGE UP (ref 27–34)
MCHC RBC-ENTMCNC: 34.7 G/DL — SIGNIFICANT CHANGE UP (ref 32–36)
MCV RBC AUTO: 83.2 FL — SIGNIFICANT CHANGE UP (ref 80–100)
MONOCYTES # BLD AUTO: 0.38 K/UL — SIGNIFICANT CHANGE UP (ref 0–0.9)
MONOCYTES NFR BLD AUTO: 4.3 % — SIGNIFICANT CHANGE UP (ref 2–14)
NEUTROPHILS # BLD AUTO: 6.11 K/UL — SIGNIFICANT CHANGE UP (ref 1.8–7.4)
NEUTROPHILS NFR BLD AUTO: 69.5 % — SIGNIFICANT CHANGE UP (ref 43–77)
NRBC # BLD: 0 /100 WBCS — SIGNIFICANT CHANGE UP (ref 0–0)
PLATELET # BLD AUTO: 240 K/UL — SIGNIFICANT CHANGE UP (ref 150–400)
RBC # BLD: 4.95 M/UL — SIGNIFICANT CHANGE UP (ref 4.2–5.8)
RBC # FLD: 14.7 % — HIGH (ref 10.3–14.5)
WBC # BLD: 8.79 K/UL — SIGNIFICANT CHANGE UP (ref 3.8–10.5)
WBC # FLD AUTO: 8.79 K/UL — SIGNIFICANT CHANGE UP (ref 3.8–10.5)

## 2024-05-16 ENCOUNTER — APPOINTMENT (OUTPATIENT)
Dept: INFUSION THERAPY | Facility: HOSPITAL | Age: 24
End: 2024-05-16

## 2024-05-16 ENCOUNTER — RESULT REVIEW (OUTPATIENT)
Age: 24
End: 2024-05-16

## 2024-05-18 LAB
ALBUMIN SERPL ELPH-MCNC: 4.4 G/DL — SIGNIFICANT CHANGE UP (ref 3.3–5)
ALP SERPL-CCNC: 71 U/L — SIGNIFICANT CHANGE UP (ref 40–120)
ALT FLD-CCNC: 22 U/L — SIGNIFICANT CHANGE UP (ref 10–45)
ANION GAP SERPL CALC-SCNC: 24 MMOL/L — HIGH (ref 5–17)
AST SERPL-CCNC: 29 U/L — SIGNIFICANT CHANGE UP (ref 10–40)
BILIRUB SERPL-MCNC: 0.2 MG/DL — SIGNIFICANT CHANGE UP (ref 0.2–1.2)
BUN SERPL-MCNC: 19 MG/DL — SIGNIFICANT CHANGE UP (ref 7–23)
CALCIUM SERPL-MCNC: 10.3 MG/DL — SIGNIFICANT CHANGE UP (ref 8.4–10.5)
CHLORIDE SERPL-SCNC: 102 MMOL/L — SIGNIFICANT CHANGE UP (ref 96–108)
CO2 SERPL-SCNC: 15 MMOL/L — LOW (ref 22–31)
CREAT SERPL-MCNC: 0.77 MG/DL — SIGNIFICANT CHANGE UP (ref 0.5–1.3)
EGFR: 129 ML/MIN/1.73M2 — SIGNIFICANT CHANGE UP
GLUCOSE SERPL-MCNC: SIGNIFICANT CHANGE UP MG/DL (ref 70–99)
POTASSIUM SERPL-MCNC: 5.2 MMOL/L — SIGNIFICANT CHANGE UP (ref 3.5–5.3)
POTASSIUM SERPL-SCNC: 5.2 MMOL/L — SIGNIFICANT CHANGE UP (ref 3.5–5.3)
PROT SERPL-MCNC: 7.4 G/DL — SIGNIFICANT CHANGE UP (ref 6–8.3)
SODIUM SERPL-SCNC: 141 MMOL/L — SIGNIFICANT CHANGE UP (ref 135–145)

## 2024-05-20 ENCOUNTER — APPOINTMENT (OUTPATIENT)
Dept: HEMATOLOGY ONCOLOGY | Facility: CLINIC | Age: 24
End: 2024-05-20

## 2024-05-29 NOTE — PRE-ANESTHESIA EVALUATION ADULT - NSANTHDIETYNSD_GEN_ALL_CORE
Yes
clear to auscultation bilaterally/no wheezes/airway patent/breath sounds equal/good air movement

## 2024-06-03 ENCOUNTER — APPOINTMENT (OUTPATIENT)
Dept: INFUSION THERAPY | Facility: HOSPITAL | Age: 24
End: 2024-06-03

## 2024-06-03 ENCOUNTER — RESULT REVIEW (OUTPATIENT)
Age: 24
End: 2024-06-03

## 2024-06-03 LAB
BASOPHILS # BLD AUTO: 0.07 K/UL — SIGNIFICANT CHANGE UP (ref 0–0.2)
BASOPHILS NFR BLD AUTO: 0.8 % — SIGNIFICANT CHANGE UP (ref 0–2)
EOSINOPHIL # BLD AUTO: 0.43 K/UL — SIGNIFICANT CHANGE UP (ref 0–0.5)
EOSINOPHIL NFR BLD AUTO: 4.6 % — SIGNIFICANT CHANGE UP (ref 0–6)
HCT VFR BLD CALC: 43.1 % — SIGNIFICANT CHANGE UP (ref 39–50)
HGB BLD-MCNC: 14.6 G/DL — SIGNIFICANT CHANGE UP (ref 13–17)
IMM GRANULOCYTES NFR BLD AUTO: 0.3 % — SIGNIFICANT CHANGE UP (ref 0–0.9)
LYMPHOCYTES # BLD AUTO: 1.46 K/UL — SIGNIFICANT CHANGE UP (ref 1–3.3)
LYMPHOCYTES # BLD AUTO: 15.8 % — SIGNIFICANT CHANGE UP (ref 13–44)
MCHC RBC-ENTMCNC: 28.7 PG — SIGNIFICANT CHANGE UP (ref 27–34)
MCHC RBC-ENTMCNC: 33.9 G/DL — SIGNIFICANT CHANGE UP (ref 32–36)
MCV RBC AUTO: 84.7 FL — SIGNIFICANT CHANGE UP (ref 80–100)
MONOCYTES # BLD AUTO: 0.64 K/UL — SIGNIFICANT CHANGE UP (ref 0–0.9)
MONOCYTES NFR BLD AUTO: 6.9 % — SIGNIFICANT CHANGE UP (ref 2–14)
NEUTROPHILS # BLD AUTO: 6.62 K/UL — SIGNIFICANT CHANGE UP (ref 1.8–7.4)
NEUTROPHILS NFR BLD AUTO: 71.6 % — SIGNIFICANT CHANGE UP (ref 43–77)
NRBC # BLD: 0 /100 WBCS — SIGNIFICANT CHANGE UP (ref 0–0)
PLATELET # BLD AUTO: 292 K/UL — SIGNIFICANT CHANGE UP (ref 150–400)
RBC # BLD: 5.09 M/UL — SIGNIFICANT CHANGE UP (ref 4.2–5.8)
RBC # FLD: 14.2 % — SIGNIFICANT CHANGE UP (ref 10.3–14.5)
WBC # BLD: 9.25 K/UL — SIGNIFICANT CHANGE UP (ref 3.8–10.5)
WBC # FLD AUTO: 9.25 K/UL — SIGNIFICANT CHANGE UP (ref 3.8–10.5)

## 2024-06-04 LAB
ALBUMIN SERPL ELPH-MCNC: 4.7 G/DL — SIGNIFICANT CHANGE UP (ref 3.3–5)
ALP SERPL-CCNC: 73 U/L — SIGNIFICANT CHANGE UP (ref 40–120)
ALT FLD-CCNC: 17 U/L — SIGNIFICANT CHANGE UP (ref 10–45)
ANION GAP SERPL CALC-SCNC: 15 MMOL/L — SIGNIFICANT CHANGE UP (ref 5–17)
AST SERPL-CCNC: 18 U/L — SIGNIFICANT CHANGE UP (ref 10–40)
BILIRUB SERPL-MCNC: 0.5 MG/DL — SIGNIFICANT CHANGE UP (ref 0.2–1.2)
BUN SERPL-MCNC: 14 MG/DL — SIGNIFICANT CHANGE UP (ref 7–23)
CALCIUM SERPL-MCNC: 9.8 MG/DL — SIGNIFICANT CHANGE UP (ref 8.4–10.5)
CHLORIDE SERPL-SCNC: 103 MMOL/L — SIGNIFICANT CHANGE UP (ref 96–108)
CO2 SERPL-SCNC: 24 MMOL/L — SIGNIFICANT CHANGE UP (ref 22–31)
CREAT SERPL-MCNC: 1.03 MG/DL — SIGNIFICANT CHANGE UP (ref 0.5–1.3)
EGFR: 105 ML/MIN/1.73M2 — SIGNIFICANT CHANGE UP
GLUCOSE SERPL-MCNC: 60 MG/DL — LOW (ref 70–99)
POTASSIUM SERPL-MCNC: 4.1 MMOL/L — SIGNIFICANT CHANGE UP (ref 3.5–5.3)
POTASSIUM SERPL-SCNC: 4.1 MMOL/L — SIGNIFICANT CHANGE UP (ref 3.5–5.3)
PROT SERPL-MCNC: 7.6 G/DL — SIGNIFICANT CHANGE UP (ref 6–8.3)
SODIUM SERPL-SCNC: 142 MMOL/L — SIGNIFICANT CHANGE UP (ref 135–145)

## 2024-06-11 NOTE — DIETITIAN INITIAL EVALUATION ADULT. - PROBLEM/PLAN-2
DISPLAY PLAN FREE TEXT [] : results reviewed [de-identified] : Pending [de-identified] : Pending [de-identified] : Pending [de-identified] : See report.

## 2024-06-13 ENCOUNTER — APPOINTMENT (OUTPATIENT)
Dept: INFUSION THERAPY | Facility: HOSPITAL | Age: 24
End: 2024-06-13

## 2024-06-17 DIAGNOSIS — C81.90 HODGKIN LYMPHOMA, UNSPECIFIED, UNSPECIFIED SITE: ICD-10-CM

## 2024-06-18 ENCOUNTER — OUTPATIENT (OUTPATIENT)
Dept: OUTPATIENT SERVICES | Facility: HOSPITAL | Age: 24
LOS: 1 days | Discharge: HOME | End: 2024-06-18

## 2024-06-18 DIAGNOSIS — C81.90 HODGKIN LYMPHOMA, UNSPECIFIED, UNSPECIFIED SITE: ICD-10-CM

## 2024-06-20 ENCOUNTER — APPOINTMENT (OUTPATIENT)
Dept: HEMATOLOGY ONCOLOGY | Facility: CLINIC | Age: 24
End: 2024-06-20

## 2024-06-20 ENCOUNTER — APPOINTMENT (OUTPATIENT)
Dept: INFUSION THERAPY | Facility: HOSPITAL | Age: 24
End: 2024-06-20

## 2024-06-27 ENCOUNTER — APPOINTMENT (OUTPATIENT)
Dept: INFUSION THERAPY | Facility: HOSPITAL | Age: 24
End: 2024-06-27

## 2024-07-01 ENCOUNTER — APPOINTMENT (OUTPATIENT)
Dept: HEMATOLOGY ONCOLOGY | Facility: CLINIC | Age: 24
End: 2024-07-01

## 2024-07-03 ENCOUNTER — APPOINTMENT (OUTPATIENT)
Dept: HEMATOLOGY ONCOLOGY | Facility: CLINIC | Age: 24
End: 2024-07-03

## 2024-07-05 ENCOUNTER — APPOINTMENT (OUTPATIENT)
Dept: HEMATOLOGY ONCOLOGY | Facility: CLINIC | Age: 24
End: 2024-07-05

## 2024-07-05 ENCOUNTER — APPOINTMENT (OUTPATIENT)
Dept: INFUSION THERAPY | Facility: HOSPITAL | Age: 24
End: 2024-07-05

## 2024-07-18 ENCOUNTER — APPOINTMENT (OUTPATIENT)
Dept: HEMATOLOGY ONCOLOGY | Facility: CLINIC | Age: 24
End: 2024-07-18

## 2024-07-18 ENCOUNTER — APPOINTMENT (OUTPATIENT)
Dept: INFUSION THERAPY | Facility: HOSPITAL | Age: 24
End: 2024-07-18

## 2024-07-29 NOTE — DIETITIAN INITIAL EVALUATION ADULT. - PROBLEM SELECTOR PLAN 2
neck and back RVP+ on 2/19 at [facility], symptomatic for several days, now improving and RVP today (2/20) negative  ivf hydration  no role for tamiflu as >48 hours from symptom onset

## 2024-08-01 ENCOUNTER — APPOINTMENT (OUTPATIENT)
Dept: INFUSION THERAPY | Facility: HOSPITAL | Age: 24
End: 2024-08-01

## 2024-08-01 ENCOUNTER — APPOINTMENT (OUTPATIENT)
Dept: HEMATOLOGY ONCOLOGY | Facility: CLINIC | Age: 24
End: 2024-08-01

## 2024-08-08 NOTE — ED PROVIDER NOTE - COVID-19  TEST TYPE
Emil Edouard is currently unable to regularly participate at Cardiac Rehab due to chest pain.  He called today to say he would be having another cardiac cath soon and hopes to return after that procedure.  Their participation will be put on hold for now and their ITP will be updated upon their return.  We will continue to follow up.      Nicole Tony RN  
MOLECULAR PCR
no

## 2024-08-15 ENCOUNTER — APPOINTMENT (OUTPATIENT)
Dept: HEMATOLOGY ONCOLOGY | Facility: CLINIC | Age: 24
End: 2024-08-15

## 2024-08-15 ENCOUNTER — APPOINTMENT (OUTPATIENT)
Dept: INFUSION THERAPY | Facility: HOSPITAL | Age: 24
End: 2024-08-15

## 2024-08-15 NOTE — ED ADULT NURSE NOTE - COVID-19 RESULT
NEGATIVE
[FreeTextEntry1] : -UCx/BD affirm/GC (urine) today -urine dip today neg -eRx macrobid -refilled loloestrin RTO for routine care

## 2024-08-29 ENCOUNTER — APPOINTMENT (OUTPATIENT)
Dept: INFUSION THERAPY | Facility: HOSPITAL | Age: 24
End: 2024-08-29

## 2024-08-29 ENCOUNTER — APPOINTMENT (OUTPATIENT)
Dept: HEMATOLOGY ONCOLOGY | Facility: CLINIC | Age: 24
End: 2024-08-29

## 2024-08-30 ENCOUNTER — OUTPATIENT (OUTPATIENT)
Dept: OUTPATIENT SERVICES | Facility: HOSPITAL | Age: 24
LOS: 1 days | Discharge: HOME | End: 2024-08-30

## 2024-08-30 DIAGNOSIS — C81.90 HODGKIN LYMPHOMA, UNSPECIFIED, UNSPECIFIED SITE: ICD-10-CM

## 2024-09-03 ENCOUNTER — APPOINTMENT (OUTPATIENT)
Dept: HEMATOLOGY ONCOLOGY | Facility: CLINIC | Age: 24
End: 2024-09-03

## 2024-10-31 ENCOUNTER — OUTPATIENT (OUTPATIENT)
Dept: OUTPATIENT SERVICES | Facility: HOSPITAL | Age: 24
LOS: 1 days | Discharge: HOME | End: 2024-10-31

## 2024-10-31 DIAGNOSIS — C81.90 HODGKIN LYMPHOMA, UNSPECIFIED, UNSPECIFIED SITE: ICD-10-CM

## 2024-11-04 ENCOUNTER — APPOINTMENT (OUTPATIENT)
Dept: HEMATOLOGY ONCOLOGY | Facility: CLINIC | Age: 24
End: 2024-11-04

## 2024-11-18 ENCOUNTER — LABORATORY RESULT (OUTPATIENT)
Age: 24
End: 2024-11-18

## 2024-11-18 ENCOUNTER — RESULT REVIEW (OUTPATIENT)
Age: 24
End: 2024-11-18

## 2024-11-18 ENCOUNTER — APPOINTMENT (OUTPATIENT)
Dept: HEMATOLOGY ONCOLOGY | Facility: CLINIC | Age: 24
End: 2024-11-18
Payer: MEDICAID

## 2024-11-18 VITALS
RESPIRATION RATE: 16 BRPM | TEMPERATURE: 98.6 F | OXYGEN SATURATION: 98 % | WEIGHT: 102.71 LBS | DIASTOLIC BLOOD PRESSURE: 64 MMHG | SYSTOLIC BLOOD PRESSURE: 98 MMHG | BODY MASS INDEX: 17.11 KG/M2 | HEIGHT: 65.12 IN | HEART RATE: 71 BPM

## 2024-11-18 DIAGNOSIS — C81.90 HODGKIN LYMPHOMA, UNSPECIFIED, UNSPECIFIED SITE: ICD-10-CM

## 2024-11-18 LAB
BASOPHILS # BLD AUTO: 0.08 K/UL — SIGNIFICANT CHANGE UP (ref 0–0.2)
BASOPHILS NFR BLD AUTO: 0.7 % — SIGNIFICANT CHANGE UP (ref 0–2)
EOSINOPHIL # BLD AUTO: 0.89 K/UL — HIGH (ref 0–0.5)
EOSINOPHIL NFR BLD AUTO: 7.9 % — HIGH (ref 0–6)
HCT VFR BLD CALC: 43.1 % — SIGNIFICANT CHANGE UP (ref 39–50)
HGB BLD-MCNC: 14.5 G/DL — SIGNIFICANT CHANGE UP (ref 13–17)
IMM GRANULOCYTES NFR BLD AUTO: 0.3 % — SIGNIFICANT CHANGE UP (ref 0–0.9)
LYMPHOCYTES # BLD AUTO: 1.68 K/UL — SIGNIFICANT CHANGE UP (ref 1–3.3)
LYMPHOCYTES # BLD AUTO: 15 % — SIGNIFICANT CHANGE UP (ref 13–44)
MCHC RBC-ENTMCNC: 28.5 PG — SIGNIFICANT CHANGE UP (ref 27–34)
MCHC RBC-ENTMCNC: 33.6 G/DL — SIGNIFICANT CHANGE UP (ref 32–36)
MCV RBC AUTO: 84.7 FL — SIGNIFICANT CHANGE UP (ref 80–100)
MONOCYTES # BLD AUTO: 0.62 K/UL — SIGNIFICANT CHANGE UP (ref 0–0.9)
MONOCYTES NFR BLD AUTO: 5.5 % — SIGNIFICANT CHANGE UP (ref 2–14)
NEUTROPHILS # BLD AUTO: 7.91 K/UL — HIGH (ref 1.8–7.4)
NEUTROPHILS NFR BLD AUTO: 70.6 % — SIGNIFICANT CHANGE UP (ref 43–77)
NRBC # BLD: 0 /100 WBCS — SIGNIFICANT CHANGE UP (ref 0–0)
PLATELET # BLD AUTO: 276 K/UL — SIGNIFICANT CHANGE UP (ref 150–400)
RBC # BLD: 5.09 M/UL — SIGNIFICANT CHANGE UP (ref 4.2–5.8)
RBC # FLD: 14.6 % — HIGH (ref 10.3–14.5)
WBC # BLD: 11.21 K/UL — HIGH (ref 3.8–10.5)
WBC # FLD AUTO: 11.21 K/UL — HIGH (ref 3.8–10.5)

## 2024-11-18 PROCEDURE — 99214 OFFICE O/P EST MOD 30 MIN: CPT

## 2024-11-18 PROCEDURE — G2211 COMPLEX E/M VISIT ADD ON: CPT | Mod: NC

## 2024-11-19 LAB
ALBUMIN SERPL ELPH-MCNC: 4.6 G/DL
ALP BLD-CCNC: 77 U/L
ALT SERPL-CCNC: 7 U/L
ANION GAP SERPL CALC-SCNC: 12 MMOL/L
AST SERPL-CCNC: 16 U/L
B2 MICROGLOB SERPL-MCNC: 2 MG/L
BILIRUB SERPL-MCNC: 0.6 MG/DL
BUN SERPL-MCNC: 17 MG/DL
CALCIUM SERPL-MCNC: 9.9 MG/DL
CHLORIDE SERPL-SCNC: 103 MMOL/L
CO2 SERPL-SCNC: 28 MMOL/L
CREAT SERPL-MCNC: 0.95 MG/DL
EGFR: 115 ML/MIN/1.73M2
GLUCOSE SERPL-MCNC: 74 MG/DL
LDH SERPL-CCNC: 214 U/L
POTASSIUM SERPL-SCNC: 4.3 MMOL/L
PROT SERPL-MCNC: 7.4 G/DL
SODIUM SERPL-SCNC: 142 MMOL/L
TSH SERPL-ACNC: 8.78 UIU/ML

## 2024-11-26 ENCOUNTER — APPOINTMENT (OUTPATIENT)
Dept: INFUSION THERAPY | Facility: HOSPITAL | Age: 24
End: 2024-11-26

## 2024-11-26 ENCOUNTER — RESULT REVIEW (OUTPATIENT)
Age: 24
End: 2024-11-26

## 2024-11-26 LAB
ALBUMIN SERPL ELPH-MCNC: 4 G/DL — SIGNIFICANT CHANGE UP (ref 3.3–5)
ALP SERPL-CCNC: 82 U/L — SIGNIFICANT CHANGE UP (ref 40–120)
ALT FLD-CCNC: 11 U/L — SIGNIFICANT CHANGE UP (ref 10–45)
ANION GAP SERPL CALC-SCNC: 11 MMOL/L — SIGNIFICANT CHANGE UP (ref 5–17)
AST SERPL-CCNC: 18 U/L — SIGNIFICANT CHANGE UP (ref 10–40)
BASOPHILS # BLD AUTO: 0.08 K/UL — SIGNIFICANT CHANGE UP (ref 0–0.2)
BASOPHILS NFR BLD AUTO: 0.6 % — SIGNIFICANT CHANGE UP (ref 0–2)
BILIRUB SERPL-MCNC: 0.3 MG/DL — SIGNIFICANT CHANGE UP (ref 0.2–1.2)
BUN SERPL-MCNC: 17 MG/DL — SIGNIFICANT CHANGE UP (ref 7–23)
CALCIUM SERPL-MCNC: 9.6 MG/DL — SIGNIFICANT CHANGE UP (ref 8.4–10.5)
CHLORIDE SERPL-SCNC: 103 MMOL/L — SIGNIFICANT CHANGE UP (ref 96–108)
CO2 SERPL-SCNC: 26 MMOL/L — SIGNIFICANT CHANGE UP (ref 22–31)
CREAT SERPL-MCNC: 0.78 MG/DL — SIGNIFICANT CHANGE UP (ref 0.5–1.3)
EGFR: 128 ML/MIN/1.73M2 — SIGNIFICANT CHANGE UP
EOSINOPHIL # BLD AUTO: 1.64 K/UL — HIGH (ref 0–0.5)
EOSINOPHIL NFR BLD AUTO: 11.6 % — HIGH (ref 0–6)
GLUCOSE SERPL-MCNC: 117 MG/DL — HIGH (ref 70–99)
HCT VFR BLD CALC: 41.2 % — SIGNIFICANT CHANGE UP (ref 39–50)
HGB BLD-MCNC: 14 G/DL — SIGNIFICANT CHANGE UP (ref 13–17)
IMM GRANULOCYTES NFR BLD AUTO: 0.2 % — SIGNIFICANT CHANGE UP (ref 0–0.9)
LYMPHOCYTES # BLD AUTO: 1.64 K/UL — SIGNIFICANT CHANGE UP (ref 1–3.3)
LYMPHOCYTES # BLD AUTO: 11.6 % — LOW (ref 13–44)
MCHC RBC-ENTMCNC: 28.9 PG — SIGNIFICANT CHANGE UP (ref 27–34)
MCHC RBC-ENTMCNC: 34 G/DL — SIGNIFICANT CHANGE UP (ref 32–36)
MCV RBC AUTO: 84.9 FL — SIGNIFICANT CHANGE UP (ref 80–100)
MONOCYTES # BLD AUTO: 0.71 K/UL — SIGNIFICANT CHANGE UP (ref 0–0.9)
MONOCYTES NFR BLD AUTO: 5 % — SIGNIFICANT CHANGE UP (ref 2–14)
NEUTROPHILS # BLD AUTO: 10.07 K/UL — HIGH (ref 1.8–7.4)
NEUTROPHILS NFR BLD AUTO: 71 % — SIGNIFICANT CHANGE UP (ref 43–77)
NRBC # BLD: 0 /100 WBCS — SIGNIFICANT CHANGE UP (ref 0–0)
PLATELET # BLD AUTO: 248 K/UL — SIGNIFICANT CHANGE UP (ref 150–400)
POTASSIUM SERPL-MCNC: 4.3 MMOL/L — SIGNIFICANT CHANGE UP (ref 3.5–5.3)
POTASSIUM SERPL-SCNC: 4.3 MMOL/L — SIGNIFICANT CHANGE UP (ref 3.5–5.3)
PROT SERPL-MCNC: 7.3 G/DL — SIGNIFICANT CHANGE UP (ref 6–8.3)
RBC # BLD: 4.85 M/UL — SIGNIFICANT CHANGE UP (ref 4.2–5.8)
RBC # FLD: 14.5 % — SIGNIFICANT CHANGE UP (ref 10.3–14.5)
SODIUM SERPL-SCNC: 140 MMOL/L — SIGNIFICANT CHANGE UP (ref 135–145)
WBC # BLD: 14.17 K/UL — HIGH (ref 3.8–10.5)
WBC # FLD AUTO: 14.17 K/UL — HIGH (ref 3.8–10.5)

## 2024-11-27 DIAGNOSIS — Z51.11 ENCOUNTER FOR ANTINEOPLASTIC CHEMOTHERAPY: ICD-10-CM

## 2024-12-10 ENCOUNTER — APPOINTMENT (OUTPATIENT)
Dept: INFUSION THERAPY | Facility: HOSPITAL | Age: 24
End: 2024-12-10

## 2024-12-10 ENCOUNTER — RESULT REVIEW (OUTPATIENT)
Age: 24
End: 2024-12-10

## 2024-12-10 LAB
ALBUMIN SERPL ELPH-MCNC: 4.5 G/DL — SIGNIFICANT CHANGE UP (ref 3.3–5)
ALP SERPL-CCNC: 68 U/L — SIGNIFICANT CHANGE UP (ref 40–120)
ALT FLD-CCNC: 14 U/L — SIGNIFICANT CHANGE UP (ref 10–45)
ANION GAP SERPL CALC-SCNC: 12 MMOL/L — SIGNIFICANT CHANGE UP (ref 5–17)
AST SERPL-CCNC: 25 U/L — SIGNIFICANT CHANGE UP (ref 10–40)
BASOPHILS # BLD AUTO: 0.07 K/UL — SIGNIFICANT CHANGE UP (ref 0–0.2)
BASOPHILS NFR BLD AUTO: 0.6 % — SIGNIFICANT CHANGE UP (ref 0–2)
BILIRUB SERPL-MCNC: 0.5 MG/DL — SIGNIFICANT CHANGE UP (ref 0.2–1.2)
BUN SERPL-MCNC: 17 MG/DL — SIGNIFICANT CHANGE UP (ref 7–23)
CALCIUM SERPL-MCNC: 10.3 MG/DL — SIGNIFICANT CHANGE UP (ref 8.4–10.5)
CHLORIDE SERPL-SCNC: 102 MMOL/L — SIGNIFICANT CHANGE UP (ref 96–108)
CO2 SERPL-SCNC: 26 MMOL/L — SIGNIFICANT CHANGE UP (ref 22–31)
CREAT SERPL-MCNC: 0.88 MG/DL — SIGNIFICANT CHANGE UP (ref 0.5–1.3)
EGFR: 123 ML/MIN/1.73M2 — SIGNIFICANT CHANGE UP
EOSINOPHIL # BLD AUTO: 0.64 K/UL — HIGH (ref 0–0.5)
EOSINOPHIL NFR BLD AUTO: 5.9 % — SIGNIFICANT CHANGE UP (ref 0–6)
GLUCOSE SERPL-MCNC: 92 MG/DL — SIGNIFICANT CHANGE UP (ref 70–99)
HCT VFR BLD CALC: 43.8 % — SIGNIFICANT CHANGE UP (ref 39–50)
HGB BLD-MCNC: 14.3 G/DL — SIGNIFICANT CHANGE UP (ref 13–17)
IMM GRANULOCYTES NFR BLD AUTO: 0.3 % — SIGNIFICANT CHANGE UP (ref 0–0.9)
LYMPHOCYTES # BLD AUTO: 1.85 K/UL — SIGNIFICANT CHANGE UP (ref 1–3.3)
LYMPHOCYTES # BLD AUTO: 17.1 % — SIGNIFICANT CHANGE UP (ref 13–44)
MCHC RBC-ENTMCNC: 27.9 PG — SIGNIFICANT CHANGE UP (ref 27–34)
MCHC RBC-ENTMCNC: 32.6 G/DL — SIGNIFICANT CHANGE UP (ref 32–36)
MCV RBC AUTO: 85.5 FL — SIGNIFICANT CHANGE UP (ref 80–100)
MONOCYTES # BLD AUTO: 0.55 K/UL — SIGNIFICANT CHANGE UP (ref 0–0.9)
MONOCYTES NFR BLD AUTO: 5.1 % — SIGNIFICANT CHANGE UP (ref 2–14)
NEUTROPHILS # BLD AUTO: 7.69 K/UL — HIGH (ref 1.8–7.4)
NEUTROPHILS NFR BLD AUTO: 71 % — SIGNIFICANT CHANGE UP (ref 43–77)
NRBC # BLD: 0 /100 WBCS — SIGNIFICANT CHANGE UP (ref 0–0)
PLATELET # BLD AUTO: 279 K/UL — SIGNIFICANT CHANGE UP (ref 150–400)
POTASSIUM SERPL-MCNC: 4.2 MMOL/L — SIGNIFICANT CHANGE UP (ref 3.5–5.3)
POTASSIUM SERPL-SCNC: 4.2 MMOL/L — SIGNIFICANT CHANGE UP (ref 3.5–5.3)
PROT SERPL-MCNC: 7.8 G/DL — SIGNIFICANT CHANGE UP (ref 6–8.3)
RBC # BLD: 5.12 M/UL — SIGNIFICANT CHANGE UP (ref 4.2–5.8)
RBC # FLD: 14.5 % — SIGNIFICANT CHANGE UP (ref 10.3–14.5)
SODIUM SERPL-SCNC: 140 MMOL/L — SIGNIFICANT CHANGE UP (ref 135–145)
WBC # BLD: 10.83 K/UL — HIGH (ref 3.8–10.5)
WBC # FLD AUTO: 10.83 K/UL — HIGH (ref 3.8–10.5)

## 2024-12-24 ENCOUNTER — RESULT REVIEW (OUTPATIENT)
Age: 24
End: 2024-12-24

## 2024-12-24 ENCOUNTER — APPOINTMENT (OUTPATIENT)
Dept: INFUSION THERAPY | Facility: HOSPITAL | Age: 24
End: 2024-12-24

## 2024-12-24 LAB
ALBUMIN SERPL ELPH-MCNC: 4.6 G/DL — SIGNIFICANT CHANGE UP (ref 3.3–5)
ALP SERPL-CCNC: 72 U/L — SIGNIFICANT CHANGE UP (ref 40–120)
ALT FLD-CCNC: 32 U/L — SIGNIFICANT CHANGE UP (ref 10–45)
ANION GAP SERPL CALC-SCNC: 14 MMOL/L — SIGNIFICANT CHANGE UP (ref 5–17)
AST SERPL-CCNC: 24 U/L — SIGNIFICANT CHANGE UP (ref 10–40)
BILIRUB SERPL-MCNC: 0.7 MG/DL — SIGNIFICANT CHANGE UP (ref 0.2–1.2)
BUN SERPL-MCNC: 15 MG/DL — SIGNIFICANT CHANGE UP (ref 7–23)
CALCIUM SERPL-MCNC: 10 MG/DL — SIGNIFICANT CHANGE UP (ref 8.4–10.5)
CHLORIDE SERPL-SCNC: 103 MMOL/L — SIGNIFICANT CHANGE UP (ref 96–108)
CO2 SERPL-SCNC: 24 MMOL/L — SIGNIFICANT CHANGE UP (ref 22–31)
CREAT SERPL-MCNC: 0.81 MG/DL — SIGNIFICANT CHANGE UP (ref 0.5–1.3)
EGFR: 126 ML/MIN/1.73M2 — SIGNIFICANT CHANGE UP
GLUCOSE SERPL-MCNC: 136 MG/DL — HIGH (ref 70–99)
POTASSIUM SERPL-MCNC: 4.2 MMOL/L — SIGNIFICANT CHANGE UP (ref 3.5–5.3)
POTASSIUM SERPL-SCNC: 4.2 MMOL/L — SIGNIFICANT CHANGE UP (ref 3.5–5.3)
PROT SERPL-MCNC: 7.5 G/DL — SIGNIFICANT CHANGE UP (ref 6–8.3)
SODIUM SERPL-SCNC: 140 MMOL/L — SIGNIFICANT CHANGE UP (ref 135–145)

## 2024-12-30 ENCOUNTER — OUTPATIENT (OUTPATIENT)
Dept: OUTPATIENT SERVICES | Facility: HOSPITAL | Age: 24
LOS: 1 days | Discharge: HOME | End: 2024-12-30

## 2024-12-30 DIAGNOSIS — C81.90 HODGKIN LYMPHOMA, UNSPECIFIED, UNSPECIFIED SITE: ICD-10-CM

## 2025-01-06 NOTE — PATIENT PROFILE ADULT - SURGICAL SITE INCISION
no
Expected Date Of Service: 06/26/2024
Performing Laboratory: [id number]
Billing Type: Third-Party Bill
Bill For Surgical Tray: no

## 2025-01-07 ENCOUNTER — RESULT REVIEW (OUTPATIENT)
Age: 25
End: 2025-01-07

## 2025-01-07 ENCOUNTER — APPOINTMENT (OUTPATIENT)
Dept: INFUSION THERAPY | Facility: HOSPITAL | Age: 25
End: 2025-01-07

## 2025-01-07 ENCOUNTER — APPOINTMENT (OUTPATIENT)
Dept: HEMATOLOGY ONCOLOGY | Facility: CLINIC | Age: 25
End: 2025-01-07
Payer: MEDICAID

## 2025-01-07 DIAGNOSIS — C81.90 HODGKIN LYMPHOMA, UNSPECIFIED, UNSPECIFIED SITE: ICD-10-CM

## 2025-01-07 LAB
ALBUMIN SERPL ELPH-MCNC: 4.3 G/DL — SIGNIFICANT CHANGE UP (ref 3.3–5)
ALP SERPL-CCNC: 66 U/L — SIGNIFICANT CHANGE UP (ref 40–120)
ALT FLD-CCNC: 18 U/L — SIGNIFICANT CHANGE UP (ref 10–45)
ANION GAP SERPL CALC-SCNC: 10 MMOL/L — SIGNIFICANT CHANGE UP (ref 5–17)
AST SERPL-CCNC: 19 U/L — SIGNIFICANT CHANGE UP (ref 10–40)
BILIRUB SERPL-MCNC: 0.3 MG/DL — SIGNIFICANT CHANGE UP (ref 0.2–1.2)
BUN SERPL-MCNC: 16 MG/DL — SIGNIFICANT CHANGE UP (ref 7–23)
CALCIUM SERPL-MCNC: 9.8 MG/DL — SIGNIFICANT CHANGE UP (ref 8.4–10.5)
CHLORIDE SERPL-SCNC: 105 MMOL/L — SIGNIFICANT CHANGE UP (ref 96–108)
CO2 SERPL-SCNC: 27 MMOL/L — SIGNIFICANT CHANGE UP (ref 22–31)
CREAT SERPL-MCNC: 0.86 MG/DL — SIGNIFICANT CHANGE UP (ref 0.5–1.3)
EGFR: 124 ML/MIN/1.73M2 — SIGNIFICANT CHANGE UP
GLUCOSE SERPL-MCNC: 81 MG/DL — SIGNIFICANT CHANGE UP (ref 70–99)
POTASSIUM SERPL-MCNC: 4.7 MMOL/L — SIGNIFICANT CHANGE UP (ref 3.5–5.3)
POTASSIUM SERPL-SCNC: 4.7 MMOL/L — SIGNIFICANT CHANGE UP (ref 3.5–5.3)
PROT SERPL-MCNC: 7.5 G/DL — SIGNIFICANT CHANGE UP (ref 6–8.3)
SODIUM SERPL-SCNC: 142 MMOL/L — SIGNIFICANT CHANGE UP (ref 135–145)

## 2025-01-07 PROCEDURE — 99213 OFFICE O/P EST LOW 20 MIN: CPT

## 2025-01-07 PROCEDURE — G2211 COMPLEX E/M VISIT ADD ON: CPT | Mod: NC

## 2025-01-08 DIAGNOSIS — Z51.11 ENCOUNTER FOR ANTINEOPLASTIC CHEMOTHERAPY: ICD-10-CM

## 2025-01-15 ENCOUNTER — APPOINTMENT (OUTPATIENT)
Dept: HEMATOLOGY ONCOLOGY | Facility: CLINIC | Age: 25
End: 2025-01-15

## 2025-01-22 NOTE — H&P ADULT - PROBLEM/PLAN-2
Devin Gomez is a 66 y.o. male on day 1 of admission presenting with Dehydration.      Subjective   He has continued to be nauseous throughout the night  No diarrhea  No abdominal pain  No chest pain no shortness of breath  No headache  No focal weakness  States that he can void    Discussed with cardiologist Dr. Vidales who feels patient does not have a non-STEMI and does not need heparin drip       Objective     Last Recorded Vitals  /55 (BP Location: Left arm, Patient Position: Lying)   Pulse 85   Temp 36.9 °C (98.5 °F) (Temporal)   Resp 20   Wt 59.8 kg (131 lb 12.8 oz)   SpO2 100%   Intake/Output last 3 Shifts:    Intake/Output Summary (Last 24 hours) at 1/22/2025 1142  Last data filed at 1/22/2025 0846  Gross per 24 hour   Intake 1510 ml   Output 1100 ml   Net 410 ml       Physical Exam  Patient was seen in room 427  Alert oriented x 3 cooperative still obviously nauseous  Normocephalic/atraumatic EOMI PERRLA  Oral mucosa dry  Neck supple  Chest clear  Heart regular  Abdomen soft nontender positive bowel sounds very benign exam  Extremities no edema  Neurologic examination grossly nonfocal  Relevant Results  Reviewed bicarb 14 anion gap elevated  Assessment/Plan     Assessment & Plan  Dehydration  Pancolitis  Complaining of stomach cramping  Abdomen pelvis showed pancolitis  Elevated lactate  IV fluids half-normal saline at 150 an hour  Clear liquid diet  Stool panel  Zosyn  Consult gastroenterology  Type 2 diabetes mellitus with hyperglycemia  Blood glucose 283 and will give 5 units IV insulin  Check ABG  Check blood glucose every 2 hours  Check BMP every 4 hours  High blood pressure without diagnosis of hypertension  Hydralazine for systolic blood pressure greater than 160  Diffuse abdominal aorta plaque  Consider vascular consult outpatient  Urinary bladder distention  Bladder scan for over 1100  Patient was straight cath  Will do bladder scans every 6 hours and straight cath for residual greater  than 350 mL  DVT prophylaxis  Lovenox  NSTEMI (non-ST elevated myocardial infarction) (Multi)    Diabetic ketoacidosis without coma associated with type 2 diabetes mellitus (Multi)    Pancolitis (Multi)    Vomiting    Bladder retention      January 22:    At this point he has not improved he continues to be nauseous with increasing anion gap.  I feel he has DKA and needs intravenous insulin drip.  Will initiate this transfer to the ICU will continue to follow electrolytes closely.  Awaiting GI input until then continue empiric antibiotics.  Discussed with cardiology who feel troponin elevation likely not related to NSTEMI and patient does not warrant intravenous heparin.  Will repeat bladder scan.  No evidence of UTI per UA.  Discussed with nurse           Ismael Knowles MD         DISPLAY PLAN FREE TEXT

## 2025-01-28 ENCOUNTER — APPOINTMENT (OUTPATIENT)
Dept: INFUSION THERAPY | Facility: HOSPITAL | Age: 25
End: 2025-01-28

## 2025-01-28 ENCOUNTER — RESULT REVIEW (OUTPATIENT)
Age: 25
End: 2025-01-28

## 2025-01-28 LAB
ALBUMIN SERPL ELPH-MCNC: 4.4 G/DL — SIGNIFICANT CHANGE UP (ref 3.3–5)
ALP SERPL-CCNC: 63 U/L — SIGNIFICANT CHANGE UP (ref 40–120)
ALT FLD-CCNC: 20 U/L — SIGNIFICANT CHANGE UP (ref 10–45)
ANION GAP SERPL CALC-SCNC: 12 MMOL/L — SIGNIFICANT CHANGE UP (ref 5–17)
AST SERPL-CCNC: 23 U/L — SIGNIFICANT CHANGE UP (ref 10–40)
BILIRUB SERPL-MCNC: 0.3 MG/DL — SIGNIFICANT CHANGE UP (ref 0.2–1.2)
BUN SERPL-MCNC: 19 MG/DL — SIGNIFICANT CHANGE UP (ref 7–23)
CALCIUM SERPL-MCNC: 9.7 MG/DL — SIGNIFICANT CHANGE UP (ref 8.4–10.5)
CHLORIDE SERPL-SCNC: 103 MMOL/L — SIGNIFICANT CHANGE UP (ref 96–108)
CO2 SERPL-SCNC: 26 MMOL/L — SIGNIFICANT CHANGE UP (ref 22–31)
CREAT SERPL-MCNC: 1.08 MG/DL — SIGNIFICANT CHANGE UP (ref 0.5–1.3)
EGFR: 98 ML/MIN/1.73M2 — SIGNIFICANT CHANGE UP
GLUCOSE SERPL-MCNC: 105 MG/DL — HIGH (ref 70–99)
POTASSIUM SERPL-MCNC: 3.9 MMOL/L — SIGNIFICANT CHANGE UP (ref 3.5–5.3)
POTASSIUM SERPL-SCNC: 3.9 MMOL/L — SIGNIFICANT CHANGE UP (ref 3.5–5.3)
PROT SERPL-MCNC: 7.4 G/DL — SIGNIFICANT CHANGE UP (ref 6–8.3)
SODIUM SERPL-SCNC: 141 MMOL/L — SIGNIFICANT CHANGE UP (ref 135–145)

## 2025-02-05 DIAGNOSIS — C81.70 OTHER HODGKIN LYMPHOMA, UNSPECIFIED SITE: ICD-10-CM

## 2025-02-05 NOTE — PATIENT PROFILE PEDIATRIC. - AS SC BRADEN MOISTURE
S:  Latanya Martini presents to the clinic on today's date complaining of thick, painful toenails which are painful.  The patient reports painful nails that they can no longer self debride.  The patient last saw Dr. Osmani Booth on 9/2/24.     O:  Dermatological:  Nails 1, 2, and 5 of the left foot and 1, 3, and 4 of the right foot are elongated, thickened, discolored, dystrophic, and painful. There are incurvated nails present.  -The above delineated dystrophic nails cause marked limitation of ambulation:  No  -The above delineated dystrophic nails cause pain:  Yes  -The above delineated dystrophic nails cause secondary infection:  No  Skin is Warm, Moist, and shows Decreased suppleness bilateral.          Vascular:  Pulses are DP:  2/4 RT, 2/4 LT.                                           PT:  1/4 RT, 1/4 LT.                           Capillary fill time is one seconds times 10.        Neuro.:  Numbness/tingling:  None.  Cold feet sensation:  None        MSK:  Dorsal contracture of lesser digits bilat     Diabetic Foot Exam Documentation done on August 22, 2024.     Normal Bilateral Foot Exam:  Skin integrity is normal. Dorsalis pedis and posterior tibial pulses are present.  Pressure sensation using the [place] monofilament is present.     A:    1. Nail dystrophy    2. Pain of toes of both feet    3. Type 2 diabetes mellitus without complication, with long-term current use of insulin (CMS/[facility])       not improving     P:   The patient's feet were examined on today's date. The patient was instructed about good foot health.  I did debride nails 1, 2, and 5 of the left and 1, 3 and 4 of the right foot using sharp and mechanical debridement, reducing the nail tissue to a level that alleviated the patient's symptoms and is medically safe for her once verbal consent obtained.  Patient will follow up as necessary.   (4) rarely moist

## 2025-02-12 ENCOUNTER — RESULT REVIEW (OUTPATIENT)
Age: 25
End: 2025-02-12

## 2025-02-12 ENCOUNTER — APPOINTMENT (OUTPATIENT)
Dept: HEMATOLOGY ONCOLOGY | Facility: CLINIC | Age: 25
End: 2025-02-12
Payer: MEDICAID

## 2025-02-12 ENCOUNTER — APPOINTMENT (OUTPATIENT)
Dept: INFUSION THERAPY | Facility: HOSPITAL | Age: 25
End: 2025-02-12

## 2025-02-12 VITALS
TEMPERATURE: 98.2 F | DIASTOLIC BLOOD PRESSURE: 73 MMHG | SYSTOLIC BLOOD PRESSURE: 107 MMHG | RESPIRATION RATE: 16 BRPM | OXYGEN SATURATION: 99 % | HEART RATE: 73 BPM

## 2025-02-12 DIAGNOSIS — C81.90 HODGKIN LYMPHOMA, UNSPECIFIED, UNSPECIFIED SITE: ICD-10-CM

## 2025-02-12 LAB
ALBUMIN SERPL ELPH-MCNC: 4.4 G/DL — SIGNIFICANT CHANGE UP (ref 3.3–5)
ALP SERPL-CCNC: 63 U/L — SIGNIFICANT CHANGE UP (ref 40–120)
ALT FLD-CCNC: 13 U/L — SIGNIFICANT CHANGE UP (ref 10–45)
ANION GAP SERPL CALC-SCNC: 14 MMOL/L — SIGNIFICANT CHANGE UP (ref 5–17)
AST SERPL-CCNC: 20 U/L — SIGNIFICANT CHANGE UP (ref 10–40)
BASOPHILS # BLD AUTO: 0.05 K/UL — SIGNIFICANT CHANGE UP (ref 0–0.2)
BASOPHILS NFR BLD AUTO: 0.5 % — SIGNIFICANT CHANGE UP (ref 0–2)
BILIRUB SERPL-MCNC: 0.5 MG/DL — SIGNIFICANT CHANGE UP (ref 0.2–1.2)
BUN SERPL-MCNC: 21 MG/DL — SIGNIFICANT CHANGE UP (ref 7–23)
CALCIUM SERPL-MCNC: 9.6 MG/DL — SIGNIFICANT CHANGE UP (ref 8.4–10.5)
CHLORIDE SERPL-SCNC: 103 MMOL/L — SIGNIFICANT CHANGE UP (ref 96–108)
CO2 SERPL-SCNC: 24 MMOL/L — SIGNIFICANT CHANGE UP (ref 22–31)
CREAT SERPL-MCNC: 0.87 MG/DL — SIGNIFICANT CHANGE UP (ref 0.5–1.3)
EGFR: 124 ML/MIN/1.73M2 — SIGNIFICANT CHANGE UP
EOSINOPHIL # BLD AUTO: 0.57 K/UL — HIGH (ref 0–0.5)
EOSINOPHIL NFR BLD AUTO: 5.6 % — SIGNIFICANT CHANGE UP (ref 0–6)
GLUCOSE SERPL-MCNC: 139 MG/DL — HIGH (ref 70–99)
HCT VFR BLD CALC: 43.9 % — SIGNIFICANT CHANGE UP (ref 39–50)
HGB BLD-MCNC: 14.7 G/DL — SIGNIFICANT CHANGE UP (ref 13–17)
IMM GRANULOCYTES NFR BLD AUTO: 0.4 % — SIGNIFICANT CHANGE UP (ref 0–0.9)
LYMPHOCYTES # BLD AUTO: 1.36 K/UL — SIGNIFICANT CHANGE UP (ref 1–3.3)
LYMPHOCYTES # BLD AUTO: 13.3 % — SIGNIFICANT CHANGE UP (ref 13–44)
MCHC RBC-ENTMCNC: 28.4 PG — SIGNIFICANT CHANGE UP (ref 27–34)
MCHC RBC-ENTMCNC: 33.5 G/DL — SIGNIFICANT CHANGE UP (ref 32–36)
MCV RBC AUTO: 84.9 FL — SIGNIFICANT CHANGE UP (ref 80–100)
MONOCYTES # BLD AUTO: 0.45 K/UL — SIGNIFICANT CHANGE UP (ref 0–0.9)
MONOCYTES NFR BLD AUTO: 4.4 % — SIGNIFICANT CHANGE UP (ref 2–14)
NEUTROPHILS # BLD AUTO: 7.74 K/UL — HIGH (ref 1.8–7.4)
NEUTROPHILS NFR BLD AUTO: 75.8 % — SIGNIFICANT CHANGE UP (ref 43–77)
NRBC BLD AUTO-RTO: 0 /100 WBCS — SIGNIFICANT CHANGE UP (ref 0–0)
PLATELET # BLD AUTO: 246 K/UL — SIGNIFICANT CHANGE UP (ref 150–400)
POTASSIUM SERPL-MCNC: 3.8 MMOL/L — SIGNIFICANT CHANGE UP (ref 3.5–5.3)
POTASSIUM SERPL-SCNC: 3.8 MMOL/L — SIGNIFICANT CHANGE UP (ref 3.5–5.3)
PROT SERPL-MCNC: 7.6 G/DL — SIGNIFICANT CHANGE UP (ref 6–8.3)
RBC # BLD: 5.17 M/UL — SIGNIFICANT CHANGE UP (ref 4.2–5.8)
RBC # FLD: 15 % — HIGH (ref 10.3–14.5)
SODIUM SERPL-SCNC: 141 MMOL/L — SIGNIFICANT CHANGE UP (ref 135–145)
WBC # BLD: 10.21 K/UL — SIGNIFICANT CHANGE UP (ref 3.8–10.5)
WBC # FLD AUTO: 10.21 K/UL — SIGNIFICANT CHANGE UP (ref 3.8–10.5)

## 2025-02-12 PROCEDURE — G2211 COMPLEX E/M VISIT ADD ON: CPT | Mod: NC

## 2025-02-12 PROCEDURE — 99214 OFFICE O/P EST MOD 30 MIN: CPT

## 2025-02-13 LAB — TSH SERPL-MCNC: 4.36 UIU/ML — HIGH (ref 0.27–4.2)

## 2025-02-22 ENCOUNTER — APPOINTMENT (OUTPATIENT)
Dept: NUCLEAR MEDICINE | Facility: IMAGING CENTER | Age: 25
End: 2025-02-22
Payer: MEDICAID

## 2025-02-22 ENCOUNTER — OUTPATIENT (OUTPATIENT)
Dept: OUTPATIENT SERVICES | Facility: HOSPITAL | Age: 25
LOS: 1 days | End: 2025-02-22
Payer: MEDICAID

## 2025-02-22 DIAGNOSIS — C81.90 HODGKIN LYMPHOMA, UNSPECIFIED, UNSPECIFIED SITE: ICD-10-CM

## 2025-02-22 PROCEDURE — 78815 PET IMAGE W/CT SKULL-THIGH: CPT | Mod: 26,PS

## 2025-02-22 PROCEDURE — A9552: CPT

## 2025-02-22 PROCEDURE — 78815 PET IMAGE W/CT SKULL-THIGH: CPT

## 2025-02-26 ENCOUNTER — RESULT REVIEW (OUTPATIENT)
Age: 25
End: 2025-02-26

## 2025-02-26 ENCOUNTER — APPOINTMENT (OUTPATIENT)
Dept: HEMATOLOGY ONCOLOGY | Facility: CLINIC | Age: 25
End: 2025-02-26
Payer: MEDICAID

## 2025-02-26 ENCOUNTER — APPOINTMENT (OUTPATIENT)
Dept: INFUSION THERAPY | Facility: HOSPITAL | Age: 25
End: 2025-02-26

## 2025-02-26 DIAGNOSIS — C81.90 HODGKIN LYMPHOMA, UNSPECIFIED, UNSPECIFIED SITE: ICD-10-CM

## 2025-02-26 LAB
ALBUMIN SERPL ELPH-MCNC: 4.4 G/DL — SIGNIFICANT CHANGE UP (ref 3.3–5)
ALP SERPL-CCNC: 61 U/L — SIGNIFICANT CHANGE UP (ref 40–120)
ALT FLD-CCNC: 14 U/L — SIGNIFICANT CHANGE UP (ref 10–45)
ANION GAP SERPL CALC-SCNC: 12 MMOL/L — SIGNIFICANT CHANGE UP (ref 5–17)
AST SERPL-CCNC: 17 U/L — SIGNIFICANT CHANGE UP (ref 10–40)
BASOPHILS # BLD AUTO: 0.05 K/UL — SIGNIFICANT CHANGE UP (ref 0–0.2)
BASOPHILS NFR BLD AUTO: 0.6 % — SIGNIFICANT CHANGE UP (ref 0–2)
BILIRUB SERPL-MCNC: 0.3 MG/DL — SIGNIFICANT CHANGE UP (ref 0.2–1.2)
BUN SERPL-MCNC: 15 MG/DL — SIGNIFICANT CHANGE UP (ref 7–23)
CALCIUM SERPL-MCNC: 9.9 MG/DL — SIGNIFICANT CHANGE UP (ref 8.4–10.5)
CHLORIDE SERPL-SCNC: 104 MMOL/L — SIGNIFICANT CHANGE UP (ref 96–108)
CO2 SERPL-SCNC: 27 MMOL/L — SIGNIFICANT CHANGE UP (ref 22–31)
CREAT SERPL-MCNC: 0.81 MG/DL — SIGNIFICANT CHANGE UP (ref 0.5–1.3)
EGFR: 126 ML/MIN/1.73M2 — SIGNIFICANT CHANGE UP
EOSINOPHIL # BLD AUTO: 0.26 K/UL — SIGNIFICANT CHANGE UP (ref 0–0.5)
EOSINOPHIL NFR BLD AUTO: 2.9 % — SIGNIFICANT CHANGE UP (ref 0–6)
GLUCOSE SERPL-MCNC: 90 MG/DL — SIGNIFICANT CHANGE UP (ref 70–99)
HCT VFR BLD CALC: 42.8 % — SIGNIFICANT CHANGE UP (ref 39–50)
HGB BLD-MCNC: 14.2 G/DL — SIGNIFICANT CHANGE UP (ref 13–17)
IMM GRANULOCYTES NFR BLD AUTO: 0.3 % — SIGNIFICANT CHANGE UP (ref 0–0.9)
LYMPHOCYTES # BLD AUTO: 1.01 K/UL — SIGNIFICANT CHANGE UP (ref 1–3.3)
LYMPHOCYTES # BLD AUTO: 11.4 % — LOW (ref 13–44)
MCHC RBC-ENTMCNC: 28.1 PG — SIGNIFICANT CHANGE UP (ref 27–34)
MCHC RBC-ENTMCNC: 33.2 G/DL — SIGNIFICANT CHANGE UP (ref 32–36)
MCV RBC AUTO: 84.6 FL — SIGNIFICANT CHANGE UP (ref 80–100)
MONOCYTES # BLD AUTO: 0.42 K/UL — SIGNIFICANT CHANGE UP (ref 0–0.9)
MONOCYTES NFR BLD AUTO: 4.7 % — SIGNIFICANT CHANGE UP (ref 2–14)
NEUTROPHILS # BLD AUTO: 7.1 K/UL — SIGNIFICANT CHANGE UP (ref 1.8–7.4)
NEUTROPHILS NFR BLD AUTO: 80.1 % — HIGH (ref 43–77)
NRBC BLD AUTO-RTO: 0 /100 WBCS — SIGNIFICANT CHANGE UP (ref 0–0)
PLATELET # BLD AUTO: 234 K/UL — SIGNIFICANT CHANGE UP (ref 150–400)
POTASSIUM SERPL-MCNC: 3.8 MMOL/L — SIGNIFICANT CHANGE UP (ref 3.5–5.3)
POTASSIUM SERPL-SCNC: 3.8 MMOL/L — SIGNIFICANT CHANGE UP (ref 3.5–5.3)
PROT SERPL-MCNC: 7.2 G/DL — SIGNIFICANT CHANGE UP (ref 6–8.3)
RBC # BLD: 5.06 M/UL — SIGNIFICANT CHANGE UP (ref 4.2–5.8)
RBC # FLD: 14.9 % — HIGH (ref 10.3–14.5)
SODIUM SERPL-SCNC: 142 MMOL/L — SIGNIFICANT CHANGE UP (ref 135–145)
WBC # BLD: 8.87 K/UL — SIGNIFICANT CHANGE UP (ref 3.8–10.5)
WBC # FLD AUTO: 8.87 K/UL — SIGNIFICANT CHANGE UP (ref 3.8–10.5)

## 2025-02-26 PROCEDURE — G2211 COMPLEX E/M VISIT ADD ON: CPT | Mod: NC

## 2025-02-26 PROCEDURE — 99214 OFFICE O/P EST MOD 30 MIN: CPT

## 2025-03-04 NOTE — ED ADULT TRIAGE NOTE - WEIGHT IN KG
The patient is Stable - Low risk of patient condition declining or worsening    Shift Goals  Clinical Goals: monitor VS, IV abx, safety  Patient Goals: rest  Family Goals: n/a    Progress made toward(s) clinical / shift goals:    Problem: Knowledge Deficit - Standard  Goal: Patient and family/care givers will demonstrate understanding of plan of care, disease process/condition, diagnostic tests and medications  Outcome: Progressing     Problem: Pain - Standard  Goal: Alleviation of pain or a reduction in pain to the patient’s comfort goal  Outcome: Progressing     Problem: Neutropenia Precautions  Goal: Neutropenic precautions will be implemented and maintained for patient protection  Outcome: Progressing       Patient is not progressing towards the following goals:       40.8

## 2025-03-10 ENCOUNTER — OUTPATIENT (OUTPATIENT)
Dept: OUTPATIENT SERVICES | Facility: HOSPITAL | Age: 25
LOS: 1 days | Discharge: HOME | End: 2025-03-10

## 2025-03-10 DIAGNOSIS — C81.90 HODGKIN LYMPHOMA, UNSPECIFIED, UNSPECIFIED SITE: ICD-10-CM

## 2025-03-12 ENCOUNTER — APPOINTMENT (OUTPATIENT)
Dept: INFUSION THERAPY | Facility: HOSPITAL | Age: 25
End: 2025-03-12

## 2025-03-26 ENCOUNTER — APPOINTMENT (OUTPATIENT)
Dept: HEMATOLOGY ONCOLOGY | Facility: CLINIC | Age: 25
End: 2025-03-26
Payer: MEDICAID

## 2025-03-26 ENCOUNTER — RESULT REVIEW (OUTPATIENT)
Age: 25
End: 2025-03-26

## 2025-03-26 ENCOUNTER — APPOINTMENT (OUTPATIENT)
Dept: INFUSION THERAPY | Facility: HOSPITAL | Age: 25
End: 2025-03-26

## 2025-03-26 VITALS
DIASTOLIC BLOOD PRESSURE: 68 MMHG | SYSTOLIC BLOOD PRESSURE: 115 MMHG | HEART RATE: 84 BPM | WEIGHT: 106.92 LBS | TEMPERATURE: 97.7 F | OXYGEN SATURATION: 99 % | RESPIRATION RATE: 16 BRPM

## 2025-03-26 DIAGNOSIS — C81.70 OTHER HODGKIN LYMPHOMA, UNSPECIFIED SITE: ICD-10-CM

## 2025-03-26 LAB
ALBUMIN SERPL ELPH-MCNC: 4.4 G/DL — SIGNIFICANT CHANGE UP (ref 3.3–5)
ALP SERPL-CCNC: 62 U/L — SIGNIFICANT CHANGE UP (ref 40–120)
ALT FLD-CCNC: 21 U/L — SIGNIFICANT CHANGE UP (ref 10–45)
ANION GAP SERPL CALC-SCNC: 9 MMOL/L — SIGNIFICANT CHANGE UP (ref 5–17)
AST SERPL-CCNC: 25 U/L — SIGNIFICANT CHANGE UP (ref 10–40)
BASOPHILS # BLD AUTO: 0.03 K/UL — SIGNIFICANT CHANGE UP (ref 0–0.2)
BASOPHILS NFR BLD AUTO: 0.3 % — SIGNIFICANT CHANGE UP (ref 0–2)
BILIRUB SERPL-MCNC: 0.3 MG/DL — SIGNIFICANT CHANGE UP (ref 0.2–1.2)
BUN SERPL-MCNC: 18 MG/DL — SIGNIFICANT CHANGE UP (ref 7–23)
CALCIUM SERPL-MCNC: 9.7 MG/DL — SIGNIFICANT CHANGE UP (ref 8.4–10.5)
CHLORIDE SERPL-SCNC: 104 MMOL/L — SIGNIFICANT CHANGE UP (ref 96–108)
CO2 SERPL-SCNC: 30 MMOL/L — SIGNIFICANT CHANGE UP (ref 22–31)
CREAT SERPL-MCNC: 0.77 MG/DL — SIGNIFICANT CHANGE UP (ref 0.5–1.3)
EGFR: 128 ML/MIN/1.73M2 — SIGNIFICANT CHANGE UP
EGFR: 128 ML/MIN/1.73M2 — SIGNIFICANT CHANGE UP
EOSINOPHIL # BLD AUTO: 0.92 K/UL — HIGH (ref 0–0.5)
EOSINOPHIL NFR BLD AUTO: 9.8 % — HIGH (ref 0–6)
GLUCOSE SERPL-MCNC: 80 MG/DL — SIGNIFICANT CHANGE UP (ref 70–99)
HCT VFR BLD CALC: 44.8 % — SIGNIFICANT CHANGE UP (ref 39–50)
HGB BLD-MCNC: 14.8 G/DL — SIGNIFICANT CHANGE UP (ref 13–17)
IMM GRANULOCYTES NFR BLD AUTO: 0.3 % — SIGNIFICANT CHANGE UP (ref 0–0.9)
LYMPHOCYTES # BLD AUTO: 1.44 K/UL — SIGNIFICANT CHANGE UP (ref 1–3.3)
LYMPHOCYTES # BLD AUTO: 15.3 % — SIGNIFICANT CHANGE UP (ref 13–44)
MCHC RBC-ENTMCNC: 28.1 PG — SIGNIFICANT CHANGE UP (ref 27–34)
MCHC RBC-ENTMCNC: 33 G/DL — SIGNIFICANT CHANGE UP (ref 32–36)
MCV RBC AUTO: 85.2 FL — SIGNIFICANT CHANGE UP (ref 80–100)
MONOCYTES # BLD AUTO: 0.49 K/UL — SIGNIFICANT CHANGE UP (ref 0–0.9)
MONOCYTES NFR BLD AUTO: 5.2 % — SIGNIFICANT CHANGE UP (ref 2–14)
NEUTROPHILS # BLD AUTO: 6.52 K/UL — SIGNIFICANT CHANGE UP (ref 1.8–7.4)
NEUTROPHILS NFR BLD AUTO: 69.1 % — SIGNIFICANT CHANGE UP (ref 43–77)
NRBC BLD AUTO-RTO: 0 /100 WBCS — SIGNIFICANT CHANGE UP (ref 0–0)
PLATELET # BLD AUTO: 241 K/UL — SIGNIFICANT CHANGE UP (ref 150–400)
POTASSIUM SERPL-MCNC: 4.3 MMOL/L — SIGNIFICANT CHANGE UP (ref 3.5–5.3)
POTASSIUM SERPL-SCNC: 4.3 MMOL/L — SIGNIFICANT CHANGE UP (ref 3.5–5.3)
PROT SERPL-MCNC: 7.2 G/DL — SIGNIFICANT CHANGE UP (ref 6–8.3)
RBC # BLD: 5.26 M/UL — SIGNIFICANT CHANGE UP (ref 4.2–5.8)
RBC # FLD: 14.4 % — SIGNIFICANT CHANGE UP (ref 10.3–14.5)
SODIUM SERPL-SCNC: 143 MMOL/L — SIGNIFICANT CHANGE UP (ref 135–145)
WBC # BLD: 9.43 K/UL — SIGNIFICANT CHANGE UP (ref 3.8–10.5)
WBC # FLD AUTO: 9.43 K/UL — SIGNIFICANT CHANGE UP (ref 3.8–10.5)

## 2025-03-26 PROCEDURE — G2211 COMPLEX E/M VISIT ADD ON: CPT | Mod: NC

## 2025-03-26 PROCEDURE — 99214 OFFICE O/P EST MOD 30 MIN: CPT

## 2025-03-27 DIAGNOSIS — Z51.11 ENCOUNTER FOR ANTINEOPLASTIC CHEMOTHERAPY: ICD-10-CM

## 2025-03-28 LAB
ALBUMIN SERPL ELPH-MCNC: 4.5 G/DL
ALP BLD-CCNC: 67 U/L
ALT SERPL-CCNC: 19 U/L
ANION GAP SERPL CALC-SCNC: 9 MMOL/L
AST SERPL-CCNC: 17 U/L
BILIRUB SERPL-MCNC: 0.3 MG/DL
BUN SERPL-MCNC: 20 MG/DL
CALCIUM SERPL-MCNC: 9.7 MG/DL
CHLORIDE SERPL-SCNC: 106 MMOL/L
CO2 SERPL-SCNC: 29 MMOL/L
CREAT SERPL-MCNC: 0.87 MG/DL
EGFRCR SERPLBLD CKD-EPI 2021: 124 ML/MIN/1.73M2
GLUCOSE SERPL-MCNC: 88 MG/DL
POTASSIUM SERPL-SCNC: 4.9 MMOL/L
PROT SERPL-MCNC: 7 G/DL
SODIUM SERPL-SCNC: 144 MMOL/L

## 2025-04-09 ENCOUNTER — APPOINTMENT (OUTPATIENT)
Dept: INFUSION THERAPY | Facility: HOSPITAL | Age: 25
End: 2025-04-09

## 2025-04-09 ENCOUNTER — RESULT REVIEW (OUTPATIENT)
Age: 25
End: 2025-04-09

## 2025-04-09 LAB
ALBUMIN SERPL ELPH-MCNC: 4.4 G/DL — SIGNIFICANT CHANGE UP (ref 3.3–5)
ALP SERPL-CCNC: 73 U/L — SIGNIFICANT CHANGE UP (ref 40–120)
ALT FLD-CCNC: 21 U/L — SIGNIFICANT CHANGE UP (ref 10–45)
ANION GAP SERPL CALC-SCNC: 11 MMOL/L — SIGNIFICANT CHANGE UP (ref 5–17)
AST SERPL-CCNC: 28 U/L — SIGNIFICANT CHANGE UP (ref 10–40)
BASOPHILS # BLD AUTO: 0.07 K/UL — SIGNIFICANT CHANGE UP (ref 0–0.2)
BASOPHILS NFR BLD AUTO: 0.7 % — SIGNIFICANT CHANGE UP (ref 0–2)
BILIRUB SERPL-MCNC: 0.4 MG/DL — SIGNIFICANT CHANGE UP (ref 0.2–1.2)
BUN SERPL-MCNC: 19 MG/DL — SIGNIFICANT CHANGE UP (ref 7–23)
CALCIUM SERPL-MCNC: 9.8 MG/DL — SIGNIFICANT CHANGE UP (ref 8.4–10.5)
CHLORIDE SERPL-SCNC: 102 MMOL/L — SIGNIFICANT CHANGE UP (ref 96–108)
CO2 SERPL-SCNC: 28 MMOL/L — SIGNIFICANT CHANGE UP (ref 22–31)
CREAT SERPL-MCNC: 0.9 MG/DL — SIGNIFICANT CHANGE UP (ref 0.5–1.3)
EGFR: 122 ML/MIN/1.73M2 — SIGNIFICANT CHANGE UP
EGFR: 122 ML/MIN/1.73M2 — SIGNIFICANT CHANGE UP
EOSINOPHIL # BLD AUTO: 0.9 K/UL — HIGH (ref 0–0.5)
EOSINOPHIL NFR BLD AUTO: 9.4 % — HIGH (ref 0–6)
GLUCOSE SERPL-MCNC: 68 MG/DL — LOW (ref 70–99)
HCT VFR BLD CALC: 44.2 % — SIGNIFICANT CHANGE UP (ref 39–50)
HGB BLD-MCNC: 14.9 G/DL — SIGNIFICANT CHANGE UP (ref 13–17)
IMM GRANULOCYTES NFR BLD AUTO: 0.3 % — SIGNIFICANT CHANGE UP (ref 0–0.9)
LYMPHOCYTES # BLD AUTO: 1.59 K/UL — SIGNIFICANT CHANGE UP (ref 1–3.3)
LYMPHOCYTES # BLD AUTO: 16.6 % — SIGNIFICANT CHANGE UP (ref 13–44)
MCHC RBC-ENTMCNC: 28.5 PG — SIGNIFICANT CHANGE UP (ref 27–34)
MCHC RBC-ENTMCNC: 33.7 G/DL — SIGNIFICANT CHANGE UP (ref 32–36)
MCV RBC AUTO: 84.5 FL — SIGNIFICANT CHANGE UP (ref 80–100)
MONOCYTES # BLD AUTO: 0.5 K/UL — SIGNIFICANT CHANGE UP (ref 0–0.9)
MONOCYTES NFR BLD AUTO: 5.2 % — SIGNIFICANT CHANGE UP (ref 2–14)
NEUTROPHILS # BLD AUTO: 6.47 K/UL — SIGNIFICANT CHANGE UP (ref 1.8–7.4)
NEUTROPHILS NFR BLD AUTO: 67.8 % — SIGNIFICANT CHANGE UP (ref 43–77)
NRBC BLD AUTO-RTO: 0 /100 WBCS — SIGNIFICANT CHANGE UP (ref 0–0)
PLATELET # BLD AUTO: 262 K/UL — SIGNIFICANT CHANGE UP (ref 150–400)
POTASSIUM SERPL-MCNC: 4.7 MMOL/L — SIGNIFICANT CHANGE UP (ref 3.5–5.3)
POTASSIUM SERPL-SCNC: 4.7 MMOL/L — SIGNIFICANT CHANGE UP (ref 3.5–5.3)
PROT SERPL-MCNC: 7.5 G/DL — SIGNIFICANT CHANGE UP (ref 6–8.3)
RBC # BLD: 5.23 M/UL — SIGNIFICANT CHANGE UP (ref 4.2–5.8)
RBC # FLD: 14.3 % — SIGNIFICANT CHANGE UP (ref 10.3–14.5)
SODIUM SERPL-SCNC: 140 MMOL/L — SIGNIFICANT CHANGE UP (ref 135–145)
WBC # BLD: 9.56 K/UL — SIGNIFICANT CHANGE UP (ref 3.8–10.5)
WBC # FLD AUTO: 9.56 K/UL — SIGNIFICANT CHANGE UP (ref 3.8–10.5)

## 2025-04-23 ENCOUNTER — RESULT REVIEW (OUTPATIENT)
Age: 25
End: 2025-04-23

## 2025-04-23 ENCOUNTER — APPOINTMENT (OUTPATIENT)
Dept: HEMATOLOGY ONCOLOGY | Facility: CLINIC | Age: 25
End: 2025-04-23
Payer: MEDICAID

## 2025-04-23 ENCOUNTER — APPOINTMENT (OUTPATIENT)
Dept: INFUSION THERAPY | Facility: HOSPITAL | Age: 25
End: 2025-04-23

## 2025-04-23 VITALS
RESPIRATION RATE: 16 BRPM | BODY MASS INDEX: 16.53 KG/M2 | DIASTOLIC BLOOD PRESSURE: 60 MMHG | SYSTOLIC BLOOD PRESSURE: 94 MMHG | HEIGHT: 65.79 IN | TEMPERATURE: 98.4 F | WEIGHT: 101.61 LBS | HEART RATE: 82 BPM | OXYGEN SATURATION: 98 %

## 2025-04-23 DIAGNOSIS — C81.70 OTHER HODGKIN LYMPHOMA, UNSPECIFIED SITE: ICD-10-CM

## 2025-04-23 LAB
ALBUMIN SERPL ELPH-MCNC: 4.5 G/DL — SIGNIFICANT CHANGE UP (ref 3.3–5)
ALP SERPL-CCNC: 63 U/L — SIGNIFICANT CHANGE UP (ref 40–120)
ALT FLD-CCNC: 15 U/L — SIGNIFICANT CHANGE UP (ref 10–45)
ANION GAP SERPL CALC-SCNC: 14 MMOL/L — SIGNIFICANT CHANGE UP (ref 5–17)
AST SERPL-CCNC: 22 U/L — SIGNIFICANT CHANGE UP (ref 10–40)
B2 MICROGLOB SERPL-MCNC: 1.9 MG/L — SIGNIFICANT CHANGE UP (ref 0.8–2.2)
BASOPHILS # BLD AUTO: 0.05 K/UL — SIGNIFICANT CHANGE UP (ref 0–0.2)
BASOPHILS NFR BLD AUTO: 0.5 % — SIGNIFICANT CHANGE UP (ref 0–2)
BILIRUB SERPL-MCNC: 0.5 MG/DL — SIGNIFICANT CHANGE UP (ref 0.2–1.2)
BUN SERPL-MCNC: 14 MG/DL — SIGNIFICANT CHANGE UP (ref 7–23)
CALCIUM SERPL-MCNC: 9.8 MG/DL — SIGNIFICANT CHANGE UP (ref 8.4–10.5)
CHLORIDE SERPL-SCNC: 102 MMOL/L — SIGNIFICANT CHANGE UP (ref 96–108)
CO2 SERPL-SCNC: 26 MMOL/L — SIGNIFICANT CHANGE UP (ref 22–31)
CREAT SERPL-MCNC: 0.82 MG/DL — SIGNIFICANT CHANGE UP (ref 0.5–1.3)
EGFR: 126 ML/MIN/1.73M2 — SIGNIFICANT CHANGE UP
EGFR: 126 ML/MIN/1.73M2 — SIGNIFICANT CHANGE UP
EOSINOPHIL # BLD AUTO: 0.35 K/UL — SIGNIFICANT CHANGE UP (ref 0–0.5)
EOSINOPHIL NFR BLD AUTO: 3.5 % — SIGNIFICANT CHANGE UP (ref 0–6)
GLUCOSE SERPL-MCNC: 119 MG/DL — HIGH (ref 70–99)
HCT VFR BLD CALC: 44.8 % — SIGNIFICANT CHANGE UP (ref 39–50)
HGB BLD-MCNC: 15 G/DL — SIGNIFICANT CHANGE UP (ref 13–17)
IMM GRANULOCYTES NFR BLD AUTO: 0.2 % — SIGNIFICANT CHANGE UP (ref 0–0.9)
LDH SERPL L TO P-CCNC: 225 U/L — SIGNIFICANT CHANGE UP (ref 50–242)
LYMPHOCYTES # BLD AUTO: 1.4 K/UL — SIGNIFICANT CHANGE UP (ref 1–3.3)
LYMPHOCYTES # BLD AUTO: 14 % — SIGNIFICANT CHANGE UP (ref 13–44)
MCHC RBC-ENTMCNC: 28.6 PG — SIGNIFICANT CHANGE UP (ref 27–34)
MCHC RBC-ENTMCNC: 33.5 G/DL — SIGNIFICANT CHANGE UP (ref 32–36)
MCV RBC AUTO: 85.5 FL — SIGNIFICANT CHANGE UP (ref 80–100)
MONOCYTES # BLD AUTO: 0.55 K/UL — SIGNIFICANT CHANGE UP (ref 0–0.9)
MONOCYTES NFR BLD AUTO: 5.5 % — SIGNIFICANT CHANGE UP (ref 2–14)
NEUTROPHILS # BLD AUTO: 7.65 K/UL — HIGH (ref 1.8–7.4)
NEUTROPHILS NFR BLD AUTO: 76.3 % — SIGNIFICANT CHANGE UP (ref 43–77)
NRBC BLD AUTO-RTO: 0 /100 WBCS — SIGNIFICANT CHANGE UP (ref 0–0)
PLATELET # BLD AUTO: 247 K/UL — SIGNIFICANT CHANGE UP (ref 150–400)
POTASSIUM SERPL-MCNC: 3.9 MMOL/L — SIGNIFICANT CHANGE UP (ref 3.5–5.3)
POTASSIUM SERPL-SCNC: 3.9 MMOL/L — SIGNIFICANT CHANGE UP (ref 3.5–5.3)
PROT SERPL-MCNC: 7.5 G/DL — SIGNIFICANT CHANGE UP (ref 6–8.3)
RBC # BLD: 5.24 M/UL — SIGNIFICANT CHANGE UP (ref 4.2–5.8)
RBC # FLD: 15.1 % — HIGH (ref 10.3–14.5)
SODIUM SERPL-SCNC: 143 MMOL/L — SIGNIFICANT CHANGE UP (ref 135–145)
WBC # BLD: 10.02 K/UL — SIGNIFICANT CHANGE UP (ref 3.8–10.5)
WBC # FLD AUTO: 10.02 K/UL — SIGNIFICANT CHANGE UP (ref 3.8–10.5)

## 2025-04-23 PROCEDURE — 99213 OFFICE O/P EST LOW 20 MIN: CPT

## 2025-04-24 ENCOUNTER — NON-APPOINTMENT (OUTPATIENT)
Age: 25
End: 2025-04-24

## 2025-04-30 NOTE — PROGRESS NOTE PEDS - PROBLEM SELECTOR PROBLEM 2
Caller: roberto Kraus     Doctor: Dr. Coronado     Reason for call: pt returning nurses call     Call back#: [phone]   Fever and neutropenia

## 2025-05-07 ENCOUNTER — RESULT REVIEW (OUTPATIENT)
Age: 25
End: 2025-05-07

## 2025-05-07 ENCOUNTER — APPOINTMENT (OUTPATIENT)
Dept: INFUSION THERAPY | Facility: HOSPITAL | Age: 25
End: 2025-05-07

## 2025-05-07 LAB
ALBUMIN SERPL ELPH-MCNC: 4.5 G/DL — SIGNIFICANT CHANGE UP (ref 3.3–5)
ALP SERPL-CCNC: 64 U/L — SIGNIFICANT CHANGE UP (ref 40–120)
ALT FLD-CCNC: 18 U/L — SIGNIFICANT CHANGE UP (ref 10–45)
ANION GAP SERPL CALC-SCNC: 10 MMOL/L — SIGNIFICANT CHANGE UP (ref 5–17)
AST SERPL-CCNC: 28 U/L — SIGNIFICANT CHANGE UP (ref 10–40)
BASOPHILS # BLD AUTO: 0.06 K/UL — SIGNIFICANT CHANGE UP (ref 0–0.2)
BASOPHILS NFR BLD AUTO: 0.6 % — SIGNIFICANT CHANGE UP (ref 0–2)
BILIRUB SERPL-MCNC: 0.4 MG/DL — SIGNIFICANT CHANGE UP (ref 0.2–1.2)
BUN SERPL-MCNC: 19 MG/DL — SIGNIFICANT CHANGE UP (ref 7–23)
CALCIUM SERPL-MCNC: 9.8 MG/DL — SIGNIFICANT CHANGE UP (ref 8.4–10.5)
CHLORIDE SERPL-SCNC: 104 MMOL/L — SIGNIFICANT CHANGE UP (ref 96–108)
CO2 SERPL-SCNC: 26 MMOL/L — SIGNIFICANT CHANGE UP (ref 22–31)
CREAT SERPL-MCNC: 0.81 MG/DL — SIGNIFICANT CHANGE UP (ref 0.5–1.3)
EGFR: 126 ML/MIN/1.73M2 — SIGNIFICANT CHANGE UP
EGFR: 126 ML/MIN/1.73M2 — SIGNIFICANT CHANGE UP
EOSINOPHIL # BLD AUTO: 0.86 K/UL — HIGH (ref 0–0.5)
EOSINOPHIL NFR BLD AUTO: 9.1 % — HIGH (ref 0–6)
GLUCOSE SERPL-MCNC: 76 MG/DL — SIGNIFICANT CHANGE UP (ref 70–99)
HCT VFR BLD CALC: 42.9 % — SIGNIFICANT CHANGE UP (ref 39–50)
HGB BLD-MCNC: 14.4 G/DL — SIGNIFICANT CHANGE UP (ref 13–17)
IMM GRANULOCYTES NFR BLD AUTO: 0.2 % — SIGNIFICANT CHANGE UP (ref 0–0.9)
LYMPHOCYTES # BLD AUTO: 1.29 K/UL — SIGNIFICANT CHANGE UP (ref 1–3.3)
LYMPHOCYTES # BLD AUTO: 13.6 % — SIGNIFICANT CHANGE UP (ref 13–44)
MCHC RBC-ENTMCNC: 28.8 PG — SIGNIFICANT CHANGE UP (ref 27–34)
MCHC RBC-ENTMCNC: 33.6 G/DL — SIGNIFICANT CHANGE UP (ref 32–36)
MCV RBC AUTO: 85.8 FL — SIGNIFICANT CHANGE UP (ref 80–100)
MONOCYTES # BLD AUTO: 0.55 K/UL — SIGNIFICANT CHANGE UP (ref 0–0.9)
MONOCYTES NFR BLD AUTO: 5.8 % — SIGNIFICANT CHANGE UP (ref 2–14)
NEUTROPHILS # BLD AUTO: 6.69 K/UL — SIGNIFICANT CHANGE UP (ref 1.8–7.4)
NEUTROPHILS NFR BLD AUTO: 70.7 % — SIGNIFICANT CHANGE UP (ref 43–77)
NRBC BLD AUTO-RTO: 0 /100 WBCS — SIGNIFICANT CHANGE UP (ref 0–0)
PLATELET # BLD AUTO: 248 K/UL — SIGNIFICANT CHANGE UP (ref 150–400)
POTASSIUM SERPL-MCNC: 4.5 MMOL/L — SIGNIFICANT CHANGE UP (ref 3.5–5.3)
POTASSIUM SERPL-SCNC: 4.5 MMOL/L — SIGNIFICANT CHANGE UP (ref 3.5–5.3)
PROT SERPL-MCNC: 7.4 G/DL — SIGNIFICANT CHANGE UP (ref 6–8.3)
RBC # BLD: 5 M/UL — SIGNIFICANT CHANGE UP (ref 4.2–5.8)
RBC # FLD: 14.9 % — HIGH (ref 10.3–14.5)
SODIUM SERPL-SCNC: 141 MMOL/L — SIGNIFICANT CHANGE UP (ref 135–145)
WBC # BLD: 9.47 K/UL — SIGNIFICANT CHANGE UP (ref 3.8–10.5)
WBC # FLD AUTO: 9.47 K/UL — SIGNIFICANT CHANGE UP (ref 3.8–10.5)

## 2025-05-15 ENCOUNTER — OUTPATIENT (OUTPATIENT)
Dept: OUTPATIENT SERVICES | Facility: HOSPITAL | Age: 25
LOS: 1 days | Discharge: HOME | End: 2025-05-15

## 2025-05-15 DIAGNOSIS — C81.90 HODGKIN LYMPHOMA, UNSPECIFIED, UNSPECIFIED SITE: ICD-10-CM

## 2025-05-21 ENCOUNTER — RESULT REVIEW (OUTPATIENT)
Age: 25
End: 2025-05-21

## 2025-05-21 ENCOUNTER — APPOINTMENT (OUTPATIENT)
Dept: INFUSION THERAPY | Facility: HOSPITAL | Age: 25
End: 2025-05-21

## 2025-05-21 ENCOUNTER — APPOINTMENT (OUTPATIENT)
Dept: HEMATOLOGY ONCOLOGY | Facility: CLINIC | Age: 25
End: 2025-05-21
Payer: MEDICAID

## 2025-05-21 DIAGNOSIS — C81.70 OTHER HODGKIN LYMPHOMA, UNSPECIFIED SITE: ICD-10-CM

## 2025-05-21 DIAGNOSIS — Z51.11 ENCOUNTER FOR ANTINEOPLASTIC CHEMOTHERAPY: ICD-10-CM

## 2025-05-21 LAB
ALBUMIN SERPL ELPH-MCNC: 4.3 G/DL — SIGNIFICANT CHANGE UP (ref 3.3–5)
ALBUMIN SERPL ELPH-MCNC: 4.4 G/DL
ALP BLD-CCNC: 68 U/L
ALP SERPL-CCNC: 68 U/L — SIGNIFICANT CHANGE UP (ref 40–120)
ALT FLD-CCNC: 17 U/L — SIGNIFICANT CHANGE UP (ref 10–45)
ALT SERPL-CCNC: 19 U/L
ANION GAP SERPL CALC-SCNC: 16 MMOL/L
ANION GAP SERPL CALC-SCNC: 16 MMOL/L — SIGNIFICANT CHANGE UP (ref 5–17)
AST SERPL-CCNC: 23 U/L
AST SERPL-CCNC: 25 U/L — SIGNIFICANT CHANGE UP (ref 10–40)
BASOPHILS # BLD AUTO: 0.07 K/UL — SIGNIFICANT CHANGE UP (ref 0–0.2)
BASOPHILS NFR BLD AUTO: 0.5 % — SIGNIFICANT CHANGE UP (ref 0–2)
BILIRUB SERPL-MCNC: 0.5 MG/DL — SIGNIFICANT CHANGE UP (ref 0.2–1.2)
BILIRUB SERPL-MCNC: 0.6 MG/DL
BUN SERPL-MCNC: 17 MG/DL
BUN SERPL-MCNC: 17 MG/DL — SIGNIFICANT CHANGE UP (ref 7–23)
CALCIUM SERPL-MCNC: 9.5 MG/DL — SIGNIFICANT CHANGE UP (ref 8.4–10.5)
CALCIUM SERPL-MCNC: 9.8 MG/DL
CHLORIDE SERPL-SCNC: 104 MMOL/L — SIGNIFICANT CHANGE UP (ref 96–108)
CHLORIDE SERPL-SCNC: 105 MMOL/L
CO2 SERPL-SCNC: 22 MMOL/L — SIGNIFICANT CHANGE UP (ref 22–31)
CO2 SERPL-SCNC: 24 MMOL/L
CREAT SERPL-MCNC: 0.83 MG/DL — SIGNIFICANT CHANGE UP (ref 0.5–1.3)
CREAT SERPL-MCNC: 0.88 MG/DL
EGFR: 125 ML/MIN/1.73M2 — SIGNIFICANT CHANGE UP
EGFR: 125 ML/MIN/1.73M2 — SIGNIFICANT CHANGE UP
EGFRCR SERPLBLD CKD-EPI 2021: 123 ML/MIN/1.73M2
EOSINOPHIL # BLD AUTO: 0.63 K/UL — HIGH (ref 0–0.5)
EOSINOPHIL NFR BLD AUTO: 4.5 % — SIGNIFICANT CHANGE UP (ref 0–6)
GLUCOSE SERPL-MCNC: 83 MG/DL — SIGNIFICANT CHANGE UP (ref 70–99)
GLUCOSE SERPL-MCNC: 88 MG/DL
HCT VFR BLD CALC: 43.9 % — SIGNIFICANT CHANGE UP (ref 39–50)
HGB BLD-MCNC: 14.5 G/DL — SIGNIFICANT CHANGE UP (ref 13–17)
IMM GRANULOCYTES NFR BLD AUTO: 0.4 % — SIGNIFICANT CHANGE UP (ref 0–0.9)
LYMPHOCYTES # BLD AUTO: 1.12 K/UL — SIGNIFICANT CHANGE UP (ref 1–3.3)
LYMPHOCYTES # BLD AUTO: 8 % — LOW (ref 13–44)
MCHC RBC-ENTMCNC: 28.4 PG — SIGNIFICANT CHANGE UP (ref 27–34)
MCHC RBC-ENTMCNC: 33 G/DL — SIGNIFICANT CHANGE UP (ref 32–36)
MCV RBC AUTO: 86.1 FL — SIGNIFICANT CHANGE UP (ref 80–100)
MONOCYTES # BLD AUTO: 0.83 K/UL — SIGNIFICANT CHANGE UP (ref 0–0.9)
MONOCYTES NFR BLD AUTO: 5.9 % — SIGNIFICANT CHANGE UP (ref 2–14)
NEUTROPHILS # BLD AUTO: 11.31 K/UL — HIGH (ref 1.8–7.4)
NEUTROPHILS NFR BLD AUTO: 80.7 % — HIGH (ref 43–77)
NRBC BLD AUTO-RTO: 0 /100 WBCS — SIGNIFICANT CHANGE UP (ref 0–0)
PLATELET # BLD AUTO: 244 K/UL — SIGNIFICANT CHANGE UP (ref 150–400)
POTASSIUM SERPL-MCNC: 4.3 MMOL/L — SIGNIFICANT CHANGE UP (ref 3.5–5.3)
POTASSIUM SERPL-SCNC: 4.3 MMOL/L — SIGNIFICANT CHANGE UP (ref 3.5–5.3)
POTASSIUM SERPL-SCNC: 5.4 MMOL/L
PROT SERPL-MCNC: 7.1 G/DL
PROT SERPL-MCNC: 7.4 G/DL — SIGNIFICANT CHANGE UP (ref 6–8.3)
RBC # BLD: 5.1 M/UL — SIGNIFICANT CHANGE UP (ref 4.2–5.8)
RBC # FLD: 14.6 % — HIGH (ref 10.3–14.5)
SODIUM SERPL-SCNC: 142 MMOL/L — SIGNIFICANT CHANGE UP (ref 135–145)
SODIUM SERPL-SCNC: 144 MMOL/L
TSH SERPL-MCNC: 2.51 UIU/ML — SIGNIFICANT CHANGE UP (ref 0.27–4.2)
WBC # BLD: 14.01 K/UL — HIGH (ref 3.8–10.5)
WBC # FLD AUTO: 14.01 K/UL — HIGH (ref 3.8–10.5)

## 2025-05-21 PROCEDURE — 99214 OFFICE O/P EST MOD 30 MIN: CPT

## 2025-05-21 PROCEDURE — G2211 COMPLEX E/M VISIT ADD ON: CPT | Mod: NC

## 2025-06-04 ENCOUNTER — RESULT REVIEW (OUTPATIENT)
Age: 25
End: 2025-06-04

## 2025-06-04 ENCOUNTER — APPOINTMENT (OUTPATIENT)
Dept: INFUSION THERAPY | Facility: HOSPITAL | Age: 25
End: 2025-06-04

## 2025-06-04 LAB
ALBUMIN SERPL ELPH-MCNC: 4.3 G/DL — SIGNIFICANT CHANGE UP (ref 3.3–5)
ALP SERPL-CCNC: 75 U/L — SIGNIFICANT CHANGE UP (ref 40–120)
ALT FLD-CCNC: 13 U/L — SIGNIFICANT CHANGE UP (ref 10–45)
ANION GAP SERPL CALC-SCNC: 12 MMOL/L — SIGNIFICANT CHANGE UP (ref 5–17)
AST SERPL-CCNC: 22 U/L — SIGNIFICANT CHANGE UP (ref 10–40)
BASOPHILS # BLD AUTO: 0.08 K/UL — SIGNIFICANT CHANGE UP (ref 0–0.2)
BASOPHILS NFR BLD AUTO: 0.8 % — SIGNIFICANT CHANGE UP (ref 0–2)
BILIRUB SERPL-MCNC: 0.4 MG/DL — SIGNIFICANT CHANGE UP (ref 0.2–1.2)
BUN SERPL-MCNC: 11 MG/DL — SIGNIFICANT CHANGE UP (ref 7–23)
CALCIUM SERPL-MCNC: 9.4 MG/DL — SIGNIFICANT CHANGE UP (ref 8.4–10.5)
CHLORIDE SERPL-SCNC: 105 MMOL/L — SIGNIFICANT CHANGE UP (ref 96–108)
CO2 SERPL-SCNC: 25 MMOL/L — SIGNIFICANT CHANGE UP (ref 22–31)
CREAT SERPL-MCNC: 0.93 MG/DL — SIGNIFICANT CHANGE UP (ref 0.5–1.3)
EGFR: 118 ML/MIN/1.73M2 — SIGNIFICANT CHANGE UP
EGFR: 118 ML/MIN/1.73M2 — SIGNIFICANT CHANGE UP
EOSINOPHIL # BLD AUTO: 0.68 K/UL — HIGH (ref 0–0.5)
EOSINOPHIL NFR BLD AUTO: 7.2 % — HIGH (ref 0–6)
GLUCOSE SERPL-MCNC: 94 MG/DL — SIGNIFICANT CHANGE UP (ref 70–99)
HCT VFR BLD CALC: 40.5 % — SIGNIFICANT CHANGE UP (ref 39–50)
HGB BLD-MCNC: 13.9 G/DL — SIGNIFICANT CHANGE UP (ref 13–17)
IMM GRANULOCYTES NFR BLD AUTO: 0.7 % — SIGNIFICANT CHANGE UP (ref 0–0.9)
LYMPHOCYTES # BLD AUTO: 1.46 K/UL — SIGNIFICANT CHANGE UP (ref 1–3.3)
LYMPHOCYTES # BLD AUTO: 15.4 % — SIGNIFICANT CHANGE UP (ref 13–44)
MCHC RBC-ENTMCNC: 28.6 PG — SIGNIFICANT CHANGE UP (ref 27–34)
MCHC RBC-ENTMCNC: 34.3 G/DL — SIGNIFICANT CHANGE UP (ref 32–36)
MCV RBC AUTO: 83.3 FL — SIGNIFICANT CHANGE UP (ref 80–100)
MONOCYTES # BLD AUTO: 0.41 K/UL — SIGNIFICANT CHANGE UP (ref 0–0.9)
MONOCYTES NFR BLD AUTO: 4.3 % — SIGNIFICANT CHANGE UP (ref 2–14)
NEUTROPHILS # BLD AUTO: 6.78 K/UL — SIGNIFICANT CHANGE UP (ref 1.8–7.4)
NEUTROPHILS NFR BLD AUTO: 71.6 % — SIGNIFICANT CHANGE UP (ref 43–77)
NRBC BLD AUTO-RTO: 0 /100 WBCS — SIGNIFICANT CHANGE UP (ref 0–0)
PLATELET # BLD AUTO: 293 K/UL — SIGNIFICANT CHANGE UP (ref 150–400)
POTASSIUM SERPL-MCNC: 4.1 MMOL/L — SIGNIFICANT CHANGE UP (ref 3.5–5.3)
POTASSIUM SERPL-SCNC: 4.1 MMOL/L — SIGNIFICANT CHANGE UP (ref 3.5–5.3)
PROT SERPL-MCNC: 7.4 G/DL — SIGNIFICANT CHANGE UP (ref 6–8.3)
RBC # BLD: 4.86 M/UL — SIGNIFICANT CHANGE UP (ref 4.2–5.8)
RBC # FLD: 14 % — SIGNIFICANT CHANGE UP (ref 10.3–14.5)
SODIUM SERPL-SCNC: 142 MMOL/L — SIGNIFICANT CHANGE UP (ref 135–145)
WBC # BLD: 9.48 K/UL — SIGNIFICANT CHANGE UP (ref 3.8–10.5)
WBC # FLD AUTO: 9.48 K/UL — SIGNIFICANT CHANGE UP (ref 3.8–10.5)

## 2025-06-18 ENCOUNTER — RESULT REVIEW (OUTPATIENT)
Age: 25
End: 2025-06-18

## 2025-06-18 ENCOUNTER — APPOINTMENT (OUTPATIENT)
Dept: INFUSION THERAPY | Facility: HOSPITAL | Age: 25
End: 2025-06-18

## 2025-06-18 ENCOUNTER — APPOINTMENT (OUTPATIENT)
Dept: HEMATOLOGY ONCOLOGY | Facility: CLINIC | Age: 25
End: 2025-06-18
Payer: MEDICAID

## 2025-06-18 LAB
ALBUMIN SERPL ELPH-MCNC: 4.5 G/DL — SIGNIFICANT CHANGE UP (ref 3.3–5)
ALP SERPL-CCNC: 75 U/L — SIGNIFICANT CHANGE UP (ref 40–120)
ALT FLD-CCNC: 15 U/L — SIGNIFICANT CHANGE UP (ref 10–45)
ANION GAP SERPL CALC-SCNC: 12 MMOL/L — SIGNIFICANT CHANGE UP (ref 5–17)
AST SERPL-CCNC: 18 U/L — SIGNIFICANT CHANGE UP (ref 10–40)
BASOPHILS # BLD AUTO: 0.08 K/UL — SIGNIFICANT CHANGE UP (ref 0–0.2)
BASOPHILS NFR BLD AUTO: 0.8 % — SIGNIFICANT CHANGE UP (ref 0–2)
BILIRUB SERPL-MCNC: 0.4 MG/DL — SIGNIFICANT CHANGE UP (ref 0.2–1.2)
BUN SERPL-MCNC: 16 MG/DL — SIGNIFICANT CHANGE UP (ref 7–23)
CALCIUM SERPL-MCNC: 9.8 MG/DL — SIGNIFICANT CHANGE UP (ref 8.4–10.5)
CHLORIDE SERPL-SCNC: 104 MMOL/L — SIGNIFICANT CHANGE UP (ref 96–108)
CO2 SERPL-SCNC: 28 MMOL/L — SIGNIFICANT CHANGE UP (ref 22–31)
CREAT SERPL-MCNC: 0.87 MG/DL — SIGNIFICANT CHANGE UP (ref 0.5–1.3)
EGFR: 124 ML/MIN/1.73M2 — SIGNIFICANT CHANGE UP
EGFR: 124 ML/MIN/1.73M2 — SIGNIFICANT CHANGE UP
EOSINOPHIL # BLD AUTO: 0.74 K/UL — HIGH (ref 0–0.5)
EOSINOPHIL NFR BLD AUTO: 7.6 % — HIGH (ref 0–6)
GLUCOSE SERPL-MCNC: 110 MG/DL — HIGH (ref 70–99)
HCT VFR BLD CALC: 43.8 % — SIGNIFICANT CHANGE UP (ref 39–50)
HGB BLD-MCNC: 14.4 G/DL — SIGNIFICANT CHANGE UP (ref 13–17)
IMM GRANULOCYTES NFR BLD AUTO: 0.1 % — SIGNIFICANT CHANGE UP (ref 0–0.9)
LYMPHOCYTES # BLD AUTO: 1.16 K/UL — SIGNIFICANT CHANGE UP (ref 1–3.3)
LYMPHOCYTES # BLD AUTO: 11.9 % — LOW (ref 13–44)
MCHC RBC-ENTMCNC: 27.9 PG — SIGNIFICANT CHANGE UP (ref 27–34)
MCHC RBC-ENTMCNC: 32.9 G/DL — SIGNIFICANT CHANGE UP (ref 32–36)
MCV RBC AUTO: 84.9 FL — SIGNIFICANT CHANGE UP (ref 80–100)
MONOCYTES # BLD AUTO: 0.43 K/UL — SIGNIFICANT CHANGE UP (ref 0–0.9)
MONOCYTES NFR BLD AUTO: 4.4 % — SIGNIFICANT CHANGE UP (ref 2–14)
NEUTROPHILS # BLD AUTO: 7.3 K/UL — SIGNIFICANT CHANGE UP (ref 1.8–7.4)
NEUTROPHILS NFR BLD AUTO: 75.2 % — SIGNIFICANT CHANGE UP (ref 43–77)
NRBC BLD AUTO-RTO: 0 /100 WBCS — SIGNIFICANT CHANGE UP (ref 0–0)
PLATELET # BLD AUTO: 263 K/UL — SIGNIFICANT CHANGE UP (ref 150–400)
POTASSIUM SERPL-MCNC: 4.7 MMOL/L — SIGNIFICANT CHANGE UP (ref 3.5–5.3)
POTASSIUM SERPL-SCNC: 4.7 MMOL/L — SIGNIFICANT CHANGE UP (ref 3.5–5.3)
PROT SERPL-MCNC: 7.8 G/DL — SIGNIFICANT CHANGE UP (ref 6–8.3)
RBC # BLD: 5.16 M/UL — SIGNIFICANT CHANGE UP (ref 4.2–5.8)
RBC # FLD: 14.8 % — HIGH (ref 10.3–14.5)
SODIUM SERPL-SCNC: 145 MMOL/L — SIGNIFICANT CHANGE UP (ref 135–145)
WBC # BLD: 9.72 K/UL — SIGNIFICANT CHANGE UP (ref 3.8–10.5)
WBC # FLD AUTO: 9.72 K/UL — SIGNIFICANT CHANGE UP (ref 3.8–10.5)

## 2025-06-18 PROCEDURE — 99213 OFFICE O/P EST LOW 20 MIN: CPT

## 2025-06-20 LAB
ALBUMIN SERPL ELPH-MCNC: 4.6 G/DL
ALP BLD-CCNC: 81 U/L
ALT SERPL-CCNC: 19 U/L
ANION GAP SERPL CALC-SCNC: 11 MMOL/L
AST SERPL-CCNC: 21 U/L
BILIRUB SERPL-MCNC: 0.4 MG/DL
BUN SERPL-MCNC: 17 MG/DL
CALCIUM SERPL-MCNC: 9.9 MG/DL
CHLORIDE SERPL-SCNC: 105 MMOL/L
CO2 SERPL-SCNC: 28 MMOL/L
CREAT SERPL-MCNC: 0.95 MG/DL
EGFRCR SERPLBLD CKD-EPI 2021: 115 ML/MIN/1.73M2
GLUCOSE SERPL-MCNC: 72 MG/DL
POTASSIUM SERPL-SCNC: 4.6 MMOL/L
PROT SERPL-MCNC: 7.4 G/DL
SODIUM SERPL-SCNC: 143 MMOL/L

## 2025-07-08 ENCOUNTER — APPOINTMENT (OUTPATIENT)
Dept: INFUSION THERAPY | Facility: HOSPITAL | Age: 25
End: 2025-07-08

## 2025-07-13 ENCOUNTER — OUTPATIENT (OUTPATIENT)
Dept: OUTPATIENT SERVICES | Facility: HOSPITAL | Age: 25
LOS: 1 days | Discharge: HOME | End: 2025-07-13

## 2025-07-13 DIAGNOSIS — C81.90 HODGKIN LYMPHOMA, UNSPECIFIED, UNSPECIFIED SITE: ICD-10-CM

## 2025-07-16 ENCOUNTER — RESULT REVIEW (OUTPATIENT)
Age: 25
End: 2025-07-16

## 2025-07-16 ENCOUNTER — APPOINTMENT (OUTPATIENT)
Dept: HEMATOLOGY ONCOLOGY | Facility: CLINIC | Age: 25
End: 2025-07-16
Payer: MEDICAID

## 2025-07-16 ENCOUNTER — APPOINTMENT (OUTPATIENT)
Dept: INFUSION THERAPY | Facility: HOSPITAL | Age: 25
End: 2025-07-16

## 2025-07-16 VITALS — RESPIRATION RATE: 16 BRPM | TEMPERATURE: 97.7 F | HEART RATE: 91 BPM | WEIGHT: 105.82 LBS | OXYGEN SATURATION: 99 %

## 2025-07-16 DIAGNOSIS — Z51.11 ENCOUNTER FOR ANTINEOPLASTIC CHEMOTHERAPY: ICD-10-CM

## 2025-07-16 LAB
ALBUMIN SERPL ELPH-MCNC: 4.4 G/DL — SIGNIFICANT CHANGE UP (ref 3.3–5)
ALP SERPL-CCNC: 90 U/L — SIGNIFICANT CHANGE UP (ref 40–120)
ALT FLD-CCNC: 16 U/L — SIGNIFICANT CHANGE UP (ref 10–45)
ANION GAP SERPL CALC-SCNC: 13 MMOL/L — SIGNIFICANT CHANGE UP (ref 5–17)
AST SERPL-CCNC: 23 U/L — SIGNIFICANT CHANGE UP (ref 10–40)
BASOPHILS # BLD AUTO: 0.07 K/UL — SIGNIFICANT CHANGE UP (ref 0–0.2)
BASOPHILS NFR BLD AUTO: 0.6 % — SIGNIFICANT CHANGE UP (ref 0–2)
BILIRUB SERPL-MCNC: 0.4 MG/DL — SIGNIFICANT CHANGE UP (ref 0.2–1.2)
BUN SERPL-MCNC: 20 MG/DL — SIGNIFICANT CHANGE UP (ref 7–23)
CALCIUM SERPL-MCNC: 9.8 MG/DL — SIGNIFICANT CHANGE UP (ref 8.4–10.5)
CHLORIDE SERPL-SCNC: 104 MMOL/L — SIGNIFICANT CHANGE UP (ref 96–108)
CO2 SERPL-SCNC: 26 MMOL/L — SIGNIFICANT CHANGE UP (ref 22–31)
CREAT SERPL-MCNC: 0.93 MG/DL — SIGNIFICANT CHANGE UP (ref 0.5–1.3)
EGFR: 118 ML/MIN/1.73M2 — SIGNIFICANT CHANGE UP
EGFR: 118 ML/MIN/1.73M2 — SIGNIFICANT CHANGE UP
EOSINOPHIL # BLD AUTO: 0.9 K/UL — HIGH (ref 0–0.5)
EOSINOPHIL NFR BLD AUTO: 8.3 % — HIGH (ref 0–6)
GLUCOSE SERPL-MCNC: 91 MG/DL — SIGNIFICANT CHANGE UP (ref 70–99)
HCT VFR BLD CALC: 42.7 % — SIGNIFICANT CHANGE UP (ref 39–50)
HGB BLD-MCNC: 14.4 G/DL — SIGNIFICANT CHANGE UP (ref 13–17)
IMM GRANULOCYTES NFR BLD AUTO: 0.4 % — SIGNIFICANT CHANGE UP (ref 0–0.9)
LYMPHOCYTES # BLD AUTO: 1.08 K/UL — SIGNIFICANT CHANGE UP (ref 1–3.3)
LYMPHOCYTES # BLD AUTO: 10 % — LOW (ref 13–44)
MCHC RBC-ENTMCNC: 28.7 PG — SIGNIFICANT CHANGE UP (ref 27–34)
MCHC RBC-ENTMCNC: 33.7 G/DL — SIGNIFICANT CHANGE UP (ref 32–36)
MCV RBC AUTO: 85.2 FL — SIGNIFICANT CHANGE UP (ref 80–100)
MONOCYTES # BLD AUTO: 0.62 K/UL — SIGNIFICANT CHANGE UP (ref 0–0.9)
MONOCYTES NFR BLD AUTO: 5.8 % — SIGNIFICANT CHANGE UP (ref 2–14)
NEUTROPHILS # BLD AUTO: 8.07 K/UL — HIGH (ref 1.8–7.4)
NEUTROPHILS NFR BLD AUTO: 74.9 % — SIGNIFICANT CHANGE UP (ref 43–77)
NRBC BLD AUTO-RTO: 0 /100 WBCS — SIGNIFICANT CHANGE UP (ref 0–0)
PLATELET # BLD AUTO: 273 K/UL — SIGNIFICANT CHANGE UP (ref 150–400)
POTASSIUM SERPL-MCNC: 4.5 MMOL/L — SIGNIFICANT CHANGE UP (ref 3.5–5.3)
POTASSIUM SERPL-SCNC: 4.5 MMOL/L — SIGNIFICANT CHANGE UP (ref 3.5–5.3)
PROT SERPL-MCNC: 7.4 G/DL — SIGNIFICANT CHANGE UP (ref 6–8.3)
RBC # BLD: 5.01 M/UL — SIGNIFICANT CHANGE UP (ref 4.2–5.8)
RBC # FLD: 15.2 % — HIGH (ref 10.3–14.5)
SODIUM SERPL-SCNC: 142 MMOL/L — SIGNIFICANT CHANGE UP (ref 135–145)
WBC # BLD: 10.78 K/UL — HIGH (ref 3.8–10.5)
WBC # FLD AUTO: 10.78 K/UL — HIGH (ref 3.8–10.5)

## 2025-07-16 PROCEDURE — 99213 OFFICE O/P EST LOW 20 MIN: CPT

## 2025-07-21 LAB
ALBUMIN SERPL ELPH-MCNC: 4.4 G/DL
ALP BLD-CCNC: 97 U/L
ALT SERPL-CCNC: 18 U/L
ANION GAP SERPL CALC-SCNC: 16 MMOL/L
AST SERPL-CCNC: 19 U/L
BILIRUB SERPL-MCNC: 0.4 MG/DL
BUN SERPL-MCNC: 18 MG/DL
CALCIUM SERPL-MCNC: 9.8 MG/DL
CHLORIDE SERPL-SCNC: 104 MMOL/L
CO2 SERPL-SCNC: 23 MMOL/L
CREAT SERPL-MCNC: 1.03 MG/DL
EGFRCR SERPLBLD CKD-EPI 2021: 104 ML/MIN/1.73M2
GLUCOSE SERPL-MCNC: 53 MG/DL
POTASSIUM SERPL-SCNC: 4.9 MMOL/L
PROT SERPL-MCNC: 7.3 G/DL
SODIUM SERPL-SCNC: 143 MMOL/L

## 2025-07-30 ENCOUNTER — APPOINTMENT (OUTPATIENT)
Dept: INFUSION THERAPY | Facility: HOSPITAL | Age: 25
End: 2025-07-30

## 2025-07-30 ENCOUNTER — RESULT REVIEW (OUTPATIENT)
Age: 25
End: 2025-07-30

## 2025-07-30 LAB
ALBUMIN SERPL ELPH-MCNC: 4.4 G/DL — SIGNIFICANT CHANGE UP (ref 3.3–5)
ALP SERPL-CCNC: 73 U/L — SIGNIFICANT CHANGE UP (ref 40–120)
ALT FLD-CCNC: 14 U/L — SIGNIFICANT CHANGE UP (ref 10–45)
ANION GAP SERPL CALC-SCNC: 16 MMOL/L — SIGNIFICANT CHANGE UP (ref 5–17)
AST SERPL-CCNC: 32 U/L — SIGNIFICANT CHANGE UP (ref 10–40)
BASOPHILS # BLD AUTO: 0.07 K/UL — SIGNIFICANT CHANGE UP (ref 0–0.2)
BASOPHILS NFR BLD AUTO: 0.7 % — SIGNIFICANT CHANGE UP (ref 0–2)
BILIRUB SERPL-MCNC: 0.3 MG/DL — SIGNIFICANT CHANGE UP (ref 0.2–1.2)
BUN SERPL-MCNC: 20 MG/DL — SIGNIFICANT CHANGE UP (ref 7–23)
CALCIUM SERPL-MCNC: 9.4 MG/DL — SIGNIFICANT CHANGE UP (ref 8.4–10.5)
CHLORIDE SERPL-SCNC: 104 MMOL/L — SIGNIFICANT CHANGE UP (ref 96–108)
CO2 SERPL-SCNC: 22 MMOL/L — SIGNIFICANT CHANGE UP (ref 22–31)
CREAT SERPL-MCNC: 0.82 MG/DL — SIGNIFICANT CHANGE UP (ref 0.5–1.3)
EGFR: 126 ML/MIN/1.73M2 — SIGNIFICANT CHANGE UP
EGFR: 126 ML/MIN/1.73M2 — SIGNIFICANT CHANGE UP
EOSINOPHIL # BLD AUTO: 0.77 K/UL — HIGH (ref 0–0.5)
EOSINOPHIL NFR BLD AUTO: 7.6 % — HIGH (ref 0–6)
GLUCOSE SERPL-MCNC: 88 MG/DL — SIGNIFICANT CHANGE UP (ref 70–99)
HCT VFR BLD CALC: 42.3 % — SIGNIFICANT CHANGE UP (ref 39–50)
HGB BLD-MCNC: 14.2 G/DL — SIGNIFICANT CHANGE UP (ref 13–17)
IMM GRANULOCYTES NFR BLD AUTO: 0.4 % — SIGNIFICANT CHANGE UP (ref 0–0.9)
LYMPHOCYTES # BLD AUTO: 1.45 K/UL — SIGNIFICANT CHANGE UP (ref 1–3.3)
LYMPHOCYTES # BLD AUTO: 14.3 % — SIGNIFICANT CHANGE UP (ref 13–44)
MCHC RBC-ENTMCNC: 28.4 PG — SIGNIFICANT CHANGE UP (ref 27–34)
MCHC RBC-ENTMCNC: 33.6 G/DL — SIGNIFICANT CHANGE UP (ref 32–36)
MCV RBC AUTO: 84.6 FL — SIGNIFICANT CHANGE UP (ref 80–100)
MONOCYTES # BLD AUTO: 0.5 K/UL — SIGNIFICANT CHANGE UP (ref 0–0.9)
MONOCYTES NFR BLD AUTO: 4.9 % — SIGNIFICANT CHANGE UP (ref 2–14)
NEUTROPHILS # BLD AUTO: 7.34 K/UL — SIGNIFICANT CHANGE UP (ref 1.8–7.4)
NEUTROPHILS NFR BLD AUTO: 72.1 % — SIGNIFICANT CHANGE UP (ref 43–77)
NRBC BLD AUTO-RTO: 0 /100 WBCS — SIGNIFICANT CHANGE UP (ref 0–0)
PLATELET # BLD AUTO: 248 K/UL — SIGNIFICANT CHANGE UP (ref 150–400)
POTASSIUM SERPL-MCNC: 4.4 MMOL/L — SIGNIFICANT CHANGE UP (ref 3.5–5.3)
POTASSIUM SERPL-SCNC: 4.4 MMOL/L — SIGNIFICANT CHANGE UP (ref 3.5–5.3)
PROT SERPL-MCNC: 7.5 G/DL — SIGNIFICANT CHANGE UP (ref 6–8.3)
RBC # BLD: 5 M/UL — SIGNIFICANT CHANGE UP (ref 4.2–5.8)
RBC # FLD: 14.7 % — HIGH (ref 10.3–14.5)
SODIUM SERPL-SCNC: 142 MMOL/L — SIGNIFICANT CHANGE UP (ref 135–145)
WBC # BLD: 10.17 K/UL — SIGNIFICANT CHANGE UP (ref 3.8–10.5)
WBC # FLD AUTO: 10.17 K/UL — SIGNIFICANT CHANGE UP (ref 3.8–10.5)

## 2025-07-31 LAB
ALBUMIN SERPL ELPH-MCNC: 4.5 G/DL
ALP BLD-CCNC: 78 U/L
ALT SERPL-CCNC: 18 U/L
ANION GAP SERPL CALC-SCNC: 13 MMOL/L
AST SERPL-CCNC: 19 U/L
BILIRUB SERPL-MCNC: 0.3 MG/DL
BUN SERPL-MCNC: 22 MG/DL
CALCIUM SERPL-MCNC: 9.8 MG/DL
CHLORIDE SERPL-SCNC: 104 MMOL/L
CO2 SERPL-SCNC: 24 MMOL/L
CREAT SERPL-MCNC: 0.98 MG/DL
EGFRCR SERPLBLD CKD-EPI 2021: 110 ML/MIN/1.73M2
GLUCOSE SERPL-MCNC: 84 MG/DL
POTASSIUM SERPL-SCNC: 4.3 MMOL/L
PROT SERPL-MCNC: 7.4 G/DL
SODIUM SERPL-SCNC: 141 MMOL/L

## 2025-08-13 ENCOUNTER — RESULT REVIEW (OUTPATIENT)
Age: 25
End: 2025-08-13

## 2025-08-13 ENCOUNTER — APPOINTMENT (OUTPATIENT)
Dept: INFUSION THERAPY | Facility: HOSPITAL | Age: 25
End: 2025-08-13

## 2025-08-13 ENCOUNTER — APPOINTMENT (OUTPATIENT)
Dept: HEMATOLOGY ONCOLOGY | Facility: CLINIC | Age: 25
End: 2025-08-13
Payer: SELF-PAY

## 2025-08-13 DIAGNOSIS — C81.70 OTHER HODGKIN LYMPHOMA, UNSPECIFIED SITE: ICD-10-CM

## 2025-08-13 PROCEDURE — G2211 COMPLEX E/M VISIT ADD ON: CPT

## 2025-08-13 PROCEDURE — 99215 OFFICE O/P EST HI 40 MIN: CPT

## 2025-08-27 ENCOUNTER — RESULT REVIEW (OUTPATIENT)
Age: 25
End: 2025-08-27

## 2025-08-27 ENCOUNTER — APPOINTMENT (OUTPATIENT)
Dept: INFUSION THERAPY | Facility: HOSPITAL | Age: 25
End: 2025-08-27

## 2025-08-27 ENCOUNTER — APPOINTMENT (OUTPATIENT)
Dept: HEMATOLOGY ONCOLOGY | Facility: CLINIC | Age: 25
End: 2025-08-27

## 2025-08-28 LAB
ALBUMIN SERPL ELPH-MCNC: 5 G/DL
ALP BLD-CCNC: 94 U/L
ALT SERPL-CCNC: 19 U/L
ANION GAP SERPL CALC-SCNC: 15 MMOL/L
AST SERPL-CCNC: 24 U/L
BILIRUB SERPL-MCNC: 0.8 MG/DL
BUN SERPL-MCNC: 17 MG/DL
CALCIUM SERPL-MCNC: 10.4 MG/DL
CHLORIDE SERPL-SCNC: 100 MMOL/L
CO2 SERPL-SCNC: 26 MMOL/L
CREAT SERPL-MCNC: 1.04 MG/DL
EGFRCR SERPLBLD CKD-EPI 2021: 103 ML/MIN/1.73M2
GLUCOSE SERPL-MCNC: 99 MG/DL
POTASSIUM SERPL-SCNC: 4.4 MMOL/L
PROT SERPL-MCNC: 8 G/DL
SODIUM SERPL-SCNC: 142 MMOL/L

## 2025-09-10 ENCOUNTER — APPOINTMENT (OUTPATIENT)
Dept: INFUSION THERAPY | Facility: HOSPITAL | Age: 25
End: 2025-09-10

## 2025-09-10 ENCOUNTER — RESULT REVIEW (OUTPATIENT)
Age: 25
End: 2025-09-10

## 2025-09-10 ENCOUNTER — APPOINTMENT (OUTPATIENT)
Dept: HEMATOLOGY ONCOLOGY | Facility: CLINIC | Age: 25
End: 2025-09-10
Payer: SELF-PAY

## 2025-09-10 DIAGNOSIS — C81.70 OTHER HODGKIN LYMPHOMA, UNSPECIFIED SITE: ICD-10-CM

## 2025-09-10 PROCEDURE — 99213 OFFICE O/P EST LOW 20 MIN: CPT
